# Patient Record
Sex: MALE | Race: WHITE | NOT HISPANIC OR LATINO | URBAN - METROPOLITAN AREA
[De-identification: names, ages, dates, MRNs, and addresses within clinical notes are randomized per-mention and may not be internally consistent; named-entity substitution may affect disease eponyms.]

---

## 2016-10-05 RX ORDER — LEVOTHYROXINE SODIUM 125 MCG
1 TABLET ORAL
Qty: 0 | Refills: 0 | COMMUNITY
Start: 2016-10-05

## 2016-10-05 RX ORDER — SPIRONOLACTONE 25 MG/1
1 TABLET, FILM COATED ORAL
Qty: 30 | Refills: 0 | COMMUNITY
Start: 2016-10-05 | End: 2016-11-04

## 2017-06-22 ENCOUNTER — OUTPATIENT (OUTPATIENT)
Dept: OUTPATIENT SERVICES | Facility: HOSPITAL | Age: 82
LOS: 1 days | End: 2017-06-22
Payer: MEDICARE

## 2017-06-22 VITALS
HEIGHT: 70 IN | DIASTOLIC BLOOD PRESSURE: 65 MMHG | OXYGEN SATURATION: 98 % | WEIGHT: 177.03 LBS | HEART RATE: 76 BPM | RESPIRATION RATE: 16 BRPM | TEMPERATURE: 98 F | SYSTOLIC BLOOD PRESSURE: 102 MMHG

## 2017-06-22 DIAGNOSIS — Z98.89 OTHER SPECIFIED POSTPROCEDURAL STATES: Chronic | ICD-10-CM

## 2017-06-22 DIAGNOSIS — Z95.0 PRESENCE OF CARDIAC PACEMAKER: Chronic | ICD-10-CM

## 2017-06-22 DIAGNOSIS — G56.02 CARPAL TUNNEL SYNDROME, LEFT UPPER LIMB: ICD-10-CM

## 2017-06-22 DIAGNOSIS — I48.0 PAROXYSMAL ATRIAL FIBRILLATION: ICD-10-CM

## 2017-06-22 DIAGNOSIS — Z98.890 OTHER SPECIFIED POSTPROCEDURAL STATES: Chronic | ICD-10-CM

## 2017-06-22 DIAGNOSIS — G56.00 CARPAL TUNNEL SYNDROME, UNSPECIFIED UPPER LIMB: ICD-10-CM

## 2017-06-22 DIAGNOSIS — K56.69 OTHER INTESTINAL OBSTRUCTION: Chronic | ICD-10-CM

## 2017-06-22 DIAGNOSIS — K66.0 PERITONEAL ADHESIONS (POSTPROCEDURAL) (POSTINFECTION): Chronic | ICD-10-CM

## 2017-06-22 DIAGNOSIS — Z01.818 ENCOUNTER FOR OTHER PREPROCEDURAL EXAMINATION: ICD-10-CM

## 2017-06-22 DIAGNOSIS — Z95.1 PRESENCE OF AORTOCORONARY BYPASS GRAFT: Chronic | ICD-10-CM

## 2017-06-22 DIAGNOSIS — Z90.49 ACQUIRED ABSENCE OF OTHER SPECIFIED PARTS OF DIGESTIVE TRACT: Chronic | ICD-10-CM

## 2017-06-22 DIAGNOSIS — I44.2 ATRIOVENTRICULAR BLOCK, COMPLETE: ICD-10-CM

## 2017-06-22 DIAGNOSIS — Z98.49 CATARACT EXTRACTION STATUS, UNSPECIFIED EYE: Chronic | ICD-10-CM

## 2017-06-22 DIAGNOSIS — Z85.828 PERSONAL HISTORY OF OTHER MALIGNANT NEOPLASM OF SKIN: Chronic | ICD-10-CM

## 2017-06-22 PROCEDURE — G0463: CPT

## 2017-06-22 RX ORDER — SODIUM CHLORIDE 9 MG/ML
3 INJECTION INTRAMUSCULAR; INTRAVENOUS; SUBCUTANEOUS EVERY 8 HOURS
Qty: 0 | Refills: 0 | Status: DISCONTINUED | OUTPATIENT
Start: 2017-06-28 | End: 2017-07-13

## 2017-06-22 NOTE — H&P PST ADULT - HISTORY OF PRESENT ILLNESS
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  Patient reports that one week ago he began noticing that he wasn't feeling quite his usual self.  Patient's wide had just passed away at the beginning of this month however.  Patient noticed that he began to feel slightly more short of breath, and as he is a physician himself he bumped up his dose of lasix and noticed some improvement.  However, he was then attending a tennis match with his grandchildren when he noticed that he became very lightheaded though this seemed to resolve.  However, his shortness of breath had not improved much and he went to see his cardiologist yesterday and had a cxr done which had shown pulmonary edema.  Because of this he was recommended to increase his lasix use and was planned for an echocardiogram on monday.  However, patient felt very fatigued yesterday-very weak which is not his usual active self to the extent where he felt he was too weak to walk around.  Because of this the patient came to the [facility] ED.  Patient reports that his pulse ox dropped as low as 82% on RA by the time he came to the ED and he was feeling very dyspneic and having "a lot of air hunger".  Patient denies any fevers/chills, denies chest pain, denies palpitations.  He has noticed a mild cough he denies any sputum production.  Of note the patient does mention that while his wife was recently dying she was in the hospital for a prolonged period of time, so up until 3 weeks ago he had been regularly visiting her in the hospital and bringing her back and forth for a long period of time.  His daughter also reports a recent URI type illness.    In the [facility] ED patient received lasix 40mg iv x 1, zosyn 3.375g iv x 1, tylenol 975mg po x 1 and vancomycin 1g iv x 1 and was briefly on BiPAP for respiratory failure. 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 Pt had surgical consult- carpal tunnel syndrome- for left carpal tunnel release and flexor tenosynovectomy on 06/28/2017

## 2017-06-22 NOTE — H&P PST ADULT - PSH
Adhesion of intestine  Relese of abdominal adhesions- 2002  Artificial cardiac pacemaker  2008  H/O hernia repair  bilateral IHR x 2  History of appendectomy    History of carpal tunnel release, unspecified laterality  right wrist- 2002  History of colon resection  1995  History of skin cancer    Hx of cataract surgery, unspecified laterality    S/P CABG x 3  2002  SBO (small bowel obstruction)  03/2017 Adhesion of intestine  Relese of abdominal adhesions- 2002  Artificial cardiac pacemaker  2008  H/O hernia repair  bilateral IHR x 2  H/O shoulder surgery  Right shoulder repair- 2001  History of appendectomy    History of carpal tunnel release, unspecified laterality  right wrist- 2002  History of colon resection  1995  History of skin cancer    Hx of cataract surgery, unspecified laterality    S/P CABG x 3  2002  SBO (small bowel obstruction)  03/2017

## 2017-06-22 NOTE — H&P PST ADULT - PMH
Abscess  diverticular  Acute on chronic congestive heart failure, unspecified congestive heart failure type  2016  Basal cell carcinoma    Benign prostatic hyperplasia, presence of lower urinary tract symptoms unspecified, unspecified morphology    Carpal tunnel syndrome of left wrist    Chronic gout with tophus, unspecified cause, unspecified site    Complete heart block  s/p PPM insertion-2008  Battery change- 2015  Last interrogation-05/2017  Coronary artery disease involving native heart without angina pectoris, unspecified vessel or lesion type    Hypothyroidism, unspecified type    Melanoma in situ of face excluding eyelid, nose, lip, and ear    Paroxysmal atrial fibrillation  on Coumadin Abscess  diverticular  Acute on chronic congestive heart failure, unspecified congestive heart failure type  2016  Basal cell carcinoma    Benign prostatic hyperplasia, presence of lower urinary tract symptoms unspecified, unspecified morphology    BOOP (bronchiolitis obliterans with organizing pneumonia)  09/2016 after colonoscopy  Carpal tunnel syndrome of left wrist    Chronic gout with tophus, unspecified cause, unspecified site    Complete heart block  s/p PPM insertion-2008  Battery change- 2015  Last interrogation-05/2017  Coronary artery disease involving native heart without angina pectoris, unspecified vessel or lesion type    Hypothyroidism, unspecified type    Melanoma in situ of face excluding eyelid, nose, lip, and ear    Paroxysmal atrial fibrillation  on Coumadin

## 2017-06-22 NOTE — H&P PST ADULT - NSANTHOSAYNRD_GEN_A_CORE
No. MALCOM screening performed.  STOP BANG Legend: 0-2 = LOW Risk; 3-4 = INTERMEDIATE Risk; 5-8 = HIGH Risk

## 2017-06-22 NOTE — H&P PST ADULT - ATTENDING COMMENTS
The patient is admitted today for a left CTR and flexor tenosynovectomy.  I have reviewed the procedure in detail again today with the patient.  The numerous risks, benefits, alternatives, possible complications and expectations are reviewed.  All questions have been thoroughly answered and the patient has verbalized understanding of all of the above and gives consent to proceed

## 2017-06-22 NOTE — H&P PST ADULT - PROBLEM SELECTOR PLAN 2
Last dose of Coumadin on 06/23/2017- INR 2.4 on 06/17/2017  will bridge to Lovenox 80 mg BID on 06/26/2017- Last dose on 06/27/2017 pm

## 2017-06-26 RX ORDER — ENOXAPARIN SODIUM 100 MG/ML
80 INJECTION SUBCUTANEOUS
Qty: 0 | Refills: 0 | COMMUNITY
Start: 2017-06-26

## 2017-06-28 ENCOUNTER — TRANSCRIPTION ENCOUNTER (OUTPATIENT)
Age: 82
End: 2017-06-28

## 2017-06-28 ENCOUNTER — RESULT REVIEW (OUTPATIENT)
Age: 82
End: 2017-06-28

## 2017-06-28 ENCOUNTER — OUTPATIENT (OUTPATIENT)
Dept: OUTPATIENT SERVICES | Facility: HOSPITAL | Age: 82
LOS: 1 days | End: 2017-06-28
Payer: MEDICARE

## 2017-06-28 VITALS
RESPIRATION RATE: 16 BRPM | SYSTOLIC BLOOD PRESSURE: 106 MMHG | HEIGHT: 70 IN | HEART RATE: 81 BPM | WEIGHT: 177.03 LBS | TEMPERATURE: 98 F | OXYGEN SATURATION: 99 % | DIASTOLIC BLOOD PRESSURE: 70 MMHG

## 2017-06-28 VITALS
HEART RATE: 81 BPM | RESPIRATION RATE: 16 BRPM | OXYGEN SATURATION: 99 % | DIASTOLIC BLOOD PRESSURE: 66 MMHG | SYSTOLIC BLOOD PRESSURE: 109 MMHG

## 2017-06-28 DIAGNOSIS — Z98.89 OTHER SPECIFIED POSTPROCEDURAL STATES: Chronic | ICD-10-CM

## 2017-06-28 DIAGNOSIS — Z90.49 ACQUIRED ABSENCE OF OTHER SPECIFIED PARTS OF DIGESTIVE TRACT: Chronic | ICD-10-CM

## 2017-06-28 DIAGNOSIS — K66.0 PERITONEAL ADHESIONS (POSTPROCEDURAL) (POSTINFECTION): Chronic | ICD-10-CM

## 2017-06-28 DIAGNOSIS — G56.00 CARPAL TUNNEL SYNDROME, UNSPECIFIED UPPER LIMB: ICD-10-CM

## 2017-06-28 DIAGNOSIS — Z98.890 OTHER SPECIFIED POSTPROCEDURAL STATES: Chronic | ICD-10-CM

## 2017-06-28 DIAGNOSIS — Z95.1 PRESENCE OF AORTOCORONARY BYPASS GRAFT: Chronic | ICD-10-CM

## 2017-06-28 DIAGNOSIS — Z85.828 PERSONAL HISTORY OF OTHER MALIGNANT NEOPLASM OF SKIN: Chronic | ICD-10-CM

## 2017-06-28 DIAGNOSIS — Z95.0 PRESENCE OF CARDIAC PACEMAKER: Chronic | ICD-10-CM

## 2017-06-28 DIAGNOSIS — Z98.49 CATARACT EXTRACTION STATUS, UNSPECIFIED EYE: Chronic | ICD-10-CM

## 2017-06-28 DIAGNOSIS — K56.69 OTHER INTESTINAL OBSTRUCTION: Chronic | ICD-10-CM

## 2017-06-28 PROCEDURE — 26145 TENDON EXCISION PALM/FINGER: CPT | Mod: LT

## 2017-06-28 PROCEDURE — 88304 TISSUE EXAM BY PATHOLOGIST: CPT

## 2017-06-28 PROCEDURE — 64721 CARPAL TUNNEL SURGERY: CPT | Mod: LT

## 2017-06-28 PROCEDURE — 88304 TISSUE EXAM BY PATHOLOGIST: CPT | Mod: 26

## 2017-06-28 RX ORDER — ACETAMINOPHEN 500 MG
975 TABLET ORAL ONCE
Qty: 0 | Refills: 0 | Status: DISCONTINUED | OUTPATIENT
Start: 2017-06-28 | End: 2017-07-13

## 2017-06-28 RX ORDER — SODIUM CHLORIDE 9 MG/ML
1000 INJECTION INTRAMUSCULAR; INTRAVENOUS; SUBCUTANEOUS
Qty: 0 | Refills: 0 | Status: DISCONTINUED | OUTPATIENT
Start: 2017-06-28 | End: 2017-07-13

## 2017-06-28 RX ORDER — ONDANSETRON 8 MG/1
4 TABLET, FILM COATED ORAL ONCE
Qty: 0 | Refills: 0 | Status: DISCONTINUED | OUTPATIENT
Start: 2017-06-28 | End: 2017-07-13

## 2017-06-28 NOTE — PRE-ANESTHESIA EVALUATION ADULT - NSANTHPMHFT_GEN_ALL_CORE
2002- cabg, 2016- re-do pacemaker 2002- CABG, 2016- re-do pacemaker, last time battery checked was May 2017, was inserthe for CHB  ASHD, s/P CABG, EF 30-35%  SBO 3/17 resolved

## 2017-06-28 NOTE — ASU PATIENT PROFILE, ADULT - PMH
Abscess  diverticular  Acute on chronic congestive heart failure, unspecified congestive heart failure type  2016  Basal cell carcinoma    Benign prostatic hyperplasia, presence of lower urinary tract symptoms unspecified, unspecified morphology    BOOP (bronchiolitis obliterans with organizing pneumonia)  09/2016 after colonoscopy  Carpal tunnel syndrome of left wrist    Chronic gout with tophus, unspecified cause, unspecified site    Complete heart block  s/p PPM insertion-2008  Battery change- 2015  Last interrogation-05/2017  Coronary artery disease involving native heart without angina pectoris, unspecified vessel or lesion type    Hypothyroidism, unspecified type    Melanoma in situ of face excluding eyelid, nose, lip, and ear    Paroxysmal atrial fibrillation  on Coumadin

## 2017-06-28 NOTE — ASU PATIENT PROFILE, ADULT - PSH
Adhesion of intestine  Relese of abdominal adhesions- 2002  Artificial cardiac pacemaker  2008  H/O hernia repair  bilateral IHR x 2  H/O shoulder surgery  Right shoulder repair- 2001  History of appendectomy    History of carpal tunnel release, unspecified laterality  right wrist- 2002  History of colon resection  1995  History of skin cancer    Hx of cataract surgery, unspecified laterality    S/P CABG x 3  2002  SBO (small bowel obstruction)  03/2017

## 2017-11-02 ENCOUNTER — APPOINTMENT (OUTPATIENT)
Dept: SURGERY | Facility: CLINIC | Age: 82
End: 2017-11-02
Payer: MEDICARE

## 2017-11-02 VITALS
WEIGHT: 174 LBS | OXYGEN SATURATION: 98 % | HEIGHT: 70 IN | DIASTOLIC BLOOD PRESSURE: 81 MMHG | RESPIRATION RATE: 16 BRPM | BODY MASS INDEX: 24.91 KG/M2 | SYSTOLIC BLOOD PRESSURE: 116 MMHG | TEMPERATURE: 98 F | HEART RATE: 86 BPM

## 2017-11-02 DIAGNOSIS — Z63.4 DISAPPEARANCE AND DEATH OF FAMILY MEMBER: ICD-10-CM

## 2017-11-02 DIAGNOSIS — I50.9 HEART FAILURE, UNSPECIFIED: ICD-10-CM

## 2017-11-02 DIAGNOSIS — I25.10 ATHEROSCLEROTIC HEART DISEASE OF NATIVE CORONARY ARTERY W/OUT ANGINA PECTORIS: ICD-10-CM

## 2017-11-02 DIAGNOSIS — Z87.09 PERSONAL HISTORY OF OTHER DISEASES OF THE RESPIRATORY SYSTEM: ICD-10-CM

## 2017-11-02 DIAGNOSIS — E78.00 PURE HYPERCHOLESTEROLEMIA, UNSPECIFIED: ICD-10-CM

## 2017-11-02 DIAGNOSIS — N40.0 BENIGN PROSTATIC HYPERPLASIA WITHOUT LOWER URINARY TRACT SYMPMS: ICD-10-CM

## 2017-11-02 DIAGNOSIS — Z87.01 PERSONAL HISTORY OF PNEUMONIA (RECURRENT): ICD-10-CM

## 2017-11-02 DIAGNOSIS — K57.80 DIVERTICULITIS OF INTESTINE, PART UNSPECIFIED, WITH PERFORATION AND ABSCESS W/OUT BLEEDING: ICD-10-CM

## 2017-11-02 DIAGNOSIS — Z87.19 PERSONAL HISTORY OF OTHER DISEASES OF THE DIGESTIVE SYSTEM: ICD-10-CM

## 2017-11-02 DIAGNOSIS — E03.9 HYPOTHYROIDISM, UNSPECIFIED: ICD-10-CM

## 2017-11-02 PROCEDURE — 99205 OFFICE O/P NEW HI 60 MIN: CPT

## 2017-11-02 RX ORDER — METHYLCELLULOSE 2 G/10.2G
POWDER, FOR SOLUTION ORAL
Refills: 0 | Status: ACTIVE | COMMUNITY

## 2017-11-02 RX ORDER — SPIRONOLACTONE 25 MG/1
25 TABLET ORAL
Refills: 0 | Status: ACTIVE | COMMUNITY

## 2017-11-02 RX ORDER — WARFARIN SODIUM 5 MG/1
5 TABLET ORAL
Refills: 0 | Status: ACTIVE | COMMUNITY

## 2017-11-02 RX ORDER — LEVOTHYROXINE SODIUM 112 UG/1
112 TABLET ORAL
Refills: 0 | Status: ACTIVE | COMMUNITY

## 2017-11-02 RX ORDER — SOTALOL HYDROCHLORIDE TABLES AF 80 MG/1
80 TABLET ORAL
Refills: 0 | Status: ACTIVE | COMMUNITY

## 2017-11-02 RX ORDER — ALLOPURINOL 100 MG/1
100 TABLET ORAL
Refills: 0 | Status: ACTIVE | COMMUNITY

## 2017-11-02 RX ORDER — DOCUSATE SODIUM 100 MG/1
CAPSULE ORAL
Refills: 0 | Status: ACTIVE | COMMUNITY

## 2017-11-02 RX ORDER — POTASSIUM CHLORIDE 750 MG/1
10 CAPSULE, EXTENDED RELEASE ORAL
Refills: 0 | Status: ACTIVE | COMMUNITY

## 2017-11-02 RX ORDER — SILODOSIN 8 MG/1
8 CAPSULE ORAL
Refills: 0 | Status: ACTIVE | COMMUNITY

## 2017-11-02 RX ORDER — FUROSEMIDE 40 MG/1
40 TABLET ORAL
Refills: 0 | Status: ACTIVE | COMMUNITY

## 2017-11-02 SDOH — SOCIAL STABILITY - SOCIAL INSECURITY: DISSAPEARANCE AND DEATH OF FAMILY MEMBER: Z63.4

## 2017-11-03 ENCOUNTER — OTHER (OUTPATIENT)
Age: 82
End: 2017-11-03

## 2017-11-13 ENCOUNTER — OUTPATIENT (OUTPATIENT)
Dept: OUTPATIENT SERVICES | Facility: HOSPITAL | Age: 82
LOS: 1 days | End: 2017-11-13

## 2017-11-13 VITALS
RESPIRATION RATE: 18 BRPM | SYSTOLIC BLOOD PRESSURE: 101 MMHG | DIASTOLIC BLOOD PRESSURE: 68 MMHG | HEIGHT: 70 IN | HEART RATE: 79 BPM | WEIGHT: 175.05 LBS | TEMPERATURE: 98 F

## 2017-11-13 DIAGNOSIS — K40.90 UNILATERAL INGUINAL HERNIA, WITHOUT OBSTRUCTION OR GANGRENE, NOT SPECIFIED AS RECURRENT: ICD-10-CM

## 2017-11-13 DIAGNOSIS — Z85.828 PERSONAL HISTORY OF OTHER MALIGNANT NEOPLASM OF SKIN: Chronic | ICD-10-CM

## 2017-11-13 DIAGNOSIS — Z98.890 OTHER SPECIFIED POSTPROCEDURAL STATES: Chronic | ICD-10-CM

## 2017-11-13 DIAGNOSIS — Z01.818 ENCOUNTER FOR OTHER PREPROCEDURAL EXAMINATION: ICD-10-CM

## 2017-11-13 DIAGNOSIS — K40.91 UNILATERAL INGUINAL HERNIA, WITHOUT OBSTRUCTION OR GANGRENE, RECURRENT: ICD-10-CM

## 2017-11-13 DIAGNOSIS — Z90.49 ACQUIRED ABSENCE OF OTHER SPECIFIED PARTS OF DIGESTIVE TRACT: Chronic | ICD-10-CM

## 2017-11-13 DIAGNOSIS — Z95.0 PRESENCE OF CARDIAC PACEMAKER: ICD-10-CM

## 2017-11-13 DIAGNOSIS — Z95.1 PRESENCE OF AORTOCORONARY BYPASS GRAFT: Chronic | ICD-10-CM

## 2017-11-13 DIAGNOSIS — Z98.89 OTHER SPECIFIED POSTPROCEDURAL STATES: Chronic | ICD-10-CM

## 2017-11-13 DIAGNOSIS — K56.69 OTHER INTESTINAL OBSTRUCTION: Chronic | ICD-10-CM

## 2017-11-13 DIAGNOSIS — Z95.0 PRESENCE OF CARDIAC PACEMAKER: Chronic | ICD-10-CM

## 2017-11-13 DIAGNOSIS — K66.0 PERITONEAL ADHESIONS (POSTPROCEDURAL) (POSTINFECTION): Chronic | ICD-10-CM

## 2017-11-13 DIAGNOSIS — Z98.49 CATARACT EXTRACTION STATUS, UNSPECIFIED EYE: Chronic | ICD-10-CM

## 2017-11-13 DIAGNOSIS — I48.0 PAROXYSMAL ATRIAL FIBRILLATION: ICD-10-CM

## 2017-11-13 LAB
ANION GAP SERPL CALC-SCNC: 15 MMOL/L — SIGNIFICANT CHANGE UP (ref 5–17)
BUN SERPL-MCNC: 31 MG/DL — HIGH (ref 7–23)
CALCIUM SERPL-MCNC: 9.4 MG/DL — SIGNIFICANT CHANGE UP (ref 8.4–10.5)
CHLORIDE SERPL-SCNC: 100 MMOL/L — SIGNIFICANT CHANGE UP (ref 96–108)
CO2 SERPL-SCNC: 25 MMOL/L — SIGNIFICANT CHANGE UP (ref 22–31)
CREAT SERPL-MCNC: 1.32 MG/DL — HIGH (ref 0.5–1.3)
GLUCOSE SERPL-MCNC: 60 MG/DL — LOW (ref 70–99)
HCT VFR BLD CALC: 40.2 % — SIGNIFICANT CHANGE UP (ref 39–50)
HGB BLD-MCNC: 13 G/DL — SIGNIFICANT CHANGE UP (ref 13–17)
MCHC RBC-ENTMCNC: 30.7 PG — SIGNIFICANT CHANGE UP (ref 27–34)
MCHC RBC-ENTMCNC: 32.3 GM/DL — SIGNIFICANT CHANGE UP (ref 32–36)
MCV RBC AUTO: 94.8 FL — SIGNIFICANT CHANGE UP (ref 80–100)
PLATELET # BLD AUTO: 156 K/UL — SIGNIFICANT CHANGE UP (ref 150–400)
POTASSIUM SERPL-MCNC: 4.6 MMOL/L — SIGNIFICANT CHANGE UP (ref 3.5–5.3)
POTASSIUM SERPL-SCNC: 4.6 MMOL/L — SIGNIFICANT CHANGE UP (ref 3.5–5.3)
RBC # BLD: 4.24 M/UL — SIGNIFICANT CHANGE UP (ref 4.2–5.8)
RBC # FLD: 15.2 % — HIGH (ref 10.3–14.5)
SODIUM SERPL-SCNC: 140 MMOL/L — SIGNIFICANT CHANGE UP (ref 135–145)
WBC # BLD: 6.77 K/UL — SIGNIFICANT CHANGE UP (ref 3.8–10.5)
WBC # FLD AUTO: 6.77 K/UL — SIGNIFICANT CHANGE UP (ref 3.8–10.5)

## 2017-11-13 RX ORDER — SILODOSIN 4 MG/1
1 CAPSULE ORAL
Qty: 0 | Refills: 0 | COMMUNITY

## 2017-11-13 NOTE — H&P PST ADULT - HISTORY OF PRESENT ILLNESS
82 y/o M PMH CAD, S/P CABG X 3, paroxysmal A fib, complete heart block, S/P PPM 2015, TIA 82 y/o M PMH CAD, S/P CABG X 3  , paroxysmal A fib, complete heart block, S/P PPM 2015, left ocular thrombus 2012, chronic CHF, BOOP developed about 3-4 days after colonoscopy one year ago, was given Propofol for sedation, the patient feels he may have been having an exacerbation of CHF prior to colonoscopy, c/o left inguinal hernia.  Presents today for recurrent left inguinal hernia repair.

## 2017-11-13 NOTE — H&P PST ADULT - PROBLEM SELECTOR PLAN 2
Holding coumadin since 11/11, bridging with Lovenox 80 mg SQ BID, holding dose 24 hours before surgery

## 2017-11-14 ENCOUNTER — EMERGENCY (EMERGENCY)
Facility: HOSPITAL | Age: 82
LOS: 1 days | Discharge: HOME | End: 2017-11-14
Attending: EMERGENCY MEDICINE | Admitting: INTERNAL MEDICINE
Payer: MEDICARE

## 2017-11-14 VITALS
RESPIRATION RATE: 16 BRPM | DIASTOLIC BLOOD PRESSURE: 75 MMHG | HEART RATE: 90 BPM | OXYGEN SATURATION: 98 % | HEIGHT: 70 IN | SYSTOLIC BLOOD PRESSURE: 116 MMHG | WEIGHT: 175.05 LBS | TEMPERATURE: 98 F

## 2017-11-14 DIAGNOSIS — Z98.49 CATARACT EXTRACTION STATUS, UNSPECIFIED EYE: Chronic | ICD-10-CM

## 2017-11-14 DIAGNOSIS — Z98.890 OTHER SPECIFIED POSTPROCEDURAL STATES: Chronic | ICD-10-CM

## 2017-11-14 DIAGNOSIS — K56.69 OTHER INTESTINAL OBSTRUCTION: Chronic | ICD-10-CM

## 2017-11-14 DIAGNOSIS — H54.61 UNQUALIFIED VISUAL LOSS, RIGHT EYE, NORMAL VISION LEFT EYE: ICD-10-CM

## 2017-11-14 DIAGNOSIS — K66.0 PERITONEAL ADHESIONS (POSTPROCEDURAL) (POSTINFECTION): Chronic | ICD-10-CM

## 2017-11-14 DIAGNOSIS — Z95.0 PRESENCE OF CARDIAC PACEMAKER: Chronic | ICD-10-CM

## 2017-11-14 DIAGNOSIS — Z90.49 ACQUIRED ABSENCE OF OTHER SPECIFIED PARTS OF DIGESTIVE TRACT: Chronic | ICD-10-CM

## 2017-11-14 DIAGNOSIS — Z95.1 PRESENCE OF AORTOCORONARY BYPASS GRAFT: Chronic | ICD-10-CM

## 2017-11-14 DIAGNOSIS — Z85.828 PERSONAL HISTORY OF OTHER MALIGNANT NEOPLASM OF SKIN: Chronic | ICD-10-CM

## 2017-11-14 DIAGNOSIS — Z98.89 OTHER SPECIFIED POSTPROCEDURAL STATES: Chronic | ICD-10-CM

## 2017-11-14 LAB
ALBUMIN SERPL ELPH-MCNC: 4.6 G/DL — SIGNIFICANT CHANGE UP (ref 3.3–5)
ALP SERPL-CCNC: 45 U/L — SIGNIFICANT CHANGE UP (ref 40–120)
ALT FLD-CCNC: 21 U/L RC — SIGNIFICANT CHANGE UP (ref 10–45)
ANION GAP SERPL CALC-SCNC: 13 MMOL/L — SIGNIFICANT CHANGE UP (ref 5–17)
APTT BLD: 70.6 SEC — HIGH (ref 27.5–37.4)
AST SERPL-CCNC: 23 U/L — SIGNIFICANT CHANGE UP (ref 10–40)
BASOPHILS # BLD AUTO: 0 K/UL — SIGNIFICANT CHANGE UP (ref 0–0.2)
BASOPHILS NFR BLD AUTO: 0.5 % — SIGNIFICANT CHANGE UP (ref 0–2)
BILIRUB SERPL-MCNC: 0.3 MG/DL — SIGNIFICANT CHANGE UP (ref 0.2–1.2)
BUN SERPL-MCNC: 26 MG/DL — HIGH (ref 7–23)
CALCIUM SERPL-MCNC: 8.8 MG/DL — SIGNIFICANT CHANGE UP (ref 8.4–10.5)
CHLORIDE SERPL-SCNC: 102 MMOL/L — SIGNIFICANT CHANGE UP (ref 96–108)
CO2 SERPL-SCNC: 25 MMOL/L — SIGNIFICANT CHANGE UP (ref 22–31)
CREAT SERPL-MCNC: 1.41 MG/DL — HIGH (ref 0.5–1.3)
EOSINOPHIL # BLD AUTO: 0.4 K/UL — SIGNIFICANT CHANGE UP (ref 0–0.5)
EOSINOPHIL NFR BLD AUTO: 6 % — SIGNIFICANT CHANGE UP (ref 0–6)
GLUCOSE SERPL-MCNC: 118 MG/DL — HIGH (ref 70–99)
HCT VFR BLD CALC: 40.6 % — SIGNIFICANT CHANGE UP (ref 39–50)
HGB BLD-MCNC: 13.8 G/DL — SIGNIFICANT CHANGE UP (ref 13–17)
INR BLD: 1.28 RATIO — HIGH (ref 0.88–1.16)
LYMPHOCYTES # BLD AUTO: 1.1 K/UL — SIGNIFICANT CHANGE UP (ref 1–3.3)
LYMPHOCYTES # BLD AUTO: 17.3 % — SIGNIFICANT CHANGE UP (ref 13–44)
MCHC RBC-ENTMCNC: 33.3 PG — SIGNIFICANT CHANGE UP (ref 27–34)
MCHC RBC-ENTMCNC: 34 GM/DL — SIGNIFICANT CHANGE UP (ref 32–36)
MCV RBC AUTO: 97.7 FL — SIGNIFICANT CHANGE UP (ref 80–100)
MONOCYTES # BLD AUTO: 0.5 K/UL — SIGNIFICANT CHANGE UP (ref 0–0.9)
MONOCYTES NFR BLD AUTO: 8 % — SIGNIFICANT CHANGE UP (ref 2–14)
NEUTROPHILS # BLD AUTO: 4.3 K/UL — SIGNIFICANT CHANGE UP (ref 1.8–7.4)
NEUTROPHILS NFR BLD AUTO: 68.3 % — SIGNIFICANT CHANGE UP (ref 43–77)
PLATELET # BLD AUTO: 139 K/UL — LOW (ref 150–400)
POTASSIUM SERPL-MCNC: 4.6 MMOL/L — SIGNIFICANT CHANGE UP (ref 3.5–5.3)
POTASSIUM SERPL-SCNC: 4.6 MMOL/L — SIGNIFICANT CHANGE UP (ref 3.5–5.3)
PROT SERPL-MCNC: 7.3 G/DL — SIGNIFICANT CHANGE UP (ref 6–8.3)
PROTHROM AB SERPL-ACNC: 14 SEC — HIGH (ref 9.8–12.7)
RBC # BLD: 4.15 M/UL — LOW (ref 4.2–5.8)
RBC # FLD: 13.5 % — SIGNIFICANT CHANGE UP (ref 10.3–14.5)
SODIUM SERPL-SCNC: 140 MMOL/L — SIGNIFICANT CHANGE UP (ref 135–145)
WBC # BLD: 6.4 K/UL — SIGNIFICANT CHANGE UP (ref 3.8–10.5)
WBC # FLD AUTO: 6.4 K/UL — SIGNIFICANT CHANGE UP (ref 3.8–10.5)

## 2017-11-14 PROCEDURE — 70450 CT HEAD/BRAIN W/O DYE: CPT | Mod: 26

## 2017-11-14 PROCEDURE — 71010: CPT | Mod: 26

## 2017-11-14 RX ORDER — SOTALOL HCL 120 MG
80 TABLET ORAL ONCE
Qty: 0 | Refills: 0 | Status: COMPLETED | OUTPATIENT
Start: 2017-11-14 | End: 2017-11-14

## 2017-11-14 RX ORDER — CARVEDILOL PHOSPHATE 80 MG/1
3.12 CAPSULE, EXTENDED RELEASE ORAL ONCE
Qty: 0 | Refills: 0 | Status: COMPLETED | OUTPATIENT
Start: 2017-11-14 | End: 2017-11-14

## 2017-11-14 RX ORDER — WARFARIN SODIUM 2.5 MG/1
8 TABLET ORAL ONCE
Qty: 0 | Refills: 0 | Status: COMPLETED | OUTPATIENT
Start: 2017-11-14 | End: 2017-11-14

## 2017-11-14 RX ORDER — SPIRONOLACTONE 25 MG/1
25 TABLET, FILM COATED ORAL ONCE
Qty: 0 | Refills: 0 | Status: COMPLETED | OUTPATIENT
Start: 2017-11-14 | End: 2017-11-14

## 2017-11-14 RX ORDER — CARVEDILOL PHOSPHATE 80 MG/1
6.25 CAPSULE, EXTENDED RELEASE ORAL ONCE
Qty: 0 | Refills: 0 | Status: COMPLETED | OUTPATIENT
Start: 2017-11-14 | End: 2017-11-14

## 2017-11-14 RX ORDER — ALPRAZOLAM 0.25 MG
0.5 TABLET ORAL ONCE
Qty: 0 | Refills: 0 | Status: DISCONTINUED | OUTPATIENT
Start: 2017-11-14 | End: 2017-11-14

## 2017-11-14 RX ORDER — ENOXAPARIN SODIUM 100 MG/ML
80 INJECTION SUBCUTANEOUS ONCE
Qty: 0 | Refills: 0 | Status: COMPLETED | OUTPATIENT
Start: 2017-11-14 | End: 2017-11-14

## 2017-11-14 RX ADMIN — WARFARIN SODIUM 8 MILLIGRAM(S): 2.5 TABLET ORAL at 23:20

## 2017-11-14 RX ADMIN — ENOXAPARIN SODIUM 80 MILLIGRAM(S): 100 INJECTION SUBCUTANEOUS at 21:30

## 2017-11-14 RX ADMIN — Medication 0.5 MILLIGRAM(S): at 23:20

## 2017-11-14 RX ADMIN — CARVEDILOL PHOSPHATE 3.12 MILLIGRAM(S): 80 CAPSULE, EXTENDED RELEASE ORAL at 21:45

## 2017-11-14 RX ADMIN — Medication 80 MILLIGRAM(S): at 23:20

## 2017-11-14 RX ADMIN — CARVEDILOL PHOSPHATE 6.25 MILLIGRAM(S): 80 CAPSULE, EXTENDED RELEASE ORAL at 21:45

## 2017-11-14 NOTE — ED ADULT NURSE NOTE - CHPI ED SYMPTOMS NEG
no change in level of consciousness/no weakness/no fever/no vomiting/no blurred vision/no loss of consciousness/no nausea/no dizziness/no confusion/no numbness

## 2017-11-14 NOTE — ED PROVIDER NOTE - OBJECTIVE STATEMENT
84 yo male with paroxysmal afib with pacemaker off coumadin for fifth night on lovenox till this morning presenting with 2/3 field of vision loss in the right eye.  intermittent, lasted about 5 minutes with complete resolution.  was scheduled to get a hernia surgery.  no headache no eye pain.  has happened in the right eye in the past when he went off ac with spontaneously resolved. was on 5 mg of coumadin at night. last inr 2 days ago which was 1.5.  on lovenox 80 mg twice day    pcp- steven goldberg - cardiologist   oph- elizabeth solares 84 yo male with paroxysmal afib with pacemaker off coumadin for fifth night on lovenox till this morning presenting with 2/3 field of vision loss in the right eye.  intermittent, lasted about 5 minutes with complete resolution.  was scheduled to get a hernia surgery.  no headache no eye pain.  has happened in the right eye in the past when he went off ac with spontaneously resolved. was on 5 mg of coumadin at night. last inr 2 days ago which was 1.5.  on lovenox 80 mg twice day  last stress test was two years ago, last echo was last year.  last carotid doppler was many years ago.   pcp- steven goldberg - cardiologist   oph- elizabeth solares

## 2017-11-14 NOTE — ED PROVIDER NOTE - NS ED ROS FT
Constitutional: no fever  Eyes: no conjunctivitis  Ears: no ear pain   Nose: no nasal congestion, Mouth/Throat: no throat pain, Neck: no stiffness  Cardiovascular: no chest pain  Chest: no cough  Gastrointestinal: no abdominal pain, no vomiting and diarrhea  MSK: no joint pain  : no dysuria  Skin: no rash  Neuro: no LOC

## 2017-11-14 NOTE — ED PROVIDER NOTE - MEDICAL DECISION MAKING DETAILS
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

## 2017-11-14 NOTE — CONSULT NOTE ADULT - ATTENDING COMMENTS
VASCULAR NEUROLOGY ATTENDING  The patient is seen and examined the history and imaging are reviewed. I agree with the resident note unless otherwise noted. Suspect cardioembolism in the setting of PAF on reverse lovenox bridge for hernia. At this point would continue Lovenox to coumadin bridge. No objection to discharge home with INR close follow-up.

## 2017-11-14 NOTE — ED ADULT NURSE NOTE - OBJECTIVE STATEMENT
82 y/o male PMH a-fib presents to ED c/o right 82 y/o male PMH a-fib presents to ED c/o right eye vision change about an hour before arrival to ED. 82 y/o male PMH a-fib presents to ED c/o right eye vision change about an hour before arrival to ED. Pt says he was in the car when he "all of a sudden felt a black curtain pull over 2/3 of my visual field." Pt was to have hernia surgery tomorrow was 9AM, was taken off his coumadin x5 days. Pt says he had similar symptoms when he came off his anticoagulants briefly in 2012. pt says entire episode lasted around 15 seconds and have since resolved. Denies chest pain, SOB, n/v/d, weakness, blurry vision. Pt lungs clear b/l. Skin warm, dry, intact. Gross motor and neuro intact. No neuro deficits noted. Pt safety and comfort provided. Family at bedside.

## 2017-11-14 NOTE — CONSULT NOTE ADULT - ASSESSMENT
Pt is an 84 yo M with a PMH of Hypothyroidism and pA-Fib (on Coumadin) who came to the ED after an episode of blindness in the bottom 2/3 visual field of his right eye lasting 5 minutes. Exam is unremarkable, CT Head is unremarkable. Pt likely had an episode of [place] Fugax vs less likely decreased blood pressure in the setting of carotid or retinal artery stenosis.    Recommendations:  - CTA Head and Neck  --- Please do with 1mg IV Ativan for sedation (pt claustrophobic)  - Pt refusing MRI Head due to claustrophobia, will consider doing repeat CT Head in 2 days  - C/W Coumadin and Lovenox bridge with daily INR  - Lipid Panel and HbA1c level

## 2017-11-14 NOTE — ED PROVIDER NOTE - PROGRESS NOTE DETAILS
KIRSTY Norman MD : pt endorsed to me by Dr Barber pending neuro eval - seen by neuro who are recommending cta. pt is refusing MRI due to claustrophobia. will admit for further eval.

## 2017-11-14 NOTE — ED PROVIDER NOTE - PHYSICAL EXAMINATION
gen: well appearing  Mentation: AAO x 3  psych: mood appropriate  ENT: airway patent  Eyes: conjunctivae clear bilaterally  Cardio: RRR, no m/r/g  Resp: normal BS b/l  GI: s/nt/nd   Neuro: AAO x 3, sensation and motor function intact, CN 2-12 intact  Skin: No evidence of rash  MSK: normal movement of all extremities gen: well appearing  Mentation: AAO x 3  psych: mood appropriate  ENT: airway patent  Eyes: conjunctivae clear bilaterally  Cardio: RRR, no m/r/g  Resp: normal BS b/l  GI: s/nt/nd   Neuro: AAO x 3, sensation and motor function intact, CN 2-12 intact, eomi, negative finger nose testing   Skin: No evidence of rash  MSK: normal movement of all extremities

## 2017-11-14 NOTE — CONSULT NOTE ADULT - SUBJECTIVE AND OBJECTIVE BOX
HPI:  Pt is an 82 yo M with a PMH of Hypothyroidism and pA-Fib (on Coumadin) who came to the ED after an episode of blindness in the bottom 2/3 visual field of his right eye lasting 5 minutes. Pt endorses that he was driving, when suddenly he couldn't see the dashboard of his car from his right eye. Event lasted 1-2 minutes, faded away, then returned lasting another 2-3 minutes, then faded away again. This has happened to the pt 5 years ago when he was off coumadin for b/l cataract surgery, this time pt was off coumadin for the past 5 days for Hernia repair planned for tomorrow. Otherwise pt denies any HA, dizziness, numbness/tingling, confusion, CP, SOB, cough, nausea/vomiting, abdominal pain, changes in BMs/urination.      MEDICATIONS  (STANDING):  spironolactone 25 milliGRAM(s) Oral once    MEDICATIONS  (PRN):    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 after colonoscopy  Carpal tunnel syndrome of left wrist  Melanoma in situ of face excluding eyelid, nose, lip, and ear  Basal cell carcinoma  Abscess: diverticular  Acute on chronic congestive heart failure, unspecified congestive heart failure type: 2016  Benign prostatic hyperplasia, presence of lower urinary tract symptoms unspecified, unspecified morphology  Chronic gout with tophus, unspecified cause, unspecified site  Hypothyroidism, unspecified type  Paroxysmal atrial fibrillation: on Coumadin  Complete heart block: s/p PPM insertion-2008  Battery change- 2015  Last interrogation-05/2017  Coronary artery disease involving native heart without angina pectoris, unspecified vessel or lesion type  H/O shoulder surgery: Right shoulder repair- 2001  Artificial cardiac pacemaker: 2008  History of colon resection: 1995  S/P CABG x 3: 2002  H/O hernia repair: bilateral IHR x 2  History of appendectomy  History of carpal tunnel release, unspecified laterality: right wrist- 2002  History of skin cancer  Hx of cataract surgery, unspecified laterality  Adhesion of intestine: Relese of abdominal adhesions- 2002  SBO (small bowel obstruction): 03/2017    FAMILY HISTORY:  Family history of coronary artery disease    Allergies    cephalosporins (Fever; Rash)    Intolerances        SHx - No smoking, No ETOH, No drug abuse      Review of Systems:  See HPI.    Vital Signs Last 24 Hrs  T(C): 36.7 (14 Nov 2017 20:58), Max: 36.7 (14 Nov 2017 20:58)  T(F): 98 (14 Nov 2017 20:58), Max: 98 (14 Nov 2017 20:58)  HR: 74 (14 Nov 2017 20:58) (74 - 90)  BP: 130/79 (14 Nov 2017 20:58) (116/75 - 130/79)  BP(mean): --  RR: 16 (14 Nov 2017 20:58) (16 - 16)  SpO2: 98% (14 Nov 2017 20:58) (98% - 98%)    General Exam:   General appearance: No acute distress    Neurological Exam:  Mental Status: Orientated to self, date and place. Attention intact. No dysarthria. Speech fluent.  Cranial Nerves: PERRL, EOMI, VFF, no nystagmus, limited fundoscopy unremarkable. CN V1-3 intact to light touch b/l. No facial asymmetry. Hearing intact to finger rub bilaterally. Tongue, uvula and palate midline. Sternocleidomastoid and Trapezius intact bilaterally.    Motor:   Tone: normal.                  Strength:     [] Upper extremity                      Delt       Bicep    Tricep                                                  R         5/5        5/5        5/5       5/5                                               L          5/5        5/5        5/5       5/5  [] Lower extremity                       HF          KE          KF        DF         PF                                               R        5/5        5/5        5/5       5/5       5/5                                               L         5/5        5/5       5/5       5/5        5/5  Pronator drift: none b/l                 Dysmetria: None to finger-nose-finger b/l  Tremor: No resting, postural or action tremor.  No myoclonus.    Sensation: intact to light touch throughout    Deep Tendon Reflexes:              Biceps       BR        Patellar        Ankle         Babinski                                         R       1+            1+            2+           0               downgoing                                         L        1+            1+            2+           0               downgoing      11-14    140  |  102  |  26<H>  ----------------------------<  118<H>  4.6   |  25  |  1.41<H>    Ca    8.8      14 Nov 2017 22:01    TPro  7.3  /  Alb  4.6  /  TBili  0.3  /  DBili  x   /  AST  23  /  ALT  21  /  AlkPhos  45  11-14                            13.8   6.4   )-----------( 139      ( 14 Nov 2017 22:01 )             40.6       Radiology:  < from: CT Head No Cont (11.14.17 @ 21:48) >  No acute territorial infarct, hemorrhage, mass or mass effect.

## 2017-11-15 ENCOUNTER — TRANSCRIPTION ENCOUNTER (OUTPATIENT)
Age: 82
End: 2017-11-15

## 2017-11-15 ENCOUNTER — APPOINTMENT (OUTPATIENT)
Dept: SURGERY | Facility: HOSPITAL | Age: 82
End: 2017-11-15

## 2017-11-15 VITALS
RESPIRATION RATE: 18 BRPM | HEART RATE: 75 BPM | SYSTOLIC BLOOD PRESSURE: 105 MMHG | DIASTOLIC BLOOD PRESSURE: 71 MMHG | OXYGEN SATURATION: 98 % | TEMPERATURE: 97 F

## 2017-11-15 DIAGNOSIS — G45.3 AMAUROSIS FUGAX: ICD-10-CM

## 2017-11-15 DIAGNOSIS — R79.1 ABNORMAL COAGULATION PROFILE: ICD-10-CM

## 2017-11-15 DIAGNOSIS — I48.0 PAROXYSMAL ATRIAL FIBRILLATION: ICD-10-CM

## 2017-11-15 DIAGNOSIS — N18.3 CHRONIC KIDNEY DISEASE, STAGE 3 (MODERATE): ICD-10-CM

## 2017-11-15 DIAGNOSIS — Z29.9 ENCOUNTER FOR PROPHYLACTIC MEASURES, UNSPECIFIED: ICD-10-CM

## 2017-11-15 DIAGNOSIS — H54.61 UNQUALIFIED VISUAL LOSS, RIGHT EYE, NORMAL VISION LEFT EYE: ICD-10-CM

## 2017-11-15 DIAGNOSIS — I50.22 CHRONIC SYSTOLIC (CONGESTIVE) HEART FAILURE: ICD-10-CM

## 2017-11-15 LAB
ANION GAP SERPL CALC-SCNC: 14 MMOL/L — SIGNIFICANT CHANGE UP (ref 5–17)
APTT BLD: 62.3 SEC — HIGH (ref 27.5–37.4)
BASOPHILS # BLD AUTO: 0.02 K/UL — SIGNIFICANT CHANGE UP (ref 0–0.2)
BASOPHILS NFR BLD AUTO: 0.4 % — SIGNIFICANT CHANGE UP (ref 0–2)
BUN SERPL-MCNC: 24 MG/DL — HIGH (ref 7–23)
CALCIUM SERPL-MCNC: 8.5 MG/DL — SIGNIFICANT CHANGE UP (ref 8.4–10.5)
CHLORIDE SERPL-SCNC: 103 MMOL/L — SIGNIFICANT CHANGE UP (ref 96–108)
CHOLEST SERPL-MCNC: 165 MG/DL — SIGNIFICANT CHANGE UP (ref 10–199)
CO2 SERPL-SCNC: 21 MMOL/L — LOW (ref 22–31)
CREAT SERPL-MCNC: 1.17 MG/DL — SIGNIFICANT CHANGE UP (ref 0.5–1.3)
CRP SERPL-MCNC: <0.2 MG/DL — SIGNIFICANT CHANGE UP (ref 0–0.4)
EOSINOPHIL # BLD AUTO: 0.3 K/UL — SIGNIFICANT CHANGE UP (ref 0–0.5)
EOSINOPHIL NFR BLD AUTO: 5.7 % — SIGNIFICANT CHANGE UP (ref 0–6)
ERYTHROCYTE [SEDIMENTATION RATE] IN BLOOD: 17 MM/HR — SIGNIFICANT CHANGE UP (ref 0–20)
GLUCOSE SERPL-MCNC: 94 MG/DL — SIGNIFICANT CHANGE UP (ref 70–99)
HBA1C BLD-MCNC: 5.8 % — HIGH (ref 4–5.6)
HCT VFR BLD CALC: 38.4 % — LOW (ref 39–50)
HDLC SERPL-MCNC: 38 MG/DL — LOW (ref 40–125)
HGB BLD-MCNC: 12.5 G/DL — LOW (ref 13–17)
IMM GRANULOCYTES NFR BLD AUTO: 0.8 % — SIGNIFICANT CHANGE UP (ref 0–1.5)
INR BLD: 1.28 RATIO — HIGH (ref 0.88–1.16)
LIPID PNL WITH DIRECT LDL SERPL: 98 MG/DL — SIGNIFICANT CHANGE UP
LYMPHOCYTES # BLD AUTO: 0.98 K/UL — LOW (ref 1–3.3)
LYMPHOCYTES # BLD AUTO: 18.5 % — SIGNIFICANT CHANGE UP (ref 13–44)
MAGNESIUM SERPL-MCNC: 2.3 MG/DL — SIGNIFICANT CHANGE UP (ref 1.6–2.6)
MCHC RBC-ENTMCNC: 31.2 PG — SIGNIFICANT CHANGE UP (ref 27–34)
MCHC RBC-ENTMCNC: 32.6 GM/DL — SIGNIFICANT CHANGE UP (ref 32–36)
MCV RBC AUTO: 95.8 FL — SIGNIFICANT CHANGE UP (ref 80–100)
MONOCYTES # BLD AUTO: 0.48 K/UL — SIGNIFICANT CHANGE UP (ref 0–0.9)
MONOCYTES NFR BLD AUTO: 9.1 % — SIGNIFICANT CHANGE UP (ref 2–14)
NEUTROPHILS # BLD AUTO: 3.47 K/UL — SIGNIFICANT CHANGE UP (ref 1.8–7.4)
NEUTROPHILS NFR BLD AUTO: 65.5 % — SIGNIFICANT CHANGE UP (ref 43–77)
PHOSPHATE SERPL-MCNC: 3 MG/DL — SIGNIFICANT CHANGE UP (ref 2.5–4.5)
PLATELET # BLD AUTO: 116 K/UL — LOW (ref 150–400)
POTASSIUM SERPL-MCNC: 4.3 MMOL/L — SIGNIFICANT CHANGE UP (ref 3.5–5.3)
POTASSIUM SERPL-SCNC: 4.3 MMOL/L — SIGNIFICANT CHANGE UP (ref 3.5–5.3)
PROTHROM AB SERPL-ACNC: 14.5 SEC — HIGH (ref 10–13.1)
RBC # BLD: 4.01 M/UL — LOW (ref 4.2–5.8)
RBC # FLD: 15.2 % — HIGH (ref 10.3–14.5)
SODIUM SERPL-SCNC: 138 MMOL/L — SIGNIFICANT CHANGE UP (ref 135–145)
TOTAL CHOLESTEROL/HDL RATIO MEASUREMENT: 4.3 RATIO — SIGNIFICANT CHANGE UP (ref 3.4–9.6)
TRIGL SERPL-MCNC: 145 MG/DL — SIGNIFICANT CHANGE UP (ref 10–149)
WBC # BLD: 5.29 K/UL — SIGNIFICANT CHANGE UP (ref 3.8–10.5)
WBC # FLD AUTO: 5.29 K/UL — SIGNIFICANT CHANGE UP (ref 3.8–10.5)

## 2017-11-15 PROCEDURE — 96372 THER/PROPH/DIAG INJ SC/IM: CPT

## 2017-11-15 PROCEDURE — 85610 PROTHROMBIN TIME: CPT

## 2017-11-15 PROCEDURE — 85027 COMPLETE CBC AUTOMATED: CPT

## 2017-11-15 PROCEDURE — 83735 ASSAY OF MAGNESIUM: CPT

## 2017-11-15 PROCEDURE — 99285 EMERGENCY DEPT VISIT HI MDM: CPT | Mod: 25

## 2017-11-15 PROCEDURE — 93005 ELECTROCARDIOGRAM TRACING: CPT

## 2017-11-15 PROCEDURE — 86140 C-REACTIVE PROTEIN: CPT

## 2017-11-15 PROCEDURE — 70496 CT ANGIOGRAPHY HEAD: CPT

## 2017-11-15 PROCEDURE — 83036 HEMOGLOBIN GLYCOSYLATED A1C: CPT

## 2017-11-15 PROCEDURE — 85730 THROMBOPLASTIN TIME PARTIAL: CPT

## 2017-11-15 PROCEDURE — 70498 CT ANGIOGRAPHY NECK: CPT

## 2017-11-15 PROCEDURE — 85652 RBC SED RATE AUTOMATED: CPT

## 2017-11-15 PROCEDURE — 70498 CT ANGIOGRAPHY NECK: CPT | Mod: 26

## 2017-11-15 PROCEDURE — 80048 BASIC METABOLIC PNL TOTAL CA: CPT

## 2017-11-15 PROCEDURE — 71045 X-RAY EXAM CHEST 1 VIEW: CPT

## 2017-11-15 PROCEDURE — 70496 CT ANGIOGRAPHY HEAD: CPT | Mod: 26

## 2017-11-15 PROCEDURE — 70450 CT HEAD/BRAIN W/O DYE: CPT

## 2017-11-15 PROCEDURE — 84100 ASSAY OF PHOSPHORUS: CPT

## 2017-11-15 PROCEDURE — 80061 LIPID PANEL: CPT

## 2017-11-15 PROCEDURE — 80053 COMPREHEN METABOLIC PANEL: CPT

## 2017-11-15 PROCEDURE — G0463: CPT

## 2017-11-15 RX ORDER — DOCUSATE SODIUM 100 MG
100 CAPSULE ORAL
Qty: 0 | Refills: 0 | Status: DISCONTINUED | OUTPATIENT
Start: 2017-11-15 | End: 2017-11-15

## 2017-11-15 RX ORDER — CARVEDILOL PHOSPHATE 80 MG/1
3 CAPSULE, EXTENDED RELEASE ORAL
Qty: 0 | Refills: 0 | COMMUNITY
Start: 2017-11-15

## 2017-11-15 RX ORDER — CARVEDILOL PHOSPHATE 80 MG/1
1 CAPSULE, EXTENDED RELEASE ORAL
Qty: 0 | Refills: 0 | COMMUNITY

## 2017-11-15 RX ORDER — ENOXAPARIN SODIUM 100 MG/ML
80 INJECTION SUBCUTANEOUS
Qty: 0 | Refills: 0 | Status: DISCONTINUED | OUTPATIENT
Start: 2017-11-15 | End: 2017-11-15

## 2017-11-15 RX ORDER — PANTOPRAZOLE SODIUM 20 MG/1
40 TABLET, DELAYED RELEASE ORAL
Qty: 0 | Refills: 0 | Status: DISCONTINUED | OUTPATIENT
Start: 2017-11-15 | End: 2017-11-15

## 2017-11-15 RX ORDER — EZETIMIBE 10 MG/1
5 TABLET ORAL DAILY
Qty: 0 | Refills: 0 | Status: DISCONTINUED | OUTPATIENT
Start: 2017-11-15 | End: 2017-11-15

## 2017-11-15 RX ORDER — SOTALOL HCL 120 MG
80 TABLET ORAL
Qty: 0 | Refills: 0 | Status: DISCONTINUED | OUTPATIENT
Start: 2017-11-15 | End: 2017-11-15

## 2017-11-15 RX ORDER — SILODOSIN 4 MG/1
8 CAPSULE ORAL AT BEDTIME
Qty: 0 | Refills: 0 | Status: DISCONTINUED | OUTPATIENT
Start: 2017-11-15 | End: 2017-11-15

## 2017-11-15 RX ORDER — ATORVASTATIN CALCIUM 80 MG/1
10 TABLET, FILM COATED ORAL AT BEDTIME
Qty: 0 | Refills: 0 | Status: DISCONTINUED | OUTPATIENT
Start: 2017-11-15 | End: 2017-11-15

## 2017-11-15 RX ORDER — ALLOPURINOL 300 MG
100 TABLET ORAL DAILY
Qty: 0 | Refills: 0 | Status: DISCONTINUED | OUTPATIENT
Start: 2017-11-15 | End: 2017-11-15

## 2017-11-15 RX ORDER — FUROSEMIDE 40 MG
40 TABLET ORAL DAILY
Qty: 0 | Refills: 0 | Status: DISCONTINUED | OUTPATIENT
Start: 2017-11-15 | End: 2017-11-15

## 2017-11-15 RX ORDER — LACTOBACILLUS ACIDOPHILUS 100MM CELL
1 CAPSULE ORAL DAILY
Qty: 0 | Refills: 0 | Status: DISCONTINUED | OUTPATIENT
Start: 2017-11-15 | End: 2017-11-15

## 2017-11-15 RX ORDER — POTASSIUM CHLORIDE 20 MEQ
0 PACKET (EA) ORAL
Qty: 0 | Refills: 0 | COMMUNITY

## 2017-11-15 RX ORDER — SODIUM CHLORIDE 9 MG/ML
500 INJECTION INTRAMUSCULAR; INTRAVENOUS; SUBCUTANEOUS ONCE
Qty: 0 | Refills: 0 | Status: COMPLETED | OUTPATIENT
Start: 2017-11-15 | End: 2017-11-15

## 2017-11-15 RX ORDER — ENOXAPARIN SODIUM 100 MG/ML
80 INJECTION SUBCUTANEOUS
Qty: 6 | Refills: 0 | OUTPATIENT
Start: 2017-11-15 | End: 2017-11-18

## 2017-11-15 RX ORDER — ACETAMINOPHEN 500 MG
650 TABLET ORAL ONCE
Qty: 0 | Refills: 0 | Status: COMPLETED | OUTPATIENT
Start: 2017-11-15 | End: 2017-11-15

## 2017-11-15 RX ORDER — LEVOTHYROXINE SODIUM 125 MCG
112 TABLET ORAL DAILY
Qty: 0 | Refills: 0 | Status: DISCONTINUED | OUTPATIENT
Start: 2017-11-15 | End: 2017-11-15

## 2017-11-15 RX ORDER — CARVEDILOL PHOSPHATE 80 MG/1
9.38 CAPSULE, EXTENDED RELEASE ORAL EVERY 12 HOURS
Qty: 0 | Refills: 0 | Status: DISCONTINUED | OUTPATIENT
Start: 2017-11-15 | End: 2017-11-15

## 2017-11-15 RX ORDER — ALPRAZOLAM 0.25 MG
0.5 TABLET ORAL AT BEDTIME
Qty: 0 | Refills: 0 | Status: DISCONTINUED | OUTPATIENT
Start: 2017-11-15 | End: 2017-11-15

## 2017-11-15 RX ORDER — SILODOSIN 4 MG/1
1 CAPSULE ORAL
Qty: 0 | Refills: 0 | COMMUNITY

## 2017-11-15 RX ADMIN — CARVEDILOL PHOSPHATE 9.38 MILLIGRAM(S): 80 CAPSULE, EXTENDED RELEASE ORAL at 05:20

## 2017-11-15 RX ADMIN — SILODOSIN 8 MILLIGRAM(S): 4 CAPSULE ORAL at 02:16

## 2017-11-15 RX ADMIN — SODIUM CHLORIDE 500 MILLILITER(S): 9 INJECTION INTRAMUSCULAR; INTRAVENOUS; SUBCUTANEOUS at 00:29

## 2017-11-15 RX ADMIN — ENOXAPARIN SODIUM 80 MILLIGRAM(S): 100 INJECTION SUBCUTANEOUS at 09:20

## 2017-11-15 RX ADMIN — Medication 650 MILLIGRAM(S): at 02:46

## 2017-11-15 RX ADMIN — Medication 650 MILLIGRAM(S): at 02:16

## 2017-11-15 RX ADMIN — Medication 100 MILLIGRAM(S): at 05:20

## 2017-11-15 RX ADMIN — Medication 112 MICROGRAM(S): at 05:20

## 2017-11-15 RX ADMIN — Medication 80 MILLIGRAM(S): at 05:20

## 2017-11-15 RX ADMIN — PANTOPRAZOLE SODIUM 40 MILLIGRAM(S): 20 TABLET, DELAYED RELEASE ORAL at 05:20

## 2017-11-15 NOTE — PROVIDER CONTACT NOTE (OTHER) - SITUATION
Pt BMP is not back, pt is getting D/C on lasix. Unsure is to take med as pt kidney functions is elevated

## 2017-11-15 NOTE — DISCHARGE NOTE ADULT - PLAN OF CARE
F/u with your ophthalmologist and neurologist Weigh yourself daily.  If you gain 3lbs in 3 days, or 5lbs in a week call your Health Care Provider.  Do not eat or drink foods containing more than 2000mg of salt (sodium) in your diet every day.  Call your Health Care Provider if you have any swelling or increased swelling in your feet, ankles, and/or stomach.  Take all of your medication as directed.  If you become dizzy call your Health Care Provider. Avoid taking (NSAIDs) - (ex: Ibuprofen, Advil, Celebrex, Naprosyn)  Avoid taking any nephrotoxic agents (can harm kidneys) - Intravenous contrast for diagnostic testing, combination cold medications.  Have all medications adjusted for your renal function by your Health Care Provider.  Blood pressure control is important.  Take all medication as prescribed. Continue Lovenox and coumadin and follow up with your cardiologist on Friday for further dosing of your medication Atrial fibrillation is the most common heart rhythm problem & has the risk of stroke & heart attack  It helps if you control your blood pressure, not drink more than 1-2 alcohol drinks per day, cut down on caffeine, getting treatment for over active thyroid gland, & getting exercise  Call your doctor if you feel your heart racing or beating unusually, chest tightness or pain, lightheaded, faint, shortness of breath especially with exercise  It is important to take your heart medication as prescribed  You may be on anticoagulation which is very important to take as directed - you may need blood work to monitor drug levels

## 2017-11-15 NOTE — H&P ADULT - PROBLEM SELECTOR PLAN 2
Patient given Lovenox 80 mg and Coumadin 8 mg in AM  Continue with Lovenox bridge to coumadin dosing  Monitor PT/INR daily  Primary day team to dose coumadin daily.

## 2017-11-15 NOTE — DISCHARGE NOTE ADULT - SECONDARY DIAGNOSIS.
Acute on chronic congestive heart failure, unspecified congestive heart failure type CKD (chronic kidney disease) stage 3, GFR 30-59 ml/min Paroxysmal atrial fibrillation

## 2017-11-15 NOTE — H&P ADULT - NSHPSOCIALHISTORY_GEN_ALL_CORE
Retired gastroenterologist  Former smoker (pipe) quit 34 years ago  EtOH use: 1 glass of wine/daily  No illicit drug use

## 2017-11-15 NOTE — H&P ADULT - HISTORY OF PRESENT ILLNESS
84 yo man with PMH of CAD s/p CABG, paroxsymal Afib on A/C, systolic HF (reported EF 45% ~1 year ago per patient), hypothyroidism, BPH, presenting with acute vision loss of the right eye. Patient states that he was driving in his car when he experienced blindness of the bottom 2/3 visual field of the right eye. Patient states that the blindness lasted ~1-2 minutes and slowly resolved and returned for another 2-3 minutes and self-resolved again. Patient denies associated headaches or right eye pain.  Denies the left eye ever being affected. Denies prior visual disturbances, discharge in the past few days. Denies acute onset weakness, numbness, gait imbalance. Patient endorsed a similar episode ~5 years ago: at that time he was off coumadin for a few days for cataract surgery. Patient had planned elective hernia repair on 11/15 and has been off coumadin for 5 days in anticipation of surgery. Since checking his INR 2 days ago: 1.5 and started taking Lovenox 80 mg BID. Patient also states when to opthalmology appointment ~4 days ago and was told everything appeared normal.

## 2017-11-15 NOTE — H&P ADULT - NSHPREVIEWOFSYSTEMS_GEN_ALL_CORE
REVIEW OF SYSTEMS:  CONSTITUTIONAL: No fever, chills, or sweats  EYES: see HPI.  ENMT:  No difficulty hearing, tinnitus, vertigo; No sinus or throat pain  NECK: No pain or stiffness  RESPIRATORY: No cough. No shortness of breath  CARDIOVASCULAR: No chest pain, palpitations, dizziness, or leg swelling  GASTROINTESTINAL: No abdominal pain. No nausea, vomiting, No diarrhea or constipation. No melena or hematochezia.  GENITOURINARY: No dysuria, or increased frequency  NEUROLOGICAL: No headaches, memory loss, loss of strength, numbness, or tremors  SKIN: No itching, burning, rashes, or lesions   MUSCULOSKELETAL: No joint pain or swelling; No muscle, back, or extremity pain  PSYCHIATRIC: No depression, anxiety. History of sleeping difficulty at night  HEME/LYMPH: No easy bruising, or bleeding gums  ALLERGY AND IMMUNOLOGIC: Reactions to medications. States may have propofol allergy as he developed BOOP after colonscopy.

## 2017-11-15 NOTE — CONSULT NOTE ADULT - SUBJECTIVE AND OBJECTIVE BOX
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 He was driving when he had acute painless loss of vision in the lower 2/3 of his R eye which resolved completely after a few minutes. No headaches. He was off coumadin and on Lovenox bridge for planned hernia repair surgery.  He has been off lovenox for approximately 12 hours and the surgery was planned for today. He is currently asymptomatic.      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 after colonoscopy  Carpal tunnel syndrome of left wrist  Melanoma in situ of face excluding eyelid, nose, lip, and ear  Basal cell carcinoma  Abscess: diverticular  Acute on chronic congestive heart failure, unspecified congestive heart failure type: 2016  Benign prostatic hyperplasia, presence of lower urinary tract symptoms unspecified, unspecified morphology  Chronic gout with tophus, unspecified cause, unspecified site  Hypothyroidism, unspecified type  Paroxysmal atrial fibrillation: on Coumadin  Complete heart block: s/p PPM insertion-2008  Battery change- 2015  Last interrogation-05/2017  Coronary artery disease involving native heart without angina pectoris, unspecified vessel or lesion type  H/O shoulder surgery: Right shoulder repair- 2001  Artificial cardiac pacemaker: 2008  History of colon resection: 1995  S/P CABG x 3: 2002  H/O hernia repair: bilateral IHR x 2  History of appendectomy  History of carpal tunnel release, unspecified laterality: right wrist- 2002  History of skin cancer  Hx of cataract surgery, unspecified laterality  Adhesion of intestine: Relese of abdominal adhesions- 2002  SBO (small bowel obstruction): 03/2017      MEDICATIONS  (STANDING):  allopurinol 100 milliGRAM(s) Oral daily  atorvastatin 10 milliGRAM(s) Oral at bedtime  carvedilol 9.375 milliGRAM(s) Oral every 12 hours  docusate sodium 100 milliGRAM(s) Oral two times a day  enoxaparin Injectable 80 milliGRAM(s) SubCutaneous two times a day  ezetimibe 5 milliGRAM(s) Oral daily  furosemide    Tablet 40 milliGRAM(s) Oral daily  lactobacillus acidophilus 1 Tablet(s) Oral daily  levothyroxine 112 MICROGram(s) Oral daily  pantoprazole    Tablet 40 milliGRAM(s) Oral before breakfast  silodosin 8 milliGRAM(s) Oral at bedtime  sotalol 80 milliGRAM(s) Oral two times a day    MEDICATIONS  (PRN):  ALPRAZolam 0.5 milliGRAM(s) Oral at bedtime PRN Inomnia      Allergies    cephalosporins (Fever; Rash)    Intolerances        Social Histroy: Tobacco- , ETOH-, Illicit Drugs-    T(C): 36.3 (11-15-17 @ 05:16), Max: 36.7 (11-14-17 @ 20:58)  HR: 75 (11-15-17 @ 05:16) (74 - 90)  BP: 108/70 (11-15-17 @ 05:16) (105/68 - 130/79)  RR: 18 (11-15-17 @ 05:16) (16 - 18)  SpO2: 95% (11-15-17 @ 05:16) (95% - 98%)  I&O's Summary    14 Nov 2017 07:01  -  15 Nov 2017 07:00  --------------------------------------------------------  IN: 360 mL / OUT: 0 mL / NET: 360 mL        Review of Systems:  Constitutional: [ ] Fever [ ] Chills [ ] Fatigue [ ] Weight change   HEENT: [ ] Blurred vision [ ] Eye Pain [ ] Headache [ ] Runny nose [ ] Sore Throat   Respiratory: [ ] Cough [ ] Wheezing [ ] Shortness of breath  Cardiovascular: [ ] Chest Pain [ ] Palpitations [ ] SMITH [ ] PND [ ] Orthopnea  Gastrointestinal: [ ] Abdominal Pain [ ] Diarrhea [ ] Constipation [ ] Hemorrhoids [ ] Nausea [ ] Vomiting  Genitourinary: [ ] Nocturia [ ] Dysuria [ ] Incontinence  Extremities: [ ] Swelling [ ] Joint Pain  Neurologic: [ ] Focal deficit [ ] Paresthesias [ ] Syncope [x] vision loss  Lymphatic: [ ] Swelling [ ] Lymphadenopathy   Skin: [ ] Rash [ ] Ecchymoses [ ] Wounds [ ] Lesions  Psychiatry: [ ] Depression [ ] Suicidal/Homicidal Ideation [ ] Anxiety [ ] Sleep Disturbances  [ x] 10 point review of systems is otherwise negative except as mentioned above            [ ]Unable to obtain    PHYSICAL EXAM:  GENERAL: Alert, NAD  NECK: Supple, No JVD, No carotid bruit.  CHEST/LUNG: Clear to auscultation bilaterally; No wheezes, rales, or rhonchi  HEART: S1 S2 normal, RRR,  No murmurs, rubs, or gallops  ABDOMEN: Soft, Nontender, Nondistended; Bowel sounds present  EXTREMITIES:  No LE edema.      LABS:                        12.5   5.29  )-----------( 116      ( 15 Nov 2017 08:53 )             38.4     11-14    140  |  102  |  26<H>  ----------------------------<  118<H>  4.6   |  25  |  1.41<H>    Ca    8.8      14 Nov 2017 22:01    TPro  7.3  /  Alb  4.6  /  TBili  0.3  /  DBili  x   /  AST  23  /  ALT  21  /  AlkPhos  45  11-14    PT/INR - ( 15 Nov 2017 08:53 )   PT: 14.5 sec;   INR: 1.28 ratio         PTT - ( 15 Nov 2017 08:53 )  PTT:62.3 sec      MEDICATIONS  (STANDING):  allopurinol 100 milliGRAM(s) Oral daily  atorvastatin 10 milliGRAM(s) Oral at bedtime  carvedilol 9.375 milliGRAM(s) Oral every 12 hours  docusate sodium 100 milliGRAM(s) Oral two times a day  enoxaparin Injectable 80 milliGRAM(s) SubCutaneous two times a day  ezetimibe 5 milliGRAM(s) Oral daily  furosemide    Tablet 40 milliGRAM(s) Oral daily  lactobacillus acidophilus 1 Tablet(s) Oral daily  levothyroxine 112 MICROGram(s) Oral daily  pantoprazole    Tablet 40 milliGRAM(s) Oral before breakfast  silodosin 8 milliGRAM(s) Oral at bedtime  sotalol 80 milliGRAM(s) Oral two times a day    MEDICATIONS  (PRN):  ALPRAZolam 0.5 milliGRAM(s) Oral at bedtime PRN Inomnia      RADIOLOGY & ADDITIONAL TESTS:    Cardiology testing:  EKG: Vpaced rhythm

## 2017-11-15 NOTE — H&P ADULT - PROBLEM SELECTOR PLAN 5
On coumadin Patient appears euvolemic.  Continue with Coreg  Patient on Lasix and Spironolactone.   Spironolactone given by ED team earlier in the evening.  Monitor BMP in setting of CKD and recent contrast use. Primary day team to determine if patient allowed to continue diuresis in AM.  Monitor I/O and daily weights

## 2017-11-15 NOTE — DISCHARGE NOTE ADULT - CARE PROVIDER_API CALL
Goldberg, Steven M (MD), Cardiovascular Disease; Internal Medicine  [address] [place] [address]  Phone: [phone]  Fax: [phone]

## 2017-11-15 NOTE — H&P ADULT - NSHPPHYSICALEXAM_GEN_ALL_CORE
Vital Signs Last 24 Hrs  T(C): 36.7 (14 Nov 2017 20:58), Max: 36.7 (14 Nov 2017 20:58)  T(F): 98 (14 Nov 2017 20:58), Max: 98 (14 Nov 2017 20:58)  HR: 74 (14 Nov 2017 20:58) (74 - 90)  BP: 130/79 (14 Nov 2017 20:58) (116/75 - 130/79)  BP(mean): --  RR: 16 (14 Nov 2017 20:58) (16 - 16)  SpO2: 98% (14 Nov 2017 20:58) (98% - 98%) Vital Signs Last 24 Hrs  T(C): 36.7 (14 Nov 2017 20:58), Max: 36.7 (14 Nov 2017 20:58)  T(F): 98 (14 Nov 2017 20:58), Max: 98 (14 Nov 2017 20:58)  HR: 74 (14 Nov 2017 20:58) (74 - 90)  BP: 130/79 (14 Nov 2017 20:58) (116/75 - 130/79)  BP(mean): --  RR: 16 (14 Nov 2017 20:58) (16 - 16)  SpO2: 98% (14 Nov 2017 20:58) (98% - 98%)      PHYSICAL EXAM:  GENERAL: NAD, well-groomed, well-developed  HEAD:  Atraumatic, Normocephalic  EYES: EOMI, no nystagmus, PERRLA, conjunctiva and sclera clear  ENMT:  Moist mucous membranes, Good dentition, No lesions  NECK: Supple, No JVD, Normal thyroid  NERVOUS SYSTEM:  Alert & Oriented X3, Good concentration. Fluid speech; Motor Strength 5/5 B/L upper and lower extremities; No pronator drift; No appreciable dysmetria on FTN testing  CHEST/LUNG: Clear to percussion bilaterally; No rales, rhonchi, wheezing, or rubs  HEART: Regular rate and rhythm; No murmurs, rubs, or gallops  ABDOMEN: Soft, Nontender, Nondistended; Bowel sounds present  EXTREMITIES:  2+ Peripheral Pulses, No clubbing, cyanosis, or edema  LYMPH: No lymphadenopathy noted  SKIN: No rashes or lesions

## 2017-11-15 NOTE — PROVIDER CONTACT NOTE (OTHER) - ACTION/TREATMENT ORDERED:
RENITA Donahue notified, as per cardiologist pt should continue to take lasix until follow up appointment on friday

## 2017-11-15 NOTE — H&P ADULT - PROBLEM SELECTOR PLAN 4
Patient appears euvolemic.  Continue with Coreg  Patient on Lasix and Spironolactone.   Spironolactone given by ED team earlier in the evening.  Monitor BMP in setting of CKD and recent contrast use. Primary day team to determine if patient allowed to continue diuresis in AM.  Monitor I/O and daily weights Patient currently rate controlled.  Continue with home dose of Sotalol

## 2017-11-15 NOTE — H&P ADULT - ATTENDING COMMENTS
Dr. Justice Canela/[facility] team accepted patient's case from the ED and requested in house hospitalist team to complete admission. Patient previously unknown to me and I was assigned to patient's case. [facility] team to assume care ~8AM. Case discussed with overnight NP, Misti [phone].

## 2017-11-15 NOTE — DISCHARGE NOTE ADULT - HOSPITAL COURSE
84 yo man with PMH of CAD s/p CABG, paroxsymal Afib on A/C, systolic HF (reported EF 45% ~1 year ago per patient), hypothyroidism, BPH.  Admitted with acute vision loss of the right eye. Patient had planned elective hernia repair on 11/15 and has been off coumadin for 5 days in anticipation of surgery.  Now surgery cancelled place back on anticoagulation with coumadin and Lovenox bridging per cardiologist. Pt is cleared by PMD, cardiology, and neurology for discharge and to follow up with cardiologist this Friday for further management of AC and F/U with his ophthalmologist and neurologist. 82 yo man with PMH of CAD s/p CABG, paroxsymal Afib on A/C, systolic HF (reported EF 45% ~1 year ago per patient), hypothyroidism, BPH.  Admitted with acute vision loss of the right eye. Patient had planned elective hernia repair on 11/15 and has been off coumadin for 5 days in anticipation of surgery.  Now surgery cancelled place back on anticoagulation with coumadin and Lovenox bridging per cardiologist. Pt is cleared by PMD, cardiology, and neurology for discharge and to follow up with cardiologist this Friday for further management of AC and F/U with his ophthalmologist and neurologist. 	        Attending DC note:  82 yo man with PMH of CAD s/p CABG, paroxsymal Afib on A/C, systolic HF (reported EF 45% ~1 year ago per patient), hypothyroidism, BPH, CKD 3, presenting with acute transient vision loss of the right eye. patient has been holding coumadin and on lovenox for planned hernia surgery. patient evaluated by neuro and felt to have amaurosis fugax. no HA, no tenderness, nontoxic. CT brain no acute pathology. CTA head and neck showed bl calcified atherosclerosis <50%. patient to cont bridging with lovenox and to resume coumadin. outpt fu with Dr. Goldberg.

## 2017-11-15 NOTE — H&P ADULT - PROBLEM SELECTOR PLAN 3
Patient currently rate controlled.  Continue with home dose of Sotalol Patient states baseline Cr 1.3. Currently 1.41  Primary day team to monitor Cr in light of recent contrast exposure  Primary day team to re-eval if lasix and sprinolactone appropriated to continue in AM and consider renal consult if worsening Cr

## 2017-11-15 NOTE — DISCHARGE NOTE ADULT - MEDICATION SUMMARY - MEDICATIONS TO CHANGE
I will SWITCH the dose or number of times a day I take the medications listed below when I get home from the hospital:    potassium chloride 10 mEq oral capsule, extended release  -- orally 2 times a day

## 2017-11-15 NOTE — H&P ADULT - NSHPLABSRESULTS_GEN_ALL_CORE
Labs personally reviewed and noted in detail below: notable subthearpeutic INR and Cr 1.41    Personally reviewed CXR: no appreciable focal consolidations    Personally reviewed EKG tracing.    Reviewed CT Head and CTA head and neck: Detailed report below.

## 2017-11-15 NOTE — H&P ADULT - PROBLEM SELECTOR PLAN 1
Patient with transient loss of vision now resolved.   Primary day team to consider opthalmology consult in AM  Per neurology differential: Amaurosis Fugax vs less likely decreased blood pressure in the setting of carotid or retinal artery stenosis.  CT Head without acute findings  CTA Head and neck: "Bilateral  calcified  atherosclerosis  at  the  carotid  bifurcations resulting  in  mild  (less  than  50%  by  NASCET  criteria)  luminal  stenosis  of  the proximal  internal  carotid  arteries."  Check HgbA1c, Lipid Panel, ESR, CRP  Per neuro continue bridge to coumadin for therapeutic INR Patient with transient loss of vision now resolved.   Primary day team to consider opthalmology consult in AM  Per neurology differential: Amaurosis Fugax vs less likely decreased blood pressure in the setting of carotid or retinal artery stenosis.  CT Head without acute findings  CTA Head and neck: "Bilateral  calcified  atherosclerosis  at  the  carotid  bifurcations resulting  in  mild  (less  than  50%  by  NASCET  criteria)  luminal  stenosis  of  the proximal  internal  carotid  arteries."  Check HgbA1c, Lipid Panel, ESR, CRP  Per neuro continue bridge to coumadin for therapeutic INR and possible repeat CT head in 2 days as patient can't tolerate MRI Patient with transient loss of vision now resolved.   Primary day team to consider opthalmology consult in AM  Per neurology differential: Amaurosis Fugax vs less likely decreased blood pressure in the setting of carotid or retinal artery stenosis.  CT Head without acute findings  CTA Head and neck: "Bilateral  calcified  atherosclerosis  at  the  carotid  bifurcations resulting  in  mild  (less  than  50%  by  NASCET  criteria)  luminal  stenosis  of  the proximal  internal  carotid  arteries."  Check HgbA1c, Lipid Panel, ESR, CRP  Consider TTE   Per neuro continue bridge to coumadin for therapeutic INR and possible repeat CT head in 2 days as patient can't tolerate MRI

## 2017-11-15 NOTE — DISCHARGE NOTE ADULT - CARE PLAN
Principal Discharge DX:	Amaurosis fugax of right eye  Goal:	F/u with your ophthalmologist and neurologist  Instructions for follow-up, activity and diet:	F/u with your ophthalmologist and neurologist  Secondary Diagnosis:	Acute on chronic congestive heart failure, unspecified congestive heart failure type  Instructions for follow-up, activity and diet:	Weigh yourself daily.  If you gain 3lbs in 3 days, or 5lbs in a week call your Health Care Provider.  Do not eat or drink foods containing more than 2000mg of salt (sodium) in your diet every day.  Call your Health Care Provider if you have any swelling or increased swelling in your feet, ankles, and/or stomach.  Take all of your medication as directed.  If you become dizzy call your Health Care Provider.  Secondary Diagnosis:	CKD (chronic kidney disease) stage 3, GFR 30-59 ml/min  Instructions for follow-up, activity and diet:	Avoid taking (NSAIDs) - (ex: Ibuprofen, Advil, Celebrex, Naprosyn)  Avoid taking any nephrotoxic agents (can harm kidneys) - Intravenous contrast for diagnostic testing, combination cold medications.  Have all medications adjusted for your renal function by your Health Care Provider.  Blood pressure control is important.  Take all medication as prescribed.  Secondary Diagnosis:	Paroxysmal atrial fibrillation  Goal:	Continue Lovenox and coumadin and follow up with your cardiologist on Friday for further dosing of your medication  Instructions for follow-up, activity and diet:	Atrial fibrillation is the most common heart rhythm problem & has the risk of stroke & heart attack  It helps if you control your blood pressure, not drink more than 1-2 alcohol drinks per day, cut down on caffeine, getting treatment for over active thyroid gland, & getting exercise  Call your doctor if you feel your heart racing or beating unusually, chest tightness or pain, lightheaded, faint, shortness of breath especially with exercise  It is important to take your heart medication as prescribed  You may be on anticoagulation which is very important to take as directed - you may need blood work to monitor drug levels

## 2017-11-15 NOTE — CONSULT NOTE ADULT - ASSESSMENT
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

## 2017-11-15 NOTE — DISCHARGE NOTE ADULT - PATIENT PORTAL LINK FT
“You can access the [facility] Patient Portal, offered by [facility], by registering with the following website: [email or web address]”

## 2017-11-15 NOTE — DISCHARGE NOTE ADULT - ADDITIONAL INSTRUCTIONS
Continue Lovenox and coumadin and follow up with your cardiologist on Friday for further dosing of your medication  F/U with your surgeon to reschedule your hernia surgery

## 2017-11-15 NOTE — CONSULT NOTE ADULT - PROBLEM SELECTOR RECOMMENDATION 2
-  - will need need to continue uninterrupted anticoagulation.  - cont therapeutic dose lovenox to coumadin bridge.  - cont other home cardiac meds.  - plan d/w pt. Questions answered.    Hector Luna M.D., [facility]  [phone]        A total of 80 minutes of face-to-face time was spent with the patient during this encounter -over half of that time was spent in counseling and coordination of care.

## 2017-11-15 NOTE — H&P ADULT - ASSESSMENT
82 yo man with PMH of CAD s/p CABG, paroxsymal Afib on A/C, systolic HF (reported EF 45% ~1 year ago per patient), hypothyroidism, BPH, presenting with acute vision loss of the right eye.

## 2017-11-15 NOTE — DISCHARGE NOTE ADULT - MEDICATION SUMMARY - MEDICATIONS TO TAKE
I will START or STAY ON the medications listed below when I get home from the hospital:    Rapaflo 8 mg oral capsule  -- 1 cap(s) by mouth once a day  -- Indication: For Cad    sotalol 80 mg oral tablet  -- 1 tab(s) by mouth 2 times a day  -- Indication: For Cad    Coumadin 5 mg oral tablet  -- 1 tab(s) by mouth once a day typicall  5 days of week. On 2 days of the week may take an extra 3 mg  -- Indication: For Paroxysmal atrial fibrillation    Lovenox 80 mg/0.8 mL injectable solution  -- 80 milligram(s) injectable 2 times a day  -- Indication: For Paroxysmal atrial fibrillation    allopurinol 100 mg oral tablet  -- 1 tab(s) by mouth once a day  -- Indication: For Pain    Lipitor 10 mg oral tablet  -- 1 tab(s) by mouth once a day (at bedtime)  -- Indication: For hld    Zetia  -- 5  mg by mouth   -- Indication: For hld    Xanax 0.5 mg oral tablet  -- orally once a day (at bedtime), As Needed  -- Indication: For Anxiety    carvedilol 3.125 mg oral tablet  -- 3 tab(s) by mouth every 12 hours  -- Indication: For htn    Lasix 40 mg oral tablet  -- 1 tab(s) by mouth once a day  -- Indication: For Chronic systolic heart failure    Citrucel 500 mg oral tablet  --  by mouth 2 times a day  -- Indication: For gi    docusate sodium 100 mg oral capsule  -- 1 cap(s) by mouth 2 times a day  -- Indication: For gi    potassium chloride 10 mEq oral capsule, extended release  -- orally once a day  -- Indication: For Supplement    lactobacillus acidophilus oral tablet  -- 1 tab(s) by mouth once a day  -- Indication: For Supplement    Protonix 40 mg oral delayed release tablet  -- 1 tab(s) by mouth once a day  -- Indication: For gi    levothyroxine 112 mcg (0.112 mg) oral tablet  -- 1 tab(s) by mouth once a day  -- Indication: For hypothyroid

## 2017-11-20 RX ORDER — POTASSIUM CHLORIDE 20 MEQ
0 PACKET (EA) ORAL
Qty: 0 | Refills: 0 | COMMUNITY

## 2017-11-21 DIAGNOSIS — H54.61 UNQUALIFIED VISUAL LOSS, RIGHT EYE, NORMAL VISION LEFT EYE: ICD-10-CM

## 2017-11-21 DIAGNOSIS — Z79.899 OTHER LONG TERM (CURRENT) DRUG THERAPY: ICD-10-CM

## 2017-11-21 DIAGNOSIS — Z79.01 LONG TERM (CURRENT) USE OF ANTICOAGULANTS: ICD-10-CM

## 2017-11-21 DIAGNOSIS — I48.91 UNSPECIFIED ATRIAL FIBRILLATION: ICD-10-CM

## 2017-11-21 DIAGNOSIS — Z88.8 ALLERGY STATUS TO OTHER DRUGS, MEDICAMENTS AND BIOLOGICAL SUBSTANCES STATUS: ICD-10-CM

## 2017-11-21 DIAGNOSIS — E03.9 HYPOTHYROIDISM, UNSPECIFIED: ICD-10-CM

## 2017-11-21 DIAGNOSIS — I50.9 HEART FAILURE, UNSPECIFIED: ICD-10-CM

## 2017-11-21 DIAGNOSIS — Z79.51 LONG TERM (CURRENT) USE OF INHALED STEROIDS: ICD-10-CM

## 2018-08-15 ENCOUNTER — TRANSCRIPTION ENCOUNTER (OUTPATIENT)
Age: 83
End: 2018-08-15

## 2018-08-15 ENCOUNTER — INPATIENT (INPATIENT)
Facility: HOSPITAL | Age: 83
LOS: 4 days | Discharge: NURSING, REHAB OR LONG-TERM CARE | DRG: 481 | End: 2018-08-20
Attending: ORTHOPAEDIC SURGERY | Admitting: ORTHOPAEDIC SURGERY
Payer: MEDICARE

## 2018-08-15 VITALS
DIASTOLIC BLOOD PRESSURE: 72 MMHG | RESPIRATION RATE: 16 BRPM | TEMPERATURE: 97 F | HEART RATE: 75 BPM | OXYGEN SATURATION: 98 % | SYSTOLIC BLOOD PRESSURE: 125 MMHG

## 2018-08-15 DIAGNOSIS — Z98.890 OTHER SPECIFIED POSTPROCEDURAL STATES: Chronic | ICD-10-CM

## 2018-08-15 DIAGNOSIS — Z90.49 ACQUIRED ABSENCE OF OTHER SPECIFIED PARTS OF DIGESTIVE TRACT: Chronic | ICD-10-CM

## 2018-08-15 DIAGNOSIS — Z98.89 OTHER SPECIFIED POSTPROCEDURAL STATES: Chronic | ICD-10-CM

## 2018-08-15 DIAGNOSIS — Z85.828 PERSONAL HISTORY OF OTHER MALIGNANT NEOPLASM OF SKIN: Chronic | ICD-10-CM

## 2018-08-15 DIAGNOSIS — Z95.1 PRESENCE OF AORTOCORONARY BYPASS GRAFT: Chronic | ICD-10-CM

## 2018-08-15 DIAGNOSIS — Z95.0 PRESENCE OF CARDIAC PACEMAKER: Chronic | ICD-10-CM

## 2018-08-15 DIAGNOSIS — K66.0 PERITONEAL ADHESIONS (POSTPROCEDURAL) (POSTINFECTION): Chronic | ICD-10-CM

## 2018-08-15 DIAGNOSIS — Z98.49 CATARACT EXTRACTION STATUS, UNSPECIFIED EYE: Chronic | ICD-10-CM

## 2018-08-15 DIAGNOSIS — K56.69 OTHER INTESTINAL OBSTRUCTION: Chronic | ICD-10-CM

## 2018-08-15 PROBLEM — G56.02 CARPAL TUNNEL SYNDROME, LEFT UPPER LIMB: Chronic | Status: ACTIVE | Noted: 2017-06-22

## 2018-08-15 PROBLEM — J84.89 OTHER SPECIFIED INTERSTITIAL PULMONARY DISEASES: Chronic | Status: ACTIVE | Noted: 2017-06-27

## 2018-08-15 LAB
ALBUMIN SERPL ELPH-MCNC: 4.3 G/DL — SIGNIFICANT CHANGE UP (ref 3.3–5)
ALP SERPL-CCNC: 49 U/L — SIGNIFICANT CHANGE UP (ref 40–120)
ALT FLD-CCNC: 17 U/L — SIGNIFICANT CHANGE UP (ref 10–45)
ANION GAP SERPL CALC-SCNC: 11 MMOL/L — SIGNIFICANT CHANGE UP (ref 5–17)
APTT BLD: 58.4 SEC — HIGH (ref 27.5–37.4)
AST SERPL-CCNC: 23 U/L — SIGNIFICANT CHANGE UP (ref 10–40)
BASOPHILS # BLD AUTO: 0 K/UL — SIGNIFICANT CHANGE UP (ref 0–0.2)
BASOPHILS NFR BLD AUTO: 0.1 % — SIGNIFICANT CHANGE UP (ref 0–2)
BILIRUB SERPL-MCNC: 0.7 MG/DL — SIGNIFICANT CHANGE UP (ref 0.2–1.2)
BLD GP AB SCN SERPL QL: NEGATIVE — SIGNIFICANT CHANGE UP
BUN SERPL-MCNC: 39 MG/DL — HIGH (ref 7–23)
CALCIUM SERPL-MCNC: 9.2 MG/DL — SIGNIFICANT CHANGE UP (ref 8.4–10.5)
CHLORIDE SERPL-SCNC: 101 MMOL/L — SIGNIFICANT CHANGE UP (ref 96–108)
CO2 SERPL-SCNC: 24 MMOL/L — SIGNIFICANT CHANGE UP (ref 22–31)
CREAT SERPL-MCNC: 1.46 MG/DL — HIGH (ref 0.5–1.3)
EOSINOPHIL # BLD AUTO: 0.5 K/UL — SIGNIFICANT CHANGE UP (ref 0–0.5)
EOSINOPHIL NFR BLD AUTO: 5.7 % — SIGNIFICANT CHANGE UP (ref 0–6)
GLUCOSE SERPL-MCNC: 109 MG/DL — HIGH (ref 70–99)
HCT VFR BLD CALC: 38 % — LOW (ref 39–50)
HGB BLD-MCNC: 12.8 G/DL — LOW (ref 13–17)
INR BLD: 2.31 RATIO — HIGH (ref 0.88–1.16)
LYMPHOCYTES # BLD AUTO: 1.1 K/UL — SIGNIFICANT CHANGE UP (ref 1–3.3)
LYMPHOCYTES # BLD AUTO: 14.2 % — SIGNIFICANT CHANGE UP (ref 13–44)
MCHC RBC-ENTMCNC: 31.6 PG — SIGNIFICANT CHANGE UP (ref 27–34)
MCHC RBC-ENTMCNC: 33.6 GM/DL — SIGNIFICANT CHANGE UP (ref 32–36)
MCV RBC AUTO: 94 FL — SIGNIFICANT CHANGE UP (ref 80–100)
MONOCYTES # BLD AUTO: 0.6 K/UL — SIGNIFICANT CHANGE UP (ref 0–0.9)
MONOCYTES NFR BLD AUTO: 7.7 % — SIGNIFICANT CHANGE UP (ref 2–14)
NEUTROPHILS # BLD AUTO: 5.8 K/UL — SIGNIFICANT CHANGE UP (ref 1.8–7.4)
NEUTROPHILS NFR BLD AUTO: 72.3 % — SIGNIFICANT CHANGE UP (ref 43–77)
PLATELET # BLD AUTO: 168 K/UL — SIGNIFICANT CHANGE UP (ref 150–400)
POTASSIUM SERPL-MCNC: 4.9 MMOL/L — SIGNIFICANT CHANGE UP (ref 3.5–5.3)
POTASSIUM SERPL-SCNC: 4.9 MMOL/L — SIGNIFICANT CHANGE UP (ref 3.5–5.3)
PROT SERPL-MCNC: 7.4 G/DL — SIGNIFICANT CHANGE UP (ref 6–8.3)
PROTHROM AB SERPL-ACNC: 25.4 SEC — HIGH (ref 9.8–12.7)
RBC # BLD: 4.04 M/UL — LOW (ref 4.2–5.8)
RBC # FLD: 14.9 % — HIGH (ref 10.3–14.5)
RH IG SCN BLD-IMP: POSITIVE — SIGNIFICANT CHANGE UP
RH IG SCN BLD-IMP: POSITIVE — SIGNIFICANT CHANGE UP
SODIUM SERPL-SCNC: 136 MMOL/L — SIGNIFICANT CHANGE UP (ref 135–145)
WBC # BLD: 8 K/UL — SIGNIFICANT CHANGE UP (ref 3.8–10.5)
WBC # FLD AUTO: 8 K/UL — SIGNIFICANT CHANGE UP (ref 3.8–10.5)

## 2018-08-15 PROCEDURE — 99285 EMERGENCY DEPT VISIT HI MDM: CPT | Mod: GC

## 2018-08-15 PROCEDURE — 72192 CT PELVIS W/O DYE: CPT | Mod: 26

## 2018-08-15 PROCEDURE — 76377 3D RENDER W/INTRP POSTPROCES: CPT | Mod: 26

## 2018-08-15 PROCEDURE — 72131 CT LUMBAR SPINE W/O DYE: CPT | Mod: 26

## 2018-08-15 PROCEDURE — 73522 X-RAY EXAM HIPS BI 3-4 VIEWS: CPT | Mod: 26

## 2018-08-15 RX ORDER — ALPRAZOLAM 0.25 MG
0.5 TABLET ORAL ONCE
Qty: 0 | Refills: 0 | Status: DISCONTINUED | OUTPATIENT
Start: 2018-08-15 | End: 2018-08-15

## 2018-08-15 RX ORDER — CARVEDILOL PHOSPHATE 80 MG/1
6.25 CAPSULE, EXTENDED RELEASE ORAL ONCE
Qty: 0 | Refills: 0 | Status: COMPLETED | OUTPATIENT
Start: 2018-08-15 | End: 2018-08-15

## 2018-08-15 RX ORDER — SPIRONOLACTONE 25 MG/1
25 TABLET, FILM COATED ORAL ONCE
Qty: 0 | Refills: 0 | Status: COMPLETED | OUTPATIENT
Start: 2018-08-15 | End: 2018-08-15

## 2018-08-15 RX ORDER — SODIUM CHLORIDE 9 MG/ML
1000 INJECTION INTRAMUSCULAR; INTRAVENOUS; SUBCUTANEOUS ONCE
Qty: 0 | Refills: 0 | Status: COMPLETED | OUTPATIENT
Start: 2018-08-15 | End: 2018-08-15

## 2018-08-15 RX ORDER — TETANUS TOXOID, REDUCED DIPHTHERIA TOXOID AND ACELLULAR PERTUSSIS VACCINE, ADSORBED 5; 2.5; 8; 8; 2.5 [IU]/.5ML; [IU]/.5ML; UG/.5ML; UG/.5ML; UG/.5ML
0.5 SUSPENSION INTRAMUSCULAR ONCE
Qty: 0 | Refills: 0 | Status: COMPLETED | OUTPATIENT
Start: 2018-08-15 | End: 2018-08-15

## 2018-08-15 RX ORDER — CARVEDILOL PHOSPHATE 80 MG/1
3.12 CAPSULE, EXTENDED RELEASE ORAL ONCE
Qty: 0 | Refills: 0 | Status: COMPLETED | OUTPATIENT
Start: 2018-08-15 | End: 2018-08-15

## 2018-08-15 RX ORDER — SOTALOL HCL 120 MG
80 TABLET ORAL ONCE
Qty: 0 | Refills: 0 | Status: COMPLETED | OUTPATIENT
Start: 2018-08-15 | End: 2018-08-15

## 2018-08-15 RX ORDER — FENTANYL CITRATE 50 UG/ML
50 INJECTION INTRAVENOUS ONCE
Qty: 0 | Refills: 0 | Status: DISCONTINUED | OUTPATIENT
Start: 2018-08-15 | End: 2018-08-15

## 2018-08-15 RX ORDER — ACETAMINOPHEN 500 MG
975 TABLET ORAL ONCE
Qty: 0 | Refills: 0 | Status: COMPLETED | OUTPATIENT
Start: 2018-08-15 | End: 2018-08-15

## 2018-08-15 RX ORDER — MORPHINE SULFATE 50 MG/1
4 CAPSULE, EXTENDED RELEASE ORAL ONCE
Qty: 0 | Refills: 0 | Status: DISCONTINUED | OUTPATIENT
Start: 2018-08-15 | End: 2018-08-15

## 2018-08-15 RX ADMIN — Medication 0.5 MILLIGRAM(S): at 23:19

## 2018-08-15 RX ADMIN — TETANUS TOXOID, REDUCED DIPHTHERIA TOXOID AND ACELLULAR PERTUSSIS VACCINE, ADSORBED 0.5 MILLILITER(S): 5; 2.5; 8; 8; 2.5 SUSPENSION INTRAMUSCULAR at 19:28

## 2018-08-15 RX ADMIN — CARVEDILOL PHOSPHATE 6.25 MILLIGRAM(S): 80 CAPSULE, EXTENDED RELEASE ORAL at 21:48

## 2018-08-15 RX ADMIN — FENTANYL CITRATE 50 MICROGRAM(S): 50 INJECTION INTRAVENOUS at 19:28

## 2018-08-15 RX ADMIN — SPIRONOLACTONE 25 MILLIGRAM(S): 25 TABLET, FILM COATED ORAL at 21:48

## 2018-08-15 RX ADMIN — Medication 975 MILLIGRAM(S): at 22:25

## 2018-08-15 RX ADMIN — CARVEDILOL PHOSPHATE 3.12 MILLIGRAM(S): 80 CAPSULE, EXTENDED RELEASE ORAL at 21:48

## 2018-08-15 RX ADMIN — Medication 80 MILLIGRAM(S): at 21:48

## 2018-08-15 RX ADMIN — SODIUM CHLORIDE 1000 MILLILITER(S): 9 INJECTION INTRAMUSCULAR; INTRAVENOUS; SUBCUTANEOUS at 23:48

## 2018-08-15 NOTE — ED ADULT NURSE NOTE - OBJECTIVE STATEMENT
Pt presents to Pt presents to ED following mechanical fall at home, pt AXOX3 with friend at bedside, reports getting out of shower at home and slipping on floor, landing on R hip. Pt denies LOC or head strike. Pt states no other area of the body was struck. Pt R leg is shortened and externally rotated, palpable pulses to lower extremities, skin color normal for age and race. Pt has abrasion to lower right shin. No obvious bruising or other deformity noted. Pt denies chest pain, no shortness of breath, breathing unlabored and symmetrical, abdomen soft and nontender, no nausea vomiting or diarrhea reported.

## 2018-08-15 NOTE — ED PROVIDER NOTE - PHYSICAL EXAMINATION
right leg is externally rotated and shortened compared to left one, tenderness on palpation of the right hip, no expanding hematoma could be appreciated, (+) minor abrasion to the distal right shin

## 2018-08-15 NOTE — ED PROVIDER NOTE - ATTENDING CONTRIBUTION TO CARE
Dr. Mendoza : I have personally seen and examined this patient at the bedside. I have fully participated in the care of this patient. I have reviewed all pertinent clinical information, including history, physical exam, plan and the Resident's note and agree except as noted.     85yo M w/ pmhx of CHAPO short on Coumadin, [place] s/p CABG and PPM, CHF, pw right hip pain s/p slip and fall in the shower today landing onto his right hip .slipped on wet floor, fell on his right side, couldn't get up, . denies any head injury, LOC,   Denies f/c/n/v/cp/sob/palpitations/cough/abd.pain/d/c/dysuria/hematuria currently or prior to fall. no sick contacts/recent travel.    PE:  head; atraumatic normocephalic  eyes: perrla  Heart: rrr s1s2  lungs: ctab  abd: soft, nt nd + bs no rebound/guarding no cva ttp  le: no swelling no calf ttp ttp to right hip ;leg appears shortened neurovascularly intact  back: no midline cervical/thoracic/lumbar ttp    -->concern for hip fx will fu labs xray ct lumbar as notes lower back pain as well--ortho consult--admit

## 2018-08-15 NOTE — ED ADULT NURSE NOTE - NSIMPLEMENTINTERV_GEN_ALL_ED
Implemented All Fall with Harm Risk Interventions:  [place] to call system. Call bell, personal items and telephone within reach. Instruct patient to call for assistance. Room bathroom lighting operational. Non-slip footwear when patient is off stretcher. Physically safe environment: no spills, clutter or unnecessary equipment. Stretcher in lowest position, wheels locked, appropriate side rails in place. Provide visual cue, wrist band, yellow gown, etc. Monitor gait and stability. Monitor for mental status changes and reorient to person, place, and time. Review medications for side effects contributing to fall risk. Reinforce activity limits and safety measures with patient and family. Provide visual clues: red socks.

## 2018-08-15 NOTE — ED PROVIDER NOTE - DATE/TIME 1
ULTRASOUND BIOPSY THYROID



HISTORY: Thyroid nodule, hyperthyroidism.



DESCRIPTION OF PROCEDURE: Informed consent was obtained. Sterile 

technique was utilized. 1% lidocaine for skin anesthesia. Using 

ultrasound guidance, 2 fine needle aspirations and one Rotex biopsy 

were obtained from a 2.8 cm left thyroid lobe mass. The samples were 

deemed adequate by the pathologist on site. No complications.



IMPRESSION: Successful ultrasound-guided biopsy of the left thyroid 

mass as described.
15-Aug-2018 21:11

## 2018-08-15 NOTE — ED PROVIDER NOTE - OBJECTIVE STATEMENT
85yo M w/ pmhx of CHAPO short on Coumadin, MI s/p CABG and PPM, CHF, BIBEMS s/p slip and fall in the shower today. Pt was getting out of the shower, slip on wet floor, fell on his right side, couldn't get up, suffered an abrasion to the distal right shin, and extreme pain to the right hip. denies any head injury, LOC, nausea, vomiting, chest pain or any other symptoms.

## 2018-08-16 DIAGNOSIS — I48.0 PAROXYSMAL ATRIAL FIBRILLATION: ICD-10-CM

## 2018-08-16 DIAGNOSIS — I50.22 CHRONIC SYSTOLIC (CONGESTIVE) HEART FAILURE: ICD-10-CM

## 2018-08-16 DIAGNOSIS — S72.001A FRACTURE OF UNSPECIFIED PART OF NECK OF RIGHT FEMUR, INITIAL ENCOUNTER FOR CLOSED FRACTURE: ICD-10-CM

## 2018-08-16 DIAGNOSIS — I25.10 ATHEROSCLEROTIC HEART DISEASE OF NATIVE CORONARY ARTERY WITHOUT ANGINA PECTORIS: ICD-10-CM

## 2018-08-16 DIAGNOSIS — Z01.810 ENCOUNTER FOR PREPROCEDURAL CARDIOVASCULAR EXAMINATION: ICD-10-CM

## 2018-08-16 DIAGNOSIS — I44.2 ATRIOVENTRICULAR BLOCK, COMPLETE: ICD-10-CM

## 2018-08-16 DIAGNOSIS — Z29.9 ENCOUNTER FOR PROPHYLACTIC MEASURES, UNSPECIFIED: ICD-10-CM

## 2018-08-16 LAB
APTT BLD: 50.7 SEC — HIGH (ref 27.5–37.4)
HCT VFR BLD CALC: 35 % — LOW (ref 39–50)
HGB BLD-MCNC: 11.7 G/DL — LOW (ref 13–17)
INR BLD: 1.25 RATIO — HIGH (ref 0.88–1.16)
INR BLD: 1.8 RATIO — HIGH (ref 0.88–1.16)
INR BLD: 2.06 RATIO — HIGH (ref 0.88–1.16)
MCHC RBC-ENTMCNC: 31.5 PG — SIGNIFICANT CHANGE UP (ref 27–34)
MCHC RBC-ENTMCNC: 33.5 GM/DL — SIGNIFICANT CHANGE UP (ref 32–36)
MCV RBC AUTO: 94.1 FL — SIGNIFICANT CHANGE UP (ref 80–100)
PLATELET # BLD AUTO: 135 K/UL — LOW (ref 150–400)
PROTHROM AB SERPL-ACNC: 13.7 SEC — HIGH (ref 9.8–12.7)
PROTHROM AB SERPL-ACNC: 19.9 SEC — HIGH (ref 9.8–12.7)
PROTHROM AB SERPL-ACNC: 22.6 SEC — HIGH (ref 9.8–12.7)
RBC # BLD: 3.72 M/UL — LOW (ref 4.2–5.8)
RBC # FLD: 15 % — HIGH (ref 10.3–14.5)
WBC # BLD: 8.1 K/UL — SIGNIFICANT CHANGE UP (ref 3.8–10.5)
WBC # FLD AUTO: 8.1 K/UL — SIGNIFICANT CHANGE UP (ref 3.8–10.5)

## 2018-08-16 PROCEDURE — 73552 X-RAY EXAM OF FEMUR 2/>: CPT | Mod: 26,RT

## 2018-08-16 PROCEDURE — 71045 X-RAY EXAM CHEST 1 VIEW: CPT | Mod: 26

## 2018-08-16 RX ORDER — SOTALOL HCL 120 MG
80 TABLET ORAL
Qty: 0 | Refills: 0 | Status: DISCONTINUED | OUTPATIENT
Start: 2018-08-16 | End: 2018-08-17

## 2018-08-16 RX ORDER — BACITRACIN ZINC 500 UNIT/G
1 OINTMENT IN PACKET (EA) TOPICAL
Qty: 0 | Refills: 0 | Status: DISCONTINUED | OUTPATIENT
Start: 2018-08-16 | End: 2018-08-16

## 2018-08-16 RX ORDER — LANOLIN ALCOHOL/MO/W.PET/CERES
3 CREAM (GRAM) TOPICAL AT BEDTIME
Qty: 0 | Refills: 0 | Status: DISCONTINUED | OUTPATIENT
Start: 2018-08-16 | End: 2018-08-16

## 2018-08-16 RX ORDER — CARVEDILOL PHOSPHATE 80 MG/1
9.38 CAPSULE, EXTENDED RELEASE ORAL EVERY 12 HOURS
Qty: 0 | Refills: 0 | Status: DISCONTINUED | OUTPATIENT
Start: 2018-08-16 | End: 2018-08-16

## 2018-08-16 RX ORDER — FUROSEMIDE 40 MG
20 TABLET ORAL DAILY
Qty: 0 | Refills: 0 | Status: DISCONTINUED | OUTPATIENT
Start: 2018-08-16 | End: 2018-08-20

## 2018-08-16 RX ORDER — DIPHENHYDRAMINE HCL 50 MG
25 CAPSULE ORAL EVERY 4 HOURS
Qty: 0 | Refills: 0 | Status: DISCONTINUED | OUTPATIENT
Start: 2018-08-16 | End: 2018-08-20

## 2018-08-16 RX ORDER — ONDANSETRON 8 MG/1
4 TABLET, FILM COATED ORAL ONCE
Qty: 0 | Refills: 0 | Status: DISCONTINUED | OUTPATIENT
Start: 2018-08-16 | End: 2018-08-16

## 2018-08-16 RX ORDER — LEVOTHYROXINE SODIUM 125 MCG
112 TABLET ORAL DAILY
Qty: 0 | Refills: 0 | Status: DISCONTINUED | OUTPATIENT
Start: 2018-08-16 | End: 2018-08-20

## 2018-08-16 RX ORDER — HEPARIN SODIUM 5000 [USP'U]/ML
INJECTION INTRAVENOUS; SUBCUTANEOUS
Qty: 25000 | Refills: 0 | Status: DISCONTINUED | OUTPATIENT
Start: 2018-08-17 | End: 2018-08-19

## 2018-08-16 RX ORDER — OXYCODONE HYDROCHLORIDE 5 MG/1
5 TABLET ORAL EVERY 4 HOURS
Qty: 0 | Refills: 0 | Status: DISCONTINUED | OUTPATIENT
Start: 2018-08-16 | End: 2018-08-16

## 2018-08-16 RX ORDER — HYDROMORPHONE HYDROCHLORIDE 2 MG/ML
0.5 INJECTION INTRAMUSCULAR; INTRAVENOUS; SUBCUTANEOUS
Qty: 0 | Refills: 0 | Status: DISCONTINUED | OUTPATIENT
Start: 2018-08-16 | End: 2018-08-20

## 2018-08-16 RX ORDER — WARFARIN SODIUM 2.5 MG/1
5 TABLET ORAL ONCE
Qty: 0 | Refills: 0 | Status: COMPLETED | OUTPATIENT
Start: 2018-08-17 | End: 2018-08-17

## 2018-08-16 RX ORDER — PHYTONADIONE (VIT K1) 5 MG
5 TABLET ORAL ONCE
Qty: 0 | Refills: 0 | Status: COMPLETED | OUTPATIENT
Start: 2018-08-16 | End: 2018-08-16

## 2018-08-16 RX ORDER — WARFARIN SODIUM 2.5 MG/1
1 TABLET ORAL
Qty: 0 | Refills: 0 | COMMUNITY

## 2018-08-16 RX ORDER — PANTOPRAZOLE SODIUM 20 MG/1
1 TABLET, DELAYED RELEASE ORAL
Qty: 0 | Refills: 0 | COMMUNITY

## 2018-08-16 RX ORDER — PROTHROMBIN COMPLEX CONCENTRATE (HUMAN) 25.5; 16.5; 24; 22; 22; 26 [IU]/ML; [IU]/ML; [IU]/ML; [IU]/ML; [IU]/ML; [IU]/ML
1500 POWDER, FOR SOLUTION INTRAVENOUS ONCE
Qty: 0 | Refills: 0 | Status: COMPLETED | OUTPATIENT
Start: 2018-08-16 | End: 2018-08-16

## 2018-08-16 RX ORDER — DOCUSATE SODIUM 100 MG
100 CAPSULE ORAL THREE TIMES A DAY
Qty: 0 | Refills: 0 | Status: DISCONTINUED | OUTPATIENT
Start: 2018-08-16 | End: 2018-08-16

## 2018-08-16 RX ORDER — CARVEDILOL PHOSPHATE 80 MG/1
9.38 CAPSULE, EXTENDED RELEASE ORAL EVERY 12 HOURS
Qty: 0 | Refills: 0 | Status: DISCONTINUED | OUTPATIENT
Start: 2018-08-16 | End: 2018-08-17

## 2018-08-16 RX ORDER — SILODOSIN 4 MG/1
8 CAPSULE ORAL AT BEDTIME
Qty: 0 | Refills: 0 | Status: DISCONTINUED | OUTPATIENT
Start: 2018-08-16 | End: 2018-08-20

## 2018-08-16 RX ORDER — OXYCODONE HYDROCHLORIDE 5 MG/1
10 TABLET ORAL EVERY 4 HOURS
Qty: 0 | Refills: 0 | Status: DISCONTINUED | OUTPATIENT
Start: 2018-08-16 | End: 2018-08-20

## 2018-08-16 RX ORDER — HYDROMORPHONE HYDROCHLORIDE 2 MG/ML
0.25 INJECTION INTRAMUSCULAR; INTRAVENOUS; SUBCUTANEOUS
Qty: 0 | Refills: 0 | Status: DISCONTINUED | OUTPATIENT
Start: 2018-08-16 | End: 2018-08-16

## 2018-08-16 RX ORDER — BENZOCAINE AND MENTHOL 5; 1 G/100ML; G/100ML
1 LIQUID ORAL
Qty: 0 | Refills: 0 | Status: DISCONTINUED | OUTPATIENT
Start: 2018-08-16 | End: 2018-08-20

## 2018-08-16 RX ORDER — OXYCODONE HYDROCHLORIDE 5 MG/1
5 TABLET ORAL EVERY 4 HOURS
Qty: 0 | Refills: 0 | Status: DISCONTINUED | OUTPATIENT
Start: 2018-08-16 | End: 2018-08-20

## 2018-08-16 RX ORDER — ACETAMINOPHEN 500 MG
975 TABLET ORAL EVERY 8 HOURS
Qty: 0 | Refills: 0 | Status: DISCONTINUED | OUTPATIENT
Start: 2018-08-16 | End: 2018-08-16

## 2018-08-16 RX ORDER — LEVOTHYROXINE SODIUM 125 MCG
112 TABLET ORAL DAILY
Qty: 0 | Refills: 0 | Status: DISCONTINUED | OUTPATIENT
Start: 2018-08-16 | End: 2018-08-16

## 2018-08-16 RX ORDER — HEPARIN SODIUM 5000 [USP'U]/ML
6500 INJECTION INTRAVENOUS; SUBCUTANEOUS EVERY 6 HOURS
Qty: 0 | Refills: 0 | Status: DISCONTINUED | OUTPATIENT
Start: 2018-08-17 | End: 2018-08-19

## 2018-08-16 RX ORDER — MORPHINE SULFATE 50 MG/1
2 CAPSULE, EXTENDED RELEASE ORAL ONCE
Qty: 0 | Refills: 0 | Status: DISCONTINUED | OUTPATIENT
Start: 2018-08-16 | End: 2018-08-16

## 2018-08-16 RX ORDER — WARFARIN SODIUM 2.5 MG/1
10 TABLET ORAL ONCE
Qty: 0 | Refills: 0 | Status: COMPLETED | OUTPATIENT
Start: 2018-08-16 | End: 2018-08-16

## 2018-08-16 RX ORDER — SENNA PLUS 8.6 MG/1
2 TABLET ORAL AT BEDTIME
Qty: 0 | Refills: 0 | Status: DISCONTINUED | OUTPATIENT
Start: 2018-08-16 | End: 2018-08-16

## 2018-08-16 RX ORDER — ACETAMINOPHEN 500 MG
650 TABLET ORAL EVERY 6 HOURS
Qty: 0 | Refills: 0 | Status: DISCONTINUED | OUTPATIENT
Start: 2018-08-16 | End: 2018-08-20

## 2018-08-16 RX ORDER — MAGNESIUM HYDROXIDE 400 MG/1
30 TABLET, CHEWABLE ORAL DAILY
Qty: 0 | Refills: 0 | Status: DISCONTINUED | OUTPATIENT
Start: 2018-08-16 | End: 2018-08-16

## 2018-08-16 RX ORDER — SOTALOL HCL 120 MG
80 TABLET ORAL
Qty: 0 | Refills: 0 | Status: DISCONTINUED | OUTPATIENT
Start: 2018-08-16 | End: 2018-08-16

## 2018-08-16 RX ORDER — SPIRONOLACTONE 25 MG/1
25 TABLET, FILM COATED ORAL DAILY
Qty: 0 | Refills: 0 | Status: DISCONTINUED | OUTPATIENT
Start: 2018-08-16 | End: 2018-08-20

## 2018-08-16 RX ORDER — METHYLCELLULOSE 500 MG
0 TABLET ORAL
Qty: 0 | Refills: 0 | COMMUNITY

## 2018-08-16 RX ORDER — MAGNESIUM HYDROXIDE 400 MG/1
30 TABLET, CHEWABLE ORAL DAILY
Qty: 0 | Refills: 0 | Status: DISCONTINUED | OUTPATIENT
Start: 2018-08-16 | End: 2018-08-20

## 2018-08-16 RX ORDER — SODIUM CHLORIDE 9 MG/ML
1000 INJECTION INTRAMUSCULAR; INTRAVENOUS; SUBCUTANEOUS
Qty: 0 | Refills: 0 | Status: DISCONTINUED | OUTPATIENT
Start: 2018-08-16 | End: 2018-08-16

## 2018-08-16 RX ORDER — SODIUM CHLORIDE 9 MG/ML
1000 INJECTION INTRAMUSCULAR; INTRAVENOUS; SUBCUTANEOUS
Qty: 0 | Refills: 0 | Status: DISCONTINUED | OUTPATIENT
Start: 2018-08-16 | End: 2018-08-20

## 2018-08-16 RX ORDER — ALPRAZOLAM 0.25 MG
0.5 TABLET ORAL AT BEDTIME
Qty: 0 | Refills: 0 | Status: DISCONTINUED | OUTPATIENT
Start: 2018-08-16 | End: 2018-08-20

## 2018-08-16 RX ORDER — LANOLIN ALCOHOL/MO/W.PET/CERES
3 CREAM (GRAM) TOPICAL AT BEDTIME
Qty: 0 | Refills: 0 | Status: DISCONTINUED | OUTPATIENT
Start: 2018-08-16 | End: 2018-08-20

## 2018-08-16 RX ORDER — DOCUSATE SODIUM 100 MG
100 CAPSULE ORAL THREE TIMES A DAY
Qty: 0 | Refills: 0 | Status: DISCONTINUED | OUTPATIENT
Start: 2018-08-16 | End: 2018-08-20

## 2018-08-16 RX ORDER — ONDANSETRON 8 MG/1
4 TABLET, FILM COATED ORAL EVERY 6 HOURS
Qty: 0 | Refills: 0 | Status: DISCONTINUED | OUTPATIENT
Start: 2018-08-16 | End: 2018-08-20

## 2018-08-16 RX ORDER — HEPARIN SODIUM 5000 [USP'U]/ML
3000 INJECTION INTRAVENOUS; SUBCUTANEOUS EVERY 6 HOURS
Qty: 0 | Refills: 0 | Status: DISCONTINUED | OUTPATIENT
Start: 2018-08-17 | End: 2018-08-19

## 2018-08-16 RX ORDER — OXYCODONE HYDROCHLORIDE 5 MG/1
2.5 TABLET ORAL EVERY 4 HOURS
Qty: 0 | Refills: 0 | Status: DISCONTINUED | OUTPATIENT
Start: 2018-08-16 | End: 2018-08-16

## 2018-08-16 RX ADMIN — SODIUM CHLORIDE 75 MILLILITER(S): 9 INJECTION INTRAMUSCULAR; INTRAVENOUS; SUBCUTANEOUS at 18:34

## 2018-08-16 RX ADMIN — HYDROMORPHONE HYDROCHLORIDE 0.25 MILLIGRAM(S): 2 INJECTION INTRAMUSCULAR; INTRAVENOUS; SUBCUTANEOUS at 18:00

## 2018-08-16 RX ADMIN — Medication 0.5 MILLIGRAM(S): at 22:22

## 2018-08-16 RX ADMIN — Medication 100 MILLIGRAM(S): at 23:07

## 2018-08-16 RX ADMIN — Medication 975 MILLIGRAM(S): at 08:12

## 2018-08-16 RX ADMIN — HYDROMORPHONE HYDROCHLORIDE 0.25 MILLIGRAM(S): 2 INJECTION INTRAMUSCULAR; INTRAVENOUS; SUBCUTANEOUS at 17:50

## 2018-08-16 RX ADMIN — Medication 101 MILLIGRAM(S): at 08:10

## 2018-08-16 RX ADMIN — CARVEDILOL PHOSPHATE 9.38 MILLIGRAM(S): 80 CAPSULE, EXTENDED RELEASE ORAL at 08:37

## 2018-08-16 RX ADMIN — MORPHINE SULFATE 2 MILLIGRAM(S): 50 CAPSULE, EXTENDED RELEASE ORAL at 04:45

## 2018-08-16 RX ADMIN — HYDROMORPHONE HYDROCHLORIDE 0.25 MILLIGRAM(S): 2 INJECTION INTRAMUSCULAR; INTRAVENOUS; SUBCUTANEOUS at 18:20

## 2018-08-16 RX ADMIN — WARFARIN SODIUM 10 MILLIGRAM(S): 2.5 TABLET ORAL at 21:58

## 2018-08-16 RX ADMIN — MORPHINE SULFATE 2 MILLIGRAM(S): 50 CAPSULE, EXTENDED RELEASE ORAL at 05:20

## 2018-08-16 RX ADMIN — OXYCODONE HYDROCHLORIDE 5 MILLIGRAM(S): 5 TABLET ORAL at 13:22

## 2018-08-16 RX ADMIN — Medication 100 MILLIGRAM(S): at 21:58

## 2018-08-16 RX ADMIN — Medication 1 APPLICATION(S): at 08:41

## 2018-08-16 RX ADMIN — Medication 100 MILLIGRAM(S): at 05:19

## 2018-08-16 RX ADMIN — PROTHROMBIN COMPLEX CONCENTRATE (HUMAN) 400 INTERNATIONAL UNIT(S): 25.5; 16.5; 24; 22; 22; 26 POWDER, FOR SOLUTION INTRAVENOUS at 14:24

## 2018-08-16 RX ADMIN — Medication 112 MICROGRAM(S): at 08:37

## 2018-08-16 RX ADMIN — OXYCODONE HYDROCHLORIDE 5 MILLIGRAM(S): 5 TABLET ORAL at 20:46

## 2018-08-16 RX ADMIN — OXYCODONE HYDROCHLORIDE 5 MILLIGRAM(S): 5 TABLET ORAL at 20:16

## 2018-08-16 RX ADMIN — Medication 100 MILLIGRAM(S): at 13:25

## 2018-08-16 RX ADMIN — Medication 80 MILLIGRAM(S): at 13:22

## 2018-08-16 NOTE — H&P ADULT - ASSESSMENT
84M with R IT fx    ·	multimodal pain control  ·	is  ·	NPO, IVF  ·	c/w home meds  ·	fu cards re clearance    Ortho [id number]

## 2018-08-16 NOTE — H&P ADULT - NSHPPHYSICALEXAM_GEN_ALL_CORE
PE  Gen: Laying in bed, alert and oriented, NAD  Resp: Unlabored breathing  RLE: Skin intact, no ecchymosis,   SILT DP/SP/ Luanne/Saph,   +EHL/FHL/TA/Gastroc,    DP+,   soft compartments, no calf ttp,   +log roll.    Secondary:  No TTP over bony landmarks, SILT BL, ROM intact BL, distal pulses palpable.    Imaging:  XR demonstrating R hip fracture

## 2018-08-16 NOTE — H&P ADULT - HISTORY OF PRESENT ILLNESS
84yMale c/o R hip pain s/p mechanical fall. Patient denies head hit or LOC. Patient denies numbness or tingling in the RLE. Patient denies any other injuries.    PMH:  BOOP (bronchiolitis obliterans with organizing pneumonia)  Carpal tunnel syndrome of left wrist  Melanoma in situ of face excluding eyelid, nose, lip, and ear  Basal cell carcinoma  Abscess  Acute on chronic congestive heart failure, unspecified congestive heart failure type  Benign prostatic hyperplasia, presence of lower urinary tract symptoms unspecified, unspecified morphology  Chronic gout with tophus, unspecified cause, unspecified site  Hypothyroidism, unspecified type  Paroxysmal atrial fibrillation  Complete heart block  Coronary artery disease involving native heart without angina pectoris, unspecified vessel or lesion type    PSH:  H/O shoulder surgery  Artificial cardiac pacemaker  History of colon resection  S/P CABG x 3  H/O hernia repair  History of appendectomy  History of carpal tunnel release, unspecified laterality  History of skin cancer  Hx of cataract surgery, unspecified laterality  Adhesion of intestine  SBO (small bowel obstruction)    AH:    Meds: See med rec

## 2018-08-16 NOTE — BRIEF OPERATIVE NOTE - PROCEDURE
<<-----Click on this checkbox to enter Procedure Antegrade intramedullary nailing of femur  08/16/2018  Right  Active  AVJOB

## 2018-08-16 NOTE — H&P ADULT - NSHPLABSRESULTS_GEN_ALL_CORE
T(C): 36.9 (08-16-18 @ 04:33)  HR: 74 (08-16-18 @ 04:33)  BP: 110/74 (08-16-18 @ 04:33)  RR: 18 (08-16-18 @ 04:33)  SpO2: 97% (08-16-18 @ 04:33)  Wt(kg): --                        12.8   8.0   )-----------( 168      ( 15 Aug 2018 19:37 )             38.0     08-15    136  |  101  |  39<H>  ----------------------------<  109<H>  4.9   |  24  |  1.46<H>    Ca    9.2      15 Aug 2018 19:37    TPro  7.4  /  Alb  4.3  /  TBili  0.7  /  DBili  x   /  AST  23  /  ALT  17  /  AlkPhos  49  08-15    PT/INR - ( 15 Aug 2018 19:37 )   PT: 25.4 sec;   INR: 2.31 ratio         PTT - ( 15 Aug 2018 19:37 )  PTT:58.4 sec

## 2018-08-16 NOTE — CONSULT NOTE ADULT - SUBJECTIVE AND OBJECTIVE BOX
[facility] Cardiology Consult  _________________________    CC: Fall R hip pain.    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  He denies any prior adverse reactions to anesthesia. No recent cardiac-related complaints. No chest pain, dyspnea or palpitations. No exertional limitations.     PMH:  BOOP (bronchiolitis obliterans with organizing pneumonia)  Carpal tunnel syndrome of left wrist  Melanoma in situ of face excluding eyelid, nose, lip, and ear  Basal cell carcinoma  Abscess  Acute on chronic congestive heart failure, unspecified congestive heart failure type  Benign prostatic hyperplasia, presence of lower urinary tract symptoms unspecified, unspecified morphology  Chronic gout with tophus, unspecified cause, unspecified site  Hypothyroidism, unspecified type  Paroxysmal atrial fibrillation  Complete heart block  Coronary artery disease involving native heart without angina pectoris, unspecified vessel or lesion type    PSH:  H/O shoulder surgery  Artificial cardiac pacemaker  History of colon resection  S/P CABG x 3  H/O hernia repair  History of appendectomy  History of carpal tunnel release, unspecified laterality  History of skin cancer  Hx of cataract surgery, unspecified laterality  Adhesion of intestine  SBO (small bowel obstruction)    AH:    Meds: See med rec (16 Aug 2018 04:39)      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 after colonoscopy  Carpal tunnel syndrome of left wrist  Melanoma in situ of face excluding eyelid, nose, lip, and ear  Basal cell carcinoma  Abscess: diverticular  Acute on chronic congestive heart failure, unspecified congestive heart failure type: 2016  Benign prostatic hyperplasia, presence of lower urinary tract symptoms unspecified, unspecified morphology  Chronic gout with tophus, unspecified cause, unspecified site  Hypothyroidism, unspecified type  Paroxysmal atrial fibrillation: on Coumadin  Complete heart block: s/p PPM insertion-2008  Battery change- 2015  Last interrogation-05/2017  Coronary artery disease involving native heart without angina pectoris, unspecified vessel or lesion type  H/O shoulder surgery: Right shoulder repair- 2001  Artificial cardiac pacemaker: 2008  History of colon resection: 1995  S/P CABG x 3: 2002  H/O hernia repair: bilateral IHR x 2  History of appendectomy  History of carpal tunnel release, unspecified laterality: right wrist- 2002  History of skin cancer  Hx of cataract surgery, unspecified laterality  Adhesion of intestine: Relese of abdominal adhesions- 2002  SBO (small bowel obstruction): 03/2017      MEDICATIONS  (STANDING):  acetaminophen   Tablet 975 milliGRAM(s) Oral every 8 hours  carvedilol 9.375 milliGRAM(s) Oral every 12 hours  docusate sodium 100 milliGRAM(s) Oral three times a day  levothyroxine 112 MICROGram(s) Oral daily  senna 2 Tablet(s) Oral at bedtime  sodium chloride 0.9%. 1000 milliLiter(s) (125 mL/Hr) IV Continuous <Continuous>  sotalol 80 milliGRAM(s) Oral two times a day    MEDICATIONS  (PRN):  magnesium hydroxide Suspension 30 milliLiter(s) Oral daily PRN Constipation  melatonin 3 milliGRAM(s) Oral at bedtime PRN Insomnia  oxyCODONE    IR 2.5 milliGRAM(s) Oral every 4 hours PRN Moderate Pain (4 - 6)  oxyCODONE    IR 5 milliGRAM(s) Oral every 4 hours PRN Severe Pain (7 - 10)      Allergies    cephalosporins (Fever; Rash)    Intolerances        Social Histroy: Tobacco- , ETOH-, Illicit Drugs-    T(C): 36.9 (08-16-18 @ 04:33), Max: 36.9 (08-16-18 @ 04:33)  HR: 74 (08-16-18 @ 04:33) (74 - 76)  BP: 110/74 (08-16-18 @ 04:33) (93/60 - 125/72)  RR: 18 (08-16-18 @ 04:33) (16 - 18)  SpO2: 97% (08-16-18 @ 04:33) (96% - 99%)  I&O's Summary    15 Aug 2018 07:01  -  16 Aug 2018 07:00  --------------------------------------------------------  IN: 375 mL / OUT: 200 mL / NET: 175 mL        Review of Systems:  Constitutional: [ ] Fever [ ] Chills [ ] Fatigue [ ] Weight change   HEENT: [ ] Blurred vision [ ] Eye Pain [ ] Headache [ ] Runny nose [ ] Sore Throat   Respiratory: [ ] Cough [ ] Wheezing [ ] Shortness of breath  Cardiovascular: [ ] Chest Pain [ ] Palpitations [ ] SMITH [ ] PND [ ] Orthopnea  Gastrointestinal: [ ] Abdominal Pain [ ] Diarrhea [ ] Constipation [ ] Hemorrhoids [ ] Nausea [ ] Vomiting  Genitourinary: [ ] Nocturia [ ] Dysuria [ ] Incontinence  Extremities: [ ] Swelling [x ] Joint Pain  Neurologic: [ ] Focal deficit [ ] Paresthesias [ ] Syncope  Lymphatic: [ ] Swelling [ ] Lymphadenopathy   Skin: [ ] Rash [ ] Ecchymoses [ ] Wounds [ ] Lesions  Psychiatry: [ ] Depression [ ] Suicidal/Homicidal Ideation [ ] Anxiety [ ] Sleep Disturbances  [ x] 10 point review of systems is otherwise negative except as mentioned above            [ ]Unable to obtain    PHYSICAL EXAM:  GENERAL: Alert, NAD  NECK: Supple, No JVD, No carotid bruit.  CHEST/LUNG: Clear to auscultation bilaterally; No wheezes, rales, or rhonchi  HEART: S1 S2 normal, RRR,  2/6 holosystolic murmur at LLSB.  ABDOMEN: Soft, Nontender, Nondistended; Bowel sounds present  EXTREMITIES:  No LE edema.      LABS:                        11.7   8.1   )-----------( 135      ( 16 Aug 2018 05:09 )             35.0     08-15    136  |  101  |  39<H>  ----------------------------<  109<H>  4.9   |  24  |  1.46<H>    Ca    9.2      15 Aug 2018 19:37    TPro  7.4  /  Alb  4.3  /  TBili  0.7  /  DBili  x   /  AST  23  /  ALT  17  /  AlkPhos  49  08-15    PT/INR - ( 16 Aug 2018 05:09 )   PT: 22.6 sec;   INR: 2.06 ratio         PTT - ( 16 Aug 2018 05:09 )  PTT:50.7 sec              MEDICATIONS  (STANDING):  acetaminophen   Tablet 975 milliGRAM(s) Oral every 8 hours  carvedilol 9.375 milliGRAM(s) Oral every 12 hours  docusate sodium 100 milliGRAM(s) Oral three times a day  levothyroxine 112 MICROGram(s) Oral daily  senna 2 Tablet(s) Oral at bedtime  sodium chloride 0.9%. 1000 milliLiter(s) (125 mL/Hr) IV Continuous <Continuous>  sotalol 80 milliGRAM(s) Oral two times a day    MEDICATIONS  (PRN):  magnesium hydroxide Suspension 30 milliLiter(s) Oral daily PRN Constipation  melatonin 3 milliGRAM(s) Oral at bedtime PRN Insomnia  oxyCODONE    IR 2.5 milliGRAM(s) Oral every 4 hours PRN Moderate Pain (4 - 6)  oxyCODONE    IR 5 milliGRAM(s) Oral every 4 hours PRN Severe Pain (7 - 10)      RADIOLOGY & ADDITIONAL TESTS:    Cardiology testing:  EKG: V paced    Echo 7/18/18 (): Normal LV dimensions and wall thickness, Low-normal LV sys function (EF 50%), RVE with normal ssystolic function, Mild mild AI and MR, moderate TR, PASP 42 mmHg. [facility] Cardiology Consult  _________________________    CC: Fall R hip pain.    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  He denies any prior adverse reactions to anesthesia. No recent cardiac-related complaints. No chest pain, dyspnea or palpitations. No exertional limitations.     PMH:  BOOP (bronchiolitis obliterans with organizing pneumonia)  Carpal tunnel syndrome of left wrist  Melanoma in situ of face excluding eyelid, nose, lip, and ear  Basal cell carcinoma  Abscess  Acute on chronic congestive heart failure, unspecified congestive heart failure type  Benign prostatic hyperplasia, presence of lower urinary tract symptoms unspecified, unspecified morphology  Chronic gout with tophus, unspecified cause, unspecified site  Hypothyroidism, unspecified type  Paroxysmal atrial fibrillation  Complete heart block  Coronary artery disease involving native heart without angina pectoris, unspecified vessel or lesion type    PSH:  H/O shoulder surgery  Artificial cardiac pacemaker  History of colon resection  S/P CABG x 3  H/O hernia repair  History of appendectomy  History of carpal tunnel release, unspecified laterality  History of skin cancer  Hx of cataract surgery, unspecified laterality  Adhesion of intestine  SBO (small bowel obstruction)    AH:    Meds: See med rec (16 Aug 2018 04:39)      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 after colonoscopy  Carpal tunnel syndrome of left wrist  Melanoma in situ of face excluding eyelid, nose, lip, and ear  Basal cell carcinoma  Abscess: diverticular  Acute on chronic congestive heart failure, unspecified congestive heart failure type: 2016  Benign prostatic hyperplasia, presence of lower urinary tract symptoms unspecified, unspecified morphology  Chronic gout with tophus, unspecified cause, unspecified site  Hypothyroidism, unspecified type  Paroxysmal atrial fibrillation: on Coumadin  Complete heart block: s/p PPM insertion-2008  Battery change- 2015  Last interrogation-05/2017  Coronary artery disease involving native heart without angina pectoris, unspecified vessel or lesion type  H/O shoulder surgery: Right shoulder repair- 2001  Artificial cardiac pacemaker: 2008  History of colon resection: 1995  S/P CABG x 3: 2002  H/O hernia repair: bilateral IHR x 2  History of appendectomy  History of carpal tunnel release, unspecified laterality: right wrist- 2002  History of skin cancer  Hx of cataract surgery, unspecified laterality  Adhesion of intestine: Relese of abdominal adhesions- 2002  SBO (small bowel obstruction): 03/2017      MEDICATIONS  (STANDING):  acetaminophen   Tablet 975 milliGRAM(s) Oral every 8 hours  carvedilol 9.375 milliGRAM(s) Oral every 12 hours  docusate sodium 100 milliGRAM(s) Oral three times a day  levothyroxine 112 MICROGram(s) Oral daily  senna 2 Tablet(s) Oral at bedtime  sodium chloride 0.9%. 1000 milliLiter(s) (125 mL/Hr) IV Continuous <Continuous>  sotalol 80 milliGRAM(s) Oral two times a day    MEDICATIONS  (PRN):  magnesium hydroxide Suspension 30 milliLiter(s) Oral daily PRN Constipation  melatonin 3 milliGRAM(s) Oral at bedtime PRN Insomnia  oxyCODONE    IR 2.5 milliGRAM(s) Oral every 4 hours PRN Moderate Pain (4 - 6)  oxyCODONE    IR 5 milliGRAM(s) Oral every 4 hours PRN Severe Pain (7 - 10)      Allergies    cephalosporins (Fever; Rash)    Intolerances        Social Histroy: Tobacco- , ETOH-, Illicit Drugs-    T(C): 36.9 (08-16-18 @ 04:33), Max: 36.9 (08-16-18 @ 04:33)  HR: 74 (08-16-18 @ 04:33) (74 - 76)  BP: 110/74 (08-16-18 @ 04:33) (93/60 - 125/72)  RR: 18 (08-16-18 @ 04:33) (16 - 18)  SpO2: 97% (08-16-18 @ 04:33) (96% - 99%)  I&O's Summary    15 Aug 2018 07:01  -  16 Aug 2018 07:00  --------------------------------------------------------  IN: 375 mL / OUT: 200 mL / NET: 175 mL        Review of Systems:  Constitutional: [ ] Fever [ ] Chills [ ] Fatigue [ ] Weight change   HEENT: [ ] Blurred vision [ ] Eye Pain [ ] Headache [ ] Runny nose [ ] Sore Throat   Respiratory: [ ] Cough [ ] Wheezing [ ] Shortness of breath  Cardiovascular: [ ] Chest Pain [ ] Palpitations [ ] SMITH [ ] PND [ ] Orthopnea  Gastrointestinal: [ ] Abdominal Pain [ ] Diarrhea [ ] Constipation [ ] Hemorrhoids [ ] Nausea [ ] Vomiting  Genitourinary: [ ] Nocturia [ ] Dysuria [ ] Incontinence  Extremities: [ ] Swelling [x ] Joint Pain  Neurologic: [ ] Focal deficit [ ] Paresthesias [ ] Syncope  Lymphatic: [ ] Swelling [ ] Lymphadenopathy   Skin: [ ] Rash [ ] Ecchymoses [ ] Wounds [ ] Lesions  Psychiatry: [ ] Depression [ ] Suicidal/Homicidal Ideation [ ] Anxiety [ ] Sleep Disturbances  [ x] 10 point review of systems is otherwise negative except as mentioned above            [ ]Unable to obtain    PHYSICAL EXAM:  GENERAL: Alert, NAD  NECK: Supple, No JVD, No carotid bruit.  CHEST/LUNG: Clear to auscultation bilaterally; No wheezes, rales, or rhonchi  HEART: S1 S2 normal, RRR,  2/6 holosystolic murmur at LLSB.  ABDOMEN: Soft, Nontender, Nondistended; Bowel sounds present  EXTREMITIES:  No LE edema.      LABS:                        11.7   8.1   )-----------( 135      ( 16 Aug 2018 05:09 )             35.0     08-15    136  |  101  |  39<H>  ----------------------------<  109<H>  4.9   |  24  |  1.46<H>    Ca    9.2      15 Aug 2018 19:37    TPro  7.4  /  Alb  4.3  /  TBili  0.7  /  DBili  x   /  AST  23  /  ALT  17  /  AlkPhos  49  08-15    PT/INR - ( 16 Aug 2018 05:09 )   PT: 22.6 sec;   INR: 2.06 ratio         PTT - ( 16 Aug 2018 05:09 )  PTT:50.7 sec              MEDICATIONS  (STANDING):  acetaminophen   Tablet 975 milliGRAM(s) Oral every 8 hours  carvedilol 9.375 milliGRAM(s) Oral every 12 hours  docusate sodium 100 milliGRAM(s) Oral three times a day  levothyroxine 112 MICROGram(s) Oral daily  senna 2 Tablet(s) Oral at bedtime  sodium chloride 0.9%. 1000 milliLiter(s) (125 mL/Hr) IV Continuous <Continuous>  sotalol 80 milliGRAM(s) Oral two times a day    MEDICATIONS  (PRN):  magnesium hydroxide Suspension 30 milliLiter(s) Oral daily PRN Constipation  melatonin 3 milliGRAM(s) Oral at bedtime PRN Insomnia  oxyCODONE    IR 2.5 milliGRAM(s) Oral every 4 hours PRN Moderate Pain (4 - 6)  oxyCODONE    IR 5 milliGRAM(s) Oral every 4 hours PRN Severe Pain (7 - 10)      RADIOLOGY & ADDITIONAL TESTS:    Cardiology testing:  EKG: V paced    Echo 7/18/18 (PH): Normal LV dimensions and wall thickness, Low-normal LV sys function (EF 50%), RVE with normal ssystolic function, Mild mild AI and MR, moderate TR, PASP 42 mmHg.

## 2018-08-16 NOTE — PROCEDURE NOTE - ADDITIONAL PROCEDURE DETAILS
Indication for interrogation: Check battery life preop  Measured data WNL. Pt is PM dependent. Patient declined pacing threshold testing ( which of note is not required for  battery life check)  Since last device check  8/23/18 stored data revealed episodes of  AFlutter with controlled ventricular rates on 6/2/18 lasting 2 hrs 14 mins and 24 seconds; 7/1/18 lasting 32 hrs 11mins and 28 seconds; and 7/27/18 lasting >99 hrs 59 mins and 59 seconds.   Changes made: none    [id number] Indication for interrogation: Check battery life preop  Measured data WNL. Pt is PM dependent. Patient declined pacing threshold testing ( which of note is not required for  battery life check)  Since last device check  8/23/18 stored data revealed episodes of  AFib/Aflutter with controlled ventricular rates on 6/2/18 lasting 2 hrs 14 mins and 24 seconds; 7/1/18 lasting 32 hrs 11mins and 28 seconds; and 7/27/18 lasting >99 hrs 59 mins and 59 seconds.   Changes made: none    [id number]

## 2018-08-16 NOTE — PROGRESS NOTE ADULT - PROBLEM SELECTOR PLAN 6
coumadin on hold for potential OR today  post-op prophylaxis as per ortho coumadin on hold for potential OR today  start heparin gtt post-op bridge to coumadin (hx of recent admission for amaurosis fugax)

## 2018-08-16 NOTE — CONSULT NOTE ADULT - PROBLEM SELECTOR RECOMMENDATION 9
-  - The patient's cardiac issues are stable at this time.  - He does not have any high-risk cardiac features at this time.  - reviewed recent echo report as noted above.  - the patient has good functional capacity.  - Otherwise, no further cardiac work-up needed prior to proceeding to OR.  - continue his beta blocker in the perioperative period.  - given his prior histor of amaurosis fugax in the setting of holding AC for elective surgery recommended bridging with heparin gtt to coumadin in the post-operative period.    Hector Luna M.D., [facility]  [phone] -  - The patient's cardiac issues are stable at this time.  - He does not have any high-risk cardiac features at this time.  - reviewed recent echo report as noted above.  - the patient has good functional capacity.  - called EP NP to interrogate his medtronic PPM for battery life.  - Otherwise, no further cardiac work-up needed prior to proceeding to OR.  - continue his beta blocker in the perioperative period.  - given his prior histor of amaurosis fugax in the setting of holding AC for elective surgery recommended bridging with heparin gtt to coumadin in the post-operative period.    Hector Luna M.D., [facility]  [phone]

## 2018-08-16 NOTE — PROGRESS NOTE ADULT - PROBLEM SELECTOR PLAN 1
appreciate ortho  if INR below 1.5 after Vit K, would be medically cleared for OR today  holding coumadin

## 2018-08-16 NOTE — CHART NOTE - NSCHARTNOTEFT_GEN_A_CORE
Patient resting without complaints.  Denies chest pain, SOB, N/V.    T(C): 36.5 (08-16-18 @ 20:05)  T(F): 97.7 (08-16-18 @ 20:05)  HR: 75 (08-16-18 @ 20:05)  BP: 94/52 (08-16-18 @ 20:05)  RR: 18 (08-16-18 @ 20:05)  SpO2: 94% (08-16-18 @ 20:05)      Exam:  Alert and oriented; No acute distress    Cardio: RRR S1,S2    Pulm: CTA B/L    R lower extremity:  Hip dsg CDI  Calf soft, non-tender  +PF/DF  Sensation grossly intact                            11.7   8.1   )-----------( 135      ( 16 Aug 2018 05:09 )             35.0        136  |  101  |  39<H>  ----------------------------<  109<H>  4.9   |  24  |  1.46<H>      PT/PTT/INR - ( 16 Aug 2018 14:53 )    13.7 sec | pending                  / \           1.25 ratio           A/P: 84y Male s/p R hip IMN; Stable    -Pain control  -DVT ppx; IS  -Am labs  -PT eval-WBAT RLE  -Continue current tx.    Anna Mohr PA-C  Orthopedic Surgery  [facility] [phone]

## 2018-08-17 DIAGNOSIS — I48.2 CHRONIC ATRIAL FIBRILLATION: ICD-10-CM

## 2018-08-17 LAB
ANION GAP SERPL CALC-SCNC: 11 MMOL/L — SIGNIFICANT CHANGE UP (ref 5–17)
APPEARANCE UR: CLEAR — SIGNIFICANT CHANGE UP
APTT BLD: 117.6 SEC — SIGNIFICANT CHANGE UP (ref 27.5–37.4)
APTT BLD: 37.7 SEC — HIGH (ref 27.5–37.4)
APTT BLD: >200 SEC — CRITICAL HIGH (ref 27.5–37.4)
BILIRUB UR-MCNC: NEGATIVE — SIGNIFICANT CHANGE UP
BUN SERPL-MCNC: 20 MG/DL — SIGNIFICANT CHANGE UP (ref 7–23)
CALCIUM SERPL-MCNC: 7.4 MG/DL — LOW (ref 8.4–10.5)
CHLORIDE SERPL-SCNC: 103 MMOL/L — SIGNIFICANT CHANGE UP (ref 96–108)
CO2 SERPL-SCNC: 21 MMOL/L — LOW (ref 22–31)
COLOR SPEC: YELLOW — SIGNIFICANT CHANGE UP
CREAT SERPL-MCNC: 1.07 MG/DL — SIGNIFICANT CHANGE UP (ref 0.5–1.3)
DIFF PNL FLD: NEGATIVE — SIGNIFICANT CHANGE UP
GLUCOSE SERPL-MCNC: 116 MG/DL — HIGH (ref 70–99)
GLUCOSE UR QL: NEGATIVE MG/DL — SIGNIFICANT CHANGE UP
HCT VFR BLD CALC: 36.8 % — LOW (ref 39–50)
HGB BLD-MCNC: 11.7 G/DL — LOW (ref 13–17)
INR BLD: 1.22 RATIO — HIGH (ref 0.88–1.16)
KETONES UR-MCNC: NEGATIVE — SIGNIFICANT CHANGE UP
LEUKOCYTE ESTERASE UR-ACNC: NEGATIVE — SIGNIFICANT CHANGE UP
MCHC RBC-ENTMCNC: 30.6 PG — SIGNIFICANT CHANGE UP (ref 27–34)
MCHC RBC-ENTMCNC: 31.8 GM/DL — LOW (ref 32–36)
MCV RBC AUTO: 96.4 FL — SIGNIFICANT CHANGE UP (ref 80–100)
NITRITE UR-MCNC: NEGATIVE — SIGNIFICANT CHANGE UP
PH UR: 5.5 — SIGNIFICANT CHANGE UP (ref 5–8)
PLATELET # BLD AUTO: 145 K/UL — LOW (ref 150–400)
POTASSIUM SERPL-MCNC: 3.9 MMOL/L — SIGNIFICANT CHANGE UP (ref 3.5–5.3)
POTASSIUM SERPL-SCNC: 3.9 MMOL/L — SIGNIFICANT CHANGE UP (ref 3.5–5.3)
PROT UR-MCNC: NEGATIVE MG/DL — SIGNIFICANT CHANGE UP
PROTHROM AB SERPL-ACNC: 13.8 SEC — HIGH (ref 10–13.1)
RBC # BLD: 3.82 M/UL — LOW (ref 4.2–5.8)
RBC # FLD: 15.1 % — HIGH (ref 10.3–14.5)
SODIUM SERPL-SCNC: 135 MMOL/L — SIGNIFICANT CHANGE UP (ref 135–145)
SP GR SPEC: 1.02 — SIGNIFICANT CHANGE UP (ref 1.01–1.02)
UROBILINOGEN FLD QL: NEGATIVE MG/DL — SIGNIFICANT CHANGE UP
WBC # BLD: 8.5 K/UL — SIGNIFICANT CHANGE UP (ref 3.8–10.5)
WBC # FLD AUTO: 8.5 K/UL — SIGNIFICANT CHANGE UP (ref 3.8–10.5)

## 2018-08-17 PROCEDURE — 99222 1ST HOSP IP/OBS MODERATE 55: CPT

## 2018-08-17 RX ORDER — ACETAMINOPHEN 500 MG
650 TABLET ORAL ONCE
Qty: 0 | Refills: 0 | Status: COMPLETED | OUTPATIENT
Start: 2018-08-17 | End: 2018-08-17

## 2018-08-17 RX ORDER — SOTALOL HCL 120 MG
80 TABLET ORAL
Qty: 0 | Refills: 0 | Status: DISCONTINUED | OUTPATIENT
Start: 2018-08-17 | End: 2018-08-20

## 2018-08-17 RX ORDER — CARVEDILOL PHOSPHATE 80 MG/1
9.38 CAPSULE, EXTENDED RELEASE ORAL EVERY 12 HOURS
Qty: 0 | Refills: 0 | Status: DISCONTINUED | OUTPATIENT
Start: 2018-08-17 | End: 2018-08-20

## 2018-08-17 RX ADMIN — Medication 650 MILLIGRAM(S): at 07:35

## 2018-08-17 RX ADMIN — Medication 650 MILLIGRAM(S): at 07:05

## 2018-08-17 RX ADMIN — SPIRONOLACTONE 25 MILLIGRAM(S): 25 TABLET, FILM COATED ORAL at 06:14

## 2018-08-17 RX ADMIN — Medication 0.5 MILLIGRAM(S): at 21:16

## 2018-08-17 RX ADMIN — Medication 100 MILLIGRAM(S): at 08:23

## 2018-08-17 RX ADMIN — Medication 650 MILLIGRAM(S): at 12:26

## 2018-08-17 RX ADMIN — CARVEDILOL PHOSPHATE 9.38 MILLIGRAM(S): 80 CAPSULE, EXTENDED RELEASE ORAL at 17:17

## 2018-08-17 RX ADMIN — Medication 650 MILLIGRAM(S): at 02:54

## 2018-08-17 RX ADMIN — HEPARIN SODIUM 900 UNIT(S)/HR: 5000 INJECTION INTRAVENOUS; SUBCUTANEOUS at 23:28

## 2018-08-17 RX ADMIN — Medication 100 MILLIGRAM(S): at 06:14

## 2018-08-17 RX ADMIN — SILODOSIN 8 MILLIGRAM(S): 4 CAPSULE ORAL at 21:16

## 2018-08-17 RX ADMIN — HEPARIN SODIUM 1400 UNIT(S)/HR: 5000 INJECTION INTRAVENOUS; SUBCUTANEOUS at 07:06

## 2018-08-17 RX ADMIN — Medication 650 MILLIGRAM(S): at 21:16

## 2018-08-17 RX ADMIN — Medication 80 MILLIGRAM(S): at 17:16

## 2018-08-17 RX ADMIN — Medication 20 MILLIGRAM(S): at 06:14

## 2018-08-17 RX ADMIN — Medication 1 TABLET(S): at 13:14

## 2018-08-17 RX ADMIN — WARFARIN SODIUM 5 MILLIGRAM(S): 2.5 TABLET ORAL at 21:16

## 2018-08-17 RX ADMIN — Medication 650 MILLIGRAM(S): at 22:16

## 2018-08-17 RX ADMIN — Medication 100 MILLIGRAM(S): at 13:14

## 2018-08-17 RX ADMIN — HEPARIN SODIUM 0 UNIT(S)/HR: 5000 INJECTION INTRAVENOUS; SUBCUTANEOUS at 22:17

## 2018-08-17 RX ADMIN — Medication 112 MICROGRAM(S): at 06:14

## 2018-08-17 RX ADMIN — HEPARIN SODIUM 1200 UNIT(S)/HR: 5000 INJECTION INTRAVENOUS; SUBCUTANEOUS at 15:58

## 2018-08-17 RX ADMIN — Medication 100 MILLIGRAM(S): at 21:16

## 2018-08-17 RX ADMIN — Medication 650 MILLIGRAM(S): at 02:24

## 2018-08-17 NOTE — OCCUPATIONAL THERAPY INITIAL EVALUATION ADULT - ADDITIONAL COMMENTS
xray right femur 8/16-Redemonstrated comminuted right intertrochanteric fracture with varus angulation and mild superior displacement of the distal femur fracture fragment. Mild medial displacement of the lesser trochanteric fracture fragment.

## 2018-08-17 NOTE — OCCUPATIONAL THERAPY INITIAL EVALUATION ADULT - PERTINENT HX OF CURRENT PROBLEM, REHAB EVAL
84yMale c/o R hip pain s/p mechanical fall. Patient denies head hit or LOC. Patient denies numbness or tingling in the RLE. Patient denies any other injuries.

## 2018-08-17 NOTE — PROGRESS NOTE ADULT - PROBLEM SELECTOR PLAN 1
-  - cont home cardiac meds - BB, statin, and diuretics.   Anticoagulation as noted below. -  - please continue his home cardiac meds - BB, statin, and diuretics. Can change hold parameter to SBP less than 90mmHg.    Anticoagulation as noted below.

## 2018-08-17 NOTE — PHYSICAL THERAPY INITIAL EVALUATION ADULT - CRITERIA FOR SKILLED THERAPEUTIC INTERVENTIONS
predicted duration of therapy intervention/functional limitations in following categories/impairments found/therapy frequency/anticipated equipment needs at discharge/anticipated discharge recommendation/risk reduction/prevention/rehab potential

## 2018-08-17 NOTE — OCCUPATIONAL THERAPY INITIAL EVALUATION ADULT - DIAGNOSIS, OT EVAL
Patient with decreased ADL status and impairments with functional mobility due to Patient with decreased ADL status and impairments with functional mobility due to decreased ROM, strength, and balance

## 2018-08-17 NOTE — PHYSICAL THERAPY INITIAL EVALUATION ADULT - PERTINENT HX OF CURRENT PROBLEM, REHAB EVAL
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 Pt found to have IT Fx, now s/p R Hip IM nail surgery on date mentioned above

## 2018-08-17 NOTE — PHYSICAL THERAPY INITIAL EVALUATION ADULT - RANGE OF MOTION EXAMINATION, REHAB EVAL
Left LE ROM was WFL (within functional limits)/R hip ROM limited 2/2 pain approx half available ROM/bilateral upper extremity ROM was WFL (within functional limits)

## 2018-08-17 NOTE — PROVIDER CONTACT NOTE (CRITICAL VALUE NOTIFICATION) - ASSESSMENT
Pt is currently on Heparin gtt infusing at 12ml/hr. Pt is has no s/s of bleeding or change in mental status

## 2018-08-17 NOTE — PHYSICAL THERAPY INITIAL EVALUATION ADULT - IMPAIRMENTS FOUND, PT EVAL
aerobic capacity/endurance/joint integrity and mobility/muscle strength/gait, locomotion, and balance/ROM

## 2018-08-17 NOTE — PROVIDER CONTACT NOTE (CRITICAL VALUE NOTIFICATION) - BACKGROUND
Admitted for closed fx of neck of right femur. PMHx of BOOP, paroxysmal afib, complete heart block, CHF, BPH, and hypothyroid.

## 2018-08-17 NOTE — PROGRESS NOTE ADULT - PROBLEM SELECTOR PLAN 2
-  - given his prior history of amaurosis fugax while holding AC for elective surgery I would continue heparin gtt bridge until INR is therapeutic on coumadin.  - cont coreg and sotalol.    our team to follow.    Hector Luna M.D., [facility]  [phone] -  - given his prior history of amaurosis fugax while holding AC for elective surgery I would continue heparin gtt bridge until INR is therapeutic on coumadin.  - cont coreg and sotalol with hold parameter for SBP less than 90 mmHg.    our team to follow.    Hector Luna M.D., [facility]  [phone]

## 2018-08-17 NOTE — PHYSICAL THERAPY INITIAL EVALUATION ADULT - ADDITIONAL COMMENTS
84 year old male who PTA was living in a apartment complex with elevator to living floor. independent in all ALD's and functional mobility no AD for gait.

## 2018-08-17 NOTE — CONSULT NOTE ADULT - ASSESSMENT
A/P:   R femur IT fracture, s/p IMN: doinf well after Sx, PT/OT to improve functional status, on coumadin for anticoagulation, pain management as current   A/fib: Heparin bridge with coumadin, rate controlled, on coreg/sotalol as home dose  Anxiety: was taking xanax qhs for many years, GDR not safe at this time, patient had hard time sleeping for the last 2 nights  hypothyroid: levothyroxin  CHF: afib control, coreg, diuretics  BPH rapaflo  fall precaution discussed.
84 M retired gastroenterologist h/o CAD s/p CABG, ICM (EF 50% on last echo), CHB s/p PPM (last gen change - medtronic), chronic AF on coumadin, hypothyroid, prior amaurosis fugax in the setting of holding coumadin for elective cataract surgery, skin cancers (melanoma in situ, basal cell), diverticulitis, prediabetes, HLD, a/w mechanical fall and R hip pain and found to have R IT fracture.

## 2018-08-17 NOTE — OCCUPATIONAL THERAPY INITIAL EVALUATION ADULT - BALANCE TRAINING, PT EVAL
Pt will increase dynamic standing balance 1/2 grade to increase safety/independence with functional transfers and ADLs within 4 weeks

## 2018-08-17 NOTE — PHYSICAL THERAPY INITIAL EVALUATION ADULT - ACTIVE RANGE OF MOTION EXAMINATION, REHAB EVAL
bilateral upper extremity Active ROM was WFL (within functional limits)/Left LE Active ROM was WFL (within functional limits)/R hip ROM limited 2/2 pain approx half available ROM

## 2018-08-17 NOTE — CONSULT NOTE ADULT - SUBJECTIVE AND OBJECTIVE BOX
HPI:  84yMale c/o R hip pain s/p mechanical fall while walking in the bathroom. Patient denies head hit or LOC. Patient denies numbness or tingling in the RLE. Patient denies any other injuries.    PMH:  BOOP (bronchiolitis obliterans with organizing pneumonia)  Carpal tunnel syndrome of left wrist  Melanoma in situ of face excluding eyelid, nose, lip, and ear  Basal cell carcinoma  Abscess  Acute on chronic congestive heart failure, unspecified congestive heart failure type  Benign prostatic hyperplasia, presence of lower urinary tract symptoms unspecified, unspecified morphology  Chronic gout with tophus, unspecified cause, unspecified site  Hypothyroidism, unspecified type  Paroxysmal atrial fibrillation  Complete heart block  Coronary artery disease involving native heart without angina pectoris, unspecified vessel or lesion type    PSH:  H/O shoulder surgery  Artificial cardiac pacemaker  History of colon resection  S/P CABG x 3  H/O hernia repair  History of appendectomy  History of carpal tunnel release, unspecified laterality  History of skin cancer  Hx of cataract surgery, unspecified laterality  Adhesion of intestine  SBO (small bowel obstruction)    Meds: See med rec (16 Aug 2018 04:39)      HOSPITAL COURSE:  found to have R IT fracture, s/p IMN 8/16/2018, post op day 1. no c/o n/v/dizziness/headache/sob/palpitation    Allergies: cephalosporins (Fever; Rash)      FAMILY HISTORY:  Family history of coronary artery disease      Social History: lives alone,     Functional status (prior to admission):  Independent  [x ] fully     Ambulation status  [ x] independant      Review of Systems:   CONSTITUTIONAL: No fever, weight loss, or fatigue  EYES: No eye pain, visual disturbances, or discharge  ENMT:  No difficulty hearing, tinnitus, vertigo; No sinus or throat pain  NECK: No pain or stiffness  BREASTS: No pain, masses, or nipple discharge  RESPIRATORY: No cough, wheezing, chills or hemoptysis; No shortness of breath  CARDIOVASCULAR: No chest pain, palpitations, dizziness, or leg swelling  GASTROINTESTINAL: No abdominal or epigastric pain. No nausea, vomiting, or hematemesis; No diarrhea or constipation. No melena or hematochezia.  GENITOURINARY: No dysuria, frequency, hematuria, or incontinence  NEUROLOGICAL: No headaches, memory loss, loss of strength, numbness, or tremors  SKIN: No itching, burning, rashes, or lesions   LYMPH NODES: No enlarged glands  ENDOCRINE: No heat or cold intolerance; No hair loss  MUSCULOSKELETAL: see HPI   PSYCHIATRIC:  anxiety  HEME/LYMPH: No easy bruising, or bleeding gums    MEDICATIONS  (STANDING):  ALPRAZolam 0.5 milliGRAM(s) Oral at bedtime  carvedilol 9.375 milliGRAM(s) Oral every 12 hours  docusate sodium 100 milliGRAM(s) Oral three times a day  furosemide    Tablet 20 milliGRAM(s) Oral daily  heparin  Infusion.  Unit(s)/Hr (14 mL/Hr) IV Continuous <Continuous>  levothyroxine 112 MICROGram(s) Oral daily  multivitamin 1 Tablet(s) Oral daily  silodosin 8 milliGRAM(s) Oral at bedtime  sodium chloride 0.9%. 1000 milliLiter(s) (75 mL/Hr) IV Continuous <Continuous>  sotalol 80 milliGRAM(s) Oral two times a day  spironolactone 25 milliGRAM(s) Oral daily  warfarin 5 milliGRAM(s) Oral once    MEDICATIONS  (PRN):  acetaminophen   Tablet 650 milliGRAM(s) Oral every 6 hours PRN For Temp greater than 38 C (100.4 F)  acetaminophen   Tablet. 650 milliGRAM(s) Oral every 6 hours PRN Mild Pain (1 - 3)  aluminum hydroxide/magnesium hydroxide/simethicone Suspension 30 milliLiter(s) Oral four times a day PRN Indigestion  benzocaine 15 mG/menthol 3.6 mG Lozenge 1 Lozenge Oral every 2 hours PRN Mouth Sores  diphenhydrAMINE   Capsule 25 milliGRAM(s) Oral every 4 hours PRN Rash and/or Itching  heparin  Injectable 6500 Unit(s) IV Push every 6 hours PRN For aPTT less than 40  heparin  Injectable 3000 Unit(s) IV Push every 6 hours PRN For aPTT between 40 - 57  HYDROmorphone  Injectable 0.5 milliGRAM(s) IV Push every 3 hours PRN breakthrough pain  magnesium hydroxide Suspension 30 milliLiter(s) Oral daily PRN Constipation  melatonin 3 milliGRAM(s) Oral at bedtime PRN Insomnia  ondansetron Injectable 4 milliGRAM(s) IV Push every 6 hours PRN Nausea and/or Vomiting  oxyCODONE    IR 10 milliGRAM(s) Oral every 4 hours PRN Severe Pain (7 - 10)  oxyCODONE    IR 5 milliGRAM(s) Oral every 4 hours PRN Moderate Pain (4 - 6)      Vital Signs Last 24 Hrs  T(C): 37 (17 Aug 2018 07:57), Max: 37.1 (16 Aug 2018 15:22)  T(F): 98.6 (17 Aug 2018 07:57), Max: 98.8 (16 Aug 2018 15:22)  HR: 75 (17 Aug 2018 07:57) (72 - 78)  BP: 95/60 (17 Aug 2018 07:57) (91/51 - 139/76)  BP(mean): 76 (16 Aug 2018 19:30) (65 - 99)  RR: 18 (17 Aug 2018 07:57) (16 - 18)  SpO2: 92% (17 Aug 2018 07:57) (92% - 99%)  CAPILLARY BLOOD GLUCOSE        I&O's Summary    16 Aug 2018 07:01  -  17 Aug 2018 07:00  --------------------------------------------------------  IN: 1340 mL / OUT: 1000 mL / NET: 340 mL        PHYSICAL EXAM:  GENERAL: NAD, well-developed  HEAD:  Atraumatic, Normocephalic  EYES: EOMI, PERRLA, conjunctiva and sclera clear  NECK: Supple, No JVD  CHEST/LUNG: Clear to auscultation bilaterally; No wheeze  HEART: Regular rate and rhythm; No murmurs, rubs, or gallops  ABDOMEN: Soft, Nontender, Nondistended; Bowel sounds present  EXTREMITIES:  2+ Peripheral Pulses, No clubbing, cyanosis, or edema  NEUROLOGY: AAOx3, non-focal  PSYCH: calm  SKIN: No rashes or lesions    LABS:                        11.7   8.1   )-----------( 135      ( 16 Aug 2018 05:09 )             35.0     08-17    135  |  103  |  20  ----------------------------<  116<H>  3.9   |  21<L>  |  1.07    Ca    7.4<L>      17 Aug 2018 06:21    TPro  7.4  /  Alb  4.3  /  TBili  0.7  /  DBili  x   /  AST  23  /  ALT  17  /  AlkPhos  49  08-15    PT/INR - ( 17 Aug 2018 07:40 )   PT: 13.8 sec;   INR: 1.22 ratio         PTT - ( 17 Aug 2018 06:22 )  PTT:37.7 sec          RADIOLOGY & ADDITIONAL TESTS:    X-Ray: Acute right comminuted intertrochanteric femur fracture with varus   angulation and mild superior displacement of the distal femur. No joint   dislocation. Degenerative changes of the spine. Mild arthrosis of   bilateral hips.  CXR: Small bilateral pleural effusions, unchanged. Patchy opacity in the right   lower lung field, decreased likely improving atelectasis. No pneumothorax.  Old fracture deformity of the right rib.    CT lumbar/pelvic: No acute displaced fracture or traumatic malalignment. Mild   chronic degenerative changes.    EKG Interpretation:PPM rhythm    Imaging Personally Reviewed: hip Xray, CT pelvic    Consultant(s) Notes Reviewed:  cardiology     Care Discussed with Consultants/Other Providers: ortho PA     Care Discussed with Family: daughter at bedside

## 2018-08-18 LAB
ANION GAP SERPL CALC-SCNC: 12 MMOL/L — SIGNIFICANT CHANGE UP (ref 5–17)
APTT BLD: 102 SEC — HIGH (ref 27.5–37.4)
APTT BLD: 58.1 SEC — HIGH (ref 27.5–37.4)
APTT BLD: 60.2 SEC — HIGH (ref 27.5–37.4)
BUN SERPL-MCNC: 19 MG/DL — SIGNIFICANT CHANGE UP (ref 7–23)
CALCIUM SERPL-MCNC: 8.4 MG/DL — SIGNIFICANT CHANGE UP (ref 8.4–10.5)
CHLORIDE SERPL-SCNC: 98 MMOL/L — SIGNIFICANT CHANGE UP (ref 96–108)
CO2 SERPL-SCNC: 22 MMOL/L — SIGNIFICANT CHANGE UP (ref 22–31)
CREAT SERPL-MCNC: 1.22 MG/DL — SIGNIFICANT CHANGE UP (ref 0.5–1.3)
GLUCOSE SERPL-MCNC: 109 MG/DL — HIGH (ref 70–99)
HCT VFR BLD CALC: 33 % — LOW (ref 39–50)
HGB BLD-MCNC: 10.6 G/DL — LOW (ref 13–17)
INR BLD: 1.52 RATIO — HIGH (ref 0.88–1.16)
MCHC RBC-ENTMCNC: 30.5 PG — SIGNIFICANT CHANGE UP (ref 27–34)
MCHC RBC-ENTMCNC: 32.1 GM/DL — SIGNIFICANT CHANGE UP (ref 32–36)
MCV RBC AUTO: 94.8 FL — SIGNIFICANT CHANGE UP (ref 80–100)
PLATELET # BLD AUTO: 128 K/UL — LOW (ref 150–400)
POTASSIUM SERPL-MCNC: 3.9 MMOL/L — SIGNIFICANT CHANGE UP (ref 3.5–5.3)
POTASSIUM SERPL-SCNC: 3.9 MMOL/L — SIGNIFICANT CHANGE UP (ref 3.5–5.3)
PROTHROM AB SERPL-ACNC: 17.3 SEC — HIGH (ref 10–13.1)
RBC # BLD: 3.48 M/UL — LOW (ref 4.2–5.8)
RBC # FLD: 16 % — HIGH (ref 10.3–14.5)
SODIUM SERPL-SCNC: 132 MMOL/L — LOW (ref 135–145)
WBC # BLD: 7.19 K/UL — SIGNIFICANT CHANGE UP (ref 3.8–10.5)
WBC # FLD AUTO: 7.19 K/UL — SIGNIFICANT CHANGE UP (ref 3.8–10.5)

## 2018-08-18 RX ORDER — ACETAMINOPHEN 500 MG
1000 TABLET ORAL ONCE
Qty: 0 | Refills: 0 | Status: COMPLETED | OUTPATIENT
Start: 2018-08-18 | End: 2018-08-18

## 2018-08-18 RX ORDER — SENNA PLUS 8.6 MG/1
2 TABLET ORAL AT BEDTIME
Qty: 0 | Refills: 0 | Status: DISCONTINUED | OUTPATIENT
Start: 2018-08-18 | End: 2018-08-20

## 2018-08-18 RX ORDER — WARFARIN SODIUM 2.5 MG/1
2.5 TABLET ORAL ONCE
Qty: 0 | Refills: 0 | Status: COMPLETED | OUTPATIENT
Start: 2018-08-18 | End: 2018-08-18

## 2018-08-18 RX ORDER — POLYETHYLENE GLYCOL 3350 17 G/17G
17 POWDER, FOR SOLUTION ORAL DAILY
Qty: 0 | Refills: 0 | Status: DISCONTINUED | OUTPATIENT
Start: 2018-08-18 | End: 2018-08-20

## 2018-08-18 RX ADMIN — WARFARIN SODIUM 2.5 MILLIGRAM(S): 2.5 TABLET ORAL at 21:29

## 2018-08-18 RX ADMIN — Medication 20 MILLIGRAM(S): at 05:11

## 2018-08-18 RX ADMIN — CARVEDILOL PHOSPHATE 9.38 MILLIGRAM(S): 80 CAPSULE, EXTENDED RELEASE ORAL at 18:10

## 2018-08-18 RX ADMIN — HEPARIN SODIUM 700 UNIT(S)/HR: 5000 INJECTION INTRAVENOUS; SUBCUTANEOUS at 06:30

## 2018-08-18 RX ADMIN — SPIRONOLACTONE 25 MILLIGRAM(S): 25 TABLET, FILM COATED ORAL at 05:11

## 2018-08-18 RX ADMIN — Medication 80 MILLIGRAM(S): at 18:11

## 2018-08-18 RX ADMIN — Medication 112 MICROGRAM(S): at 05:11

## 2018-08-18 RX ADMIN — Medication 100 MILLIGRAM(S): at 14:15

## 2018-08-18 RX ADMIN — Medication 400 MILLIGRAM(S): at 08:07

## 2018-08-18 RX ADMIN — Medication 650 MILLIGRAM(S): at 14:15

## 2018-08-18 RX ADMIN — Medication 650 MILLIGRAM(S): at 14:45

## 2018-08-18 RX ADMIN — Medication 80 MILLIGRAM(S): at 05:11

## 2018-08-18 RX ADMIN — HEPARIN SODIUM 700 UNIT(S)/HR: 5000 INJECTION INTRAVENOUS; SUBCUTANEOUS at 20:43

## 2018-08-18 RX ADMIN — Medication 100 MILLIGRAM(S): at 21:29

## 2018-08-18 RX ADMIN — Medication 5 MILLIGRAM(S): at 21:29

## 2018-08-18 RX ADMIN — Medication 100 MILLIGRAM(S): at 05:11

## 2018-08-18 RX ADMIN — SILODOSIN 8 MILLIGRAM(S): 4 CAPSULE ORAL at 21:30

## 2018-08-18 RX ADMIN — Medication 1 TABLET(S): at 11:47

## 2018-08-18 RX ADMIN — CARVEDILOL PHOSPHATE 9.38 MILLIGRAM(S): 80 CAPSULE, EXTENDED RELEASE ORAL at 05:11

## 2018-08-18 RX ADMIN — Medication 1000 MILLIGRAM(S): at 08:20

## 2018-08-18 RX ADMIN — Medication 0.5 MILLIGRAM(S): at 21:30

## 2018-08-18 RX ADMIN — HEPARIN SODIUM 700 UNIT(S)/HR: 5000 INJECTION INTRAVENOUS; SUBCUTANEOUS at 14:11

## 2018-08-18 NOTE — PROGRESS NOTE ADULT - PROBLEM SELECTOR PLAN 1
-  - please continue his home cardiac meds - BB, statin, and diuretics.   - Anticoagulation as noted below.

## 2018-08-18 NOTE — PROGRESS NOTE ADULT - PROBLEM SELECTOR PLAN 2
-  - cont coreg and sotalol  - cont heparin bridge to coumadin.    Hector Luna M.D., [facility]  [phone]

## 2018-08-18 NOTE — PROGRESS NOTE ADULT - PROBLEM SELECTOR PLAN 5
euvolemic  OK to hold lasix for today, but will likely resume tomorrow
euvolemic  cont lasix 20 mg daily

## 2018-08-18 NOTE — PROGRESS NOTE ADULT - PROBLEM SELECTOR PROBLEM 3
Coronary artery disease involving native heart without angina pectoris, unspecified vessel or lesion type
Coronary artery disease involving native heart without angina pectoris, unspecified vessel or lesion type

## 2018-08-18 NOTE — PROGRESS NOTE ADULT - PROBLEM SELECTOR PLAN 1
appreciate orthopedic intervention  s/p R hip IMN 8/17  PT/incentive spirometer  pain management as per ortho

## 2018-08-18 NOTE — PROVIDER CONTACT NOTE (OTHER) - ACTION/TREATMENT ORDERED:
MD stated no action necessary and to send labs as soon as possible.
MD made aware; ok to stop q6hrs PTT check. Pt therapeutic at this time, will draw next PTT in AM daily. Will continue to monitor.
MD notified. Verbalized ok to give.

## 2018-08-19 ENCOUNTER — TRANSCRIPTION ENCOUNTER (OUTPATIENT)
Age: 83
End: 2018-08-19

## 2018-08-19 LAB
ANION GAP SERPL CALC-SCNC: 11 MMOL/L — SIGNIFICANT CHANGE UP (ref 5–17)
APTT BLD: 48.6 SEC — HIGH (ref 27.5–37.4)
APTT BLD: 55.3 SEC — HIGH (ref 27.5–37.4)
APTT BLD: >200 SEC — CRITICAL HIGH (ref 27.5–37.4)
BUN SERPL-MCNC: 17 MG/DL — SIGNIFICANT CHANGE UP (ref 7–23)
CALCIUM SERPL-MCNC: 8.5 MG/DL — SIGNIFICANT CHANGE UP (ref 8.4–10.5)
CHLORIDE SERPL-SCNC: 101 MMOL/L — SIGNIFICANT CHANGE UP (ref 96–108)
CO2 SERPL-SCNC: 23 MMOL/L — SIGNIFICANT CHANGE UP (ref 22–31)
CREAT SERPL-MCNC: 1.24 MG/DL — SIGNIFICANT CHANGE UP (ref 0.5–1.3)
GLUCOSE SERPL-MCNC: 103 MG/DL — HIGH (ref 70–99)
HCT VFR BLD CALC: 30.8 % — LOW (ref 39–50)
HGB BLD-MCNC: 10.1 G/DL — LOW (ref 13–17)
INR BLD: 1.85 RATIO — HIGH (ref 0.88–1.16)
MCHC RBC-ENTMCNC: 30.9 PG — SIGNIFICANT CHANGE UP (ref 27–34)
MCHC RBC-ENTMCNC: 32.8 GM/DL — SIGNIFICANT CHANGE UP (ref 32–36)
MCV RBC AUTO: 94.2 FL — SIGNIFICANT CHANGE UP (ref 80–100)
PLATELET # BLD AUTO: 134 K/UL — LOW (ref 150–400)
POTASSIUM SERPL-MCNC: 3.9 MMOL/L — SIGNIFICANT CHANGE UP (ref 3.5–5.3)
POTASSIUM SERPL-SCNC: 3.9 MMOL/L — SIGNIFICANT CHANGE UP (ref 3.5–5.3)
PROTHROM AB SERPL-ACNC: 21.2 SEC — HIGH (ref 10–13.1)
RBC # BLD: 3.27 M/UL — LOW (ref 4.2–5.8)
RBC # FLD: 15.8 % — HIGH (ref 10.3–14.5)
SODIUM SERPL-SCNC: 135 MMOL/L — SIGNIFICANT CHANGE UP (ref 135–145)
WBC # BLD: 6.24 K/UL — SIGNIFICANT CHANGE UP (ref 3.8–10.5)
WBC # FLD AUTO: 6.24 K/UL — SIGNIFICANT CHANGE UP (ref 3.8–10.5)

## 2018-08-19 RX ORDER — WARFARIN SODIUM 2.5 MG/1
2.5 TABLET ORAL ONCE
Qty: 0 | Refills: 0 | Status: COMPLETED | OUTPATIENT
Start: 2018-08-19 | End: 2018-08-19

## 2018-08-19 RX ORDER — POLYETHYLENE GLYCOL 3350 17 G/17G
17 POWDER, FOR SOLUTION ORAL
Qty: 0 | Refills: 0 | COMMUNITY
Start: 2018-08-19

## 2018-08-19 RX ORDER — OXYCODONE HYDROCHLORIDE 5 MG/1
1 TABLET ORAL
Qty: 0 | Refills: 0 | COMMUNITY
Start: 2018-08-19

## 2018-08-19 RX ORDER — SILODOSIN 4 MG/1
1 CAPSULE ORAL
Qty: 0 | Refills: 0 | COMMUNITY
Start: 2018-08-19

## 2018-08-19 RX ORDER — ACETAMINOPHEN 500 MG
2 TABLET ORAL
Qty: 0 | Refills: 0 | COMMUNITY
Start: 2018-08-19

## 2018-08-19 RX ORDER — DOCUSATE SODIUM 100 MG
1 CAPSULE ORAL
Qty: 0 | Refills: 0 | COMMUNITY
Start: 2018-08-19

## 2018-08-19 RX ADMIN — Medication 650 MILLIGRAM(S): at 17:40

## 2018-08-19 RX ADMIN — HEPARIN SODIUM 3000 UNIT(S): 5000 INJECTION INTRAVENOUS; SUBCUTANEOUS at 15:59

## 2018-08-19 RX ADMIN — Medication 20 MILLIGRAM(S): at 06:16

## 2018-08-19 RX ADMIN — CARVEDILOL PHOSPHATE 9.38 MILLIGRAM(S): 80 CAPSULE, EXTENDED RELEASE ORAL at 06:16

## 2018-08-19 RX ADMIN — Medication 0.5 MILLIGRAM(S): at 21:36

## 2018-08-19 RX ADMIN — Medication 112 MICROGRAM(S): at 06:16

## 2018-08-19 RX ADMIN — HEPARIN SODIUM 900 UNIT(S)/HR: 5000 INJECTION INTRAVENOUS; SUBCUTANEOUS at 09:19

## 2018-08-19 RX ADMIN — HEPARIN SODIUM 1100 UNIT(S)/HR: 5000 INJECTION INTRAVENOUS; SUBCUTANEOUS at 15:46

## 2018-08-19 RX ADMIN — Medication 80 MILLIGRAM(S): at 17:41

## 2018-08-19 RX ADMIN — Medication 650 MILLIGRAM(S): at 06:46

## 2018-08-19 RX ADMIN — CARVEDILOL PHOSPHATE 9.38 MILLIGRAM(S): 80 CAPSULE, EXTENDED RELEASE ORAL at 17:41

## 2018-08-19 RX ADMIN — Medication 80 MILLIGRAM(S): at 06:16

## 2018-08-19 RX ADMIN — Medication 100 MILLIGRAM(S): at 06:16

## 2018-08-19 RX ADMIN — Medication 1 TABLET(S): at 13:25

## 2018-08-19 RX ADMIN — Medication 650 MILLIGRAM(S): at 06:16

## 2018-08-19 RX ADMIN — SPIRONOLACTONE 25 MILLIGRAM(S): 25 TABLET, FILM COATED ORAL at 21:36

## 2018-08-19 RX ADMIN — Medication 650 MILLIGRAM(S): at 13:55

## 2018-08-19 RX ADMIN — WARFARIN SODIUM 2.5 MILLIGRAM(S): 2.5 TABLET ORAL at 21:36

## 2018-08-19 RX ADMIN — Medication 100 MILLIGRAM(S): at 13:25

## 2018-08-19 RX ADMIN — SILODOSIN 8 MILLIGRAM(S): 4 CAPSULE ORAL at 21:36

## 2018-08-19 RX ADMIN — Medication 650 MILLIGRAM(S): at 13:25

## 2018-08-19 RX ADMIN — Medication 100 MILLIGRAM(S): at 21:37

## 2018-08-19 NOTE — DISCHARGE NOTE ADULT - PATIENT PORTAL LINK FT
You can access the [facility] Portal, offered by [facility], by registering with the following website: [email or web address]

## 2018-08-19 NOTE — DISCHARGE NOTE ADULT - CARE PLAN
Principal Discharge DX:	Closed fracture of right hip, initial encounter  Goal:	Pain relief, improvement with activities of daily living  Assessment and plan of treatment:	Follow up with Dr. Lange after rehab, call for appointment. Out of bed ambulate weight bearing as tolerated. Physical therapy to assist with exercises and help increase endurance. D/C Staples about post op day 14, apply steri strips. Principal Discharge DX:	Closed fracture of right hip, initial encounter  Goal:	Pain relief, improvement with activities of daily living  Assessment and plan of treatment:	Follow up with Dr. Lange after rehab, call for appointment. Out of bed ambulate weight bearing as tolerated. Physical therapy to assist with exercises and help increase endurance. D/C Staples about post op day 14, apply steri strips (8/30/18).  Patient on coumadin for a-fib which will also cover for DVT prophylaxis.

## 2018-08-19 NOTE — DISCHARGE NOTE ADULT - NS AS ACTIVITY OBS
No Heavy lifting/straining/Walking-Outdoors allowed/keep wounds clean and dry/Walking-Indoors allowed/Do not make important decisions/Stairs allowed/Do not drive or operate machinery keep wounds clean and dry, sponge bathe until staples are removed/No Heavy lifting/straining/Walking-Indoors allowed/Do not make important decisions/Do not drive or operate machinery/Walking-Outdoors allowed/Stairs allowed

## 2018-08-19 NOTE — PROGRESS NOTE ADULT - PROBLEM SELECTOR PLAN 1
-  - Continue his home cardiac meds - BB, statin, and diuretics.   - Anticoagulation as noted below.

## 2018-08-19 NOTE — PROGRESS NOTE ADULT - PROBLEM SELECTOR PLAN 1
PT/OT-WBAT  IS  DVT PPx  Pain Control  Continue Current Tx.  Dose coumadin this PM pending INR  Dispo planning    Jono Velarde PA-C  Team Pager: #[phone]

## 2018-08-19 NOTE — PROGRESS NOTE ADULT - PROBLEM SELECTOR PLAN 2
-  - cont coreg and sotalol  - cont heparin bridge to coumadin.  Can DC heparin when INR is above 2.0  - DC planning for rehab.    Hector Luna M.D., [facility]  [phone]

## 2018-08-19 NOTE — PROVIDER CONTACT NOTE (CRITICAL VALUE NOTIFICATION) - ACTION/TREATMENT ORDERED:
MD made aware. Heparin gtt D/C'ed. As per MD, INR close to therapeutic range & will draw in AM. Will continue to monitor.
MD notified. RN to follow Heparin nomogram; stop gtt for 60 mins and continue at 9ml/hr. Will continue to monitor.

## 2018-08-19 NOTE — DISCHARGE NOTE ADULT - HOSPITAL COURSE
History of Present Illness: 	  84yMale c/o R hip pain s/p mechanical fall. Patient denies head hit or LOC. Patient denies numbness or tingling in the RLE. Patient denies any other injuries.    PMH:  BOOP (bronchiolitis obliterans with organizing pneumonia)  Carpal tunnel syndrome of left wrist  Melanoma in situ of face excluding eyelid, nose, lip, and ear  Basal cell carcinoma  Abscess  Acute on chronic congestive heart failure, unspecified congestive heart failure type  Benign prostatic hyperplasia, presence of lower urinary tract symptoms unspecified, unspecified morphology  Chronic gout with tophus, unspecified cause, unspecified site  Hypothyroidism, unspecified type  Paroxysmal atrial fibrillation  Complete heart block  Coronary artery disease involving native heart without angina pectoris, unspecified vessel or lesion type    PSH:  H/O shoulder surgery  Artificial cardiac pacemaker  History of colon resection  S/P CABG x 3  H/O hernia repair  History of appendectomy  History of carpal tunnel release, unspecified laterality  History of skin cancer  Hx of cataract surgery, unspecified laterality  Adhesion of intestine  SBO (small bowel obstruction)    AH:    Meds: See med rec           Review of Systems:  Other Review of Systems: All other review of systems negative, except as noted in HPI	      Allergies and Intolerances:        Allergies:  	cephalosporins: Drug Category, Fever, Rash    Home Medications:   * Patient Currently Takes Medications as of 15-Nov-2017 11:37 documented in Structured Notes  · 	carvedilol 3.125 mg oral tablet: 3 tab(s) orally every 12 hours  · 	Lovenox 80 mg/0.8 mL injectable solution: 80 milligram(s) injectable 2 times a day  · 	docusate sodium 100 mg oral capsule: 1 cap(s) orally 2 times a day  · 	Lasix 40 mg oral tablet: 1 tab(s) orally once a day  · 	Citrucel 500 mg oral tablet:  orally 2 times a day  · 	Zetia: 5  mg orally   · 	Protonix 40 mg oral delayed release tablet: 1 tab(s) orally once a day  · 	sotalol 80 mg oral tablet: 1 tab(s) orally 2 times a day  · 	Xanax 0.5 mg oral tablet: orally once a day (at bedtime), As Needed  · 	levothyroxine 112 mcg (0.112 mg) oral tablet: 1 tab(s) orally once a day  · 	Rapaflo 8 mg oral capsule: 1 cap(s) orally once a day  · 	Coumadin 5 mg oral tablet: 1 tab(s) orally once a day typicall  5 days of week. On 2 days of the week may take an extra 3 mg  · 	allopurinol 100 mg oral tablet: 1 tab(s) orally once a day  · 	lactobacillus acidophilus oral tablet: 1 tab(s) orally once a day  · 	Lipitor 10 mg oral tablet: 1 tab(s) orally once a day (at bedtime)  · 	potassium chloride 10 mEq oral capsule, extended release: orally once a day  8/16/18- Patient presents to the hospital after a fall with right hip pain, x-ray reveals IT fracture. Patient was admitted and cleared for surgery. Patient was taken to the OR for fixation of fracture with intramedullary nail- tolerated procedure without complications.  Patient was evaluated by Physical therapy whom recommended rehab for discharge plan.   Patient stable for discharge when bed becomes available. History of Present Illness: 	  84yMale c/o R hip pain s/p mechanical fall. Patient denies head hit or LOC. Patient denies numbness or tingling in the RLE. Patient denies any other injuries.    PMH:  BOOP (bronchiolitis obliterans with organizing pneumonia)  Carpal tunnel syndrome of left wrist  Melanoma in situ of face excluding eyelid, nose, lip, and ear  Basal cell carcinoma  Abscess  Acute on chronic congestive heart failure, unspecified congestive heart failure type  Benign prostatic hyperplasia, presence of lower urinary tract symptoms unspecified, unspecified morphology  Chronic gout with tophus, unspecified cause, unspecified site  Hypothyroidism, unspecified type  Paroxysmal atrial fibrillation  Complete heart block  Coronary artery disease involving native heart without angina pectoris, unspecified vessel or lesion type    PSH:  H/O shoulder surgery  Artificial cardiac pacemaker  History of colon resection  S/P CABG x 3  H/O hernia repair  History of appendectomy  History of carpal tunnel release, unspecified laterality  History of skin cancer  Hx of cataract surgery, unspecified laterality  Adhesion of intestine  SBO (small bowel obstruction)    AH:    Meds: See med rec           Review of Systems:  Other Review of Systems: All other review of systems negative, except as noted in HPI	      Allergies and Intolerances:        Allergies:  	cephalosporins: Drug Category, Fever, Rash    Home Medications:   * Patient Currently Takes Medications as of 15-Nov-2017 11:37 documented in Structured Notes  · 	carvedilol 3.125 mg oral tablet: 3 tab(s) orally every 12 hours  · 	Lovenox 80 mg/0.8 mL injectable solution: 80 milligram(s) injectable 2 times a day  · 	docusate sodium 100 mg oral capsule: 1 cap(s) orally 2 times a day  · 	Lasix 40 mg oral tablet: 1 tab(s) orally once a day  · 	Citrucel 500 mg oral tablet:  orally 2 times a day  · 	Zetia: 5  mg orally   · 	Protonix 40 mg oral delayed release tablet: 1 tab(s) orally once a day  · 	sotalol 80 mg oral tablet: 1 tab(s) orally 2 times a day  · 	Xanax 0.5 mg oral tablet: orally once a day (at bedtime), As Needed  · 	levothyroxine 112 mcg (0.112 mg) oral tablet: 1 tab(s) orally once a day  · 	Rapaflo 8 mg oral capsule: 1 cap(s) orally once a day  · 	Coumadin 5 mg oral tablet: 1 tab(s) orally once a day typicall  5 days of week. On 2 days of the week may take an extra 3 mg  · 	allopurinol 100 mg oral tablet: 1 tab(s) orally once a day  · 	lactobacillus acidophilus oral tablet: 1 tab(s) orally once a day  · 	Lipitor 10 mg oral tablet: 1 tab(s) orally once a day (at bedtime)  · 	potassium chloride 10 mEq oral capsule, extended release: orally once a day  8/16/18- Patient presents to the hospital after a fall with right hip pain, x-ray reveals IT fracture. Patient was admitted and cleared for surgery. Patient was taken to the OR for fixation of fracture with intramedullary nail- tolerated procedure without complications.  Patient was evaluated by Physical therapy whom recommended rehab for discharge plan.   Will d/c when bed available  Patient stable for discharge when bed becomes available.

## 2018-08-19 NOTE — DISCHARGE NOTE ADULT - PLAN OF CARE
Pain relief, improvement with activities of daily living Follow up with Dr. Lange after rehab, call for appointment. Out of bed ambulate weight bearing as tolerated. Physical therapy to assist with exercises and help increase endurance. D/C Staples about post op day 14, apply steri strips. Follow up with Dr. Lange after rehab, call for appointment. Out of bed ambulate weight bearing as tolerated. Physical therapy to assist with exercises and help increase endurance. D/C Staples about post op day 14, apply steri strips (8/30/18).  Patient on coumadin for a-fib which will also cover for DVT prophylaxis.

## 2018-08-19 NOTE — DISCHARGE NOTE ADULT - ADDITIONAL INSTRUCTIONS
Recommend follow up with medical MD, within next 4 weeks. Recommend follow up with medical MD, within next 4 weeks.    Follow up with Dr. Lange after rehab, call for appointment.

## 2018-08-19 NOTE — DISCHARGE NOTE ADULT - MEDICATION SUMMARY - MEDICATIONS TO TAKE
I will START or STAY ON the medications listed below when I get home from the hospital:    spironolactone 25 mg oral tablet  -- 1 tab(s) by mouth 2 times a day  -- Indication: For Chronic systolic congestive heart failure    acetaminophen 325 mg oral tablet  -- 2 tab(s) by mouth every 6 hours, As needed, For Temp greater than 38 C (100.4 F)  -- Indication: For temps    acetaminophen 325 mg oral tablet  -- 2 tab(s) by mouth every 6 hours, As needed, Mild Pain (1 - 3)  -- Indication: For Pain mgt    oxyCODONE 10 mg oral tablet  -- 1 tab(s) by mouth every 4 hours, As needed, Severe Pain (7 - 10)  -- Indication: For Pain mgt    oxyCODONE 5 mg oral tablet  -- 1 tab(s) by mouth every 4 hours, As needed, Moderate Pain (4 - 6)  -- Indication: For Pain mgt    Rapaflo 8 mg oral capsule  -- 1 cap(s) by mouth once a day  -- Indication: For BPH    sotalol 80 mg oral tablet  -- 1 tab(s) by mouth 2 times a day  -- Indication: For Chronic atrial fibrillation    Coumadin 7.5 mg oral tablet  -- 1 tab(s) by mouth once a day (home dose)  while in the hospital patient was receiving 2.5 mg coumadin.  Please adjust dosing to keep INR 2.5- 3.00  -- Indication: For Paroxysmal atrial fibrillation    Lipitor 10 mg oral tablet  -- 1 tab(s) by mouth once a day (at bedtime)  -- Indication: For HLD    Zetia  -- 5  mg by mouth   -- Indication: For HLD    Xanax 0.5 mg oral tablet  -- orally once a day (at bedtime), As Needed  -- Indication: For anxiety    carvedilol 3.125 mg oral tablet  -- 3 tab(s) by mouth every 12 hours  -- Indication: For Chronic systolic congestive heart failure    Lasix 40 mg oral tablet  -- 1 tab(s) by mouth once a day  -- Indication: For Chronic systolic congestive heart failure    docusate sodium 100 mg oral capsule  -- 1 cap(s) by mouth 3 times a day  -- Indication: For laxative    polyethylene glycol 3350 oral powder for reconstitution  -- 17 gram(s) by mouth once a day, As Needed  -- Indication: For laxative    senna oral tablet  -- 2 tab(s) by mouth once a day (at bedtime)  -- Indication: For laxative    potassium chloride 10 mEq oral capsule, extended release  -- orally once a day  -- Indication: For supplement    melatonin 3 mg oral tablet  -- 1 tab(s) by mouth once a day (at bedtime), As needed, Insomnia  -- Indication: For insomnia    lactobacillus acidophilus oral tablet  -- 1 tab(s) by mouth once a day  -- Indication: For GI    levothyroxine 112 mcg (0.112 mg) oral tablet  -- 1 tab(s) by mouth once a day  -- Indication: For hypothyroid    Multiple Vitamins oral tablet  -- 1 tab(s) by mouth once a day  -- Indication: For supplement

## 2018-08-20 VITALS
RESPIRATION RATE: 18 BRPM | SYSTOLIC BLOOD PRESSURE: 102 MMHG | TEMPERATURE: 98 F | HEART RATE: 75 BPM | OXYGEN SATURATION: 99 % | DIASTOLIC BLOOD PRESSURE: 61 MMHG

## 2018-08-20 LAB
HCT VFR BLD CALC: 28.6 % — LOW (ref 39–50)
HGB BLD-MCNC: 9 G/DL — LOW (ref 13–17)
INR BLD: 2.06 RATIO — HIGH (ref 0.88–1.16)
MCHC RBC-ENTMCNC: 29.7 PG — SIGNIFICANT CHANGE UP (ref 27–34)
MCHC RBC-ENTMCNC: 31.5 GM/DL — LOW (ref 32–36)
MCV RBC AUTO: 94.4 FL — SIGNIFICANT CHANGE UP (ref 80–100)
PLATELET # BLD AUTO: 142 K/UL — LOW (ref 150–400)
PROTHROM AB SERPL-ACNC: 23.6 SEC — HIGH (ref 10–13.1)
RBC # BLD: 3.03 M/UL — LOW (ref 4.2–5.8)
RBC # FLD: 16 % — HIGH (ref 10.3–14.5)
WBC # BLD: 5.77 K/UL — SIGNIFICANT CHANGE UP (ref 3.8–10.5)
WBC # FLD AUTO: 5.77 K/UL — SIGNIFICANT CHANGE UP (ref 3.8–10.5)

## 2018-08-20 PROCEDURE — 85027 COMPLETE CBC AUTOMATED: CPT

## 2018-08-20 PROCEDURE — 76000 FLUOROSCOPY <1 HR PHYS/QHP: CPT

## 2018-08-20 PROCEDURE — 86850 RBC ANTIBODY SCREEN: CPT

## 2018-08-20 PROCEDURE — 97530 THERAPEUTIC ACTIVITIES: CPT

## 2018-08-20 PROCEDURE — 90715 TDAP VACCINE 7 YRS/> IM: CPT

## 2018-08-20 PROCEDURE — 90471 IMMUNIZATION ADMIN: CPT

## 2018-08-20 PROCEDURE — 99285 EMERGENCY DEPT VISIT HI MDM: CPT | Mod: 25

## 2018-08-20 PROCEDURE — 72192 CT PELVIS W/O DYE: CPT

## 2018-08-20 PROCEDURE — 86901 BLOOD TYPING SEROLOGIC RH(D): CPT

## 2018-08-20 PROCEDURE — 71045 X-RAY EXAM CHEST 1 VIEW: CPT

## 2018-08-20 PROCEDURE — 97110 THERAPEUTIC EXERCISES: CPT

## 2018-08-20 PROCEDURE — C1769: CPT

## 2018-08-20 PROCEDURE — 97165 OT EVAL LOW COMPLEX 30 MIN: CPT

## 2018-08-20 PROCEDURE — 72131 CT LUMBAR SPINE W/O DYE: CPT

## 2018-08-20 PROCEDURE — 73522 X-RAY EXAM HIPS BI 3-4 VIEWS: CPT

## 2018-08-20 PROCEDURE — 97116 GAIT TRAINING THERAPY: CPT

## 2018-08-20 PROCEDURE — 80053 COMPREHEN METABOLIC PANEL: CPT

## 2018-08-20 PROCEDURE — 76377 3D RENDER W/INTRP POSTPROCES: CPT

## 2018-08-20 PROCEDURE — 73552 X-RAY EXAM OF FEMUR 2/>: CPT

## 2018-08-20 PROCEDURE — 80048 BASIC METABOLIC PNL TOTAL CA: CPT

## 2018-08-20 PROCEDURE — 81003 URINALYSIS AUTO W/O SCOPE: CPT

## 2018-08-20 PROCEDURE — 85610 PROTHROMBIN TIME: CPT

## 2018-08-20 PROCEDURE — C1889: CPT

## 2018-08-20 PROCEDURE — C1713: CPT

## 2018-08-20 PROCEDURE — 85730 THROMBOPLASTIN TIME PARTIAL: CPT

## 2018-08-20 PROCEDURE — 97162 PT EVAL MOD COMPLEX 30 MIN: CPT

## 2018-08-20 PROCEDURE — 86900 BLOOD TYPING SEROLOGIC ABO: CPT

## 2018-08-20 PROCEDURE — 96374 THER/PROPH/DIAG INJ IV PUSH: CPT

## 2018-08-20 RX ORDER — LANOLIN ALCOHOL/MO/W.PET/CERES
1 CREAM (GRAM) TOPICAL
Qty: 0 | Refills: 0 | COMMUNITY
Start: 2018-08-20

## 2018-08-20 RX ORDER — OXYCODONE HYDROCHLORIDE 5 MG/1
1 TABLET ORAL
Qty: 0 | Refills: 0 | COMMUNITY
Start: 2018-08-20

## 2018-08-20 RX ORDER — WARFARIN SODIUM 2.5 MG/1
1 TABLET ORAL
Qty: 0 | Refills: 0 | COMMUNITY

## 2018-08-20 RX ORDER — SENNA PLUS 8.6 MG/1
2 TABLET ORAL
Qty: 0 | Refills: 0 | COMMUNITY
Start: 2018-08-20

## 2018-08-20 RX ORDER — ACETAMINOPHEN 500 MG
2 TABLET ORAL
Qty: 0 | Refills: 0 | COMMUNITY
Start: 2018-08-20

## 2018-08-20 RX ORDER — WARFARIN SODIUM 2.5 MG/1
2.5 TABLET ORAL ONCE
Qty: 0 | Refills: 0 | Status: DISCONTINUED | OUTPATIENT
Start: 2018-08-20 | End: 2018-08-20

## 2018-08-20 RX ORDER — FUROSEMIDE 40 MG
1 TABLET ORAL
Qty: 0 | Refills: 0 | COMMUNITY

## 2018-08-20 RX ADMIN — Medication 650 MILLIGRAM(S): at 06:01

## 2018-08-20 RX ADMIN — Medication 80 MILLIGRAM(S): at 05:31

## 2018-08-20 RX ADMIN — Medication 1 TABLET(S): at 12:03

## 2018-08-20 RX ADMIN — Medication 20 MILLIGRAM(S): at 05:31

## 2018-08-20 RX ADMIN — Medication 650 MILLIGRAM(S): at 05:31

## 2018-08-20 RX ADMIN — Medication 100 MILLIGRAM(S): at 05:30

## 2018-08-20 RX ADMIN — Medication 650 MILLIGRAM(S): at 12:02

## 2018-08-20 RX ADMIN — Medication 112 MICROGRAM(S): at 05:31

## 2018-08-20 RX ADMIN — CARVEDILOL PHOSPHATE 9.38 MILLIGRAM(S): 80 CAPSULE, EXTENDED RELEASE ORAL at 05:30

## 2018-08-20 NOTE — PROGRESS NOTE ADULT - PROBLEM SELECTOR PROBLEM 1
Closed fracture of right hip, initial encounter
Coronary artery disease involving native heart without angina pectoris, unspecified vessel or lesion type
Closed fracture of right hip, initial encounter

## 2018-08-20 NOTE — PROGRESS NOTE ADULT - PROBLEM SELECTOR PROBLEM 2
Paroxysmal atrial fibrillation
Chronic atrial fibrillation
Paroxysmal atrial fibrillation

## 2018-08-20 NOTE — PROGRESS NOTE ADULT - PROVIDER SPECIALTY LIST ADULT
Cardiology
Internal Medicine
Orthopedics
Internal Medicine

## 2018-08-20 NOTE — PROGRESS NOTE ADULT - PROBLEM SELECTOR PLAN 2
-  - cont coreg and sotalol  - INR therapeutic. Can DC heparin gtt. Cont coumadin dosing.  - No objection from cardiac perspective to DC to rehab.    Hector Luna M.D., [facility]  [phone]

## 2018-08-20 NOTE — PROGRESS NOTE ADULT - ASSESSMENT
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
84 M retired gastroenterologist h/o CAD s/p CABG, ICM (EF 50% on last echo), CHB s/p PPM (last gen change - medtronic), chronic AF on coumadin, hypothyroid, prior amaurosis fugax in the setting of holding coumadin for elective cataract surgery, skin cancers (melanoma in situ, basal cell), diverticulitis, prediabetes, HLD, a/w mechanical fall and R hip pain and found to have R IT fracture.
A/p: 84yMale s/p R hip IMN POD 4.  VSS. NAD.    -Dose Coumadin 2.5mg, f/u INR (last INR 1.85)  -WBAT  -D/c heparin when INR >2  -Pain control  -Dispo: Rehab
A/p: 84yMale s/p R hip IMN.  VSS. NAD.
Impression: Stable       Plan:   Continue present treatment                 Out of bed, ambulate, weight bearing as tolerated                  Physical therapy follow up                  Continue to monitor                      Remy Carlos PA-C  Orthopaedic Surgery  Team pager [phone]  [phone]
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

## 2018-08-20 NOTE — PROGRESS NOTE ADULT - SUBJECTIVE AND OBJECTIVE BOX
[facility] Cardiology Progress Note  _______________________________    Pt. seen and examined. POD #1 s/p R hip IMN. No cardiac-related complaints.       T(C): 37.1 (08-17-18 @ 04:50), Max: 37.1 (08-16-18 @ 15:22)  HR: 75 (08-17-18 @ 04:50) (72 - 78)  BP: 103/60 (08-17-18 @ 04:50) (91/51 - 139/76)  RR: 18 (08-17-18 @ 04:50) (16 - 18)  SpO2: 94% (08-17-18 @ 04:50) (94% - 99%)  I&O's Summary    16 Aug 2018 07:01  -  17 Aug 2018 07:00  --------------------------------------------------------  IN: 1340 mL / OUT: 1000 mL / NET: 340 mL        PHYSICAL EXAM:  GENERAL: Alert, NAD  NECK: Supple, No JVD, No carotid bruit.  CHEST/LUNG: Clear to auscultation bilaterally; No wheezes, rales, or rhonchi  HEART: S1 S2 normal, RRR,  2/6 holosystolic murmur at LLSB.  ABDOMEN: Soft, Nontender, Nondistended; Bowel sounds present  EXTREMITIES:  No LE edema.      LABS:                        11.7   8.1   )-----------( 135      ( 16 Aug 2018 05:09 )             35.0     08-17    135  |  103  |  20  ----------------------------<  116<H>  3.9   |  21<L>  |  1.07    Ca    7.4<L>      17 Aug 2018 06:21    TPro  7.4  /  Alb  4.3  /  TBili  0.7  /  DBili  x   /  AST  23  /  ALT  17  /  AlkPhos  49  08-15    PT/INR - ( 17 Aug 2018 07:40 )   PT: 13.8 sec;   INR: 1.22 ratio         PTT - ( 17 Aug 2018 06:22 )  PTT:37.7 sec              MEDICATIONS  (STANDING):  ALPRAZolam 0.5 milliGRAM(s) Oral at bedtime  carvedilol 9.375 milliGRAM(s) Oral every 12 hours  clindamycin IVPB 900 milliGRAM(s) IV Intermittent every 8 hours  docusate sodium 100 milliGRAM(s) Oral three times a day  furosemide    Tablet 20 milliGRAM(s) Oral daily  heparin  Infusion.  Unit(s)/Hr (14 mL/Hr) IV Continuous <Continuous>  levothyroxine 112 MICROGram(s) Oral daily  multivitamin 1 Tablet(s) Oral daily  silodosin 8 milliGRAM(s) Oral at bedtime  sodium chloride 0.9%. 1000 milliLiter(s) (75 mL/Hr) IV Continuous <Continuous>  sotalol 80 milliGRAM(s) Oral two times a day  spironolactone 25 milliGRAM(s) Oral daily  warfarin 5 milliGRAM(s) Oral once    MEDICATIONS  (PRN):  acetaminophen   Tablet 650 milliGRAM(s) Oral every 6 hours PRN For Temp greater than 38 C (100.4 F)  acetaminophen   Tablet. 650 milliGRAM(s) Oral every 6 hours PRN Mild Pain (1 - 3)  aluminum hydroxide/magnesium hydroxide/simethicone Suspension 30 milliLiter(s) Oral four times a day PRN Indigestion  benzocaine 15 mG/menthol 3.6 mG Lozenge 1 Lozenge Oral every 2 hours PRN Mouth Sores  diphenhydrAMINE   Capsule 25 milliGRAM(s) Oral every 4 hours PRN Rash and/or Itching  heparin  Injectable 6500 Unit(s) IV Push every 6 hours PRN For aPTT less than 40  heparin  Injectable 3000 Unit(s) IV Push every 6 hours PRN For aPTT between 40 - 57  HYDROmorphone  Injectable 0.5 milliGRAM(s) IV Push every 3 hours PRN breakthrough pain  magnesium hydroxide Suspension 30 milliLiter(s) Oral daily PRN Constipation  melatonin 3 milliGRAM(s) Oral at bedtime PRN Insomnia  ondansetron Injectable 4 milliGRAM(s) IV Push every 6 hours PRN Nausea and/or Vomiting  oxyCODONE    IR 10 milliGRAM(s) Oral every 4 hours PRN Severe Pain (7 - 10)  oxyCODONE    IR 5 milliGRAM(s) Oral every 4 hours PRN Moderate Pain (4 - 6)      RADIOLOGY & ADDITIONAL TESTS:
[facility] Cardiology Progress Note  _______________________________    Pt. seen and examined. No new cardiac-related complaints.      T(C): 36.8 (18 @ 07:59), Max: 37 (18 @ 18:00)  HR: 74 (18 @ 07:59) (71 - 76)  BP: 100/65 (18 @ 07:59) (91/64 - 120/55)  RR: 18 (18 @ 07:59) (18 - 18)  SpO2: 94% (18 @ 07:59) (93% - 97%)  I&O's Summary    17 Aug 2018 07:01  -  18 Aug 2018 07:00  --------------------------------------------------------  IN: 988 mL / OUT: 1000 mL / NET: -12 mL        PHYSICAL EXAM:  GENERAL: Alert, NAD  NECK: Supple, No JVD, No carotid bruit.  CHEST/LUNG: Clear to auscultation bilaterally; No wheezes, rales, or rhonchi  HEART: S1 S2 normal, RRR,  2/6 holosystolic murmur at LLSB.  ABDOMEN: Soft, Nontender, Nondistended; Bowel sounds present  EXTREMITIES:  No LE edema.    LABS:                        10.6   7.19  )-----------( 128      ( 18 Aug 2018 08:28 )             33.0         132<L>  |  98  |  19  ----------------------------<  109<H>  3.9   |  22  |  1.22    Ca    8.4      18 Aug 2018 06:15      PT/INR - ( 18 Aug 2018 08:28 )   PT: 17.3 sec;   INR: 1.52 ratio         PTT - ( 18 Aug 2018 06:15 )  PTT:102.0 sec          Urinalysis Basic - ( 17 Aug 2018 21:19 )    Color: Yellow / Appearance: Clear / S.018 / pH: x  Gluc: x / Ketone: Negative  / Bili: Negative / Urobili: Negative mg/dL   Blood: x / Protein: Negative mg/dL / Nitrite: Negative   Leuk Esterase: Negative / RBC: x / WBC x   Sq Epi: x / Non Sq Epi: x / Bacteria: x        MEDICATIONS  (STANDING):  acetaminophen  IVPB. 1000 milliGRAM(s) IV Intermittent once  acetaminophen  IVPB. 1000 milliGRAM(s) IV Intermittent once  ALPRAZolam 0.5 milliGRAM(s) Oral at bedtime  carvedilol 9.375 milliGRAM(s) Oral every 12 hours  docusate sodium 100 milliGRAM(s) Oral three times a day  furosemide    Tablet 20 milliGRAM(s) Oral daily  heparin  Infusion.  Unit(s)/Hr (14 mL/Hr) IV Continuous <Continuous>  levothyroxine 112 MICROGram(s) Oral daily  multivitamin 1 Tablet(s) Oral daily  silodosin 8 milliGRAM(s) Oral at bedtime  sodium chloride 0.9%. 1000 milliLiter(s) (75 mL/Hr) IV Continuous <Continuous>  sotalol 80 milliGRAM(s) Oral two times a day  spironolactone 25 milliGRAM(s) Oral daily  warfarin 2.5 milliGRAM(s) Oral once    MEDICATIONS  (PRN):  acetaminophen   Tablet 650 milliGRAM(s) Oral every 6 hours PRN For Temp greater than 38 C (100.4 F)  acetaminophen   Tablet. 650 milliGRAM(s) Oral every 6 hours PRN Mild Pain (1 - 3)  aluminum hydroxide/magnesium hydroxide/simethicone Suspension 30 milliLiter(s) Oral four times a day PRN Indigestion  benzocaine 15 mG/menthol 3.6 mG Lozenge 1 Lozenge Oral every 2 hours PRN Mouth Sores  bisacodyl Suppository 10 milliGRAM(s) Rectal daily PRN If no bowel movement  diphenhydrAMINE   Capsule 25 milliGRAM(s) Oral every 4 hours PRN Rash and/or Itching  heparin  Injectable 6500 Unit(s) IV Push every 6 hours PRN For aPTT less than 40  heparin  Injectable 3000 Unit(s) IV Push every 6 hours PRN For aPTT between 40 - 57  HYDROmorphone  Injectable 0.5 milliGRAM(s) IV Push every 3 hours PRN breakthrough pain  magnesium hydroxide Suspension 30 milliLiter(s) Oral daily PRN Constipation  melatonin 3 milliGRAM(s) Oral at bedtime PRN Insomnia  ondansetron Injectable 4 milliGRAM(s) IV Push every 6 hours PRN Nausea and/or Vomiting  oxyCODONE    IR 10 milliGRAM(s) Oral every 4 hours PRN Severe Pain (7 - 10)  oxyCODONE    IR 5 milliGRAM(s) Oral every 4 hours PRN Moderate Pain (4 - 6)      RADIOLOGY & ADDITIONAL TESTS:
NO new pulmonary or cardiac symptoms  Still has     GENERAL: NAD, AAOx3  HEAD:  Atraumatic, Normocephalic  EYES: EOMI, PERRLA, conjunctiva and sclera clear  NECK: Supple, No JVD, No LAD  CHEST/LUNG: Clear to auscultation bilaterally; No wheeze  HEART: Irregular rate and rhythm; No murmurs, rubs, or gallops  ABDOMEN: Soft, Nontender, Nondistended; Bowel sounds present  EXTREMITIES:  2+ Peripheral Pulses, No clubbing, cyanosis, or edema  SKIN: minor superficial scrap wound on right distal shin  NEURO: nonfocal CN/motor/sensory/reflexes
ORTHO  Patient is a 84y old  Male who presents with a chief complaint of   Pt. resting without complaint    VS-  T(C): 36.8 (08-18-18 @ 04:38), Max: 37 (08-17-18 @ 07:57)  HR: 76 (08-18-18 @ 04:38) (71 - 76)  BP: 99/60 (08-18-18 @ 04:38) (91/64 - 120/55)  RR: 18 (08-18-18 @ 04:38) (18 - 18)  SpO2: 93% (08-18-18 @ 04:38) (92% - 97%)  Wt(kg): --    M.S. A&O  Extremity- Right LE- dressings intact  Neuro-              Motor- (+) ankle DF/PF              Sensation- grossly intact to light touch              Calves- soft, nontender- PAS                               11.7   8.5   )-----------( 145      ( 17 Aug 2018 15:22 )             36.8     08-18    132<L>  |  98  |  19  ----------------------------<  109<H>  3.9   |  22  |  1.22    Ca    8.4      18 Aug 2018 06:15
[facility] Cardiology Progress Note  _______________________________    Pt. seen and examined. No new cardiac-related complaints. He is concerned about his R leg swelling.       T(C): 36.6 (18 @ 08:34), Max: 36.9 (18 @ 05:11)  HR: 75 (18 @ 09:23) (74 - 78)  BP: 104/65 (18 @ 09:23) (88/53 - 113/72)  RR: 18 (18 @ 09:23) (18 - 18)  SpO2: 96% (18 @ 09:23) (94% - 97%)  I&O's Summary    18 Aug 2018 07:  -  19 Aug 2018 07:00  --------------------------------------------------------  IN: 1240 mL / OUT: 1500 mL / NET: -260 mL    19 Aug 2018 07:  -  19 Aug 2018 11:56  --------------------------------------------------------  IN: 480 mL / OUT: 250 mL / NET: 230 mL        PHYSICAL EXAM:  GENERAL: Alert, NAD  NECK: Supple, No JVD, No carotid bruit.  CHEST/LUNG: Clear to auscultation bilaterally; No wheezes, rales, or rhonchi  HEART: S1 S2 normal, RRR,  2/6 holosystolic murmur at LLSB.  ABDOMEN: Soft, Nontender, Nondistended; Bowel sounds present  EXTREMITIES: RLE 1+ edema, no LLE edema.    LABS:                        10.1   6.24  )-----------( 134      ( 19 Aug 2018 07:58 )             30.8         135  |  101  |  17  ----------------------------<  103<H>  3.9   |  23  |  1.24    Ca    8.5      19 Aug 2018 06:43      PT/INR - ( 19 Aug 2018 07:58 )   PT: 21.2 sec;   INR: 1.85 ratio         PTT - ( 19 Aug 2018 07:58 )  PTT:48.6 sec          Urinalysis Basic - ( 17 Aug 2018 21:19 )    Color: Yellow / Appearance: Clear / S.018 / pH: x  Gluc: x / Ketone: Negative  / Bili: Negative / Urobili: Negative mg/dL   Blood: x / Protein: Negative mg/dL / Nitrite: Negative   Leuk Esterase: Negative / RBC: x / WBC x   Sq Epi: x / Non Sq Epi: x / Bacteria: x        MEDICATIONS  (STANDING):  ALPRAZolam 0.5 milliGRAM(s) Oral at bedtime  bisacodyl 5 milliGRAM(s) Oral at bedtime  carvedilol 9.375 milliGRAM(s) Oral every 12 hours  docusate sodium 100 milliGRAM(s) Oral three times a day  furosemide    Tablet 20 milliGRAM(s) Oral daily  heparin  Infusion.  Unit(s)/Hr (14 mL/Hr) IV Continuous <Continuous>  levothyroxine 112 MICROGram(s) Oral daily  multivitamin 1 Tablet(s) Oral daily  polyethylene glycol 3350 17 Gram(s) Oral daily  senna 2 Tablet(s) Oral at bedtime  silodosin 8 milliGRAM(s) Oral at bedtime  sodium chloride 0.9%. 1000 milliLiter(s) (75 mL/Hr) IV Continuous <Continuous>  sotalol 80 milliGRAM(s) Oral two times a day  spironolactone 25 milliGRAM(s) Oral daily  warfarin 2.5 milliGRAM(s) Oral once    MEDICATIONS  (PRN):  acetaminophen   Tablet 650 milliGRAM(s) Oral every 6 hours PRN For Temp greater than 38 C (100.4 F)  acetaminophen   Tablet. 650 milliGRAM(s) Oral every 6 hours PRN Mild Pain (1 - 3)  aluminum hydroxide/magnesium hydroxide/simethicone Suspension 30 milliLiter(s) Oral four times a day PRN Indigestion  benzocaine 15 mG/menthol 3.6 mG Lozenge 1 Lozenge Oral every 2 hours PRN Mouth Sores  bisacodyl Suppository 10 milliGRAM(s) Rectal daily PRN If no bowel movement  diphenhydrAMINE   Capsule 25 milliGRAM(s) Oral every 4 hours PRN Rash and/or Itching  heparin  Injectable 6500 Unit(s) IV Push every 6 hours PRN For aPTT less than 40  heparin  Injectable 3000 Unit(s) IV Push every 6 hours PRN For aPTT between 40 - 57  HYDROmorphone  Injectable 0.5 milliGRAM(s) IV Push every 3 hours PRN breakthrough pain  magnesium hydroxide Suspension 30 milliLiter(s) Oral daily PRN Constipation  melatonin 3 milliGRAM(s) Oral at bedtime PRN Insomnia  ondansetron Injectable 4 milliGRAM(s) IV Push every 6 hours PRN Nausea and/or Vomiting  oxyCODONE    IR 10 milliGRAM(s) Oral every 4 hours PRN Severe Pain (7 - 10)  oxyCODONE    IR 5 milliGRAM(s) Oral every 4 hours PRN Moderate Pain (4 - 6)      RADIOLOGY & ADDITIONAL TESTS:
Patient resting without complaints.  No chest pain, SOB, N/V. Pain well controlled. Patient would like to go to [facility] for rehab so he is closer to his grandkids.    Vitals 24hrs  Vital Signs Last 24 Hrs  T(C): 36.8 (20 Aug 2018 05:02), Max: 36.8 (20 Aug 2018 05:02)  T(F): 98.3 (20 Aug 2018 05:02), Max: 98.3 (20 Aug 2018 05:02)  HR: 75 (20 Aug 2018 05:02) (75 - 78)  BP: 98/62 (20 Aug 2018 05:02) (96/60 - 104/68)  BP(mean): --  RR: 18 (20 Aug 2018 05:02) (16 - 18)  SpO2: 96% (20 Aug 2018 05:02) (93% - 97%)      08-18-18 @ 07:01  -  08-19-18 @ 07:00  --------------------------------------------------------  IN: 1240 mL / OUT: 1500 mL / NET: -260 mL    08-19-18 @ 07:01  -  08-20-18 @ 06:22  --------------------------------------------------------  IN: 1964 mL / OUT: 1550 mL / NET: 414 mL      Lab Results 24hrs:                        10.1   6.24  )-----------( 134      ( 19 Aug 2018 07:58 )             30.8       08-19    135  |  101  |  17  ----------------------------<  103<H>  3.9   |  23  |  1.24    Ca    8.5      19 Aug 2018 06:43      Exam:  Alert and Oriented, No Acute Distress  Proximal dressing changed, no drainage, distal site intact with staples. Tibial abrasians, dressings placed (no drainage)  Calves Soft, Non-tender bilaterally  +PF/DF/EHL/FHL  SILT  +DP Pulse
Patient seen and examined. Pain controlled. No acute events overnight.     Allergies    cephalosporins (Fever; Rash)    Intolerances                            11.7   8.1   )-----------( 135      ( 16 Aug 2018 05:09 )             35.0     15 Aug 2018 19:37    136    |  101    |  39     ----------------------------<  109    4.9     |  24     |  1.46     Ca    9.2        15 Aug 2018 19:37    TPro  7.4    /  Alb  4.3    /  TBili  0.7    /  DBili  x      /  AST  23     /  ALT  17     /  AlkPhos  49     15 Aug 2018 19:37    PT/INR - ( 16 Aug 2018 14:53 )   PT: 13.7 sec;   INR: 1.25 ratio         PTT - ( 16 Aug 2018 05:09 )  PTT:50.7 sec  Vital Signs Last 24 Hrs  T(C): 37.1 (08-17-18 @ 04:50), Max: 37.1 (08-16-18 @ 15:22)  T(F): 98.8 (08-17-18 @ 04:50), Max: 98.8 (08-16-18 @ 15:22)  HR: 75 (08-17-18 @ 04:50) (72 - 78)  BP: 103/60 (08-17-18 @ 04:50) (91/51 - 139/76)  BP(mean): 76 (08-16-18 @ 19:30) (65 - 99)  RR: 18 (08-17-18 @ 04:50) (16 - 18)  SpO2: 94% (08-17-18 @ 04:50) (94% - 99%)    Physical Exam  Gen: NAD  RLE:   Dressing c/d/i  +ehl/fhl/ta/gs function  L2-S1 silt  Dp/pt pulse intact  No calf ttp  Compartments soft    A/P:  84y Male sp R IMN  Pain control  DVT ppx, hep ggt bridge to coumadin  PT/WBAT/OOB  FU labs  Medical management appreciated  Incentive spirometry  Dispo planning
Post op Day [3 ]    Patient resting without complaints.  No chest pain, SOB, N/V.    T(C): 36.9 (08-19-18 @ 05:11), Max: 36.9 (08-19-18 @ 05:11)  HR: 75 (08-19-18 @ 05:11) (74 - 78)  BP: 113/72 (08-19-18 @ 05:11) (88/53 - 113/72)  RR: 18 (08-19-18 @ 05:11) (18 - 18)  SpO2: 94% (08-19-18 @ 05:11) (94% - 97%)  Wt(kg): --    Exam:  Alert and Oriented, No Acute Distress  Proximal dressing changed, mild serosang drainage, distal site intact with staples  Calves Soft, Non-tender bilaterally  +PF/DF/EHL/FHL  SILT  +DP Pulse                        10.6   7.19  )-----------( 128      ( 18 Aug 2018 08:28 )             33.0    08-19    135  |  101  |  17  ----------------------------<  103<H>  3.9   |  23  |  1.24    Ca    8.5      19 Aug 2018 06:43
[facility] Cardiology Progress Note  _______________________________    Pt. seen and examined. No new cardiac-related complaints.      T(C): 36.8 (08-20-18 @ 05:02), Max: 36.8 (08-20-18 @ 05:02)  HR: 75 (08-20-18 @ 05:02) (75 - 78)  BP: 98/62 (08-20-18 @ 05:02) (96/60 - 104/68)  RR: 18 (08-20-18 @ 05:02) (16 - 18)  SpO2: 96% (08-20-18 @ 05:02) (93% - 97%)  I&O's Summary    19 Aug 2018 07:01  -  20 Aug 2018 07:00  --------------------------------------------------------  IN: 1964 mL / OUT: 1550 mL / NET: 414 mL        PHYSICAL EXAM:  GENERAL: Alert, NAD  NECK: Supple, No JVD, No carotid bruit.  CHEST/LUNG: Clear to auscultation bilaterally; No wheezes, rales, or rhonchi  HEART: S1 S2 normal, RRR,  2/6 holosystolic murmur at LLSB.  ABDOMEN: Soft, Nontender, Nondistended; Bowel sounds present  EXTREMITIES: RLE 1+ edema, no LLE edema.      LABS:                        9.0    5.77  )-----------( 142      ( 20 Aug 2018 06:38 )             28.6     08-19    135  |  101  |  17  ----------------------------<  103<H>  3.9   |  23  |  1.24    Ca    8.5      19 Aug 2018 06:43      PT/INR - ( 20 Aug 2018 07:14 )   PT: 23.6 sec;   INR: 2.06 ratio         PTT - ( 19 Aug 2018 21:34 )  PTT:>200.0 sec              MEDICATIONS  (STANDING):  ALPRAZolam 0.5 milliGRAM(s) Oral at bedtime  bisacodyl 5 milliGRAM(s) Oral at bedtime  carvedilol 9.375 milliGRAM(s) Oral every 12 hours  docusate sodium 100 milliGRAM(s) Oral three times a day  furosemide    Tablet 20 milliGRAM(s) Oral daily  levothyroxine 112 MICROGram(s) Oral daily  multivitamin 1 Tablet(s) Oral daily  polyethylene glycol 3350 17 Gram(s) Oral daily  senna 2 Tablet(s) Oral at bedtime  silodosin 8 milliGRAM(s) Oral at bedtime  sodium chloride 0.9%. 1000 milliLiter(s) (75 mL/Hr) IV Continuous <Continuous>  sotalol 80 milliGRAM(s) Oral two times a day  spironolactone 25 milliGRAM(s) Oral daily    MEDICATIONS  (PRN):  acetaminophen   Tablet 650 milliGRAM(s) Oral every 6 hours PRN For Temp greater than 38 C (100.4 F)  acetaminophen   Tablet. 650 milliGRAM(s) Oral every 6 hours PRN Mild Pain (1 - 3)  aluminum hydroxide/magnesium hydroxide/simethicone Suspension 30 milliLiter(s) Oral four times a day PRN Indigestion  benzocaine 15 mG/menthol 3.6 mG Lozenge 1 Lozenge Oral every 2 hours PRN Mouth Sores  bisacodyl Suppository 10 milliGRAM(s) Rectal daily PRN If no bowel movement  diphenhydrAMINE   Capsule 25 milliGRAM(s) Oral every 4 hours PRN Rash and/or Itching  HYDROmorphone  Injectable 0.5 milliGRAM(s) IV Push every 3 hours PRN breakthrough pain  magnesium hydroxide Suspension 30 milliLiter(s) Oral daily PRN Constipation  melatonin 3 milliGRAM(s) Oral at bedtime PRN Insomnia  ondansetron Injectable 4 milliGRAM(s) IV Push every 6 hours PRN Nausea and/or Vomiting  oxyCODONE    IR 10 milliGRAM(s) Oral every 4 hours PRN Severe Pain (7 - 10)  oxyCODONE    IR 5 milliGRAM(s) Oral every 4 hours PRN Moderate Pain (4 - 6)      RADIOLOGY & ADDITIONAL TESTS:
Patient is a 84y old  Male who presents with a chief complaint of     SUBJECTIVE / OVERNIGHT EVENTS:    Denies any CP or SOB    Vital Signs Last 24 Hrs  T(C): 36.9 (16 Aug 2018 04:33), Max: 36.9 (16 Aug 2018 04:33)  T(F): 98.4 (16 Aug 2018 04:33), Max: 98.4 (16 Aug 2018 04:33)  HR: 74 (16 Aug 2018 04:33) (74 - 76)  BP: 110/74 (16 Aug 2018 04:33) (93/60 - 125/72)  BP(mean): --  RR: 18 (16 Aug 2018 04:33) (16 - 18)  SpO2: 97% (16 Aug 2018 04:33) (96% - 99%)  I&O's Summary    15 Aug 2018 07:01  -  16 Aug 2018 07:00  --------------------------------------------------------  IN: 375 mL / OUT: 200 mL / NET: 175 mL    16 Aug 2018 07:01  -  16 Aug 2018 08:26  --------------------------------------------------------  IN: 0 mL / OUT: 250 mL / NET: -250 mL        GENERAL: NAD, AAOx3  HEAD:  Atraumatic, Normocephalic  EYES: EOMI, PERRLA, conjunctiva and sclera clear  NECK: Supple, No JVD, No LAD  CHEST/LUNG: Clear to auscultation bilaterally; No wheeze  HEART: Irregular rate and rhythm; No murmurs, rubs, or gallops  ABDOMEN: Soft, Nontender, Nondistended; Bowel sounds present  EXTREMITIES:  2+ Peripheral Pulses, No clubbing, cyanosis, or edema  SKIN: minor superficial scrap wound on right distal shin  NEURO: nonfocal CN/motor/sensory/reflexes    LABS:                        11.7   8.1   )-----------( 135      ( 16 Aug 2018 05:09 )             35.0     08-15    136  |  101  |  39<H>  ----------------------------<  109<H>  4.9   |  24  |  1.46<H>    Ca    9.2      15 Aug 2018 19:37    TPro  7.4  /  Alb  4.3  /  TBili  0.7  /  DBili  x   /  AST  23  /  ALT  17  /  AlkPhos  49  08-15    PT/INR - ( 16 Aug 2018 05:09 )   PT: 22.6 sec;   INR: 2.06 ratio         PTT - ( 16 Aug 2018 05:09 )  PTT:50.7 sec  CAPILLARY BLOOD GLUCOSE                RADIOLOGY & ADDITIONAL TESTS:    Imaging Personally Reviewed:  [x] YES  [ ] NO    Consultant(s) Notes Reviewed:  [x] YES  [ ] NO      MEDICATIONS  (STANDING):  acetaminophen   Tablet 975 milliGRAM(s) Oral every 8 hours  BACItracin   Ointment 1 Application(s) Topical two times a day  carvedilol 9.375 milliGRAM(s) Oral every 12 hours  docusate sodium 100 milliGRAM(s) Oral three times a day  levothyroxine 112 MICROGram(s) Oral daily  senna 2 Tablet(s) Oral at bedtime  sodium chloride 0.9%. 1000 milliLiter(s) (125 mL/Hr) IV Continuous <Continuous>  sotalol 80 milliGRAM(s) Oral two times a day    MEDICATIONS  (PRN):  magnesium hydroxide Suspension 30 milliLiter(s) Oral daily PRN Constipation  melatonin 3 milliGRAM(s) Oral at bedtime PRN Insomnia  oxyCODONE    IR 2.5 milliGRAM(s) Oral every 4 hours PRN Moderate Pain (4 - 6)  oxyCODONE    IR 5 milliGRAM(s) Oral every 4 hours PRN Severe Pain (7 - 10)      Care Discussed with Consultants/Other Providers [x] YES  [ ] NO    HEALTH ISSUES - PROBLEM Dx:

## 2018-09-13 ENCOUNTER — INPATIENT (INPATIENT)
Facility: HOSPITAL | Age: 83
LOS: 10 days | Discharge: HOME | DRG: 351 | End: 2018-09-24
Attending: COLON & RECTAL SURGERY | Admitting: COLON & RECTAL SURGERY
Payer: MEDICARE

## 2018-09-13 VITALS
HEART RATE: 88 BPM | DIASTOLIC BLOOD PRESSURE: 74 MMHG | SYSTOLIC BLOOD PRESSURE: 110 MMHG | OXYGEN SATURATION: 98 % | RESPIRATION RATE: 18 BRPM

## 2018-09-13 DIAGNOSIS — Z85.828 PERSONAL HISTORY OF OTHER MALIGNANT NEOPLASM OF SKIN: Chronic | ICD-10-CM

## 2018-09-13 DIAGNOSIS — Z95.1 PRESENCE OF AORTOCORONARY BYPASS GRAFT: Chronic | ICD-10-CM

## 2018-09-13 DIAGNOSIS — Z98.890 OTHER SPECIFIED POSTPROCEDURAL STATES: Chronic | ICD-10-CM

## 2018-09-13 DIAGNOSIS — Z90.49 ACQUIRED ABSENCE OF OTHER SPECIFIED PARTS OF DIGESTIVE TRACT: Chronic | ICD-10-CM

## 2018-09-13 DIAGNOSIS — Z98.49 CATARACT EXTRACTION STATUS, UNSPECIFIED EYE: Chronic | ICD-10-CM

## 2018-09-13 DIAGNOSIS — Z98.89 OTHER SPECIFIED POSTPROCEDURAL STATES: Chronic | ICD-10-CM

## 2018-09-13 DIAGNOSIS — K56.609 UNSPECIFIED INTESTINAL OBSTRUCTION, UNSPECIFIED AS TO PARTIAL VERSUS COMPLETE OBSTRUCTION: ICD-10-CM

## 2018-09-13 DIAGNOSIS — K66.0 PERITONEAL ADHESIONS (POSTPROCEDURAL) (POSTINFECTION): Chronic | ICD-10-CM

## 2018-09-13 DIAGNOSIS — K56.69 OTHER INTESTINAL OBSTRUCTION: Chronic | ICD-10-CM

## 2018-09-13 DIAGNOSIS — Z95.0 PRESENCE OF CARDIAC PACEMAKER: Chronic | ICD-10-CM

## 2018-09-13 LAB
ALBUMIN SERPL ELPH-MCNC: 4.2 G/DL — SIGNIFICANT CHANGE UP (ref 3.3–5)
ALP SERPL-CCNC: 104 U/L — SIGNIFICANT CHANGE UP (ref 40–120)
ALT FLD-CCNC: 17 U/L — SIGNIFICANT CHANGE UP (ref 10–45)
ANION GAP SERPL CALC-SCNC: 14 MMOL/L — SIGNIFICANT CHANGE UP (ref 5–17)
APTT BLD: 54.2 SEC — HIGH (ref 27.5–37.4)
AST SERPL-CCNC: 24 U/L — SIGNIFICANT CHANGE UP (ref 10–40)
BASOPHILS # BLD AUTO: 0 K/UL — SIGNIFICANT CHANGE UP (ref 0–0.2)
BASOPHILS NFR BLD AUTO: 0.4 % — SIGNIFICANT CHANGE UP (ref 0–2)
BILIRUB SERPL-MCNC: 0.7 MG/DL — SIGNIFICANT CHANGE UP (ref 0.2–1.2)
BUN SERPL-MCNC: 25 MG/DL — HIGH (ref 7–23)
CALCIUM SERPL-MCNC: 10.1 MG/DL — SIGNIFICANT CHANGE UP (ref 8.4–10.5)
CHLORIDE SERPL-SCNC: 90 MMOL/L — LOW (ref 96–108)
CO2 SERPL-SCNC: 23 MMOL/L — SIGNIFICANT CHANGE UP (ref 22–31)
CREAT SERPL-MCNC: 1.39 MG/DL — HIGH (ref 0.5–1.3)
EOSINOPHIL # BLD AUTO: 0.3 K/UL — SIGNIFICANT CHANGE UP (ref 0–0.5)
EOSINOPHIL NFR BLD AUTO: 3.7 % — SIGNIFICANT CHANGE UP (ref 0–6)
GLUCOSE SERPL-MCNC: 110 MG/DL — HIGH (ref 70–99)
HCT VFR BLD CALC: 39.5 % — SIGNIFICANT CHANGE UP (ref 39–50)
HGB BLD-MCNC: 13.7 G/DL — SIGNIFICANT CHANGE UP (ref 13–17)
INR BLD: 2.75 RATIO — HIGH (ref 0.88–1.16)
LACTATE BLDV-MCNC: 1.7 MMOL/L — SIGNIFICANT CHANGE UP (ref 0.7–2)
LIDOCAIN IGE QN: 26 U/L — SIGNIFICANT CHANGE UP (ref 7–60)
LYMPHOCYTES # BLD AUTO: 0.8 K/UL — LOW (ref 1–3.3)
LYMPHOCYTES # BLD AUTO: 9.9 % — LOW (ref 13–44)
MCHC RBC-ENTMCNC: 32.3 PG — SIGNIFICANT CHANGE UP (ref 27–34)
MCHC RBC-ENTMCNC: 34.6 GM/DL — SIGNIFICANT CHANGE UP (ref 32–36)
MCV RBC AUTO: 93.4 FL — SIGNIFICANT CHANGE UP (ref 80–100)
MONOCYTES # BLD AUTO: 0.4 K/UL — SIGNIFICANT CHANGE UP (ref 0–0.9)
MONOCYTES NFR BLD AUTO: 4.8 % — SIGNIFICANT CHANGE UP (ref 2–14)
NEUTROPHILS # BLD AUTO: 6.6 K/UL — SIGNIFICANT CHANGE UP (ref 1.8–7.4)
NEUTROPHILS NFR BLD AUTO: 81.2 % — HIGH (ref 43–77)
PLATELET # BLD AUTO: 202 K/UL — SIGNIFICANT CHANGE UP (ref 150–400)
POTASSIUM SERPL-MCNC: 5.3 MMOL/L — SIGNIFICANT CHANGE UP (ref 3.5–5.3)
POTASSIUM SERPL-SCNC: 5.3 MMOL/L — SIGNIFICANT CHANGE UP (ref 3.5–5.3)
PROT SERPL-MCNC: 7.3 G/DL — SIGNIFICANT CHANGE UP (ref 6–8.3)
PROTHROM AB SERPL-ACNC: 30.3 SEC — HIGH (ref 9.8–12.7)
RBC # BLD: 4.23 M/UL — SIGNIFICANT CHANGE UP (ref 4.2–5.8)
RBC # FLD: 14.7 % — HIGH (ref 10.3–14.5)
SODIUM SERPL-SCNC: 127 MMOL/L — LOW (ref 135–145)
WBC # BLD: 8.1 K/UL — SIGNIFICANT CHANGE UP (ref 3.8–10.5)
WBC # FLD AUTO: 8.1 K/UL — SIGNIFICANT CHANGE UP (ref 3.8–10.5)

## 2018-09-13 PROCEDURE — 99285 EMERGENCY DEPT VISIT HI MDM: CPT | Mod: GC

## 2018-09-13 PROCEDURE — 71045 X-RAY EXAM CHEST 1 VIEW: CPT | Mod: 26

## 2018-09-13 PROCEDURE — 74176 CT ABD & PELVIS W/O CONTRAST: CPT | Mod: 26

## 2018-09-13 RX ORDER — SODIUM CHLORIDE 9 MG/ML
500 INJECTION, SOLUTION INTRAVENOUS ONCE
Qty: 0 | Refills: 0 | Status: DISCONTINUED | OUTPATIENT
Start: 2018-09-13 | End: 2018-09-14

## 2018-09-13 RX ORDER — LEVOTHYROXINE SODIUM 125 MCG
1 TABLET ORAL
Qty: 0 | Refills: 0 | COMMUNITY

## 2018-09-13 RX ORDER — MORPHINE SULFATE 50 MG/1
2 CAPSULE, EXTENDED RELEASE ORAL ONCE
Qty: 0 | Refills: 0 | Status: DISCONTINUED | OUTPATIENT
Start: 2018-09-13 | End: 2018-09-13

## 2018-09-13 RX ORDER — METOCLOPRAMIDE HCL 10 MG
10 TABLET ORAL ONCE
Qty: 0 | Refills: 0 | Status: COMPLETED | OUTPATIENT
Start: 2018-09-13 | End: 2018-09-13

## 2018-09-13 RX ORDER — LIDOCAINE 4 G/100G
10 CREAM TOPICAL ONCE
Qty: 0 | Refills: 0 | Status: COMPLETED | OUTPATIENT
Start: 2018-09-13 | End: 2018-09-13

## 2018-09-13 RX ORDER — METOPROLOL TARTRATE 50 MG
5 TABLET ORAL EVERY 6 HOURS
Qty: 0 | Refills: 0 | Status: DISCONTINUED | OUTPATIENT
Start: 2018-09-13 | End: 2018-09-14

## 2018-09-13 RX ORDER — SPIRONOLACTONE 25 MG/1
1 TABLET, FILM COATED ORAL
Qty: 0 | Refills: 0 | COMMUNITY

## 2018-09-13 RX ORDER — SODIUM CHLORIDE 9 MG/ML
1000 INJECTION INTRAMUSCULAR; INTRAVENOUS; SUBCUTANEOUS ONCE
Qty: 0 | Refills: 0 | Status: COMPLETED | OUTPATIENT
Start: 2018-09-13 | End: 2018-09-13

## 2018-09-13 RX ORDER — ALPRAZOLAM 0.25 MG
0 TABLET ORAL
Qty: 0 | Refills: 0 | COMMUNITY

## 2018-09-13 RX ORDER — LEVOTHYROXINE SODIUM 125 MCG
56 TABLET ORAL AT BEDTIME
Qty: 0 | Refills: 0 | Status: DISCONTINUED | OUTPATIENT
Start: 2018-09-13 | End: 2018-09-16

## 2018-09-13 RX ORDER — SODIUM CHLORIDE 9 MG/ML
1000 INJECTION, SOLUTION INTRAVENOUS
Qty: 0 | Refills: 0 | Status: DISCONTINUED | OUTPATIENT
Start: 2018-09-13 | End: 2018-09-14

## 2018-09-13 RX ORDER — POTASSIUM CHLORIDE 20 MEQ
0 PACKET (EA) ORAL
Qty: 0 | Refills: 0 | COMMUNITY

## 2018-09-13 RX ORDER — ONDANSETRON 8 MG/1
4 TABLET, FILM COATED ORAL ONCE
Qty: 0 | Refills: 0 | Status: COMPLETED | OUTPATIENT
Start: 2018-09-13 | End: 2018-09-13

## 2018-09-13 RX ORDER — SILODOSIN 4 MG/1
1 CAPSULE ORAL
Qty: 0 | Refills: 0 | COMMUNITY

## 2018-09-13 RX ORDER — EZETIMIBE 10 MG/1
5 TABLET ORAL
Qty: 0 | Refills: 0 | COMMUNITY

## 2018-09-13 RX ORDER — SODIUM CHLORIDE 9 MG/ML
1000 INJECTION, SOLUTION INTRAVENOUS ONCE
Qty: 0 | Refills: 0 | Status: COMPLETED | OUTPATIENT
Start: 2018-09-13 | End: 2018-09-13

## 2018-09-13 RX ORDER — ONDANSETRON 8 MG/1
4 TABLET, FILM COATED ORAL EVERY 4 HOURS
Qty: 0 | Refills: 0 | Status: DISCONTINUED | OUTPATIENT
Start: 2018-09-13 | End: 2018-09-16

## 2018-09-13 RX ORDER — MORPHINE SULFATE 50 MG/1
4 CAPSULE, EXTENDED RELEASE ORAL ONCE
Qty: 0 | Refills: 0 | Status: DISCONTINUED | OUTPATIENT
Start: 2018-09-13 | End: 2018-09-13

## 2018-09-13 RX ADMIN — Medication 10 MILLIGRAM(S): at 23:30

## 2018-09-13 RX ADMIN — MORPHINE SULFATE 2 MILLIGRAM(S): 50 CAPSULE, EXTENDED RELEASE ORAL at 20:15

## 2018-09-13 RX ADMIN — MORPHINE SULFATE 2 MILLIGRAM(S): 50 CAPSULE, EXTENDED RELEASE ORAL at 20:06

## 2018-09-13 RX ADMIN — ONDANSETRON 4 MILLIGRAM(S): 8 TABLET, FILM COATED ORAL at 18:14

## 2018-09-13 RX ADMIN — SODIUM CHLORIDE 1000 MILLILITER(S): 9 INJECTION, SOLUTION INTRAVENOUS at 18:14

## 2018-09-13 RX ADMIN — MORPHINE SULFATE 4 MILLIGRAM(S): 50 CAPSULE, EXTENDED RELEASE ORAL at 19:58

## 2018-09-13 RX ADMIN — LIDOCAINE 10 MILLILITER(S): 4 CREAM TOPICAL at 21:29

## 2018-09-13 RX ADMIN — ONDANSETRON 4 MILLIGRAM(S): 8 TABLET, FILM COATED ORAL at 20:03

## 2018-09-13 RX ADMIN — MORPHINE SULFATE 4 MILLIGRAM(S): 50 CAPSULE, EXTENDED RELEASE ORAL at 18:14

## 2018-09-13 RX ADMIN — SODIUM CHLORIDE 1000 MILLILITER(S): 9 INJECTION INTRAMUSCULAR; INTRAVENOUS; SUBCUTANEOUS at 20:04

## 2018-09-13 NOTE — ED PROVIDER NOTE - NS ED ROS FT
CONSTITUTIONAL: No fevers, no chills  Eyes: no visual changes  Ears: no ear drainage, no ear pain  Nose: no nasal congestion  Mouth/Throat: no sore throat  Cardiovascular: No Chest pain  Respiratory: No SOB  Gastrointestinal: see HPI   Genitourinary: no dysuria, no hematuria  SKIN: no rashes.  NEURO: no headache

## 2018-09-13 NOTE — ED PROVIDER NOTE - MEDICAL DECISION MAKING DETAILS
abd pain diffuse, epigastric tenderness will get labs, CT for SBO, pt has CKD Cr 1.6 this morning will get CT non-contrast, labs. Dr. Snell (Attending Physician)  abd pain diffuse, epigastric tenderness will get labs, CT for SBO, pt has CKD Cr 1.6 this morning will get CT non-contrast, labs.

## 2018-09-13 NOTE — ED ADULT NURSE NOTE - OBJECTIVE STATEMENT
pt is an 84 yr M presents with abd pain and nausea. pt has hx of SBO, this feels like his past SBOs. pt reports constipation that he took a laxative for, then pt started having diarrhea which he took imodium for which resulted in abd cramping. denies fever/chills/dysuria.

## 2018-09-13 NOTE — ED PROVIDER NOTE - ATTENDING CONTRIBUTION TO CARE
Dr. Snell (Attending Physician)  I performed a history and physical exam of the patient and discussed their management with the resident. I reviewed the resident's note and agree with the documented findings and plan of care. My medical decision making and observations are found above.

## 2018-09-13 NOTE — ED SUB INTERN NOTE - OBJECTIVE STATEMENT FT
45y female presents with L eye vision change onset 4 days ago. Pt notes gradual onset pain and blurry vision that has progressively worsened.

## 2018-09-13 NOTE — ED ADULT NURSE NOTE - NSIMPLEMENTINTERV_GEN_ALL_ED
Implemented All Fall with Harm Risk Interventions:  [place] to call system. Call bell, personal items and telephone within reach. Instruct patient to call for assistance. Room bathroom lighting operational. Non-slip footwear when patient is off stretcher. Physically safe environment: no spills, clutter or unnecessary equipment. Stretcher in lowest position, wheels locked, appropriate side rails in place. Provide visual cue, wrist band, yellow gown, etc. Monitor gait and stability. Monitor for mental status changes and reorient to person, place, and time. Review medications for side effects contributing to fall risk. Reinforce activity limits and safety measures with patient and family. Provide visual clues: red socks.

## 2018-09-13 NOTE — ED PROVIDER NOTE - OBJECTIVE STATEMENT
84 YOM pmh SBO in past, multiple abdominal surgeries, recently at rehab facility s/p hip fx. P/w diffuse abdominal pain and nausea pt states similar in nature to his previous SBO. Pt states pain radiates to his back. No chest pain, no fever, no chills, no current diarrhea. Pt had diarrhea yesterday nbnb and took 2 imodium today and started having abdominal cramping this morning. Pt denies headache, denies chest pain. Pt currently nauseated but not vomiting.

## 2018-09-14 DIAGNOSIS — I25.5 ISCHEMIC CARDIOMYOPATHY: ICD-10-CM

## 2018-09-14 DIAGNOSIS — K56.609 UNSPECIFIED INTESTINAL OBSTRUCTION, UNSPECIFIED AS TO PARTIAL VERSUS COMPLETE OBSTRUCTION: ICD-10-CM

## 2018-09-14 DIAGNOSIS — I48.2 CHRONIC ATRIAL FIBRILLATION: ICD-10-CM

## 2018-09-14 LAB
ANION GAP SERPL CALC-SCNC: 11 MMOL/L — SIGNIFICANT CHANGE UP (ref 5–17)
APTT BLD: 47.3 SEC — HIGH (ref 27.5–37.4)
BASOPHILS # BLD AUTO: 0 K/UL — SIGNIFICANT CHANGE UP (ref 0–0.2)
BASOPHILS NFR BLD AUTO: 0.3 % — SIGNIFICANT CHANGE UP (ref 0–2)
BLD GP AB SCN SERPL QL: NEGATIVE — SIGNIFICANT CHANGE UP
BUN SERPL-MCNC: 22 MG/DL — SIGNIFICANT CHANGE UP (ref 7–23)
CALCIUM SERPL-MCNC: 9.3 MG/DL — SIGNIFICANT CHANGE UP (ref 8.4–10.5)
CHLORIDE SERPL-SCNC: 92 MMOL/L — LOW (ref 96–108)
CO2 SERPL-SCNC: 24 MMOL/L — SIGNIFICANT CHANGE UP (ref 22–31)
CREAT SERPL-MCNC: 1.4 MG/DL — HIGH (ref 0.5–1.3)
EOSINOPHIL # BLD AUTO: 0.1 K/UL — SIGNIFICANT CHANGE UP (ref 0–0.5)
EOSINOPHIL NFR BLD AUTO: 4.7 % — SIGNIFICANT CHANGE UP (ref 0–6)
GLUCOSE SERPL-MCNC: 108 MG/DL — HIGH (ref 70–99)
HCT VFR BLD CALC: 35.4 % — LOW (ref 39–50)
HCT VFR BLD CALC: 35.6 % — LOW (ref 39–50)
HGB BLD-MCNC: 11.4 G/DL — LOW (ref 13–17)
HGB BLD-MCNC: 12 G/DL — LOW (ref 13–17)
INR BLD: 2.92 RATIO — HIGH (ref 0.88–1.16)
LYMPHOCYTES # BLD AUTO: 0.6 K/UL — LOW (ref 1–3.3)
LYMPHOCYTES # BLD AUTO: 21.1 % — SIGNIFICANT CHANGE UP (ref 13–44)
MAGNESIUM SERPL-MCNC: 1.8 MG/DL — SIGNIFICANT CHANGE UP (ref 1.6–2.6)
MCHC RBC-ENTMCNC: 29.9 PG — SIGNIFICANT CHANGE UP (ref 27–34)
MCHC RBC-ENTMCNC: 31.8 PG — SIGNIFICANT CHANGE UP (ref 27–34)
MCHC RBC-ENTMCNC: 32.2 GM/DL — SIGNIFICANT CHANGE UP (ref 32–36)
MCHC RBC-ENTMCNC: 33.8 GM/DL — SIGNIFICANT CHANGE UP (ref 32–36)
MCV RBC AUTO: 92.9 FL — SIGNIFICANT CHANGE UP (ref 80–100)
MCV RBC AUTO: 94.1 FL — SIGNIFICANT CHANGE UP (ref 80–100)
MONOCYTES # BLD AUTO: 0.5 K/UL — SIGNIFICANT CHANGE UP (ref 0–0.9)
MONOCYTES NFR BLD AUTO: 16.1 % — HIGH (ref 2–14)
NEUTROPHILS # BLD AUTO: 1.7 K/UL — LOW (ref 1.8–7.4)
NEUTROPHILS NFR BLD AUTO: 57.8 % — SIGNIFICANT CHANGE UP (ref 43–77)
PHOSPHATE SERPL-MCNC: 3.4 MG/DL — SIGNIFICANT CHANGE UP (ref 2.5–4.5)
PLATELET # BLD AUTO: 153 K/UL — SIGNIFICANT CHANGE UP (ref 150–400)
PLATELET # BLD AUTO: 166 K/UL — SIGNIFICANT CHANGE UP (ref 150–400)
POTASSIUM SERPL-MCNC: 4.6 MMOL/L — SIGNIFICANT CHANGE UP (ref 3.5–5.3)
POTASSIUM SERPL-SCNC: 4.6 MMOL/L — SIGNIFICANT CHANGE UP (ref 3.5–5.3)
PROTHROM AB SERPL-ACNC: 33.7 SEC — HIGH (ref 10–13.1)
RBC # BLD: 3.78 M/UL — LOW (ref 4.2–5.8)
RBC # BLD: 3.81 M/UL — LOW (ref 4.2–5.8)
RBC # FLD: 14.7 % — HIGH (ref 10.3–14.5)
RBC # FLD: 15.8 % — HIGH (ref 10.3–14.5)
RH IG SCN BLD-IMP: POSITIVE — SIGNIFICANT CHANGE UP
SODIUM SERPL-SCNC: 127 MMOL/L — LOW (ref 135–145)
WBC # BLD: 1.97 K/UL — LOW (ref 3.8–10.5)
WBC # BLD: 2.9 K/UL — LOW (ref 3.8–10.5)
WBC # FLD AUTO: 1.97 K/UL — LOW (ref 3.8–10.5)
WBC # FLD AUTO: 2.9 K/UL — LOW (ref 3.8–10.5)

## 2018-09-14 RX ORDER — SODIUM CHLORIDE 9 MG/ML
1000 INJECTION INTRAMUSCULAR; INTRAVENOUS; SUBCUTANEOUS
Qty: 0 | Refills: 0 | Status: DISCONTINUED | OUTPATIENT
Start: 2018-09-14 | End: 2018-09-16

## 2018-09-14 RX ORDER — METOPROLOL TARTRATE 50 MG
2.5 TABLET ORAL EVERY 6 HOURS
Qty: 0 | Refills: 0 | Status: DISCONTINUED | OUTPATIENT
Start: 2018-09-14 | End: 2018-09-16

## 2018-09-14 RX ORDER — MAGNESIUM SULFATE 500 MG/ML
2 VIAL (ML) INJECTION ONCE
Qty: 0 | Refills: 0 | Status: COMPLETED | OUTPATIENT
Start: 2018-09-14 | End: 2018-09-14

## 2018-09-14 RX ORDER — PANTOPRAZOLE SODIUM 20 MG/1
40 TABLET, DELAYED RELEASE ORAL DAILY
Qty: 0 | Refills: 0 | Status: DISCONTINUED | OUTPATIENT
Start: 2018-09-14 | End: 2018-09-16

## 2018-09-14 RX ORDER — SODIUM CHLORIDE 9 MG/ML
250 INJECTION, SOLUTION INTRAVENOUS ONCE
Qty: 0 | Refills: 0 | Status: COMPLETED | OUTPATIENT
Start: 2018-09-14 | End: 2018-09-14

## 2018-09-14 RX ADMIN — MORPHINE SULFATE 2 MILLIGRAM(S): 50 CAPSULE, EXTENDED RELEASE ORAL at 00:13

## 2018-09-14 RX ADMIN — SODIUM CHLORIDE 750 MILLILITER(S): 9 INJECTION, SOLUTION INTRAVENOUS at 10:34

## 2018-09-14 RX ADMIN — Medication 0.25 MILLIGRAM(S): at 20:46

## 2018-09-14 RX ADMIN — Medication 0.25 MILLIGRAM(S): at 01:34

## 2018-09-14 RX ADMIN — SODIUM CHLORIDE 100 MILLILITER(S): 9 INJECTION, SOLUTION INTRAVENOUS at 01:34

## 2018-09-14 RX ADMIN — SODIUM CHLORIDE 100 MILLILITER(S): 9 INJECTION INTRAMUSCULAR; INTRAVENOUS; SUBCUTANEOUS at 09:08

## 2018-09-14 RX ADMIN — PANTOPRAZOLE SODIUM 40 MILLIGRAM(S): 20 TABLET, DELAYED RELEASE ORAL at 13:03

## 2018-09-14 RX ADMIN — Medication 56 MICROGRAM(S): at 21:11

## 2018-09-14 RX ADMIN — Medication 50 GRAM(S): at 09:08

## 2018-09-14 NOTE — PROGRESS NOTE ADULT - SUBJECTIVE AND OBJECTIVE BOX
[facility] GREEN SURGERY DAILY PROGRESS NOTE      SUBJECTIVE:    The patient was seen and examined at bedside this morning.  -  minimal nausea with NGT in place  -  no pain currently    OBJECTIVE:    Vital Signs Last 24 Hrs  T(C): 36.8 (14 Sep 2018 00:55), Max: 36.8 (13 Sep 2018 18:09)  T(F): 98.3 (14 Sep 2018 00:55), Max: 98.3 (14 Sep 2018 00:55)  HR: 75 (14 Sep 2018 00:55) (75 - 88)  BP: 100/63 (14 Sep 2018 00:55) (83/55 - 110/74)  BP(mean): --  RR: 18 (14 Sep 2018 00:55) (17 - 21)  SpO2: 92% (14 Sep 2018 00:55) (92% - 98%)    I&O's Detail    13 Sep 2018 07:01  -  14 Sep 2018 05:05  --------------------------------------------------------  IN:  Total IN: 0 mL    OUT:    Nasoenteral Tube: 200 mL  Total OUT: 200 mL    Total NET: -200 mL          LABS:                        13.7   8.1   )-----------( 202      ( 13 Sep 2018 18:37 )             39.5     09-13    127<L>  |  90<L>  |  25<H>  ----------------------------<  110<H>  5.3   |  23  |  1.39<H>    Ca    10.1      13 Sep 2018 18:37    TPro  7.3  /  Alb  4.2  /  TBili  0.7  /  DBili  x   /  AST  24  /  ALT  17  /  AlkPhos  104  09-13    PT/INR - ( 13 Sep 2018 18:37 )   PT: 30.3 sec;   INR: 2.75 ratio         PTT - ( 13 Sep 2018 18:37 )  PTT:54.2 sec    LIVER FUNCTIONS - ( 13 Sep 2018 18:37 )  Alb: 4.2 g/dL / Pro: 7.3 g/dL / ALK PHOS: 104 U/L / ALT: 17 U/L / AST: 24 U/L / GGT: x             EXAM:  Gen:       alert, in NAD  Lungs:       unlabored breathing  CV:       regular rate, rhythm  Abd:       nondistended, nontender, soft  Ext:        no edema

## 2018-09-14 NOTE — H&P ADULT - ATTENDING COMMENTS
Patient seen/examined.  Agree w above note and plan and have discussed plan w house staff.  Feels better w NG.  Abdomen soft, non-tender.  LIH reducible.  NG decompression, fluids.  No plan for OR at this point.  Will stop coumadin and start heparin drip if no resolution next few days    Nnamdi Briones MD

## 2018-09-14 NOTE — PROGRESS NOTE ADULT - SUBJECTIVE AND OBJECTIVE BOX
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 after colonoscopy  Carpal tunnel syndrome of left wrist  Melanoma in situ of face excluding eyelid, nose, lip, and ear  Basal cell carcinoma  Abscess: diverticular  Acute on chronic congestive heart failure, unspecified congestive heart failure type: 2016  Benign prostatic hyperplasia, presence of lower urinary tract symptoms unspecified, unspecified morphology  Chronic gout with tophus, unspecified cause, unspecified site  Hypothyroidism, unspecified type  Paroxysmal atrial fibrillation: on Coumadin  Complete heart block: s/p PPM insertion-2008  Battery change- 2015  Last interrogation-05/2017  Coronary artery disease involving native heart without angina pectoris, unspecified vessel or lesion type  H/O shoulder surgery: Right shoulder repair- 2001  Artificial cardiac pacemaker: 2008  History of colon resection: 1995  S/P CABG x 3: 2002  H/O hernia repair: bilateral IHR x 2  History of appendectomy  History of carpal tunnel release, unspecified laterality: right wrist- 2002  History of skin cancer  Hx of cataract surgery, unspecified laterality  Adhesion of intestine: Relese of abdominal adhesions- 2002  SBO (small bowel obstruction): 03/2017      MEDICATIONS  (STANDING):  levothyroxine Injectable 56 MICROGram(s) IV Push at bedtime  metoprolol tartrate Injectable 5 milliGRAM(s) IV Push every 6 hours  pantoprazole  Injectable 40 milliGRAM(s) IV Push daily  sodium chloride 0.9%. 1000 milliLiter(s) (100 mL/Hr) IV Continuous <Continuous>    MEDICATIONS  (PRN):  ondansetron Injectable 4 milliGRAM(s) IV Push every 4 hours PRN Nausea and/or Vomiting      Allergies    cephalosporins (Fever; Rash)    Intolerances        Social Histroy: Tobacco- , ETOH-, Illicit Drugs-    T(C): 36.8 (09-14-18 @ 05:14), Max: 36.8 (09-13-18 @ 18:09)  HR: 76 (09-14-18 @ 05:14) (75 - 88)  BP: 97/61 (09-14-18 @ 05:14) (83/55 - 110/74)  RR: 18 (09-14-18 @ 05:14) (17 - 21)  SpO2: 95% (09-14-18 @ 05:14) (92% - 98%)  I&O's Summary    13 Sep 2018 07:01  -  14 Sep 2018 07:00  --------------------------------------------------------  IN: 500 mL / OUT: 1050 mL / NET: -550 mL        Review of Systems:  Constitutional: [ ] Fever [ ] Chills [ ] Fatigue [ ] Weight change   HEENT: [ ] Blurred vision [ ] Eye Pain [ ] Headache [ ] Runny nose [ ] Sore Throat   Respiratory: [ ] Cough [ ] Wheezing [ ] Shortness of breath  Cardiovascular: [ ] Chest Pain [ ] Palpitations [ ] SMITH [ ] PND [ ] Orthopnea  Gastrointestinal: [ ] Abdominal Pain [ ] Diarrhea [ ] Constipation [ ] Hemorrhoids [ ] Nausea [ ] Vomiting  Genitourinary: [ ] Nocturia [ ] Dysuria [ ] Incontinence  Extremities: [ ] Swelling [ ] Joint Pain  Neurologic: [ ] Focal deficit [ ] Paresthesias [ ] Syncope  Lymphatic: [ ] Swelling [ ] Lymphadenopathy   Skin: [ ] Rash [ ] Ecchymoses [ ] Wounds [ ] Lesions  Psychiatry: [ ] Depression [ ] Suicidal/Homicidal Ideation [ ] Anxiety [ ] Sleep Disturbances  [ ] 10 point review of systems is otherwise negative except as mentioned above            [ ]Unable to obtain    PHYSICAL EXAM:  GENERAL: Alert, NAD  NECK: Supple, No JVD, No carotid bruit.  CHEST/LUNG: Clear to auscultation bilaterally; No wheezes, rales, or rhonchi  HEART: S1 S2 normal, RRR,  No murmurs, rubs, or gallops  ABDOMEN: Soft, Nontender, Nondistended; Bowel sounds present  EXTREMITIES:  No LE edema.      LABS:                        13.7   8.1   )-----------( 202      ( 13 Sep 2018 18:37 )             39.5     09-14    127<L>  |  92<L>  |  22  ----------------------------<  108<H>  4.6   |  24  |  1.40<H>    Ca    9.3      14 Sep 2018 07:16  Phos  3.4     09-14  Mg     1.8     09-14    TPro  7.3  /  Alb  4.2  /  TBili  0.7  /  DBili  x   /  AST  24  /  ALT  17  /  AlkPhos  104  09-13    PT/INR - ( 13 Sep 2018 18:37 )   PT: 30.3 sec;   INR: 2.75 ratio         PTT - ( 13 Sep 2018 18:37 )  PTT:54.2 sec              MEDICATIONS  (STANDING):  levothyroxine Injectable 56 MICROGram(s) IV Push at bedtime  metoprolol tartrate Injectable 5 milliGRAM(s) IV Push every 6 hours  pantoprazole  Injectable 40 milliGRAM(s) IV Push daily  sodium chloride 0.9%. 1000 milliLiter(s) (100 mL/Hr) IV Continuous <Continuous>    MEDICATIONS  (PRN):  ondansetron Injectable 4 milliGRAM(s) IV Push every 4 hours PRN Nausea and/or Vomiting      RADIOLOGY & ADDITIONAL TESTS:    Telemetry:    < from: CT Abdomen and Pelvis No Cont (09.13.18 @ 19:02) >  EXAM:  CT ABDOMEN AND PELVIS                            PROCEDURE DATE:  09/13/2018            INTERPRETATION:  CLINICAL INFORMATION: Abdominal pain    COMPARISON: Pelvic CT dated 8/15/2018.    PROCEDURE:   CT of the Abdomen and Pelvis was performed without intravenous contrast.   Intravenous contrast: None.  Oral contrast: None.  Sagittal and coronal reformats were performed.    FINDINGS:    LOWER CHEST: Within normal limits.    LIVER: Within normal limits.  BILE DUCTS: Normal caliber.  GALLBLADDER: Within normal limits.  SPLEEN: Within normal limits.  PANCREAS: Within normal limits.  ADRENALS: Within normal limits.  KIDNEYS/URETERS: Left renal cyst. No hydronephrosis.    BLADDER: Within normal limits.  REPRODUCTIVE ORGANS: The prostate isenlarged.    BOWEL: Dilated loops of small bowel with transition point just distal to   left bowel containing inguinal hernia, consistent with small bowel   obstruction. Small hiatal hernia. Appendix is normal.  PERITONEUM: No ascites.  VESSELS:  Atherosclerotic disease.  RETROPERITONEUM: No lymphadenopathy.    ABDOMINAL WALL: Small fat-containing umbilical hernia.  BONES: Multilevel degenerative disease. Right femur fixating hardware is   partially visualized.    IMPRESSION: Small bowel obstruction with transition points just distal to   left bowel containing inguinal hernia.                    ANTONI NORRIS M.D., RADIOLOGY RESIDENT  This document has been electronically signed.  VINOD YOU M.D., ATTENDING RADIOLOGIST  This document hasbeen electronically signed. 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 after colonoscopy  Carpal tunnel syndrome of left wrist  Melanoma in situ of face excluding eyelid, nose, lip, and ear  Basal cell carcinoma  Abscess: diverticular  Acute on chronic congestive heart failure, unspecified congestive heart failure type: 2016  Benign prostatic hyperplasia, presence of lower urinary tract symptoms unspecified, unspecified morphology  Chronic gout with tophus, unspecified cause, unspecified site  Hypothyroidism, unspecified type  Paroxysmal atrial fibrillation: on Coumadin  Complete heart block: s/p PPM insertion-2008  Battery change- 2015  Last interrogation-05/2017  Coronary artery disease involving native heart without angina pectoris, unspecified vessel or lesion type  H/O shoulder surgery: Right shoulder repair- 2001  Artificial cardiac pacemaker: 2008  History of colon resection: 1995  S/P CABG x 3: 2002  H/O hernia repair: bilateral IHR x 2  History of appendectomy  History of carpal tunnel release, unspecified laterality: right wrist- 2002  History of skin cancer  Hx of cataract surgery, unspecified laterality  Adhesion of intestine: Relese of abdominal adhesions- 2002  SBO (small bowel obstruction): 03/2017      MEDICATIONS  (STANDING):  levothyroxine Injectable 56 MICROGram(s) IV Push at bedtime  metoprolol tartrate Injectable 5 milliGRAM(s) IV Push every 6 hours  pantoprazole  Injectable 40 milliGRAM(s) IV Push daily  sodium chloride 0.9%. 1000 milliLiter(s) (100 mL/Hr) IV Continuous <Continuous>    MEDICATIONS  (PRN):  ondansetron Injectable 4 milliGRAM(s) IV Push every 4 hours PRN Nausea and/or Vomiting      Allergies    cephalosporins (Fever; Rash)    Intolerances        Social Histroy: Tobacco- , ETOH-, Illicit Drugs-    T(C): 36.8 (09-14-18 @ 05:14), Max: 36.8 (09-13-18 @ 18:09)  HR: 76 (09-14-18 @ 05:14) (75 - 88)  BP: 97/61 (09-14-18 @ 05:14) (83/55 - 110/74)  RR: 18 (09-14-18 @ 05:14) (17 - 21)  SpO2: 95% (09-14-18 @ 05:14) (92% - 98%)  I&O's Summary    13 Sep 2018 07:01  -  14 Sep 2018 07:00  --------------------------------------------------------  IN: 500 mL / OUT: 1050 mL / NET: -550 mL        Review of Systems:  Constitutional: [ ] Fever [ ] Chills [ ] Fatigue [ ] Weight change   HEENT: [ ] Blurred vision [ ] Eye Pain [ ] Headache [ ] Runny nose [ ] Sore Throat   Respiratory: [ ] Cough [ ] Wheezing [ ] Shortness of breath  Cardiovascular: [ ] Chest Pain [ ] Palpitations [ ] SMITH [ ] PND [ ] Orthopnea  Gastrointestinal: [ x] Abdominal Pain [ ] Diarrhea [ ] Constipation [ ] Hemorrhoids [x ] Nausea [x] Vomiting  Genitourinary: [ ] Nocturia [ ] Dysuria [ ] Incontinence  Extremities: [ ] Swelling [ ] Joint Pain  Neurologic: [ ] Focal deficit [ ] Paresthesias [ ] Syncope  Lymphatic: [ ] Swelling [ ] Lymphadenopathy   Skin: [ ] Rash [ ] Ecchymoses [ ] Wounds [ ] Lesions  Psychiatry: [ ] Depression [ ] Suicidal/Homicidal Ideation [ ] Anxiety [ ] Sleep Disturbances  [x ] 10 point review of systems is otherwise negative except as mentioned above            [ ]Unable to obtain    PHYSICAL EXAM:  GENERAL: Alert, NAD. NG tube in nose.  NECK: Supple, No JVD, No carotid bruit.  CHEST/LUNG: Clear to auscultation bilaterally; No wheezes, rales, or rhonchi  HEART: S1 S2 normal, RRR,  No murmurs, rubs, or gallops  ABDOMEN: Soft, Nontender, Nondistended; Bowel sounds present  EXTREMITIES:  No LE edema.      LABS:                        13.7   8.1   )-----------( 202      ( 13 Sep 2018 18:37 )             39.5     09-14    127<L>  |  92<L>  |  22  ----------------------------<  108<H>  4.6   |  24  |  1.40<H>    Ca    9.3      14 Sep 2018 07:16  Phos  3.4     09-14  Mg     1.8     09-14    TPro  7.3  /  Alb  4.2  /  TBili  0.7  /  DBili  x   /  AST  24  /  ALT  17  /  AlkPhos  104  09-13    PT/INR - ( 13 Sep 2018 18:37 )   PT: 30.3 sec;   INR: 2.75 ratio         PTT - ( 13 Sep 2018 18:37 )  PTT:54.2 sec              MEDICATIONS  (STANDING):  levothyroxine Injectable 56 MICROGram(s) IV Push at bedtime  metoprolol tartrate Injectable 5 milliGRAM(s) IV Push every 6 hours  pantoprazole  Injectable 40 milliGRAM(s) IV Push daily  sodium chloride 0.9%. 1000 milliLiter(s) (100 mL/Hr) IV Continuous <Continuous>    MEDICATIONS  (PRN):  ondansetron Injectable 4 milliGRAM(s) IV Push every 4 hours PRN Nausea and/or Vomiting      RADIOLOGY & ADDITIONAL TESTS:    Telemetry:    < from: CT Abdomen and Pelvis No Cont (09.13.18 @ 19:02) >  EXAM:  CT ABDOMEN AND PELVIS                            PROCEDURE DATE:  09/13/2018            INTERPRETATION:  CLINICAL INFORMATION: Abdominal pain    COMPARISON: Pelvic CT dated 8/15/2018.    PROCEDURE:   CT of the Abdomen and Pelvis was performed without intravenous contrast.   Intravenous contrast: None.  Oral contrast: None.  Sagittal and coronal reformats were performed.    FINDINGS:    LOWER CHEST: Within normal limits.    LIVER: Within normal limits.  BILE DUCTS: Normal caliber.  GALLBLADDER: Within normal limits.  SPLEEN: Within normal limits.  PANCREAS: Within normal limits.  ADRENALS: Within normal limits.  KIDNEYS/URETERS: Left renal cyst. No hydronephrosis.    BLADDER: Within normal limits.  REPRODUCTIVE ORGANS: The prostate isenlarged.    BOWEL: Dilated loops of small bowel with transition point just distal to   left bowel containing inguinal hernia, consistent with small bowel   obstruction. Small hiatal hernia. Appendix is normal.  PERITONEUM: No ascites.  VESSELS:  Atherosclerotic disease.  RETROPERITONEUM: No lymphadenopathy.    ABDOMINAL WALL: Small fat-containing umbilical hernia.  BONES: Multilevel degenerative disease. Right femur fixating hardware is   partially visualized.    IMPRESSION: Small bowel obstruction with transition points just distal to   left bowel containing inguinal hernia.                    ANTONI NORRIS M.D., RADIOLOGY RESIDENT  This document has been electronically signed.  VINOD YOU M.D., ATTENDING RADIOLOGIST  This document hasbeen electronically signed. Sep 13 2018  7:32PM        < end of copied text >

## 2018-09-14 NOTE — H&P ADULT - ASSESSMENT
Assessment:  84M with significant cardiac history, and hx of diverticulitis s/p colectomy, multiple SBOs now presenting with another SBO.    Plan:  - Admit to [facility] surgery under Dr. Welch  - NPO / IVF / NGT  - Serial abdominal exams  - Will reach out to the patient's cardiologist (Dr. Steven Goldberg) regarding optimization for possible surgical intervention  - Home meds  - OOB as tolerated    Discussed with Dr. Yuri Maldonado, PGY-2  Green Surgery [phone]

## 2018-09-14 NOTE — H&P ADULT - NSHPLABSRESULTS_GEN_ALL_CORE
CBC (09-13 @ 18:37)                              13.7                           8.1     )----------------(  202        81.2<H>% Neutrophils, 9.9<L>% Lymphocytes, ANC: 6.6                                 39.5      BMP (09-13 @ 18:37)             127<L>  |  90<L>   |  25<H> 		Ca++ --      Ca 10.1               ---------------------------------( 110<H>		Mg --                 5.3     |  23      |  1.39<H>			Ph --      BMP (09-13 @ 09:28)             126<L>  |  90<L>   |  27<H> 		Ca++ --      Ca 9.8                ---------------------------------( 107<H>		Mg --                 4.8     |  25      |  1.59<H>			Ph --        LFTs (09-13 @ 18:37)      TPro 7.3 / Alb 4.2 / TBili 0.7 / DBili -- / AST 24 / ALT 17 / AlkPhos 104    Coags (09-13 @ 18:37)  aPTT 54.2<H> / INR 2.75<H> / PT 30.3<H>  Coags (09-13 @ 08:10)  aPTT -- / INR 2.42<H> / PT 27.9<H>        VBG (09-13 @ 18:37)     -- / -- / -- / -- / -- / --%     Lactate: 1.7      < from: CT Abdomen and Pelvis No Cont (09.13.18 @ 19:02) >    FINDINGS:    LOWER CHEST: Within normal limits.    LIVER: Within normal limits.  BILE DUCTS: Normal caliber.  GALLBLADDER: Within normal limits.  SPLEEN: Within normal limits.  PANCREAS: Within normal limits.  ADRENALS: Within normal limits.  KIDNEYS/URETERS: Left renal cyst. No hydronephrosis.    BLADDER: Within normal limits.  REPRODUCTIVE ORGANS: The prostate isenlarged.    BOWEL: Dilated loops of small bowel with transition point just distal to   left bowel containing inguinal hernia, consistent with small bowel   obstruction. Small hiatal hernia. Appendix is normal.  PERITONEUM: No ascites.  VESSELS:  Atherosclerotic disease.  RETROPERITONEUM: No lymphadenopathy.    ABDOMINAL WALL: Small fat-containing umbilical hernia.  BONES: Multilevel degenerative disease. Right femur fixating hardware is   partially visualized.    IMPRESSION: Small bowel obstruction with transition points just distal to   left bowel containing inguinal hernia.    < end of copied text >

## 2018-09-14 NOTE — H&P ADULT - NSHPPHYSICALEXAM_GEN_ALL_CORE
Gen: AAOx3, NAD, mentating normally  Neuro: Cranial nerves II-XII grossly intact  HEENT: Atraumatic, normocephalic, NG tube in place to wall suction  CV: RRR, normal S1/S2, no audible m/r/g  Pulm: Breathing comfortably on RA. Equal chest rise b/l. Lung fields CTAB  Abd: Well-healed vertical midline incision. Soft, distended, mostly tender in the epigastrium. No palpable loops of bowel in L inguinal canal. No rebound or guarding.  Back/flank: No CVA tenderness  Extremities: WWP. Moving all 4 extremities spontaneously. Strength 5/5. Sensation intact  Skin: No rashes or suspicious lesions

## 2018-09-14 NOTE — H&P ADULT - HISTORY OF PRESENT ILLNESS
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 The patient describes the pain as diffuse, similar in nature to previous SBOs, non-radiating, and severe at times. He reports that 2 days ago he had some diarrhea, for which he took 2 imodium, and since then has not had much GI function. Of note, the patient had b/l inguinal hernia repairs in the '80s, and has a known recurrence of the left hernia. He had seen Dr. Eddie Laura in the office to schedule a L inguinal hernia repair, however in the course of preparing for surgery the patient had held his coumadin, and subsequently had an episode of amaurosis fugax, so the surgery was cancelled. He denies HA, CP, SOB, dysuria.

## 2018-09-15 LAB
ANION GAP SERPL CALC-SCNC: 13 MMOL/L — SIGNIFICANT CHANGE UP (ref 5–17)
APTT BLD: 45.1 SEC — HIGH (ref 27.5–37.4)
BUN SERPL-MCNC: 19 MG/DL — SIGNIFICANT CHANGE UP (ref 7–23)
CALCIUM SERPL-MCNC: 8.5 MG/DL — SIGNIFICANT CHANGE UP (ref 8.4–10.5)
CHLORIDE SERPL-SCNC: 96 MMOL/L — SIGNIFICANT CHANGE UP (ref 96–108)
CO2 SERPL-SCNC: 23 MMOL/L — SIGNIFICANT CHANGE UP (ref 22–31)
CREAT SERPL-MCNC: 1.2 MG/DL — SIGNIFICANT CHANGE UP (ref 0.5–1.3)
GLUCOSE SERPL-MCNC: 78 MG/DL — SIGNIFICANT CHANGE UP (ref 70–99)
HCT VFR BLD CALC: 33 % — LOW (ref 39–50)
HGB BLD-MCNC: 10.6 G/DL — LOW (ref 13–17)
INR BLD: 2.9 RATIO — HIGH (ref 0.88–1.16)
MAGNESIUM SERPL-MCNC: 2.2 MG/DL — SIGNIFICANT CHANGE UP (ref 1.6–2.6)
MCHC RBC-ENTMCNC: 30.5 PG — SIGNIFICANT CHANGE UP (ref 27–34)
MCHC RBC-ENTMCNC: 32.1 GM/DL — SIGNIFICANT CHANGE UP (ref 32–36)
MCV RBC AUTO: 94.8 FL — SIGNIFICANT CHANGE UP (ref 80–100)
PHOSPHATE SERPL-MCNC: 2.6 MG/DL — SIGNIFICANT CHANGE UP (ref 2.5–4.5)
PLATELET # BLD AUTO: 152 K/UL — SIGNIFICANT CHANGE UP (ref 150–400)
POTASSIUM SERPL-MCNC: 4.3 MMOL/L — SIGNIFICANT CHANGE UP (ref 3.5–5.3)
POTASSIUM SERPL-SCNC: 4.3 MMOL/L — SIGNIFICANT CHANGE UP (ref 3.5–5.3)
PROTHROM AB SERPL-ACNC: 33.5 SEC — HIGH (ref 10–13.1)
RBC # BLD: 3.48 M/UL — LOW (ref 4.2–5.8)
RBC # FLD: 15.8 % — HIGH (ref 10.3–14.5)
SODIUM SERPL-SCNC: 132 MMOL/L — LOW (ref 135–145)
WBC # BLD: 3.78 K/UL — LOW (ref 3.8–10.5)
WBC # FLD AUTO: 3.78 K/UL — LOW (ref 3.8–10.5)

## 2018-09-15 RX ORDER — SODIUM CHLORIDE 9 MG/ML
500 INJECTION INTRAMUSCULAR; INTRAVENOUS; SUBCUTANEOUS ONCE
Qty: 0 | Refills: 0 | Status: COMPLETED | OUTPATIENT
Start: 2018-09-15 | End: 2018-09-15

## 2018-09-15 RX ADMIN — PANTOPRAZOLE SODIUM 40 MILLIGRAM(S): 20 TABLET, DELAYED RELEASE ORAL at 10:30

## 2018-09-15 RX ADMIN — Medication 62.5 MILLIMOLE(S): at 14:16

## 2018-09-15 RX ADMIN — Medication 62.5 MILLIMOLE(S): at 10:29

## 2018-09-15 RX ADMIN — Medication 2.5 MILLIGRAM(S): at 17:46

## 2018-09-15 RX ADMIN — Medication 56 MICROGRAM(S): at 21:50

## 2018-09-15 RX ADMIN — SODIUM CHLORIDE 1000 MILLILITER(S): 9 INJECTION INTRAMUSCULAR; INTRAVENOUS; SUBCUTANEOUS at 19:58

## 2018-09-15 RX ADMIN — Medication 0.5 MILLIGRAM(S): at 21:50

## 2018-09-15 NOTE — PROGRESS NOTE ADULT - SUBJECTIVE AND OBJECTIVE BOX
[facility] Cardiology Progress Note  _______________________________    Pt. seen and examined. No new cardiac-related complaints.      T(C): 37 (09-15-18 @ 09:51), Max: 37 (09-15-18 @ 09:51)  HR: 74 (09-15-18 @ 09:51) (74 - 77)  BP: 98/58 (09-15-18 @ 09:51) (90/55 - 109/64)  RR: 18 (09-15-18 @ 09:51) (18 - 18)  SpO2: 93% (09-15-18 @ 09:51) (93% - 96%)  I&O's Summary    14 Sep 2018 07:01  -  15 Sep 2018 07:00  --------------------------------------------------------  IN: 1250 mL / OUT: 1325 mL / NET: -75 mL    15 Sep 2018 07:01  -  15 Sep 2018 13:02  --------------------------------------------------------  IN: 250 mL / OUT: 100 mL / NET: 150 mL        PHYSICAL EXAM:  GENERAL: Alert, NAD. NG tube in nose.  NECK: Supple, No JVD, No carotid bruit.  CHEST/LUNG: Clear to auscultation bilaterally; No wheezes, rales, or rhonchi  HEART: S1 S2 normal, RRR,  No murmurs, rubs, or gallops  ABDOMEN: Soft, Nontender, Nondistended; Bowel sounds present  EXTREMITIES:  No LE edema.    LABS:                        10.6   3.78  )-----------( 152      ( 15 Sep 2018 07:04 )             33.0     09-15    132<L>  |  96  |  19  ----------------------------<  78  4.3   |  23  |  1.20    Ca    8.5      15 Sep 2018 06:28  Phos  2.6     09-15  Mg     2.2     09-15    TPro  7.3  /  Alb  4.2  /  TBili  0.7  /  DBili  x   /  AST  24  /  ALT  17  /  AlkPhos  104  09-13    PT/INR - ( 15 Sep 2018 07:04 )   PT: 33.5 sec;   INR: 2.90 ratio         PTT - ( 15 Sep 2018 07:04 )  PTT:45.1 sec              MEDICATIONS  (STANDING):  levothyroxine Injectable 56 MICROGram(s) IV Push at bedtime  metoprolol tartrate Injectable 2.5 milliGRAM(s) IV Push every 6 hours  pantoprazole  Injectable 40 milliGRAM(s) IV Push daily  sodium chloride 0.9%. 1000 milliLiter(s) (100 mL/Hr) IV Continuous <Continuous>  sodium phosphate IVPB 15 milliMole(s) IV Intermittent once    MEDICATIONS  (PRN):  LORazepam   Injectable 0.5 milliGRAM(s) IV Push every 4 hours PRN Insomnia  ondansetron Injectable 4 milliGRAM(s) IV Push every 4 hours PRN Nausea and/or Vomiting      RADIOLOGY & ADDITIONAL TESTS: [facility] Cardiology Progress Note  _______________________________    Pt. seen and examined. No new cardiac-related complaints.      T(C): 37 (09-15-18 @ 09:51), Max: 37 (09-15-18 @ 09:51)  HR: 74 (09-15-18 @ 09:51) (74 - 77)  BP: 98/58 (09-15-18 @ 09:51) (90/55 - 109/64)  RR: 18 (09-15-18 @ 09:51) (18 - 18)  SpO2: 93% (09-15-18 @ 09:51) (93% - 96%)  I&O's Summary    14 Sep 2018 07:01  -  15 Sep 2018 07:00  --------------------------------------------------------  IN: 1250 mL / OUT: 1325 mL / NET: -75 mL    15 Sep 2018 07:01  -  15 Sep 2018 13:02  --------------------------------------------------------  IN: 250 mL / OUT: 100 mL / NET: 150 mL        PHYSICAL EXAM:  GENERAL: Alert, NAD. NG tube in nose.  NECK: Supple, No JVD, No carotid bruit.  CHEST/LUNG: Clear to auscultation bilaterally; No wheezes, rales, or rhonchi  HEART: S1 S2 normal, RRR,  No murmurs, rubs, or gallops  ABDOMEN: Soft, slightly distended, nontender.  EXTREMITIES:  No LE edema.    LABS:                        10.6   3.78  )-----------( 152      ( 15 Sep 2018 07:04 )             33.0     09-15    132<L>  |  96  |  19  ----------------------------<  78  4.3   |  23  |  1.20    Ca    8.5      15 Sep 2018 06:28  Phos  2.6     09-15  Mg     2.2     09-15    TPro  7.3  /  Alb  4.2  /  TBili  0.7  /  DBili  x   /  AST  24  /  ALT  17  /  AlkPhos  104  09-13    PT/INR - ( 15 Sep 2018 07:04 )   PT: 33.5 sec;   INR: 2.90 ratio         PTT - ( 15 Sep 2018 07:04 )  PTT:45.1 sec              MEDICATIONS  (STANDING):  levothyroxine Injectable 56 MICROGram(s) IV Push at bedtime  metoprolol tartrate Injectable 2.5 milliGRAM(s) IV Push every 6 hours  pantoprazole  Injectable 40 milliGRAM(s) IV Push daily  sodium chloride 0.9%. 1000 milliLiter(s) (100 mL/Hr) IV Continuous <Continuous>  sodium phosphate IVPB 15 milliMole(s) IV Intermittent once    MEDICATIONS  (PRN):  LORazepam   Injectable 0.5 milliGRAM(s) IV Push every 4 hours PRN Insomnia  ondansetron Injectable 4 milliGRAM(s) IV Push every 4 hours PRN Nausea and/or Vomiting      RADIOLOGY & ADDITIONAL TESTS:

## 2018-09-15 NOTE — PROGRESS NOTE ADULT - PROBLEM SELECTOR PLAN 1
-  - NGT currently to suction.  - conservative management for now per surgical team. -  - NGT currently to suction.  - conservative management for now per surgical team.  - No absolute cardiac contraindication to hernia surgery.

## 2018-09-15 NOTE — PROVIDER CONTACT NOTE (OTHER) - ACTION/TREATMENT ORDERED:
Bladder scan performed, MD Shook stated no action necessary at this time and to pass off on in handoff report. Stated he would monitor overnight. Will continue to monitor.

## 2018-09-15 NOTE — PROGRESS NOTE ADULT - SUBJECTIVE AND OBJECTIVE BOX
SURGERY DAILY PROGRESS NOTE:       SUBJECTIVE/ROS: Patient examined at bedside. No acute events overnight. NGT w/ bilious output. No GI fxn.          MEDICATIONS  (STANDING):  levothyroxine Injectable 56 MICROGram(s) IV Push at bedtime  metoprolol tartrate Injectable 2.5 milliGRAM(s) IV Push every 6 hours  pantoprazole  Injectable 40 milliGRAM(s) IV Push daily  sodium chloride 0.9%. 1000 milliLiter(s) (100 mL/Hr) IV Continuous <Continuous>    MEDICATIONS  (PRN):  ondansetron Injectable 4 milliGRAM(s) IV Push every 4 hours PRN Nausea and/or Vomiting      OBJECTIVE:    Vital Signs Last 24 Hrs  T(C): 36.7 (15 Sep 2018 04:55), Max: 36.9 (14 Sep 2018 21:29)  T(F): 98 (15 Sep 2018 04:55), Max: 98.5 (14 Sep 2018 21:29)  HR: 75 (15 Sep 2018 04:55) (74 - 77)  BP: 100/60 (15 Sep 2018 04:55) (88/55 - 109/64)  BP(mean): --  RR: 18 (15 Sep 2018 04:55) (18 - 18)  SpO2: 95% (15 Sep 2018 04:55) (93% - 96%)        I&O's Detail    13 Sep 2018 07:01  -  14 Sep 2018 07:00  --------------------------------------------------------  IN:    lactated ringers.: 300 mL    sodium chloride 0.9%.: 200 mL  Total IN: 500 mL    OUT:    Nasoenteral Tube: 850 mL    Voided: 200 mL  Total OUT: 1050 mL    Total NET: -550 mL      14 Sep 2018 07:01  -  15 Sep 2018 06:40  --------------------------------------------------------  IN:    IV PiggyBack: 50 mL    sodium chloride 0.9%.: 1200 mL  Total IN: 1250 mL    OUT:    Nasoenteral Tube: 550 mL    Voided: 775 mL  Total OUT: 1325 mL    Total NET: -75 mL          Daily     Daily     LABS:                        12.0   2.9   )-----------( 153      ( 14 Sep 2018 12:24 )             35.6     09-14    127<L>  |  92<L>  |  22  ----------------------------<  108<H>  4.6   |  24  |  1.40<H>    Ca    9.3      14 Sep 2018 07:16  Phos  3.4     09-14  Mg     1.8     09-14    TPro  7.3  /  Alb  4.2  /  TBili  0.7  /  DBili  x   /  AST  24  /  ALT  17  /  AlkPhos  104  09-13    PT/INR - ( 14 Sep 2018 09:41 )   PT: 33.7 sec;   INR: 2.92 ratio         PTT - ( 14 Sep 2018 09:41 )  PTT:47.3 sec              PHYSICAL EXAM:  GEN: NAD  Pulm: unlabored breathing on RA  CV: radial pulse 2+, cap refil <2sec  Abd: soft, nontender, distended

## 2018-09-16 LAB
ANION GAP SERPL CALC-SCNC: 12 MMOL/L — SIGNIFICANT CHANGE UP (ref 5–17)
APTT BLD: 43.8 SEC — HIGH (ref 27.5–37.4)
BUN SERPL-MCNC: 15 MG/DL — SIGNIFICANT CHANGE UP (ref 7–23)
CALCIUM SERPL-MCNC: 8.4 MG/DL — SIGNIFICANT CHANGE UP (ref 8.4–10.5)
CHLORIDE SERPL-SCNC: 98 MMOL/L — SIGNIFICANT CHANGE UP (ref 96–108)
CO2 SERPL-SCNC: 22 MMOL/L — SIGNIFICANT CHANGE UP (ref 22–31)
CREAT SERPL-MCNC: 1.19 MG/DL — SIGNIFICANT CHANGE UP (ref 0.5–1.3)
GLUCOSE SERPL-MCNC: 71 MG/DL — SIGNIFICANT CHANGE UP (ref 70–99)
HCT VFR BLD CALC: 30.7 % — LOW (ref 39–50)
HGB BLD-MCNC: 10 G/DL — LOW (ref 13–17)
INR BLD: 2.92 RATIO — HIGH (ref 0.88–1.16)
MAGNESIUM SERPL-MCNC: 2 MG/DL — SIGNIFICANT CHANGE UP (ref 1.6–2.6)
MCHC RBC-ENTMCNC: 31.2 PG — SIGNIFICANT CHANGE UP (ref 27–34)
MCHC RBC-ENTMCNC: 32.6 GM/DL — SIGNIFICANT CHANGE UP (ref 32–36)
MCV RBC AUTO: 95.6 FL — SIGNIFICANT CHANGE UP (ref 80–100)
PHOSPHATE SERPL-MCNC: 2.9 MG/DL — SIGNIFICANT CHANGE UP (ref 2.5–4.5)
PLATELET # BLD AUTO: 135 K/UL — LOW (ref 150–400)
POTASSIUM SERPL-MCNC: 3.6 MMOL/L — SIGNIFICANT CHANGE UP (ref 3.5–5.3)
POTASSIUM SERPL-SCNC: 3.6 MMOL/L — SIGNIFICANT CHANGE UP (ref 3.5–5.3)
PROTHROM AB SERPL-ACNC: 33.7 SEC — HIGH (ref 10–13.1)
RBC # BLD: 3.21 M/UL — LOW (ref 4.2–5.8)
RBC # FLD: 15.8 % — HIGH (ref 10.3–14.5)
SODIUM SERPL-SCNC: 132 MMOL/L — LOW (ref 135–145)
WBC # BLD: 2.68 K/UL — LOW (ref 3.8–10.5)
WBC # FLD AUTO: 2.68 K/UL — LOW (ref 3.8–10.5)

## 2018-09-16 PROCEDURE — 74176 CT ABD & PELVIS W/O CONTRAST: CPT | Mod: 26

## 2018-09-16 PROCEDURE — 71045 X-RAY EXAM CHEST 1 VIEW: CPT | Mod: 26

## 2018-09-16 RX ORDER — METOCLOPRAMIDE HCL 10 MG
10 TABLET ORAL EVERY 6 HOURS
Qty: 0 | Refills: 0 | Status: DISCONTINUED | OUTPATIENT
Start: 2018-09-16 | End: 2018-09-19

## 2018-09-16 RX ORDER — ONDANSETRON 8 MG/1
4 TABLET, FILM COATED ORAL EVERY 6 HOURS
Qty: 0 | Refills: 0 | Status: DISCONTINUED | OUTPATIENT
Start: 2018-09-16 | End: 2018-09-19

## 2018-09-16 RX ORDER — FUROSEMIDE 40 MG
40 TABLET ORAL DAILY
Qty: 0 | Refills: 0 | Status: DISCONTINUED | OUTPATIENT
Start: 2018-09-16 | End: 2018-09-17

## 2018-09-16 RX ORDER — SODIUM CHLORIDE 9 MG/ML
1000 INJECTION INTRAMUSCULAR; INTRAVENOUS; SUBCUTANEOUS
Qty: 0 | Refills: 0 | Status: DISCONTINUED | OUTPATIENT
Start: 2018-09-16 | End: 2018-09-16

## 2018-09-16 RX ORDER — DEXTROSE MONOHYDRATE, SODIUM CHLORIDE, AND POTASSIUM CHLORIDE 50; .745; 4.5 G/1000ML; G/1000ML; G/1000ML
1000 INJECTION, SOLUTION INTRAVENOUS
Qty: 0 | Refills: 0 | Status: DISCONTINUED | OUTPATIENT
Start: 2018-09-16 | End: 2018-09-19

## 2018-09-16 RX ORDER — ALPRAZOLAM 0.25 MG
0.5 TABLET ORAL AT BEDTIME
Qty: 0 | Refills: 0 | Status: DISCONTINUED | OUTPATIENT
Start: 2018-09-16 | End: 2018-09-16

## 2018-09-16 RX ORDER — SOTALOL HCL 120 MG
80 TABLET ORAL
Qty: 0 | Refills: 0 | Status: DISCONTINUED | OUTPATIENT
Start: 2018-09-16 | End: 2018-09-17

## 2018-09-16 RX ORDER — PANTOPRAZOLE SODIUM 20 MG/1
40 TABLET, DELAYED RELEASE ORAL
Qty: 0 | Refills: 0 | Status: DISCONTINUED | OUTPATIENT
Start: 2018-09-16 | End: 2018-09-17

## 2018-09-16 RX ORDER — SILODOSIN 4 MG/1
8 CAPSULE ORAL DAILY
Qty: 0 | Refills: 0 | Status: DISCONTINUED | OUTPATIENT
Start: 2018-09-16 | End: 2018-09-17

## 2018-09-16 RX ORDER — PANTOPRAZOLE SODIUM 20 MG/1
40 TABLET, DELAYED RELEASE ORAL ONCE
Qty: 0 | Refills: 0 | Status: COMPLETED | OUTPATIENT
Start: 2018-09-16 | End: 2018-09-16

## 2018-09-16 RX ORDER — CARVEDILOL PHOSPHATE 80 MG/1
3.12 CAPSULE, EXTENDED RELEASE ORAL EVERY 12 HOURS
Qty: 0 | Refills: 0 | Status: DISCONTINUED | OUTPATIENT
Start: 2018-09-16 | End: 2018-09-17

## 2018-09-16 RX ORDER — LEVOTHYROXINE SODIUM 125 MCG
112 TABLET ORAL DAILY
Qty: 0 | Refills: 0 | Status: DISCONTINUED | OUTPATIENT
Start: 2018-09-16 | End: 2018-09-17

## 2018-09-16 RX ORDER — SPIRONOLACTONE 25 MG/1
25 TABLET, FILM COATED ORAL
Qty: 0 | Refills: 0 | Status: DISCONTINUED | OUTPATIENT
Start: 2018-09-16 | End: 2018-09-16

## 2018-09-16 RX ADMIN — Medication 112 MICROGRAM(S): at 13:28

## 2018-09-16 RX ADMIN — DEXTROSE MONOHYDRATE, SODIUM CHLORIDE, AND POTASSIUM CHLORIDE 75 MILLILITER(S): 50; .745; 4.5 INJECTION, SOLUTION INTRAVENOUS at 15:53

## 2018-09-16 RX ADMIN — SILODOSIN 8 MILLIGRAM(S): 4 CAPSULE ORAL at 15:54

## 2018-09-16 RX ADMIN — SODIUM CHLORIDE 100 MILLILITER(S): 9 INJECTION INTRAMUSCULAR; INTRAVENOUS; SUBCUTANEOUS at 05:51

## 2018-09-16 RX ADMIN — CARVEDILOL PHOSPHATE 3.12 MILLIGRAM(S): 80 CAPSULE, EXTENDED RELEASE ORAL at 17:28

## 2018-09-16 RX ADMIN — PANTOPRAZOLE SODIUM 40 MILLIGRAM(S): 20 TABLET, DELAYED RELEASE ORAL at 20:07

## 2018-09-16 RX ADMIN — Medication 80 MILLIGRAM(S): at 17:28

## 2018-09-16 RX ADMIN — PANTOPRAZOLE SODIUM 40 MILLIGRAM(S): 20 TABLET, DELAYED RELEASE ORAL at 13:28

## 2018-09-16 RX ADMIN — ONDANSETRON 4 MILLIGRAM(S): 8 TABLET, FILM COATED ORAL at 15:53

## 2018-09-16 RX ADMIN — Medication 0.5 MILLIGRAM(S): at 21:38

## 2018-09-16 RX ADMIN — Medication 0.5 MILLIGRAM(S): at 01:53

## 2018-09-16 RX ADMIN — Medication 10 MILLIGRAM(S): at 20:07

## 2018-09-16 RX ADMIN — Medication 2.5 MILLIGRAM(S): at 05:46

## 2018-09-16 RX ADMIN — Medication 40 MILLIGRAM(S): at 13:28

## 2018-09-16 NOTE — PROGRESS NOTE ADULT - SUBJECTIVE AND OBJECTIVE BOX
SURGERY DAILY PROGRESS NOTE:       SUBJECTIVE/ROS: Patient examined at bedside. No acute events overnight. NGT trial w/ residual <10ml. Pt decided to keep NGT. Denies N/V. -BM/+Flatus         MEDICATIONS  (STANDING):  levothyroxine Injectable 56 MICROGram(s) IV Push at bedtime  metoprolol tartrate Injectable 2.5 milliGRAM(s) IV Push every 6 hours  pantoprazole  Injectable 40 milliGRAM(s) IV Push daily  sodium chloride 0.9%. 1000 milliLiter(s) (100 mL/Hr) IV Continuous <Continuous>    MEDICATIONS  (PRN):  LORazepam   Injectable 0.5 milliGRAM(s) IV Push every 4 hours PRN Insomnia  ondansetron Injectable 4 milliGRAM(s) IV Push every 4 hours PRN Nausea and/or Vomiting      OBJECTIVE:    Vital Signs Last 24 Hrs  T(C): 36.5 (16 Sep 2018 05:12), Max: 37 (15 Sep 2018 09:51)  T(F): 97.7 (16 Sep 2018 05:12), Max: 98.6 (15 Sep 2018 09:51)  HR: 74 (16 Sep 2018 05:12) (70 - 75)  BP: 101/63 (16 Sep 2018 05:12) (92/54 - 111/70)  BP(mean): --  RR: 18 (16 Sep 2018 05:12) (18 - 18)  SpO2: 94% (16 Sep 2018 05:12) (93% - 97%)        I&O's Detail    14 Sep 2018 07:01  -  15 Sep 2018 07:00  --------------------------------------------------------  IN:    IV PiggyBack: 50 mL    sodium chloride 0.9%.: 1200 mL  Total IN: 1250 mL    OUT:    Nasoenteral Tube: 550 mL    Voided: 775 mL  Total OUT: 1325 mL    Total NET: -75 mL      15 Sep 2018 07:01  -  16 Sep 2018 06:32  --------------------------------------------------------  IN:    IV PiggyBack: 500 mL    sodium chloride 0.9%.: 1200 mL  Total IN: 1700 mL    OUT:    Voided: 675 mL  Total OUT: 675 mL    Total NET: 1025 mL          Daily Height in cm: 177.8 (15 Sep 2018 10:33)    Daily     LABS:                        10.6   3.78  )-----------( 152      ( 15 Sep 2018 07:04 )             33.0     09-15    132<L>  |  96  |  19  ----------------------------<  78  4.3   |  23  |  1.20    Ca    8.5      15 Sep 2018 06:28  Phos  2.6     09-15  Mg     2.2     09-15      PT/INR - ( 15 Sep 2018 07:04 )   PT: 33.5 sec;   INR: 2.90 ratio         PTT - ( 15 Sep 2018 07:04 )  PTT:45.1 sec              PHYSICAL EXAM:  GEN: NAD  Pulm: unlabored breathing on RA  CV: radial pulse 2+, cap refil <2sec  Abd: soft, nontender, nondistended

## 2018-09-16 NOTE — PROGRESS NOTE ADULT - SUBJECTIVE AND OBJECTIVE BOX
The patient remains distended - I re reduced the LIH - patient remains distended with minimal GI function - I discussed with the patient and family the issues - It looks to me on the original CT that the obstruction was at the LIH but as there has been minimal improvement will repeat the CT scan with po contrast

## 2018-09-16 NOTE — PROGRESS NOTE ADULT - PROBLEM SELECTOR PLAN 1
-  - NGT removed.  - passing gas.  - No absolute cardiac contraindication to planned hernia surgery.

## 2018-09-16 NOTE — PROGRESS NOTE ADULT - SUBJECTIVE AND OBJECTIVE BOX
[facility] Cardiology Progress Note  _______________________________    Pt. seen and examined. Events noted. Pt c/o SMITH today when walking to the bathroom. Currently no dyspnea or other cardiac related symptoms at rest    T(C): 36.8 (09-16-18 @ 11:28), Max: 37.2 (09-16-18 @ 09:40)  HR: 76 (09-16-18 @ 11:38) (70 - 76)  BP: 127/69 (09-16-18 @ 11:38) (92/54 - 127/69)  RR: 18 (09-16-18 @ 11:38) (18 - 18)  SpO2: 96% (09-16-18 @ 11:38) (94% - 97%)  I&O's Summary    15 Sep 2018 07:01  -  16 Sep 2018 07:00  --------------------------------------------------------  IN: 1700 mL / OUT: 950 mL / NET: 750 mL        PHYSICAL EXAM:  GENERAL: Alert, NAD.  NECK: Supple, No JVD, no carotid bruit.  CHEST/LUNG: bibasilar crackles.  HEART: S1 S2 normal, RRR; No murmurs, rubs, or gallops  ABDOMEN: distended. Nontender.  EXTREMITIES:  No LE edema.      LABS:                        10.0   2.68  )-----------( 135      ( 16 Sep 2018 09:15 )             30.7     09-16    132<L>  |  98  |  15  ----------------------------<  71  3.6   |  22  |  1.19    Ca    8.4      16 Sep 2018 07:29  Phos  2.9     09-16  Mg     2.0     09-16      PT/INR - ( 16 Sep 2018 09:12 )   PT: 33.7 sec;   INR: 2.92 ratio         PTT - ( 16 Sep 2018 09:12 )  PTT:43.8 sec              MEDICATIONS  (STANDING):  ALPRAZolam 0.5 milliGRAM(s) Oral at bedtime  levothyroxine 112 MICROGram(s) Oral daily  metoprolol tartrate Injectable 2.5 milliGRAM(s) IV Push every 6 hours  pantoprazole    Tablet 40 milliGRAM(s) Oral before breakfast    MEDICATIONS  (PRN):      RADIOLOGY & ADDITIONAL TESTS:

## 2018-09-16 NOTE — PHYSICAL THERAPY INITIAL EVALUATION ADULT - ADDITIONAL COMMENTS
lives w/ lives alone in apt w/ elevator, did not use assistive device prior to admission for previous hip fx , was at [facility] prior to this admission, glasses all the time, hearing good, R handed

## 2018-09-16 NOTE — PHYSICAL THERAPY INITIAL EVALUATION ADULT - PERTINENT HX OF CURRENT PROBLEM, REHAB EVAL
84M with significant cardiac history, and hx of diverticulitis s/p colectomy, multiple SBOs now presenting with another SBO.

## 2018-09-17 LAB
ANION GAP SERPL CALC-SCNC: 12 MMOL/L — SIGNIFICANT CHANGE UP (ref 5–17)
APTT BLD: 42.6 SEC — HIGH (ref 27.5–37.4)
APTT BLD: 45.3 SEC — HIGH (ref 27.5–37.4)
APTT BLD: 46.2 SEC — HIGH (ref 27.5–37.4)
BUN SERPL-MCNC: 13 MG/DL — SIGNIFICANT CHANGE UP (ref 7–23)
CALCIUM SERPL-MCNC: 8.5 MG/DL — SIGNIFICANT CHANGE UP (ref 8.4–10.5)
CHLORIDE SERPL-SCNC: 95 MMOL/L — LOW (ref 96–108)
CO2 SERPL-SCNC: 23 MMOL/L — SIGNIFICANT CHANGE UP (ref 22–31)
CREAT SERPL-MCNC: 1.26 MG/DL — SIGNIFICANT CHANGE UP (ref 0.5–1.3)
GLUCOSE SERPL-MCNC: 99 MG/DL — SIGNIFICANT CHANGE UP (ref 70–99)
HCT VFR BLD CALC: 29.7 % — LOW (ref 39–50)
HGB BLD-MCNC: 9.6 G/DL — LOW (ref 13–17)
INR BLD: 2.24 RATIO — HIGH (ref 0.88–1.16)
INR BLD: 3.22 RATIO — HIGH (ref 0.88–1.16)
INR BLD: 3.27 RATIO — HIGH (ref 0.88–1.16)
MAGNESIUM SERPL-MCNC: 1.8 MG/DL — SIGNIFICANT CHANGE UP (ref 1.6–2.6)
MCHC RBC-ENTMCNC: 30.2 PG — SIGNIFICANT CHANGE UP (ref 27–34)
MCHC RBC-ENTMCNC: 32.3 GM/DL — SIGNIFICANT CHANGE UP (ref 32–36)
MCV RBC AUTO: 93.4 FL — SIGNIFICANT CHANGE UP (ref 80–100)
PHOSPHATE SERPL-MCNC: 2.5 MG/DL — SIGNIFICANT CHANGE UP (ref 2.5–4.5)
PLATELET # BLD AUTO: 149 K/UL — LOW (ref 150–400)
POTASSIUM SERPL-MCNC: 3.6 MMOL/L — SIGNIFICANT CHANGE UP (ref 3.5–5.3)
POTASSIUM SERPL-SCNC: 3.6 MMOL/L — SIGNIFICANT CHANGE UP (ref 3.5–5.3)
PROTHROM AB SERPL-ACNC: 24.6 SEC — HIGH (ref 9.8–12.7)
PROTHROM AB SERPL-ACNC: 36.2 SEC — HIGH (ref 9.8–12.7)
PROTHROM AB SERPL-ACNC: 37.3 SEC — HIGH (ref 10–13.1)
RBC # BLD: 3.18 M/UL — LOW (ref 4.2–5.8)
RBC # FLD: 15.7 % — HIGH (ref 10.3–14.5)
SODIUM SERPL-SCNC: 130 MMOL/L — LOW (ref 135–145)
WBC # BLD: 2.67 K/UL — LOW (ref 3.8–10.5)
WBC # FLD AUTO: 2.67 K/UL — LOW (ref 3.8–10.5)

## 2018-09-17 PROCEDURE — 93880 EXTRACRANIAL BILAT STUDY: CPT | Mod: 26

## 2018-09-17 PROCEDURE — 99233 SBSQ HOSP IP/OBS HIGH 50: CPT | Mod: GC

## 2018-09-17 RX ORDER — PANTOPRAZOLE SODIUM 20 MG/1
40 TABLET, DELAYED RELEASE ORAL DAILY
Qty: 0 | Refills: 0 | Status: DISCONTINUED | OUTPATIENT
Start: 2018-09-17 | End: 2018-09-19

## 2018-09-17 RX ORDER — HYDROCORTISONE 1 %
1 OINTMENT (GRAM) TOPICAL ONCE
Qty: 0 | Refills: 0 | Status: COMPLETED | OUTPATIENT
Start: 2018-09-17 | End: 2018-09-17

## 2018-09-17 RX ORDER — LEVOTHYROXINE SODIUM 125 MCG
56 TABLET ORAL AT BEDTIME
Qty: 0 | Refills: 0 | Status: DISCONTINUED | OUTPATIENT
Start: 2018-09-17 | End: 2018-09-19

## 2018-09-17 RX ORDER — FUROSEMIDE 40 MG
40 TABLET ORAL DAILY
Qty: 0 | Refills: 0 | Status: DISCONTINUED | OUTPATIENT
Start: 2018-09-17 | End: 2018-09-18

## 2018-09-17 RX ORDER — METOPROLOL TARTRATE 50 MG
2.5 TABLET ORAL EVERY 6 HOURS
Qty: 0 | Refills: 0 | Status: DISCONTINUED | OUTPATIENT
Start: 2018-09-17 | End: 2018-09-19

## 2018-09-17 RX ORDER — PHYTONADIONE (VIT K1) 5 MG
2.5 TABLET ORAL ONCE
Qty: 0 | Refills: 0 | Status: COMPLETED | OUTPATIENT
Start: 2018-09-17 | End: 2018-09-17

## 2018-09-17 RX ADMIN — Medication 0.5 MILLIGRAM(S): at 21:36

## 2018-09-17 RX ADMIN — Medication 2.5 MILLIGRAM(S): at 17:35

## 2018-09-17 RX ADMIN — Medication 1 APPLICATION(S): at 21:28

## 2018-09-17 RX ADMIN — CARVEDILOL PHOSPHATE 3.12 MILLIGRAM(S): 80 CAPSULE, EXTENDED RELEASE ORAL at 05:30

## 2018-09-17 RX ADMIN — Medication 56 MICROGRAM(S): at 21:47

## 2018-09-17 RX ADMIN — Medication 100.5 MILLIGRAM(S): at 12:54

## 2018-09-17 RX ADMIN — DEXTROSE MONOHYDRATE, SODIUM CHLORIDE, AND POTASSIUM CHLORIDE 75 MILLILITER(S): 50; .745; 4.5 INJECTION, SOLUTION INTRAVENOUS at 05:31

## 2018-09-17 RX ADMIN — Medication 112 MICROGRAM(S): at 05:31

## 2018-09-17 RX ADMIN — Medication 80 MILLIGRAM(S): at 05:29

## 2018-09-17 RX ADMIN — PANTOPRAZOLE SODIUM 40 MILLIGRAM(S): 20 TABLET, DELAYED RELEASE ORAL at 05:29

## 2018-09-17 RX ADMIN — PANTOPRAZOLE SODIUM 40 MILLIGRAM(S): 20 TABLET, DELAYED RELEASE ORAL at 12:54

## 2018-09-17 RX ADMIN — Medication 40 MILLIGRAM(S): at 05:30

## 2018-09-17 NOTE — PROGRESS NOTE ADULT - SUBJECTIVE AND OBJECTIVE BOX
SURGERY DAILY PROGRESS NOTE:       SUBJECTIVE/ROS: Patient examined at bedside. No acute events overnight. Passing gas however still feels a little nauseated. D/c'ed NGT yesterday. CT repeat done last night.          MEDICATIONS  (STANDING):  carvedilol 3.125 milliGRAM(s) Oral every 12 hours  dextrose 5% + sodium chloride 0.9% with potassium chloride 20 mEq/L 1000 milliLiter(s) (75 mL/Hr) IV Continuous <Continuous>  furosemide    Tablet 40 milliGRAM(s) Oral daily  levothyroxine 112 MICROGram(s) Oral daily  pantoprazole    Tablet 40 milliGRAM(s) Oral before breakfast  silodosin 8 milliGRAM(s) Oral daily  sotalol 80 milliGRAM(s) Oral two times a day    MEDICATIONS  (PRN):  LORazepam   Injectable 0.5 milliGRAM(s) IV Push every 4 hours PRN Anxiety  metoclopramide Injectable 10 milliGRAM(s) IV Push every 6 hours PRN nausea  ondansetron Injectable 4 milliGRAM(s) IV Push every 6 hours PRN Nausea and/or Vomiting      OBJECTIVE:    Vital Signs Last 24 Hrs  T(C): 36.8 (16 Sep 2018 21:28), Max: 37.7 (16 Sep 2018 14:19)  T(F): 98.2 (16 Sep 2018 21:28), Max: 99.8 (16 Sep 2018 14:19)  HR: 74 (16 Sep 2018 21:28) (74 - 76)  BP: 107/64 (16 Sep 2018 21:28) (92/54 - 127/69)  BP(mean): --  RR: 18 (16 Sep 2018 21:28) (18 - 18)  SpO2: 96% (16 Sep 2018 21:28) (92% - 96%)        I&O's Detail    15 Sep 2018 07:01  -  16 Sep 2018 07:00  --------------------------------------------------------  IN:    IV PiggyBack: 500 mL    sodium chloride 0.9%: 1200 mL  Total IN: 1700 mL    OUT:    Nasoenteral Tube: 275 mL    Voided: 675 mL  Total OUT: 950 mL    Total NET: 750 mL      16 Sep 2018 07:01  -  17 Sep 2018 00:31  --------------------------------------------------------  IN:    dextrose 5% + sodium chloride 0.9% with potassium chloride 20 mEq/L: 300 mL    Oral Fluid: 340 mL    sodium chloride 0.9%: 400 mL  Total IN: 1040 mL    OUT:    Voided: 475 mL  Total OUT: 475 mL    Total NET: 565 mL          Daily     Daily     LABS:                        10.0   2.68  )-----------( 135      ( 16 Sep 2018 09:15 )             30.7     09-16    132<L>  |  98  |  15  ----------------------------<  71  3.6   |  22  |  1.19    Ca    8.4      16 Sep 2018 07:29  Phos  2.9     09-16  Mg     2.0     09-16      PT/INR - ( 16 Sep 2018 09:12 )   PT: 33.7 sec;   INR: 2.92 ratio         PTT - ( 16 Sep 2018 09:12 )  PTT:43.8 sec              PHYSICAL EXAM:  GEN: NAD  Pulm: unlabored breathing on RA  CV: radial pulse 2+, cap refil <2sec  Abd: soft, nontender,distedned

## 2018-09-17 NOTE — PROGRESS NOTE ADULT - PROBLEM SELECTOR PLAN 1
-  - resolved  - No absolute cardiac contraindication to planned hernia surgery but first need INR to be lower.

## 2018-09-17 NOTE — CONSULT NOTE ADULT - SUBJECTIVE AND OBJECTIVE BOX
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 The patient describes the pain as diffuse, similar in nature to previous SBOs, non-radiating, and severe at times. He reports that 2 days ago he had some diarrhea, for which he took 2 imodium, and since then has not had much GI function. Of note, the patient had b/l inguinal hernia repairs in the '80s, and has a known recurrence of the left hernia. He had seen Dr. Eddie Laura in the office to schedule a L inguinal hernia repair, however in the course of preparing for surgery the patient had held his coumadin, and subsequently had an episode of amaurosis fugax, so the surgery was cancelled. He denies HA, CP, SOB, dysuria.    Of note, previous episodes of amaurosis fugax occurred when patient was not in the hospital. His pain is currently well controlled and he doesn't have excessive amounts of nausea.     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 after colonoscopy  Carpal tunnel syndrome of left wrist  Melanoma in situ of face excluding eyelid, nose, lip, and ear  Basal cell carcinoma  Abscess: diverticular  Acute on chronic congestive heart failure, unspecified congestive heart failure type: 2016  Benign prostatic hyperplasia, presence of lower urinary tract symptoms unspecified, unspecified morphology  Chronic gout with tophus, unspecified cause, unspecified site  Hypothyroidism, unspecified type  Paroxysmal atrial fibrillation: on Coumadin  Complete heart block: s/p PPM insertion-2008  Battery change- 2015  Last interrogation-05/2017  Coronary artery disease involving native heart without angina pectoris, unspecified vessel or lesion type  H/O shoulder surgery: Right shoulder repair- 2001  Artificial cardiac pacemaker: 2008  History of colon resection: 1995  S/P CABG x 3: 2002  H/O hernia repair: bilateral IHR x 2  History of appendectomy  History of carpal tunnel release, unspecified laterality: right wrist- 2002  History of skin cancer  Hx of cataract surgery, unspecified laterality  Adhesion of intestine: Relese of abdominal adhesions- 2002  SBO (small bowel obstruction): 03/2017      Allergies    cephalosporins (Fever; Rash)    Intolerances        MEDICATIONS  (STANDING):  dextrose 5% + sodium chloride 0.9% with potassium chloride 20 mEq/L 1000 milliLiter(s) (75 mL/Hr) IV Continuous <Continuous>  furosemide    Tablet 40 milliGRAM(s) Oral daily  levothyroxine Injectable 56 MICROGram(s) IV Push at bedtime  metoprolol tartrate Injectable 2.5 milliGRAM(s) IV Push every 6 hours  pantoprazole  Injectable 40 milliGRAM(s) IV Push daily    MEDICATIONS  (PRN):  LORazepam   Injectable 0.5 milliGRAM(s) IV Push every 4 hours PRN Anxiety  metoclopramide Injectable 10 milliGRAM(s) IV Push every 6 hours PRN nausea  ondansetron Injectable 4 milliGRAM(s) IV Push every 6 hours PRN Nausea and/or Vomiting      FAMILY HISTORY:      SOCIAL HISTORY: No EtOH, no tobacco    REVIEW OF SYSTEMS:    CONSTITUTIONAL: No weakness, fevers or chills  EYES/ENT: No visual changes;  No vertigo or throat pain   NECK: No pain or stiffness  RESPIRATORY: No cough, wheezing, hemoptysis; No shortness of breath  CARDIOVASCULAR: No chest pain or palpitations  GASTROINTESTINAL: Abdominal bloating+ Abdominal Pain +  GENITOURINARY: No dysuria, frequency or hematuria  NEUROLOGICAL: No numbness or weakness  SKIN: No itching, burning, rashes, or lesions   All other review of systems is negative unless indicated above.        T(F): 98.2 (09-17-18 @ 12:57), Max: 98.2 (09-16-18 @ 17:22)  HR: 75 (09-17-18 @ 12:57)  BP: 99/68 (09-17-18 @ 12:57)  RR: 16 (09-17-18 @ 12:57)  SpO2: 95% (09-17-18 @ 12:57)  Wt(kg): --    GENERAL: NAD, well-developed  HEAD:  Atraumatic, Normocephalic  EYES: EOMI, PERRLA, conjunctiva and sclera clear  NECK: Supple, No JVD  CHEST/LUNG: Clear to auscultation bilaterally; No wheeze  HEART: Regular rate and rhythm; No murmurs, rubs, or gallops  ABDOMEN: Soft, Nontender, Nondistended; Bowel sounds present  EXTREMITIES:  2+ Peripheral Pulses, No clubbing, cyanosis, or edema  NEUROLOGY: non-focal  SKIN: No rashes or lesions                          9.6    2.67  )-----------( 149      ( 17 Sep 2018 07:55 )             29.7       09-17    130<L>  |  95<L>  |  13  ----------------------------<  99  3.6   |  23  |  1.26    Ca    8.5      17 Sep 2018 06:23  Phos  2.5     09-17  Mg     1.8     09-17        Magnesium, Serum: 1.8 mg/dL (09-17 @ 06:23)  Phosphorus Level, Serum: 2.5 mg/dL (09-17 @ 06:23)      PT/INR - ( 17 Sep 2018 07:52 )   PT: 37.3 sec;   INR: 3.22 ratio         PTT - ( 17 Sep 2018 07:52 )  PTT:46.2 sec

## 2018-09-17 NOTE — PROGRESS NOTE ADULT - SUBJECTIVE AND OBJECTIVE BOX
[facility] Cardiology Progress Note  _______________________________    Pt. seen and examined. No new cardiac-related complaints. Passing gas. Small BM yest.     T(C): 36.8 (09-17-18 @ 04:33), Max: 37.7 (09-16-18 @ 14:19)  HR: 75 (09-17-18 @ 04:33) (74 - 76)  BP: 97/58 (09-17-18 @ 04:33) (97/58 - 127/69)  RR: 18 (09-17-18 @ 04:33) (18 - 18)  SpO2: 94% (09-17-18 @ 04:33) (92% - 96%)  I&O's Summary    16 Sep 2018 07:01  -  17 Sep 2018 07:00  --------------------------------------------------------  IN: 1940 mL / OUT: 1225 mL / NET: 715 mL    17 Sep 2018 07:01  -  17 Sep 2018 10:26  --------------------------------------------------------  IN: 0 mL / OUT: 200 mL / NET: -200 mL        PHYSICAL EXAM:  GENERAL: Alert, NAD.  NECK: Supple, No JVD, no carotid bruit.  CHEST/LUNG: bibasilar crackles - improved.  HEART: S1 S2 normal, RRR; No murmurs, rubs, or gallops  ABDOMEN: distended. Nontender.  EXTREMITIES:  No LE edema.    LABS:                        9.6    2.67  )-----------( 149      ( 17 Sep 2018 07:55 )             29.7     09-17    130<L>  |  95<L>  |  13  ----------------------------<  99  3.6   |  23  |  1.26    Ca    8.5      17 Sep 2018 06:23  Phos  2.5     09-17  Mg     1.8     09-17      PT/INR - ( 17 Sep 2018 07:52 )   PT: 37.3 sec;   INR: 3.22 ratio         PTT - ( 17 Sep 2018 07:52 )  PTT:46.2 sec              MEDICATIONS  (STANDING):  dextrose 5% + sodium chloride 0.9% with potassium chloride 20 mEq/L 1000 milliLiter(s) (75 mL/Hr) IV Continuous <Continuous>  levothyroxine Injectable 56 MICROGram(s) IV Push at bedtime  metoprolol tartrate Injectable 2.5 milliGRAM(s) IV Push every 6 hours  pantoprazole  Injectable 40 milliGRAM(s) IV Push daily    MEDICATIONS  (PRN):  LORazepam   Injectable 0.5 milliGRAM(s) IV Push every 4 hours PRN Anxiety  metoclopramide Injectable 10 milliGRAM(s) IV Push every 6 hours PRN nausea  ondansetron Injectable 4 milliGRAM(s) IV Push every 6 hours PRN Nausea and/or Vomiting      RADIOLOGY & ADDITIONAL TESTS:

## 2018-09-17 NOTE — CONSULT NOTE ADULT - ASSESSMENT
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     #Elevated INR  Would give vitamin K 2.5 mg IV and recheck INR. Repeat as necessary for INR around 2 to be acceptable for surgery.  Patient has had embolic events below INR of 2 however that was in non-hospital situation.   If INR goes below 2 would start heparin infusion as a bridge.   Restart coumadin after surgery.   Please contact with any further questions.

## 2018-09-17 NOTE — CONSULT NOTE ADULT - ATTENDING COMMENTS
This patient has a history of paradoxical atrial fibrillation; he now has a pacemaker and has a paced rhythm. He discusses a bowel obstruction associated with a lower left abdomen (possibly inguinal hernia). Management of anticoagulation including a switch to heparin once surgery is decided. He has received oral vitamin K

## 2018-09-18 ENCOUNTER — TRANSCRIPTION ENCOUNTER (OUTPATIENT)
Age: 83
End: 2018-09-18

## 2018-09-18 LAB
ANION GAP SERPL CALC-SCNC: 10 MMOL/L — SIGNIFICANT CHANGE UP (ref 5–17)
APPEARANCE UR: CLEAR — SIGNIFICANT CHANGE UP
APTT BLD: 116.7 SEC — HIGH (ref 27.5–37.4)
APTT BLD: 185.1 SEC — CRITICAL HIGH (ref 27.5–37.4)
BILIRUB UR-MCNC: NEGATIVE — SIGNIFICANT CHANGE UP
BLD GP AB SCN SERPL QL: NEGATIVE — SIGNIFICANT CHANGE UP
BUN SERPL-MCNC: 9 MG/DL — SIGNIFICANT CHANGE UP (ref 7–23)
CALCIUM SERPL-MCNC: 8.8 MG/DL — SIGNIFICANT CHANGE UP (ref 8.4–10.5)
CHLORIDE SERPL-SCNC: 99 MMOL/L — SIGNIFICANT CHANGE UP (ref 96–108)
CO2 SERPL-SCNC: 24 MMOL/L — SIGNIFICANT CHANGE UP (ref 22–31)
COLOR SPEC: SIGNIFICANT CHANGE UP
CREAT SERPL-MCNC: 1.46 MG/DL — HIGH (ref 0.5–1.3)
DIFF PNL FLD: NEGATIVE — SIGNIFICANT CHANGE UP
GLUCOSE SERPL-MCNC: 133 MG/DL — HIGH (ref 70–99)
GLUCOSE UR QL: NEGATIVE — SIGNIFICANT CHANGE UP
HCT VFR BLD CALC: 36.1 % — LOW (ref 39–50)
HGB BLD-MCNC: 12 G/DL — LOW (ref 13–17)
INR BLD: 1.33 RATIO — HIGH (ref 0.88–1.16)
INR BLD: 1.66 RATIO — HIGH (ref 0.88–1.16)
KETONES UR-MCNC: NEGATIVE — SIGNIFICANT CHANGE UP
LEUKOCYTE ESTERASE UR-ACNC: NEGATIVE — SIGNIFICANT CHANGE UP
MAGNESIUM SERPL-MCNC: 1.7 MG/DL — SIGNIFICANT CHANGE UP (ref 1.6–2.6)
MCHC RBC-ENTMCNC: 31.3 PG — SIGNIFICANT CHANGE UP (ref 27–34)
MCHC RBC-ENTMCNC: 33.3 GM/DL — SIGNIFICANT CHANGE UP (ref 32–36)
MCV RBC AUTO: 94.1 FL — SIGNIFICANT CHANGE UP (ref 80–100)
NITRITE UR-MCNC: NEGATIVE — SIGNIFICANT CHANGE UP
PH UR: 6 — SIGNIFICANT CHANGE UP (ref 5–8)
PHOSPHATE SERPL-MCNC: 2.3 MG/DL — LOW (ref 2.5–4.5)
PLATELET # BLD AUTO: 192 K/UL — SIGNIFICANT CHANGE UP (ref 150–400)
POTASSIUM SERPL-MCNC: 3.9 MMOL/L — SIGNIFICANT CHANGE UP (ref 3.5–5.3)
POTASSIUM SERPL-SCNC: 3.9 MMOL/L — SIGNIFICANT CHANGE UP (ref 3.5–5.3)
PROT UR-MCNC: NEGATIVE — SIGNIFICANT CHANGE UP
PROTHROM AB SERPL-ACNC: 14.6 SEC — HIGH (ref 9.8–12.7)
PROTHROM AB SERPL-ACNC: 18.1 SEC — HIGH (ref 9.8–12.7)
RBC # BLD: 3.84 M/UL — LOW (ref 4.2–5.8)
RBC # FLD: 13.9 % — SIGNIFICANT CHANGE UP (ref 10.3–14.5)
RH IG SCN BLD-IMP: POSITIVE — SIGNIFICANT CHANGE UP
SODIUM SERPL-SCNC: 133 MMOL/L — LOW (ref 135–145)
SP GR SPEC: 1.01 — SIGNIFICANT CHANGE UP
UROBILINOGEN FLD QL: NEGATIVE — SIGNIFICANT CHANGE UP
WBC # BLD: 4.7 K/UL — SIGNIFICANT CHANGE UP (ref 3.8–10.5)
WBC # FLD AUTO: 4.7 K/UL — SIGNIFICANT CHANGE UP (ref 3.8–10.5)

## 2018-09-18 RX ORDER — HEPARIN SODIUM 5000 [USP'U]/ML
1400 INJECTION INTRAVENOUS; SUBCUTANEOUS
Qty: 25000 | Refills: 0 | Status: DISCONTINUED | OUTPATIENT
Start: 2018-09-18 | End: 2018-09-18

## 2018-09-18 RX ORDER — HEPARIN SODIUM 5000 [USP'U]/ML
1400 INJECTION INTRAVENOUS; SUBCUTANEOUS
Qty: 25000 | Refills: 0 | Status: DISCONTINUED | OUTPATIENT
Start: 2018-09-18 | End: 2018-09-19

## 2018-09-18 RX ADMIN — PANTOPRAZOLE SODIUM 40 MILLIGRAM(S): 20 TABLET, DELAYED RELEASE ORAL at 13:31

## 2018-09-18 RX ADMIN — Medication 56 MICROGRAM(S): at 22:04

## 2018-09-18 RX ADMIN — Medication 40 MILLIGRAM(S): at 06:25

## 2018-09-18 RX ADMIN — Medication 10 MILLIGRAM(S): at 13:31

## 2018-09-18 RX ADMIN — Medication 0.5 MILLIGRAM(S): at 22:04

## 2018-09-18 RX ADMIN — Medication 2.5 MILLIGRAM(S): at 13:31

## 2018-09-18 RX ADMIN — HEPARIN SODIUM 14 UNIT(S)/HR: 5000 INJECTION INTRAVENOUS; SUBCUTANEOUS at 04:17

## 2018-09-18 RX ADMIN — Medication 62.5 MILLIMOLE(S): at 15:24

## 2018-09-18 RX ADMIN — Medication 0.5 MILLIGRAM(S): at 02:14

## 2018-09-18 RX ADMIN — HEPARIN SODIUM 11 UNIT(S)/HR: 5000 INJECTION INTRAVENOUS; SUBCUTANEOUS at 13:29

## 2018-09-18 RX ADMIN — HEPARIN SODIUM 9 UNIT(S)/HR: 5000 INJECTION INTRAVENOUS; SUBCUTANEOUS at 21:32

## 2018-09-18 RX ADMIN — DEXTROSE MONOHYDRATE, SODIUM CHLORIDE, AND POTASSIUM CHLORIDE 75 MILLILITER(S): 50; .745; 4.5 INJECTION, SOLUTION INTRAVENOUS at 02:27

## 2018-09-18 RX ADMIN — Medication 2.5 MILLIGRAM(S): at 17:45

## 2018-09-18 RX ADMIN — DEXTROSE MONOHYDRATE, SODIUM CHLORIDE, AND POTASSIUM CHLORIDE 75 MILLILITER(S): 50; .745; 4.5 INJECTION, SOLUTION INTRAVENOUS at 15:23

## 2018-09-18 RX ADMIN — Medication 2.5 MILLIGRAM(S): at 06:25

## 2018-09-18 NOTE — PROGRESS NOTE ADULT - SUBJECTIVE AND OBJECTIVE BOX
Green Surgery Pre-Op Note    Pre-Op Diagnosis:  SBO from hernia repair   Procedure: Hernia repair    Lab Findings:    CBC Full  -  ( 18 Sep 2018 11:50 )  WBC Count : 4.7 K/uL  Hemoglobin : 12.0 g/dL  Hematocrit : 36.1 %  Platelet Count - Automated : 192 K/uL  Mean Cell Volume : 94.1 fl  Mean Cell Hemoglobin : 31.3 pg  Mean Cell Hemoglobin Concentration : 33.3 gm/dL  Auto Neutrophil # : x  Auto Lymphocyte # : x  Auto Monocyte # : x  Auto Eosinophil # : x  Auto Basophil # : x  Auto Neutrophil % : x  Auto Lymphocyte % : x  Auto Monocyte % : x  Auto Eosinophil % : x  Auto Basophil % : x   09-18    133<L>  |  99  |  9   ----------------------------<  133<H>  3.9   |  24  |  1.46<H>    Ca    8.8      18 Sep 2018 11:40  Phos  2.3     09-18  Mg     1.7     09-18   PT/INR - ( 18 Sep 2018 11:10 )   PT: 14.6 sec;   INR: 1.33 ratio      PTT - ( 18 Sep 2018 11:40 )  PTT:185.1 sec          CAPILLARY BLOOD GLUCOSE    Magnesium, Serum: 1.7 mg/dL [1.6 - 2.6] (09-18-18 @ 11:40)                  09-18    133<L>  |  99  |  9   ----------------------------<  133<H>  3.9   |  24  |  1.46<H>    Ca    8.8      18 Sep 2018 11:40  Phos  2.3     09-18  Mg     1.7     09-18    Type and Screen: PENDING ORDERED resident will follow up     Vital Signs Last 24 Hrs  T(C): 36.6 (18 Sep 2018 09:44), Max: 36.9 (18 Sep 2018 05:58)  T(F): 97.8 (18 Sep 2018 09:44), Max: 98.4 (18 Sep 2018 05:58)  HR: 75 (18 Sep 2018 12:44) (75 - 80)  BP: 110/71 (18 Sep 2018 12:44) (108/68 - 125/82)  RR: 18 (18 Sep 2018 12:44) (18 - 18)  SpO2: 96% (18 Sep 2018 12:44) (95% - 97%) I&O's Detail      17 Sep 2018 07:01  -  18 Sep 2018 07:00  --------------------------------------------------------  IN:    dextrose 5% + sodium chloride 0.9% with potassium chloride 20 mEq/L: 1800 mL    heparin Infusion: 42 mL    Oral Fluid: 320 mL  Total IN: 2162 mL    OUT:    Voided: 1200 mL  Total OUT: 1200 mL  Total NET: 962 mL    18 Sep 2018 07:01  -  18 Sep 2018 14:43  --------------------------------------------------------  IN:    Oral Fluid: 840 mL  Total IN: 840 mL  OUT:    Voided: 100 mL  Total OUT: 100 mL  Total NET: 740 mL    EkG: QRS axis to [] ° and NSR at a rate of [] BPM. There was no atrial enlargement. There was no ventricular hypertrophy. There were no ST-T changes and all intervals were normal.    CXR: Impression: < from: Xray Chest 1 View- PORTABLE-Urgent (09.16.18 @ 13:09) >IMPRESSION: Small left pleural effusion.No bilateral focal infiltrates.< end of copied text >    Urinalaysis:  PENDING ORDERED STAT, Over night resident will follow up   Orders: NPO pMN Allergies, cephalosporins (Fever; Rash)    Consent: Pending will be done by overnight resident     Mary Lee PA-C  #[id number]

## 2018-09-18 NOTE — PROGRESS NOTE ADULT - SUBJECTIVE AND OBJECTIVE BOX
SURGERY DAILY PROGRESS NOTE:       SUBJECTIVE/ROS: Patient examined at bedside. No acute events overnight. INR 1.6 was started on Hep drip.          MEDICATIONS  (STANDING):  dextrose 5% + sodium chloride 0.9% with potassium chloride 20 mEq/L 1000 milliLiter(s) (75 mL/Hr) IV Continuous <Continuous>  furosemide    Tablet 40 milliGRAM(s) Oral daily  heparin  Infusion 1400 Unit(s)/Hr (14 mL/Hr) IV Continuous <Continuous>  levothyroxine Injectable 56 MICROGram(s) IV Push at bedtime  metoprolol tartrate Injectable 2.5 milliGRAM(s) IV Push every 6 hours  pantoprazole  Injectable 40 milliGRAM(s) IV Push daily    MEDICATIONS  (PRN):  LORazepam   Injectable 0.5 milliGRAM(s) IV Push every 4 hours PRN Anxiety  metoclopramide Injectable 10 milliGRAM(s) IV Push every 6 hours PRN nausea  ondansetron Injectable 4 milliGRAM(s) IV Push every 6 hours PRN Nausea and/or Vomiting      OBJECTIVE:    Vital Signs Last 24 Hrs  T(C): 36.5 (18 Sep 2018 00:31), Max: 36.8 (17 Sep 2018 12:57)  T(F): 97.7 (18 Sep 2018 00:31), Max: 98.3 (17 Sep 2018 16:53)  HR: 76 (18 Sep 2018 00:31) (74 - 78)  BP: 108/68 (18 Sep 2018 00:31) (99/68 - 122/71)  BP(mean): --  RR: 18 (18 Sep 2018 00:31) (16 - 18)  SpO2: 96% (18 Sep 2018 00:31) (95% - 96%)        I&O's Detail    16 Sep 2018 07:01  -  17 Sep 2018 07:00  --------------------------------------------------------  IN:    dextrose 5% + sodium chloride 0.9% with potassium chloride 20 mEq/L: 1200 mL    Oral Fluid: 340 mL    sodium chloride 0.9%: 400 mL  Total IN: 1940 mL    OUT:    Voided: 1225 mL  Total OUT: 1225 mL    Total NET: 715 mL      17 Sep 2018 07:01  -  18 Sep 2018 05:40  --------------------------------------------------------  IN:    dextrose 5% + sodium chloride 0.9% with potassium chloride 20 mEq/L: 900 mL    Oral Fluid: 320 mL  Total IN: 1220 mL    OUT:    Voided: 1200 mL  Total OUT: 1200 mL    Total NET: 20 mL          Daily     Daily     LABS:                        9.6    2.67  )-----------( 149      ( 17 Sep 2018 07:55 )             29.7     09-17    130<L>  |  95<L>  |  13  ----------------------------<  99  3.6   |  23  |  1.26    Ca    8.5      17 Sep 2018 06:23  Phos  2.5     09-17  Mg     1.8     09-17      PT/INR - ( 17 Sep 2018 23:42 )   PT: 18.1 sec;   INR: 1.66 ratio         PTT - ( 17 Sep 2018 16:45 )  PTT:42.6 sec              PHYSICAL EXAM:  GEN: NAD  Pulm: unlabored breathing on RA  CV: radial pulse 2+, cap refil <2sec  Abd: soft, nontender, nondistended

## 2018-09-18 NOTE — PROGRESS NOTE ADULT - SUBJECTIVE AND OBJECTIVE BOX
[facility] Progress Note  _______________________________    Pt. seen and examined. No new cardiac-related complaints. Passing gas.      T(C): 36.9 (09-18-18 @ 05:58), Max: 36.9 (09-18-18 @ 05:58)  HR: 75 (09-18-18 @ 05:58) (74 - 78)  BP: 125/82 (09-18-18 @ 05:58) (99/68 - 125/82)  RR: 18 (09-18-18 @ 05:58) (16 - 18)  SpO2: 95% (09-18-18 @ 05:58) (95% - 96%)  I&O's Summary    17 Sep 2018 07:01  -  18 Sep 2018 07:00  --------------------------------------------------------  IN: 2162 mL / OUT: 1200 mL / NET: 962 mL        PHYSICAL EXAM:  GENERAL: Alert, NAD.  NECK: Supple, No JVD, no carotid bruit.  CHEST/LUNG: cta b/l  HEART: S1 S2 normal, RRR; No murmurs, rubs, or gallops  ABDOMEN: distended. Nontender.  EXTREMITIES:  No LE edema.    LABS:                        9.6    2.67  )-----------( 149      ( 17 Sep 2018 07:55 )             29.7     09-17    130<L>  |  95<L>  |  13  ----------------------------<  99  3.6   |  23  |  1.26    Ca    8.5      17 Sep 2018 06:23  Phos  2.5     09-17  Mg     1.8     09-17      PT/INR - ( 17 Sep 2018 23:42 )   PT: 18.1 sec;   INR: 1.66 ratio         PTT - ( 17 Sep 2018 16:45 )  PTT:42.6 sec              MEDICATIONS  (STANDING):  dextrose 5% + sodium chloride 0.9% with potassium chloride 20 mEq/L 1000 milliLiter(s) (75 mL/Hr) IV Continuous <Continuous>  furosemide    Tablet 40 milliGRAM(s) Oral daily  heparin  Infusion 1400 Unit(s)/Hr (14 mL/Hr) IV Continuous <Continuous>  levothyroxine Injectable 56 MICROGram(s) IV Push at bedtime  metoprolol tartrate Injectable 2.5 milliGRAM(s) IV Push every 6 hours  pantoprazole  Injectable 40 milliGRAM(s) IV Push daily    MEDICATIONS  (PRN):  LORazepam   Injectable 0.5 milliGRAM(s) IV Push every 4 hours PRN Anxiety  metoclopramide Injectable 10 milliGRAM(s) IV Push every 6 hours PRN nausea  ondansetron Injectable 4 milliGRAM(s) IV Push every 6 hours PRN Nausea and/or Vomiting      RADIOLOGY & ADDITIONAL TESTS:

## 2018-09-18 NOTE — PROGRESS NOTE ADULT - PROBLEM SELECTOR PLAN 1
-  - No absolute cardiac contraindication to planned hernia surgery.  - cont BB in the perioperative period.

## 2018-09-19 ENCOUNTER — APPOINTMENT (OUTPATIENT)
Dept: SURGERY | Facility: HOSPITAL | Age: 83
End: 2018-09-19
Payer: MEDICARE

## 2018-09-19 LAB
ANION GAP SERPL CALC-SCNC: 11 MMOL/L — SIGNIFICANT CHANGE UP (ref 5–17)
APTT BLD: 56.9 SEC — HIGH (ref 27.5–37.4)
APTT BLD: 89.8 SEC — HIGH (ref 27.5–37.4)
BUN SERPL-MCNC: 8 MG/DL — SIGNIFICANT CHANGE UP (ref 7–23)
CALCIUM SERPL-MCNC: 8.4 MG/DL — SIGNIFICANT CHANGE UP (ref 8.4–10.5)
CHLORIDE SERPL-SCNC: 99 MMOL/L — SIGNIFICANT CHANGE UP (ref 96–108)
CO2 SERPL-SCNC: 24 MMOL/L — SIGNIFICANT CHANGE UP (ref 22–31)
CREAT SERPL-MCNC: 1.35 MG/DL — HIGH (ref 0.5–1.3)
GLUCOSE SERPL-MCNC: 110 MG/DL — HIGH (ref 70–99)
HCT VFR BLD CALC: 34.4 % — LOW (ref 39–50)
HGB BLD-MCNC: 11.2 G/DL — LOW (ref 13–17)
INR BLD: 1.25 RATIO — HIGH (ref 0.88–1.16)
MAGNESIUM SERPL-MCNC: 1.5 MG/DL — LOW (ref 1.6–2.6)
MCHC RBC-ENTMCNC: 30.9 PG — SIGNIFICANT CHANGE UP (ref 27–34)
MCHC RBC-ENTMCNC: 32.6 GM/DL — SIGNIFICANT CHANGE UP (ref 32–36)
MCV RBC AUTO: 95 FL — SIGNIFICANT CHANGE UP (ref 80–100)
PHOSPHATE SERPL-MCNC: 2.9 MG/DL — SIGNIFICANT CHANGE UP (ref 2.5–4.5)
PLATELET # BLD AUTO: 164 K/UL — SIGNIFICANT CHANGE UP (ref 150–400)
POTASSIUM SERPL-MCNC: 3.6 MMOL/L — SIGNIFICANT CHANGE UP (ref 3.5–5.3)
POTASSIUM SERPL-SCNC: 3.6 MMOL/L — SIGNIFICANT CHANGE UP (ref 3.5–5.3)
PROTHROM AB SERPL-ACNC: 13.6 SEC — HIGH (ref 9.8–12.7)
RBC # BLD: 3.62 M/UL — LOW (ref 4.2–5.8)
RBC # FLD: 15.4 % — HIGH (ref 10.3–14.5)
SODIUM SERPL-SCNC: 134 MMOL/L — LOW (ref 135–145)
WBC # BLD: 3.59 K/UL — LOW (ref 3.8–10.5)
WBC # FLD AUTO: 3.59 K/UL — LOW (ref 3.8–10.5)

## 2018-09-19 PROCEDURE — 49520 REREPAIR ING HERNIA REDUCE: CPT | Mod: LT

## 2018-09-19 RX ORDER — OXYCODONE AND ACETAMINOPHEN 5; 325 MG/1; MG/1
1 TABLET ORAL EVERY 4 HOURS
Qty: 0 | Refills: 0 | Status: DISCONTINUED | OUTPATIENT
Start: 2018-09-19 | End: 2018-09-19

## 2018-09-19 RX ORDER — METOPROLOL TARTRATE 50 MG
2.5 TABLET ORAL EVERY 6 HOURS
Qty: 0 | Refills: 0 | Status: DISCONTINUED | OUTPATIENT
Start: 2018-09-19 | End: 2018-09-20

## 2018-09-19 RX ORDER — HEPARIN SODIUM 5000 [USP'U]/ML
1100 INJECTION INTRAVENOUS; SUBCUTANEOUS
Qty: 25000 | Refills: 0 | Status: DISCONTINUED | OUTPATIENT
Start: 2018-09-19 | End: 2018-09-21

## 2018-09-19 RX ORDER — ACETAMINOPHEN 500 MG
1000 TABLET ORAL ONCE
Qty: 0 | Refills: 0 | Status: COMPLETED | OUTPATIENT
Start: 2018-09-20 | End: 2018-09-20

## 2018-09-19 RX ORDER — HYDROMORPHONE HYDROCHLORIDE 2 MG/ML
0.25 INJECTION INTRAMUSCULAR; INTRAVENOUS; SUBCUTANEOUS
Qty: 0 | Refills: 0 | Status: DISCONTINUED | OUTPATIENT
Start: 2018-09-19 | End: 2018-09-20

## 2018-09-19 RX ORDER — DEXTROSE MONOHYDRATE, SODIUM CHLORIDE, AND POTASSIUM CHLORIDE 50; .745; 4.5 G/1000ML; G/1000ML; G/1000ML
1000 INJECTION, SOLUTION INTRAVENOUS
Qty: 0 | Refills: 0 | Status: DISCONTINUED | OUTPATIENT
Start: 2018-09-19 | End: 2018-09-19

## 2018-09-19 RX ORDER — FUROSEMIDE 40 MG
20 TABLET ORAL ONCE
Qty: 0 | Refills: 0 | Status: COMPLETED | OUTPATIENT
Start: 2018-09-19 | End: 2018-09-19

## 2018-09-19 RX ORDER — PANTOPRAZOLE SODIUM 20 MG/1
40 TABLET, DELAYED RELEASE ORAL DAILY
Qty: 0 | Refills: 0 | Status: DISCONTINUED | OUTPATIENT
Start: 2018-09-19 | End: 2018-09-20

## 2018-09-19 RX ORDER — ONDANSETRON 8 MG/1
8 TABLET, FILM COATED ORAL ONCE
Qty: 0 | Refills: 0 | Status: COMPLETED | OUTPATIENT
Start: 2018-09-19 | End: 2018-09-19

## 2018-09-19 RX ORDER — CHLORHEXIDINE GLUCONATE 213 G/1000ML
1 SOLUTION TOPICAL ONCE
Qty: 0 | Refills: 0 | Status: COMPLETED | OUTPATIENT
Start: 2018-09-19 | End: 2018-09-19

## 2018-09-19 RX ORDER — LEVOTHYROXINE SODIUM 125 MCG
56 TABLET ORAL AT BEDTIME
Qty: 0 | Refills: 0 | Status: DISCONTINUED | OUTPATIENT
Start: 2018-09-19 | End: 2018-09-20

## 2018-09-19 RX ORDER — LEVOTHYROXINE SODIUM 125 MCG
56 TABLET ORAL AT BEDTIME
Qty: 0 | Refills: 0 | Status: DISCONTINUED | OUTPATIENT
Start: 2018-09-19 | End: 2018-09-19

## 2018-09-19 RX ORDER — FUROSEMIDE 40 MG
20 TABLET ORAL ONCE
Qty: 0 | Refills: 0 | Status: DISCONTINUED | OUTPATIENT
Start: 2018-09-19 | End: 2018-09-19

## 2018-09-19 RX ORDER — ACETAMINOPHEN 500 MG
650 TABLET ORAL EVERY 6 HOURS
Qty: 0 | Refills: 0 | Status: DISCONTINUED | OUTPATIENT
Start: 2018-09-19 | End: 2018-09-19

## 2018-09-19 RX ORDER — ACETAMINOPHEN 500 MG
1000 TABLET ORAL ONCE
Qty: 0 | Refills: 0 | Status: COMPLETED | OUTPATIENT
Start: 2018-09-19 | End: 2018-09-19

## 2018-09-19 RX ORDER — OXYCODONE AND ACETAMINOPHEN 5; 325 MG/1; MG/1
2 TABLET ORAL EVERY 6 HOURS
Qty: 0 | Refills: 0 | Status: DISCONTINUED | OUTPATIENT
Start: 2018-09-19 | End: 2018-09-19

## 2018-09-19 RX ADMIN — HYDROMORPHONE HYDROCHLORIDE 0.25 MILLIGRAM(S): 2 INJECTION INTRAMUSCULAR; INTRAVENOUS; SUBCUTANEOUS at 19:44

## 2018-09-19 RX ADMIN — DEXTROSE MONOHYDRATE, SODIUM CHLORIDE, AND POTASSIUM CHLORIDE 75 MILLILITER(S): 50; .745; 4.5 INJECTION, SOLUTION INTRAVENOUS at 11:51

## 2018-09-19 RX ADMIN — Medication 2.5 MILLIGRAM(S): at 00:13

## 2018-09-19 RX ADMIN — HEPARIN SODIUM 11 UNIT(S)/HR: 5000 INJECTION INTRAVENOUS; SUBCUTANEOUS at 22:03

## 2018-09-19 RX ADMIN — PANTOPRAZOLE SODIUM 40 MILLIGRAM(S): 20 TABLET, DELAYED RELEASE ORAL at 11:51

## 2018-09-19 RX ADMIN — Medication 2.5 MILLIGRAM(S): at 23:30

## 2018-09-19 RX ADMIN — Medication 400 MILLIGRAM(S): at 23:30

## 2018-09-19 RX ADMIN — Medication 2.5 MILLIGRAM(S): at 12:00

## 2018-09-19 RX ADMIN — Medication 2.5 MILLIGRAM(S): at 05:54

## 2018-09-19 RX ADMIN — CHLORHEXIDINE GLUCONATE 1 APPLICATION(S): 213 SOLUTION TOPICAL at 11:30

## 2018-09-19 RX ADMIN — Medication 20 MILLIGRAM(S): at 08:55

## 2018-09-19 RX ADMIN — DEXTROSE MONOHYDRATE, SODIUM CHLORIDE, AND POTASSIUM CHLORIDE 75 MILLILITER(S): 50; .745; 4.5 INJECTION, SOLUTION INTRAVENOUS at 08:55

## 2018-09-19 RX ADMIN — Medication 0.5 MILLIGRAM(S): at 01:50

## 2018-09-19 RX ADMIN — HEPARIN SODIUM 9 UNIT(S)/HR: 5000 INJECTION INTRAVENOUS; SUBCUTANEOUS at 03:40

## 2018-09-19 RX ADMIN — Medication 56 MICROGRAM(S): at 22:58

## 2018-09-19 RX ADMIN — ONDANSETRON 8 MILLIGRAM(S): 8 TABLET, FILM COATED ORAL at 20:03

## 2018-09-19 RX ADMIN — HYDROMORPHONE HYDROCHLORIDE 0.25 MILLIGRAM(S): 2 INJECTION INTRAMUSCULAR; INTRAVENOUS; SUBCUTANEOUS at 18:57

## 2018-09-19 NOTE — CHART NOTE - NSCHARTNOTEFT_GEN_A_CORE
General Surgery Post-Op Note    SUBJECTIVE:  This is a 84y Male POD 0 s/p repair of L recurrent inguinal hernia with bioabsorbable mesh plug. Doing well overall. Denies flatus, BM, N/V. Heparin gtt restarted at 2200 POD#0.     OBJECTIVE:     ** VITAL SIGNS / I&O's **    Vital Signs Last 24 Hrs  T(C): 36.5 (19 Sep 2018 21:30), Max: 36.8 (19 Sep 2018 14:36)  T(F): 97.7 (19 Sep 2018 21:30), Max: 98.3 (19 Sep 2018 14:36)  HR: 76 (19 Sep 2018 21:30) (75 - 80)  BP: 115/69 (19 Sep 2018 21:30) (102/64 - 124/67)  BP(mean): 83 (19 Sep 2018 19:30) (75 - 90)  RR: 18 (19 Sep 2018 21:30) (14 - 18)  SpO2: 95% (19 Sep 2018 21:30) (94% - 100%)      18 Sep 2018 07:01  -  19 Sep 2018 07:00  --------------------------------------------------------  IN:    dextrose 5% + sodium chloride 0.9% with potassium chloride 20 mEq/L: 1200 mL    heparin Infusion: 228 mL    IV PiggyBack: 250 mL    Oral Fluid: 1260 mL  Total IN: 2938 mL    OUT:    Voided: 1700 mL  Total OUT: 1700 mL    Total NET: 1238 mL      19 Sep 2018 07:01  -  19 Sep 2018 22:25  --------------------------------------------------------  IN:  Total IN: 0 mL    OUT:    Voided: 1550 mL  Total OUT: 1550 mL    Total NET: -1550 mL          ** PHYSICAL EXAM **    -- CONSTITUTIONAL: Alert, in NAD  -- PULMONARY: non-labored respirations  -- ABDOMEN: soft, non-distended, appropriately TTP, dressings c/d/i  -- NEURO: A&Ox3    ** LABS **                          11.2   3.59  )-----------( 164      ( 19 Sep 2018 13:21 )             34.4     19 Sep 2018 11:03    134    |  99     |  8      ----------------------------<  110    3.6     |  24     |  1.35     Ca    8.4        19 Sep 2018 11:03  Phos  2.9       19 Sep 2018 11:03  Mg     1.5       19 Sep 2018 11:03      PT/INR - ( 19 Sep 2018 11:03 )   PT: 13.6 sec;   INR: 1.25 ratio         PTT - ( 19 Sep 2018 11:03 )  PTT:56.9 sec  CAPILLARY BLOOD GLUCOSE                Urinalysis Basic - ( 18 Sep 2018 18:29 )    Color: Light Yellow / Appearance: Clear / S.010 / pH: x  Gluc: x / Ketone: Negative  / Bili: Negative / Urobili: Negative   Blood: x / Protein: Negative / Nitrite: Negative   Leuk Esterase: Negative / RBC: x / WBC x   Sq Epi: x / Non Sq Epi: x / Bacteria: x        MEDICATIONS  (STANDING):  acetaminophen  IVPB .. 1000 milliGRAM(s) IV Intermittent once  heparin  Infusion 1100 Unit(s)/Hr (11 mL/Hr) IV Continuous <Continuous>  levothyroxine Injectable 56 MICROGram(s) IV Push at bedtime  metoprolol tartrate Injectable 2.5 milliGRAM(s) IV Push every 6 hours  pantoprazole  Injectable 40 milliGRAM(s) IV Push daily    MEDICATIONS  (PRN):  HYDROmorphone  Injectable 0.25 milliGRAM(s) IV Push every 10 minutes PRN Moderate Pain (4 - 6)  LORazepam   Injectable 0.5 milliGRAM(s) IV Push every 4 hours PRN Anxiety        Assessment:  This is a 84yMale POD 0 s/p repair of L recurrent inguinal hernia with bioabsorbable mesh plug.    Plan:  - Diet: CLDs  - Started Hep gtt at 11/hr at 2200 tonight, monitor PT/INR  - OOB, ambulate as tolerated  - Encourage use of IS  - Monitor dressings  - Monitor GIF  - DVT ppx      General Surgery Green Team [phone]

## 2018-09-19 NOTE — BRIEF OPERATIVE NOTE - PROCEDURE
<<-----Click on this checkbox to enter Procedure Inguinal hernia repair, left  09/19/2018  With mesh  [facility]

## 2018-09-19 NOTE — PROGRESS NOTE ADULT - SUBJECTIVE AND OBJECTIVE BOX
SURGERY DAILY PROGRESS NOTE:       SUBJECTIVE/ROS: Patient examined at bedside. No acute events overnight. NPO for OR today; denies  N/V. -BM/+Flatus         MEDICATIONS  (STANDING):  dextrose 5% + sodium chloride 0.9% with potassium chloride 20 mEq/L 1000 milliLiter(s) (75 mL/Hr) IV Continuous <Continuous>  heparin  Infusion 1400 Unit(s)/Hr (9 mL/Hr) IV Continuous <Continuous>  levothyroxine Injectable 56 MICROGram(s) IV Push at bedtime  metoprolol tartrate Injectable 2.5 milliGRAM(s) IV Push every 6 hours  pantoprazole  Injectable 40 milliGRAM(s) IV Push daily    MEDICATIONS  (PRN):  LORazepam   Injectable 0.5 milliGRAM(s) IV Push every 4 hours PRN Anxiety  metoclopramide Injectable 10 milliGRAM(s) IV Push every 6 hours PRN nausea  ondansetron Injectable 4 milliGRAM(s) IV Push every 6 hours PRN Nausea and/or Vomiting      OBJECTIVE:    Vital Signs Last 24 Hrs  T(C): 36.6 (19 Sep 2018 05:27), Max: 36.9 (18 Sep 2018 05:58)  T(F): 97.8 (19 Sep 2018 05:27), Max: 98.5 (18 Sep 2018 17:37)  HR: 75 (19 Sep 2018 05:27) (75 - 80)  BP: 112/71 (19 Sep 2018 05:27) (102/65 - 125/82)  BP(mean): --  RR: 18 (19 Sep 2018 05:27) (18 - 18)  SpO2: 95% (19 Sep 2018 05:27) (94% - 97%)        I&O's Detail    17 Sep 2018 07:01  -  18 Sep 2018 07:00  --------------------------------------------------------  IN:    dextrose 5% + sodium chloride 0.9% with potassium chloride 20 mEq/L: 1800 mL    heparin Infusion: 42 mL    Oral Fluid: 320 mL  Total IN: 2162 mL    OUT:    Voided: 1200 mL  Total OUT: 1200 mL    Total NET: 962 mL      18 Sep 2018 07:01  -  19 Sep 2018 05:32  --------------------------------------------------------  IN:    dextrose 5% + sodium chloride 0.9% with potassium chloride 20 mEq/L: 1200 mL    heparin Infusion: 125 mL    IV PiggyBack: 250 mL    Oral Fluid: 1260 mL  Total IN: 2835 mL    OUT:    Voided: 1700 mL  Total OUT: 1700 mL    Total NET: 1135 mL          Daily     Daily     LABS:                        12.0   4.7   )-----------( 192      ( 18 Sep 2018 11:50 )             36.1         133<L>  |  99  |  9   ----------------------------<  133<H>  3.9   |  24  |  1.46<H>    Ca    8.8      18 Sep 2018 11:40  Phos  2.3       Mg     1.7           PT/INR - ( 18 Sep 2018 11:10 )   PT: 14.6 sec;   INR: 1.33 ratio         PTT - ( 19 Sep 2018 03:18 )  PTT:89.8 sec  Urinalysis Basic - ( 18 Sep 2018 18:29 )    Color: Light Yellow / Appearance: Clear / S.010 / pH: x  Gluc: x / Ketone: Negative  / Bili: Negative / Urobili: Negative   Blood: x / Protein: Negative / Nitrite: Negative   Leuk Esterase: Negative / RBC: x / WBC x   Sq Epi: x / Non Sq Epi: x / Bacteria: x                PHYSICAL EXAM:  GEN: NAD  Pulm: unlabored breathing on RA  CV: radial pulse 2+, cap refil <2sec  Abd: soft, nontender, nondistended L inguinal reducible hernia

## 2018-09-19 NOTE — PROGRESS NOTE ADULT - SUBJECTIVE AND OBJECTIVE BOX
CARDIOLOGY FOLLOW UP - Dr. Mckeon - coverage for [facility] - Dr. Luna     CC no cp/sob        PHYSICAL EXAM:  T(C): 36.6 (09-19-18 @ 09:29), Max: 36.9 (09-18-18 @ 17:37)  HR: 75 (09-19-18 @ 09:29) (75 - 77)  BP: 111/67 (09-19-18 @ 09:29) (102/65 - 114/73)  RR: 18 (09-19-18 @ 09:29) (18 - 18)  SpO2: 94% (09-19-18 @ 09:29) (94% - 96%)  Wt(kg): --  I&O's Summary    18 Sep 2018 07:01  -  19 Sep 2018 07:00  --------------------------------------------------------  IN: 2938 mL / OUT: 1700 mL / NET: 1238 mL    19 Sep 2018 07:01  -  19 Sep 2018 11:06  --------------------------------------------------------  IN: 0 mL / OUT: 800 mL / NET: -800 mL        Appearance: Normal	  Cardiovascular: Normal S1 S2,RRR, No JVD, No murmurs  Respiratory: Lungs clear to auscultation	  Gastrointestinal:  Soft, Non-tender, + BS	  Extremities: Normal range of motion, No clubbing, cyanosis or edema        MEDICATIONS  (STANDING):  chlorhexidine 4% Liquid 1 Application(s) Topical once  dextrose 5% + sodium chloride 0.9% with potassium chloride 20 mEq/L 1000 milliLiter(s) (75 mL/Hr) IV Continuous <Continuous>  heparin  Infusion 1400 Unit(s)/Hr (9 mL/Hr) IV Continuous <Continuous>  levothyroxine Injectable 56 MICROGram(s) IV Push at bedtime  metoprolol tartrate Injectable 2.5 milliGRAM(s) IV Push every 6 hours  pantoprazole  Injectable 40 milliGRAM(s) IV Push daily      TELEMETRY: 	    ECG:  	  RADIOLOGY:   DIAGNOSTIC TESTING:  [ ] Echocardiogram:  [ ]  Catheterization:  [ ] Stress Test:    OTHER: 	    LABS:	 	                                12.0   4.7   )-----------( 192      ( 18 Sep 2018 11:50 )             36.1     09-18    133<L>  |  99  |  9   ----------------------------<  133<H>  3.9   |  24  |  1.46<H>    Ca    8.8      18 Sep 2018 11:40  Phos  2.3     09-18  Mg     1.7     09-18      PT/INR - ( 18 Sep 2018 11:10 )   PT: 14.6 sec;   INR: 1.33 ratio         PTT - ( 19 Sep 2018 03:18 )  PTT:89.8 sec

## 2018-09-19 NOTE — BRIEF OPERATIVE NOTE - POST-OP DX
Recurrent inguinal hernia of left side with obstruction  09/19/2018    Stacie Lott  Small bowel obstruction  09/19/2018    Stacie Lott

## 2018-09-20 LAB
ANION GAP SERPL CALC-SCNC: 12 MMOL/L — SIGNIFICANT CHANGE UP (ref 5–17)
APTT BLD: 66.4 SEC — HIGH (ref 27.5–37.4)
APTT BLD: 93.3 SEC — HIGH (ref 27.5–37.4)
BUN SERPL-MCNC: 10 MG/DL — SIGNIFICANT CHANGE UP (ref 7–23)
CALCIUM SERPL-MCNC: 8 MG/DL — LOW (ref 8.4–10.5)
CHLORIDE SERPL-SCNC: 96 MMOL/L — SIGNIFICANT CHANGE UP (ref 96–108)
CO2 SERPL-SCNC: 23 MMOL/L — SIGNIFICANT CHANGE UP (ref 22–31)
CREAT SERPL-MCNC: 1.26 MG/DL — SIGNIFICANT CHANGE UP (ref 0.5–1.3)
GLUCOSE SERPL-MCNC: 99 MG/DL — SIGNIFICANT CHANGE UP (ref 70–99)
HCT VFR BLD CALC: 29.3 % — LOW (ref 39–50)
HCT VFR BLD CALC: 30.6 % — LOW (ref 39–50)
HGB BLD-MCNC: 10.2 G/DL — LOW (ref 13–17)
HGB BLD-MCNC: 9.7 G/DL — LOW (ref 13–17)
MAGNESIUM SERPL-MCNC: 1.5 MG/DL — LOW (ref 1.6–2.6)
MCHC RBC-ENTMCNC: 30.9 PG — SIGNIFICANT CHANGE UP (ref 27–34)
MCHC RBC-ENTMCNC: 31.2 PG — SIGNIFICANT CHANGE UP (ref 27–34)
MCHC RBC-ENTMCNC: 33.1 GM/DL — SIGNIFICANT CHANGE UP (ref 32–36)
MCHC RBC-ENTMCNC: 33.2 GM/DL — SIGNIFICANT CHANGE UP (ref 32–36)
MCV RBC AUTO: 93.2 FL — SIGNIFICANT CHANGE UP (ref 80–100)
MCV RBC AUTO: 93.7 FL — SIGNIFICANT CHANGE UP (ref 80–100)
PLATELET # BLD AUTO: 143 K/UL — LOW (ref 150–400)
PLATELET # BLD AUTO: 150 K/UL — SIGNIFICANT CHANGE UP (ref 150–400)
POTASSIUM SERPL-MCNC: 3.1 MMOL/L — LOW (ref 3.5–5.3)
POTASSIUM SERPL-SCNC: 3.1 MMOL/L — LOW (ref 3.5–5.3)
RBC # BLD: 3.14 M/UL — LOW (ref 4.2–5.8)
RBC # BLD: 3.27 M/UL — LOW (ref 4.2–5.8)
RBC # FLD: 13.9 % — SIGNIFICANT CHANGE UP (ref 10.3–14.5)
RBC # FLD: 14 % — SIGNIFICANT CHANGE UP (ref 10.3–14.5)
SODIUM SERPL-SCNC: 131 MMOL/L — LOW (ref 135–145)
WBC # BLD: 4.8 K/UL — SIGNIFICANT CHANGE UP (ref 3.8–10.5)
WBC # BLD: 4.9 K/UL — SIGNIFICANT CHANGE UP (ref 3.8–10.5)
WBC # FLD AUTO: 4.8 K/UL — SIGNIFICANT CHANGE UP (ref 3.8–10.5)
WBC # FLD AUTO: 4.9 K/UL — SIGNIFICANT CHANGE UP (ref 3.8–10.5)

## 2018-09-20 PROCEDURE — 99222 1ST HOSP IP/OBS MODERATE 55: CPT

## 2018-09-20 PROCEDURE — 74176 CT ABD & PELVIS W/O CONTRAST: CPT | Mod: 26

## 2018-09-20 PROCEDURE — 70450 CT HEAD/BRAIN W/O DYE: CPT | Mod: 26

## 2018-09-20 PROCEDURE — 72131 CT LUMBAR SPINE W/O DYE: CPT | Mod: 26

## 2018-09-20 RX ORDER — POTASSIUM CHLORIDE 20 MEQ
20 PACKET (EA) ORAL
Qty: 0 | Refills: 0 | Status: COMPLETED | OUTPATIENT
Start: 2018-09-20 | End: 2018-09-20

## 2018-09-20 RX ORDER — CARVEDILOL PHOSPHATE 80 MG/1
3.12 CAPSULE, EXTENDED RELEASE ORAL EVERY 12 HOURS
Qty: 0 | Refills: 0 | Status: DISCONTINUED | OUTPATIENT
Start: 2018-09-20 | End: 2018-09-21

## 2018-09-20 RX ORDER — ALPRAZOLAM 0.25 MG
0.5 TABLET ORAL AT BEDTIME
Qty: 0 | Refills: 0 | Status: DISCONTINUED | OUTPATIENT
Start: 2018-09-20 | End: 2018-09-24

## 2018-09-20 RX ORDER — POTASSIUM CHLORIDE 20 MEQ
20 PACKET (EA) ORAL ONCE
Qty: 0 | Refills: 0 | Status: COMPLETED | OUTPATIENT
Start: 2018-09-20 | End: 2018-09-20

## 2018-09-20 RX ORDER — WARFARIN SODIUM 2.5 MG/1
7.5 TABLET ORAL DAILY
Qty: 0 | Refills: 0 | Status: DISCONTINUED | OUTPATIENT
Start: 2018-09-20 | End: 2018-09-21

## 2018-09-20 RX ORDER — PANTOPRAZOLE SODIUM 20 MG/1
40 TABLET, DELAYED RELEASE ORAL
Qty: 0 | Refills: 0 | Status: DISCONTINUED | OUTPATIENT
Start: 2018-09-20 | End: 2018-09-24

## 2018-09-20 RX ORDER — SOTALOL HCL 120 MG
80 TABLET ORAL
Qty: 0 | Refills: 0 | Status: DISCONTINUED | OUTPATIENT
Start: 2018-09-20 | End: 2018-09-24

## 2018-09-20 RX ORDER — POTASSIUM CHLORIDE 20 MEQ
10 PACKET (EA) ORAL
Qty: 0 | Refills: 0 | Status: DISCONTINUED | OUTPATIENT
Start: 2018-09-20 | End: 2018-09-20

## 2018-09-20 RX ORDER — MAGNESIUM SULFATE 500 MG/ML
2 VIAL (ML) INJECTION
Qty: 0 | Refills: 0 | Status: COMPLETED | OUTPATIENT
Start: 2018-09-20 | End: 2018-09-20

## 2018-09-20 RX ADMIN — WARFARIN SODIUM 7.5 MILLIGRAM(S): 2.5 TABLET ORAL at 14:29

## 2018-09-20 RX ADMIN — Medication 0.5 MILLIGRAM(S): at 07:04

## 2018-09-20 RX ADMIN — Medication 400 MILLIGRAM(S): at 04:58

## 2018-09-20 RX ADMIN — PANTOPRAZOLE SODIUM 40 MILLIGRAM(S): 20 TABLET, DELAYED RELEASE ORAL at 12:39

## 2018-09-20 RX ADMIN — Medication 400 MILLIGRAM(S): at 17:47

## 2018-09-20 RX ADMIN — Medication 1000 MILLIGRAM(S): at 05:28

## 2018-09-20 RX ADMIN — Medication 2.5 MILLIGRAM(S): at 05:59

## 2018-09-20 RX ADMIN — Medication 0.5 MILLIGRAM(S): at 11:13

## 2018-09-20 RX ADMIN — Medication 2.5 MILLIGRAM(S): at 13:34

## 2018-09-20 RX ADMIN — Medication 0.5 MILLIGRAM(S): at 21:05

## 2018-09-20 RX ADMIN — HEPARIN SODIUM 11 UNIT(S)/HR: 5000 INJECTION INTRAVENOUS; SUBCUTANEOUS at 04:56

## 2018-09-20 RX ADMIN — Medication 20 MILLIEQUIVALENT(S): at 19:59

## 2018-09-20 RX ADMIN — Medication 20 MILLIEQUIVALENT(S): at 14:26

## 2018-09-20 RX ADMIN — Medication 400 MILLIGRAM(S): at 12:38

## 2018-09-20 RX ADMIN — Medication 1000 MILLIGRAM(S): at 00:00

## 2018-09-20 RX ADMIN — Medication 100 MILLIEQUIVALENT(S): at 17:46

## 2018-09-20 RX ADMIN — Medication 50 GRAM(S): at 16:29

## 2018-09-20 RX ADMIN — HEPARIN SODIUM 11 UNIT(S)/HR: 5000 INJECTION INTRAVENOUS; SUBCUTANEOUS at 13:33

## 2018-09-20 RX ADMIN — Medication 1000 MILLIGRAM(S): at 13:01

## 2018-09-20 RX ADMIN — Medication 20 MILLIEQUIVALENT(S): at 21:05

## 2018-09-20 RX ADMIN — Medication 50 GRAM(S): at 13:35

## 2018-09-20 NOTE — PROVIDER CONTACT NOTE (OTHER) - ACTION/TREATMENT ORDERED:
MD Chambers notified and made aware. MD Chambers assessed pt at bedside.  Green team in pts room assessing pt at bedside. Awaiting orders. Will continue to monitor.

## 2018-09-20 NOTE — PROGRESS NOTE ADULT - SUBJECTIVE AND OBJECTIVE BOX
[facility] Progress Note  _______________________________    Pt. seen and examined. No new cardiac-related complaints. Tolerated surgery well. c/o Numbness on inner side of L leg.      T(C): 36.9 (18 @ 04:27), Max: 36.9 (18 @ 04:27)  HR: 75 (18 @ 09:19) (75 - 80)  BP: 111/67 (18 @ 09:19) (102/64 - 124/67)  RR: 18 (18 @ 09:19) (14 - 18)  SpO2: 94% (18 @ 09:19) (94% - 100%)  I&O's Summary    19 Sep 2018 07:  -  20 Sep 2018 07:00  --------------------------------------------------------  IN: 419 mL / OUT: 2250 mL / NET: -1831 mL    20 Sep 2018 07:  -  20 Sep 2018 11:41  --------------------------------------------------------  IN: 33 mL / OUT: 250 mL / NET: -217 mL        PHYSICAL EXAM:  GENERAL: Alert, NAD.  NECK: Supple, No JVD, no carotid bruit.  CHEST/LUNG: cta b/l  HEART: S1 S2 normal, RRR; No murmurs, rubs, or gallops  ABDOMEN: deferred.  EXTREMITIES:  No LE edema.    LABS:                        9.7    4.9   )-----------( 150      ( 20 Sep 2018 09:54 )             29.3         131<L>  |  96  |  10  ----------------------------<  99  3.1<L>   |  23  |  1.26    Ca    8.0<L>      20 Sep 2018 09:37  Phos  2.9     09-  Mg     1.5     -20      PT/INR - ( 19 Sep 2018 11:03 )   PT: 13.6 sec;   INR: 1.25 ratio         PTT - ( 20 Sep 2018 04:20 )  PTT:66.4 sec          Urinalysis Basic - ( 18 Sep 2018 18:29 )    Color: Light Yellow / Appearance: Clear / S.010 / pH: x  Gluc: x / Ketone: Negative  / Bili: Negative / Urobili: Negative   Blood: x / Protein: Negative / Nitrite: Negative   Leuk Esterase: Negative / RBC: x / WBC x   Sq Epi: x / Non Sq Epi: x / Bacteria: x        MEDICATIONS  (STANDING):  acetaminophen  IVPB .. 1000 milliGRAM(s) IV Intermittent once  acetaminophen  IVPB .. 1000 milliGRAM(s) IV Intermittent once  heparin  Infusion 1100 Unit(s)/Hr (11 mL/Hr) IV Continuous <Continuous>  levothyroxine Injectable 56 MICROGram(s) IV Push at bedtime  magnesium sulfate  IVPB 2 Gram(s) IV Intermittent every 1 hour  metoprolol tartrate Injectable 2.5 milliGRAM(s) IV Push every 6 hours  pantoprazole  Injectable 40 milliGRAM(s) IV Push daily  potassium chloride    Tablet ER 20 milliEquivalent(s) Oral once  potassium chloride  10 mEq/100 mL IVPB 10 milliEquivalent(s) IV Intermittent every 1 hour  warfarin 7.5 milliGRAM(s) Oral daily    MEDICATIONS  (PRN):  LORazepam   Injectable 0.5 milliGRAM(s) IV Push every 4 hours PRN Anxiety      RADIOLOGY & ADDITIONAL TESTS:

## 2018-09-20 NOTE — PROVIDER CONTACT NOTE (OTHER) - ASSESSMENT
Pt in bed, upset stating he cant feel his left leg. Pt could not stand w/ assistance and a walker to complete the 1st OOB.

## 2018-09-20 NOTE — CONSULT NOTE ADULT - ASSESSMENT
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 Now s/p left inguinal hernia repair (9/19) and complaining of left medial thigh numbness radiating to the left leg with weakness and difficulty ambulating. Neurologic exam remarkable diminished sensation to light touch and temperature in the Left medial thigh/Left medial leg and left lateral knee and weakness in knee extension 2/5. CT head unremarkable for acute pathology.       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     Recommendations:  [] Frequent Repositioning in bed  [] Consider CT Lumbar spine (MRI preferred but patient has PPM)    Full recommendations to follow    Plan to be discussed with Neurology Attending. 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 Now s/p left inguinal hernia repair (9/19) and complaining of left medial thigh numbness radiating to the left leg with weakness and difficulty ambulating. Neurologic exam remarkable diminished sensation to light touch and temperature in the Left medial thigh/Left medial leg and left lateral knee and weakness in knee extension 2/5. CT head unremarkable for acute pathology.       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     Recommendations:  [] Frequent Repositioning in bed  [] Consider CT Lumbar spine (MRI preferred but patient has PPM)  [] CT abd/pelv to evaluate for hematoma  [] PT/OT    Plan discussed with Neurology Attending. 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 Now s/p left inguinal hernia repair (9/19) and complaining of left medial thigh numbness radiating to the left leg with weakness and difficulty ambulating. Neurologic exam remarkable diminished sensation to light touch and pin prick in the Left medial thigh/Left medial leg  and weakness in knee extension and hip flexion. CT head unremarkable for acute pathology.       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     Recommendations:  [] Frequent Repositioning in bed  [] Consider CT Lumbar spine (MRI preferred but patient has PPM)  [] CT abd/pelv to evaluate for hematoma  [] PT/OT    Plan discussed with Neurology Attending.

## 2018-09-20 NOTE — CONSULT NOTE ADULT - SUBJECTIVE AND OBJECTIVE BOX
CODY [id number] is a 84y old  Male who presents with a chief complaint of Hernia repair (18 Sep 2018 14:42)    HPI:  "84M (former gastroenterologist) pmhx CAD s/p CABG, complete heart block s/p PPM, Afib on coumadin, Hypothyroidism, Gout, CHF (EF 45% 2017), BPH, Diverticulitis s/p partial colectomy, multiple previous SBOs, who now presents to the ED from [facility] with 1-2 days of abdominal pain & nausea. The patient describes the pain as diffuse, similar in nature to previous SBOs, non-radiating, and severe at times. He reports that 2 days ago he had some diarrhea, for which he took 2 imodium, and since then has not had much GI function. Of note, the patient had b/l inguinal hernia repairs in the '80s, and has a known recurrence of the left hernia. He had seen Dr. Eddie Laura in the office to schedule a L inguinal hernia repair, however in the course of preparing for surgery the patient had held his coumadin, and subsequently had an episode of amaurosis fugax, so the surgery was cancelled. He denies HA, CP, SOB, dysuria. (14 Sep 2018 00:34)"  Patient underwent Left inguinal hernia repair on 9/19/2018 and later that night developed Left leg numbness and weakness.     Neurology consulted for LLE numbness/weakness.     Interim history: Patient seen and examined at bedside. Patient's daughters at bedside.   Patient states that since late last night he has been feeling numbness in the inner portion of his thigh which radiates down to his leg. Patient also states he has been unable to lift his leg up off the bed. At baseline patient ambulates with walker and is currently in [facility]. At time of symptom onset, patient denies any changes in speech, changes in vision, headache, incontinence of bladder or bowel, lower back pain.   Currently, patient complains of persistent numbness that has not diminished in severity a/w weakness in his left leg and inability to ambulate as a result.       MEDICATIONS  (STANDING):  acetaminophen  IVPB .. 1000 milliGRAM(s) IV Intermittent once  acetaminophen  IVPB .. 1000 milliGRAM(s) IV Intermittent once  heparin  Infusion 1100 Unit(s)/Hr (11 mL/Hr) IV Continuous <Continuous>  levothyroxine Injectable 56 MICROGram(s) IV Push at bedtime  metoprolol tartrate Injectable 2.5 milliGRAM(s) IV Push every 6 hours  pantoprazole  Injectable 40 milliGRAM(s) IV Push daily    MEDICATIONS  (PRN):  LORazepam   Injectable 0.5 milliGRAM(s) IV Push every 4 hours PRN Anxiety    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 after colonoscopy  Carpal tunnel syndrome of left wrist  Melanoma in situ of face excluding eyelid, nose, lip, and ear  Basal cell carcinoma  Abscess: diverticular  Acute on chronic congestive heart failure, unspecified congestive heart failure type: 2016  Benign prostatic hyperplasia, presence of lower urinary tract symptoms unspecified, unspecified morphology  Chronic gout with tophus, unspecified cause, unspecified site  Hypothyroidism, unspecified type  Paroxysmal atrial fibrillation: on Coumadin  Complete heart block: s/p PPM insertion-2008  Battery change- 2015  Last interrogation-05/2017  Coronary artery disease involving native heart without angina pectoris, unspecified vessel or lesion type  H/O shoulder surgery: Right shoulder repair- 2001  Artificial cardiac pacemaker: 2008  History of colon resection: 1995  S/P CABG x 3: 2002  H/O hernia repair: bilateral IHR x 2  History of appendectomy  History of carpal tunnel release, unspecified laterality: right wrist- 2002  History of skin cancer  Hx of cataract surgery, unspecified laterality  Adhesion of intestine: Relese of abdominal adhesions- 2002  SBO (small bowel obstruction): 03/2017    FAMILY HISTORY:    Allergies    Diprivan (Short breath)    Intolerances        SHx - No smoking, No ETOH, No drug abuse      Review of Systems:  As per HPi      Vital Signs Last 24 Hrs  T(C): 36.9 (20 Sep 2018 04:27), Max: 36.9 (20 Sep 2018 04:27)  T(F): 98.4 (20 Sep 2018 04:27), Max: 98.4 (20 Sep 2018 04:27)  HR: 75 (20 Sep 2018 09:19) (75 - 80)  BP: 111/67 (20 Sep 2018 09:19) (102/64 - 124/67)  BP(mean): 83 (19 Sep 2018 19:30) (75 - 90)  RR: 18 (20 Sep 2018 09:19) (14 - 18)  SpO2: 94% (20 Sep 2018 09:19) (94% - 100%)    General Exam:   General appearance: No acute distress                 Neurological Exam:  Mental Status: Orientated to self, date and place.  Attention intact.  No dysarthria. Speech fluent.  Cranial Nerves:   PERRL, EOMI, VFF, no nystagmus.    CN V1-3 intact to light touch .  No facial asymmetry.  Hearing intact to finger rub bilaterally.  Tongue, uvula and palate midline.  Sternocleidomastoid and Trapezius intact bilaterally.    Motor:   Tone: Normal.                  Strength:     [] Upper extremity                      Delt       Bicep    Tricep                                                  R         5/5 5/5 5/5 5/5                                               L          5/5 5/5 5/5 5/5  [] Lower extremity                       HF          KE          KF        DF         PF                                               R        5/5 5/5 5/5 5/5 5/5                                               L         5/5 2/5 5/5 5/5 5/5  Pronator drift: None                 Dysmetria: None to finger-nose-finger.    Tremor: No resting, postural or action tremor.  No myoclonus.    Sensation: +diminished sensation to light touch and temperature in the Left medial thigh/Left medial leg and left lateral knee.     Deep Tendon Reflexes:     Biceps          Triceps      BR        Patellar        Ankle         Babinski                                         R       1+                   1+           1+            0               0           downgoing                                         L        1+                  1+           1+            0               0           downgoing    Gait: Deferred    Other:    09-20    131<L>  |  96  |  10  ----------------------------<  99  3.1<L>   |  23  |  1.26    Ca    8.0<L>      20 Sep 2018 09:37  Phos  2.9     09-19  Mg     1.5     09-20                              9.7    4.9   )-----------( 150      ( 20 Sep 2018 09:54 )             29.3       Radiology    CT: < from: CT Head No Cont (09.20.18 @ 07:22) >  IMPRESSION: Age  appropriate involutional and ischemic gliotic changes..   No hemorrhage. No significant change since 11/14/2017.    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 The patient describes the pain as diffuse, similar in nature to previous SBOs, non-radiating, and severe at times. He reports that 2 days ago he had some diarrhea, for which he took 2 imodium, and since then has not had much GI function. Of note, the patient had b/l inguinal hernia repairs in the '80s, and has a known recurrence of the left hernia. He had seen Dr. Eddie Laura in the office to schedule a L inguinal hernia repair, however in the course of preparing for surgery the patient had held his coumadin, and subsequently had an episode of amaurosis fugax, so the surgery was cancelled. He denies HA, CP, SOB, dysuria. (14 Sep 2018 00:34)"  Patient underwent Left inguinal hernia repair on 9/19/2018 and later that night developed Left leg numbness and weakness.     Neurology consulted for LLE numbness/weakness.     Interim history: Patient seen and examined at bedside. Patient's daughters at bedside.   Patient states that since late last night he has been feeling numbness in the inner portion of his thigh which radiates down to his leg. Patient also states he has been unable to lift his leg up off the bed. At baseline patient ambulates with walker and is currently in [facility]. At time of symptom onset, patient denies any changes in speech, changes in vision, headache, incontinence of bladder or bowel, lower back pain.   Currently, patient complains of persistent numbness that has not diminished in severity a/w weakness in his left leg and inability to ambulate as a result.       MEDICATIONS  (STANDING):  acetaminophen  IVPB .. 1000 milliGRAM(s) IV Intermittent once  acetaminophen  IVPB .. 1000 milliGRAM(s) IV Intermittent once  heparin  Infusion 1100 Unit(s)/Hr (11 mL/Hr) IV Continuous <Continuous>  levothyroxine Injectable 56 MICROGram(s) IV Push at bedtime  metoprolol tartrate Injectable 2.5 milliGRAM(s) IV Push every 6 hours  pantoprazole  Injectable 40 milliGRAM(s) IV Push daily    MEDICATIONS  (PRN):  LORazepam   Injectable 0.5 milliGRAM(s) IV Push every 4 hours PRN Anxiety    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 after colonoscopy  Carpal tunnel syndrome of left wrist  Melanoma in situ of face excluding eyelid, nose, lip, and ear  Basal cell carcinoma  Abscess: diverticular  Acute on chronic congestive heart failure, unspecified congestive heart failure type: 2016  Benign prostatic hyperplasia, presence of lower urinary tract symptoms unspecified, unspecified morphology  Chronic gout with tophus, unspecified cause, unspecified site  Hypothyroidism, unspecified type  Paroxysmal atrial fibrillation: on Coumadin  Complete heart block: s/p PPM insertion-2008  Battery change- 2015  Last interrogation-05/2017  Coronary artery disease involving native heart without angina pectoris, unspecified vessel or lesion type  H/O shoulder surgery: Right shoulder repair- 2001  Artificial cardiac pacemaker: 2008  History of colon resection: 1995  S/P CABG x 3: 2002  H/O hernia repair: bilateral IHR x 2  History of appendectomy  History of carpal tunnel release, unspecified laterality: right wrist- 2002  History of skin cancer  Hx of cataract surgery, unspecified laterality  Adhesion of intestine: Relese of abdominal adhesions- 2002  SBO (small bowel obstruction): 03/2017    FAMILY HISTORY:    Allergies    Diprivan (Short breath)    Intolerances        SHx - No smoking, No ETOH, No drug abuse      Review of Systems:  As per HPi      Vital Signs Last 24 Hrs  T(C): 36.9 (20 Sep 2018 04:27), Max: 36.9 (20 Sep 2018 04:27)  T(F): 98.4 (20 Sep 2018 04:27), Max: 98.4 (20 Sep 2018 04:27)  HR: 75 (20 Sep 2018 09:19) (75 - 80)  BP: 111/67 (20 Sep 2018 09:19) (102/64 - 124/67)  BP(mean): 83 (19 Sep 2018 19:30) (75 - 90)  RR: 18 (20 Sep 2018 09:19) (14 - 18)  SpO2: 94% (20 Sep 2018 09:19) (94% - 100%)    General Exam:   General appearance: No acute distress                 Neurological Exam:  Mental Status: Orientated to self, date and place.  Attention intact.  No dysarthria. Speech fluent.  Cranial Nerves:   PERRL, EOMI, VFF, no nystagmus.    CN V1-3 intact to light touch .  No facial asymmetry.  Hearing intact to finger rub bilaterally.  Tongue, uvula and palate midline.  Sternocleidomastoid and Trapezius intact bilaterally.    Motor:   Tone: Normal.                  Strength:     [] Upper extremity                      Delt       Bicep    Tricep                                                  R         5/5 5/5 5/5 5/5                                               L          5/5 5/5 5/5 5/5  [] Lower extremity                       HF          KE          KF        DF         PF                                               R        5/5 5/5 5/5 5/5 5/5                                               L        4/5 2/5 5/5 5/5 5/5  Pronator drift: None                 Dysmetria: None to finger-nose-finger.    Tremor: No resting, postural or action tremor.  No myoclonus.    Sensation: +diminished sensation to light touch and temperature in the Left medial thigh/Left medial leg and left lateral knee.     Deep Tendon Reflexes:     Biceps          Triceps      BR        Patellar        Ankle         Babinski                                         R       1+                   1+           1+            0               0           downgoing                                         L        1+                  1+           1+            0               0           downgoing    Gait: Deferred    Other:    09-20    131<L>  |  96  |  10  ----------------------------<  99  3.1<L>   |  23  |  1.26    Ca    8.0<L>      20 Sep 2018 09:37  Phos  2.9     09-19  Mg     1.5     09-20                              9.7    4.9   )-----------( 150      ( 20 Sep 2018 09:54 )             29.3       Radiology    CT: < from: CT Head No Cont (09.20.18 @ 07:22) >  IMPRESSION: Age  appropriate involutional and ischemic gliotic changes..   No hemorrhage. No significant change since 11/14/2017.    < end of copied text >    MRI  EKG:  tele:  TTE:  EEG:

## 2018-09-20 NOTE — PROVIDER CONTACT NOTE (OTHER) - SITUATION
Pt woke up screaming stating he could not feel his upper right thigh that it went dumb. Pt woke up screaming stating he could not feel his upper left thigh that it went dumb.

## 2018-09-20 NOTE — CHART NOTE - NSCHARTNOTEFT_GEN_A_CORE
Patient with complaints of LLE numbness, had been sedated with ativan earlier.  D/w family who had already been told by surgical team that local anesthetic injected by surgeon can last up to 72 hours.  No apparent complications of the general anesthetic he received yesterday.  All questions answered.  Continue management as per surgery.

## 2018-09-20 NOTE — PROGRESS NOTE ADULT - SUBJECTIVE AND OBJECTIVE BOX
SURGERY DAILY PROGRESS NOTE:       SUBJECTIVE/ROS: Patient examined at bedside. No acute events overnight. Went to OR yesterday. Pain well controlled.          MEDICATIONS  (STANDING):  acetaminophen  IVPB .. 1000 milliGRAM(s) IV Intermittent once  acetaminophen  IVPB .. 1000 milliGRAM(s) IV Intermittent once  acetaminophen  IVPB .. 1000 milliGRAM(s) IV Intermittent once  heparin  Infusion 1100 Unit(s)/Hr (11 mL/Hr) IV Continuous <Continuous>  levothyroxine Injectable 56 MICROGram(s) IV Push at bedtime  metoprolol tartrate Injectable 2.5 milliGRAM(s) IV Push every 6 hours  pantoprazole  Injectable 40 milliGRAM(s) IV Push daily    MEDICATIONS  (PRN):  LORazepam   Injectable 0.5 milliGRAM(s) IV Push every 4 hours PRN Anxiety      OBJECTIVE:    Vital Signs Last 24 Hrs  T(C): 36.9 (20 Sep 2018 04:27), Max: 36.9 (20 Sep 2018 04:27)  T(F): 98.4 (20 Sep 2018 04:27), Max: 98.4 (20 Sep 2018 04:27)  HR: 75 (20 Sep 2018 04:27) (75 - 80)  BP: 104/69 (20 Sep 2018 04:27) (102/64 - 124/67)  BP(mean): 83 (19 Sep 2018 19:30) (75 - 90)  RR: 18 (20 Sep 2018 04:27) (14 - 18)  SpO2: 95% (20 Sep 2018 04:27) (94% - 100%)        I&O's Detail    18 Sep 2018 07:01  -  19 Sep 2018 07:00  --------------------------------------------------------  IN:    dextrose 5% + sodium chloride 0.9% with potassium chloride 20 mEq/L: 1200 mL    heparin Infusion: 228 mL    Oral Fluid: 1260 mL    Solution: 250 mL  Total IN: 2938 mL    OUT:    Voided: 1700 mL  Total OUT: 1700 mL    Total NET: 1238 mL      19 Sep 2018 07:01  -  20 Sep 2018 05:05  --------------------------------------------------------  IN:    heparin Infusion: 99 mL    Oral Fluid: 120 mL    Solution: 200 mL  Total IN: 419 mL    OUT:    Voided: 2250 mL  Total OUT: 2250 mL    Total NET: -1831 mL          Daily     Daily Weight in k (19 Sep 2018 11:50)    LABS:                        10.2   4.8   )-----------( 143      ( 20 Sep 2018 04:20 )             30.6     09-19    134<L>  |  99  |  8   ----------------------------<  110<H>  3.6   |  24  |  1.35<H>    Ca    8.4      19 Sep 2018 11:03  Phos  2.9       Mg     1.5           PT/INR - ( 19 Sep 2018 11:03 )   PT: 13.6 sec;   INR: 1.25 ratio         PTT - ( 20 Sep 2018 04:20 )  PTT:66.4 sec  Urinalysis Basic - ( 18 Sep 2018 18:29 )    Color: Light Yellow / Appearance: Clear / S.010 / pH: x  Gluc: x / Ketone: Negative  / Bili: Negative / Urobili: Negative   Blood: x / Protein: Negative / Nitrite: Negative   Leuk Esterase: Negative / RBC: x / WBC x   Sq Epi: x / Non Sq Epi: x / Bacteria: x                PHYSICAL EXAM:  GEN: NAD  Pulm: unlabored breathing on RA  CV: radial pulse 2+, cap refil <2sec  Abd: soft, nontender, nondistedned, incision CDI SURGERY DAILY PROGRESS NOTE:       SUBJECTIVE/ROS: Patient examined at bedside. No acute events overnight. Went to OR yesterday. Pain well controlled.          MEDICATIONS  (STANDING):  acetaminophen  IVPB .. 1000 milliGRAM(s) IV Intermittent once  acetaminophen  IVPB .. 1000 milliGRAM(s) IV Intermittent once  acetaminophen  IVPB .. 1000 milliGRAM(s) IV Intermittent once  heparin  Infusion 1100 Unit(s)/Hr (11 mL/Hr) IV Continuous <Continuous>  levothyroxine Injectable 56 MICROGram(s) IV Push at bedtime  metoprolol tartrate Injectable 2.5 milliGRAM(s) IV Push every 6 hours  pantoprazole  Injectable 40 milliGRAM(s) IV Push daily    MEDICATIONS  (PRN):  LORazepam   Injectable 0.5 milliGRAM(s) IV Push every 4 hours PRN Anxiety      OBJECTIVE:    Vital Signs Last 24 Hrs  T(C): 36.9 (20 Sep 2018 04:27), Max: 36.9 (20 Sep 2018 04:27)  T(F): 98.4 (20 Sep 2018 04:27), Max: 98.4 (20 Sep 2018 04:27)  HR: 75 (20 Sep 2018 04:27) (75 - 80)  BP: 104/69 (20 Sep 2018 04:27) (102/64 - 124/67)  BP(mean): 83 (19 Sep 2018 19:30) (75 - 90)  RR: 18 (20 Sep 2018 04:27) (14 - 18)  SpO2: 95% (20 Sep 2018 04:27) (94% - 100%)        I&O's Detail    18 Sep 2018 07:01  -  19 Sep 2018 07:00  --------------------------------------------------------  IN:    dextrose 5% + sodium chloride 0.9% with potassium chloride 20 mEq/L: 1200 mL    heparin Infusion: 228 mL    Oral Fluid: 1260 mL    Solution: 250 mL  Total IN: 2938 mL    OUT:    Voided: 1700 mL  Total OUT: 1700 mL    Total NET: 1238 mL      19 Sep 2018 07:01  -  20 Sep 2018 05:05  --------------------------------------------------------  IN:    heparin Infusion: 99 mL    Oral Fluid: 120 mL    Solution: 200 mL  Total IN: 419 mL    OUT:    Voided: 2250 mL  Total OUT: 2250 mL    Total NET: -1831 mL          Daily     Daily Weight in k (19 Sep 2018 11:50)    LABS:                        10.2   4.8   )-----------( 143      ( 20 Sep 2018 04:20 )             30.6     09-19    134<L>  |  99  |  8   ----------------------------<  110<H>  3.6   |  24  |  1.35<H>    Ca    8.4      19 Sep 2018 11:03  Phos  2.9       Mg     1.5           PT/INR - ( 19 Sep 2018 11:03 )   PT: 13.6 sec;   INR: 1.25 ratio         PTT - ( 20 Sep 2018 04:20 )  PTT:66.4 sec  Urinalysis Basic - ( 18 Sep 2018 18:29 )    Color: Light Yellow / Appearance: Clear / S.010 / pH: x  Gluc: x / Ketone: Negative  / Bili: Negative / Urobili: Negative   Blood: x / Protein: Negative / Nitrite: Negative   Leuk Esterase: Negative / RBC: x / WBC x   Sq Epi: x / Non Sq Epi: x / Bacteria: x                PHYSICAL EXAM:  GEN: NAD  Pulm: unlabored breathing on RA  CV: radial pulse 2+, cap refil <2sec  Abd: soft, nontender, nondistended incision CDI

## 2018-09-21 LAB
ANION GAP SERPL CALC-SCNC: 9 MMOL/L — SIGNIFICANT CHANGE UP (ref 5–17)
APTT BLD: 172.9 SEC — CRITICAL HIGH (ref 27.5–37.4)
APTT BLD: 55.5 SEC — HIGH (ref 27.5–37.4)
APTT BLD: > 200 SEC (ref 27.5–37.4)
BUN SERPL-MCNC: 10 MG/DL — SIGNIFICANT CHANGE UP (ref 7–23)
CALCIUM SERPL-MCNC: 8.1 MG/DL — LOW (ref 8.4–10.5)
CHLORIDE SERPL-SCNC: 96 MMOL/L — SIGNIFICANT CHANGE UP (ref 96–108)
CO2 SERPL-SCNC: 23 MMOL/L — SIGNIFICANT CHANGE UP (ref 22–31)
CREAT SERPL-MCNC: 1.2 MG/DL — SIGNIFICANT CHANGE UP (ref 0.5–1.3)
GLUCOSE SERPL-MCNC: 143 MG/DL — HIGH (ref 70–99)
HCT VFR BLD CALC: 29.7 % — LOW (ref 39–50)
HCT VFR BLD CALC: 30.6 % — LOW (ref 39–50)
HGB BLD-MCNC: 10.2 G/DL — LOW (ref 13–17)
HGB BLD-MCNC: 9.8 G/DL — LOW (ref 13–17)
INR BLD: 1.56 RATIO — HIGH (ref 0.88–1.16)
MAGNESIUM SERPL-MCNC: 2.2 MG/DL — SIGNIFICANT CHANGE UP (ref 1.6–2.6)
MCHC RBC-ENTMCNC: 30.4 PG — SIGNIFICANT CHANGE UP (ref 27–34)
MCHC RBC-ENTMCNC: 31.4 PG — SIGNIFICANT CHANGE UP (ref 27–34)
MCHC RBC-ENTMCNC: 33 GM/DL — SIGNIFICANT CHANGE UP (ref 32–36)
MCHC RBC-ENTMCNC: 33.5 GM/DL — SIGNIFICANT CHANGE UP (ref 32–36)
MCV RBC AUTO: 92.2 FL — SIGNIFICANT CHANGE UP (ref 80–100)
MCV RBC AUTO: 93.8 FL — SIGNIFICANT CHANGE UP (ref 80–100)
PHOSPHATE SERPL-MCNC: 2.7 MG/DL — SIGNIFICANT CHANGE UP (ref 2.5–4.5)
PLATELET # BLD AUTO: 156 K/UL — SIGNIFICANT CHANGE UP (ref 150–400)
PLATELET # BLD AUTO: 159 K/UL — SIGNIFICANT CHANGE UP (ref 150–400)
POTASSIUM SERPL-MCNC: 3.5 MMOL/L — SIGNIFICANT CHANGE UP (ref 3.5–5.3)
POTASSIUM SERPL-MCNC: 3.7 MMOL/L — SIGNIFICANT CHANGE UP (ref 3.5–5.3)
POTASSIUM SERPL-SCNC: 3.5 MMOL/L — SIGNIFICANT CHANGE UP (ref 3.5–5.3)
POTASSIUM SERPL-SCNC: 3.7 MMOL/L — SIGNIFICANT CHANGE UP (ref 3.5–5.3)
PROTHROM AB SERPL-ACNC: 17.2 SEC — HIGH (ref 9.8–12.7)
RBC # BLD: 3.22 M/UL — LOW (ref 4.2–5.8)
RBC # BLD: 3.26 M/UL — LOW (ref 4.2–5.8)
RBC # FLD: 14.2 % — SIGNIFICANT CHANGE UP (ref 10.3–14.5)
RBC # FLD: 15.5 % — HIGH (ref 10.3–14.5)
SODIUM SERPL-SCNC: 128 MMOL/L — LOW (ref 135–145)
WBC # BLD: 7.21 K/UL — SIGNIFICANT CHANGE UP (ref 3.8–10.5)
WBC # BLD: 7.5 K/UL — SIGNIFICANT CHANGE UP (ref 3.8–10.5)
WBC # FLD AUTO: 7.21 K/UL — SIGNIFICANT CHANGE UP (ref 3.8–10.5)
WBC # FLD AUTO: 7.5 K/UL — SIGNIFICANT CHANGE UP (ref 3.8–10.5)

## 2018-09-21 RX ORDER — ACETAMINOPHEN 500 MG
650 TABLET ORAL EVERY 6 HOURS
Qty: 0 | Refills: 0 | Status: DISCONTINUED | OUTPATIENT
Start: 2018-09-21 | End: 2018-09-24

## 2018-09-21 RX ORDER — SODIUM CHLORIDE 9 MG/ML
1 INJECTION INTRAMUSCULAR; INTRAVENOUS; SUBCUTANEOUS
Qty: 0 | Refills: 0 | Status: DISCONTINUED | OUTPATIENT
Start: 2018-09-21 | End: 2018-09-24

## 2018-09-21 RX ORDER — HEPARIN SODIUM 5000 [USP'U]/ML
900 INJECTION INTRAVENOUS; SUBCUTANEOUS
Qty: 25000 | Refills: 0 | Status: DISCONTINUED | OUTPATIENT
Start: 2018-09-21 | End: 2018-09-21

## 2018-09-21 RX ORDER — WARFARIN SODIUM 2.5 MG/1
5 TABLET ORAL DAILY
Qty: 0 | Refills: 0 | Status: DISCONTINUED | OUTPATIENT
Start: 2018-09-21 | End: 2018-09-24

## 2018-09-21 RX ORDER — POTASSIUM CHLORIDE 20 MEQ
10 PACKET (EA) ORAL DAILY
Qty: 0 | Refills: 0 | Status: DISCONTINUED | OUTPATIENT
Start: 2018-09-22 | End: 2018-09-24

## 2018-09-21 RX ORDER — CARVEDILOL PHOSPHATE 80 MG/1
9.38 CAPSULE, EXTENDED RELEASE ORAL EVERY 12 HOURS
Qty: 0 | Refills: 0 | Status: DISCONTINUED | OUTPATIENT
Start: 2018-09-21 | End: 2018-09-24

## 2018-09-21 RX ORDER — ALPRAZOLAM 0.25 MG
0.5 TABLET ORAL DAILY
Qty: 0 | Refills: 0 | Status: DISCONTINUED | OUTPATIENT
Start: 2018-09-21 | End: 2018-09-24

## 2018-09-21 RX ORDER — HEPARIN SODIUM 5000 [USP'U]/ML
1100 INJECTION INTRAVENOUS; SUBCUTANEOUS
Qty: 25000 | Refills: 0 | Status: DISCONTINUED | OUTPATIENT
Start: 2018-09-21 | End: 2018-09-22

## 2018-09-21 RX ORDER — HEPARIN SODIUM 5000 [USP'U]/ML
3000 INJECTION INTRAVENOUS; SUBCUTANEOUS ONCE
Qty: 0 | Refills: 0 | Status: COMPLETED | OUTPATIENT
Start: 2018-09-21 | End: 2018-09-21

## 2018-09-21 RX ORDER — ACETAMINOPHEN 500 MG
1000 TABLET ORAL ONCE
Qty: 0 | Refills: 0 | Status: COMPLETED | OUTPATIENT
Start: 2018-09-21 | End: 2018-09-21

## 2018-09-21 RX ORDER — FUROSEMIDE 40 MG
20 TABLET ORAL DAILY
Qty: 0 | Refills: 0 | Status: DISCONTINUED | OUTPATIENT
Start: 2018-09-21 | End: 2018-09-24

## 2018-09-21 RX ORDER — SPIRONOLACTONE 25 MG/1
25 TABLET, FILM COATED ORAL AT BEDTIME
Qty: 0 | Refills: 0 | Status: DISCONTINUED | OUTPATIENT
Start: 2018-09-21 | End: 2018-09-24

## 2018-09-21 RX ORDER — POTASSIUM CHLORIDE 20 MEQ
20 PACKET (EA) ORAL ONCE
Qty: 0 | Refills: 0 | Status: COMPLETED | OUTPATIENT
Start: 2018-09-21 | End: 2018-09-21

## 2018-09-21 RX ADMIN — Medication 1000 MILLIGRAM(S): at 02:30

## 2018-09-21 RX ADMIN — Medication 0.25 MILLIGRAM(S): at 02:27

## 2018-09-21 RX ADMIN — Medication 0.5 MILLIGRAM(S): at 22:09

## 2018-09-21 RX ADMIN — SPIRONOLACTONE 25 MILLIGRAM(S): 25 TABLET, FILM COATED ORAL at 22:09

## 2018-09-21 RX ADMIN — SODIUM CHLORIDE 1 GRAM(S): 9 INJECTION INTRAMUSCULAR; INTRAVENOUS; SUBCUTANEOUS at 18:43

## 2018-09-21 RX ADMIN — PANTOPRAZOLE SODIUM 40 MILLIGRAM(S): 20 TABLET, DELAYED RELEASE ORAL at 06:26

## 2018-09-21 RX ADMIN — Medication 400 MILLIGRAM(S): at 02:00

## 2018-09-21 RX ADMIN — CARVEDILOL PHOSPHATE 3.12 MILLIGRAM(S): 80 CAPSULE, EXTENDED RELEASE ORAL at 06:24

## 2018-09-21 RX ADMIN — HEPARIN SODIUM 11 UNIT(S)/HR: 5000 INJECTION INTRAVENOUS; SUBCUTANEOUS at 07:15

## 2018-09-21 RX ADMIN — Medication 80 MILLIGRAM(S): at 18:43

## 2018-09-21 RX ADMIN — Medication 80 MILLIGRAM(S): at 06:23

## 2018-09-21 RX ADMIN — Medication 650 MILLIGRAM(S): at 11:12

## 2018-09-21 RX ADMIN — Medication 20 MILLIEQUIVALENT(S): at 11:13

## 2018-09-21 RX ADMIN — WARFARIN SODIUM 5 MILLIGRAM(S): 2.5 TABLET ORAL at 22:09

## 2018-09-21 RX ADMIN — Medication 650 MILLIGRAM(S): at 11:42

## 2018-09-21 RX ADMIN — HEPARIN SODIUM 9 UNIT(S)/HR: 5000 INJECTION INTRAVENOUS; SUBCUTANEOUS at 08:38

## 2018-09-21 RX ADMIN — Medication 20 MILLIGRAM(S): at 12:37

## 2018-09-21 RX ADMIN — HEPARIN SODIUM 3000 UNIT(S): 5000 INJECTION INTRAVENOUS; SUBCUTANEOUS at 17:13

## 2018-09-21 RX ADMIN — CARVEDILOL PHOSPHATE 9.38 MILLIGRAM(S): 80 CAPSULE, EXTENDED RELEASE ORAL at 18:43

## 2018-09-21 RX ADMIN — HEPARIN SODIUM 11 UNIT(S)/HR: 5000 INJECTION INTRAVENOUS; SUBCUTANEOUS at 17:14

## 2018-09-21 NOTE — PROVIDER CONTACT NOTE (CRITICAL VALUE NOTIFICATION) - ACTION/TREATMENT ORDERED:
heparin drip held for high APTT. bleeding precautions maintained
MD Patino notified and made aware. Awaiting new rate for heparin. Will continue to monitor.
MD aware, hold Heparin for 1 hour, provider will place new order for lower rate. Continue to monitor

## 2018-09-21 NOTE — PROVIDER CONTACT NOTE (OTHER) - ASSESSMENT
Pt resting comfortably in bed, pt A&Ox4, no s/s of active bleeding. Patient's repeat APTT resulted with 55.5.

## 2018-09-21 NOTE — PROGRESS NOTE ADULT - SUBJECTIVE AND OBJECTIVE BOX
I have reviewed the abdominal CT scan done last night - there is no retroperitoneal hematoma or any evidence of psoas muscle hematoma.  I ahve discussed the finding with the patient.  He is slowly regainng ability to extended his left lower leg.

## 2018-09-21 NOTE — CHART NOTE - NSCHARTNOTEFT_GEN_A_CORE
Patient's CT Lumbar Spine and CT abd/pelv reviewed. Patient's left lower extremity numbness and weakness on knee extension consistent with Femoral Nerve infiltration likely from Anesthetic given at time of L Inguinal Hernia repair. Patient's symptoms should continue to improve as the anesthetic wears off.     Recommendations:  [] PT/OT  [] Follow-up with Neurology outpatient    Neurology signing off.

## 2018-09-21 NOTE — PROGRESS NOTE ADULT - SUBJECTIVE AND OBJECTIVE BOX
Pt. seen and examined.  Doing better.  Right leg improved but not baseline.  Complains of some air hunger typically when he doesnt get full dose of lasix.  No chest pain or palpitations.  Surg notes appreciated    Telemetry -  Vital Signs Last 24 Hrs  T(C): 36.7 (21 Sep 2018 09:44), Max: 37 (20 Sep 2018 12:47)  T(F): 98 (21 Sep 2018 09:44), Max: 98.6 (20 Sep 2018 12:47)  HR: 80 (21 Sep 2018 09:44) (75 - 80)  BP: 100/66 (21 Sep 2018 09:44) (100/66 - 120/74)  BP(mean): --  RR: 18 (21 Sep 2018 09:44) (18 - 18)  SpO2: 97% (21 Sep 2018 09:44) (93% - 97%)    I&O's Summary    20 Sep 2018 07:01  -  21 Sep 2018 07:00  --------------------------------------------------------  IN: 1264 mL / OUT: 800 mL / NET: 464 mL    21 Sep 2018 07:01  -  21 Sep 2018 11:02  --------------------------------------------------------  IN: 360 mL / OUT: 300 mL / NET: 60 mL        PHYSICAL EXAM:  GENERAL: Alert, NAD.  NECK: Supple, No JVD, no carotid bruit.  CHEST/LUNG: Grossly clear to auscultation bilaterally; No wheezes, rales, or rhonchi.  HEART: S1 S2 normal, RRR; No murmurs, rubs, or gallops  ABDOMEN: Soft, Nontender, Nondistended; Bowel sounds present  EXTREMITIES:  mild edema      LABS:                        9.8    7.21  )-----------( 159      ( 21 Sep 2018 07:50 )             29.7     09-21    128<L>  |  96  |  10  ----------------------------<  143<H>  3.7   |  23  |  1.20    Ca    8.1<L>      21 Sep 2018 06:15  Phos  2.7     09-21  Mg     2.2     09-21      PT/INR - ( 21 Sep 2018 06:16 )   PT: 17.2 sec;   INR: 1.56 ratio         PTT - ( 21 Sep 2018 06:16 )  PTT:172.9 sec              MEDICATIONS  (STANDING):  ALPRAZolam 0.5 milliGRAM(s) Oral at bedtime  carvedilol 9.375 milliGRAM(s) Oral every 12 hours  furosemide    Tablet 20 milliGRAM(s) Oral daily  heparin  Infusion 900 Unit(s)/Hr (9 mL/Hr) IV Continuous <Continuous>  pantoprazole    Tablet 40 milliGRAM(s) Oral before breakfast  potassium chloride    Tablet ER 20 milliEquivalent(s) Oral once  sotalol 80 milliGRAM(s) Oral two times a day  spironolactone 25 milliGRAM(s) Oral at bedtime  warfarin 7.5 milliGRAM(s) Oral daily    MEDICATIONS  (PRN):  acetaminophen   Tablet .. 650 milliGRAM(s) Oral every 6 hours PRN Moderate Pain (4 - 6)  ALPRAZolam 0.5 milliGRAM(s) Oral daily PRN anxiety      RADIOLOGY & ADDITIONAL TESTS:

## 2018-09-21 NOTE — PROGRESS NOTE ADULT - SUBJECTIVE AND OBJECTIVE BOX
SURGERY DAILY PROGRESS NOTE:       SUBJECTIVE/ROS: Patient examined at bedside. No acute events overnight. Tolerates regular diet w/o N/V. -BM/+Flatus. Continues to have L leg paralysis most likely 2/2 femoral nerve block         MEDICATIONS  (STANDING):  ALPRAZolam 0.5 milliGRAM(s) Oral at bedtime  carvedilol 3.125 milliGRAM(s) Oral every 12 hours  heparin  Infusion 1100 Unit(s)/Hr (11 mL/Hr) IV Continuous <Continuous>  pantoprazole    Tablet 40 milliGRAM(s) Oral before breakfast  sotalol 80 milliGRAM(s) Oral two times a day  warfarin 7.5 milliGRAM(s) Oral daily    MEDICATIONS  (PRN):      OBJECTIVE:    Vital Signs Last 24 Hrs  T(C): 36.6 (21 Sep 2018 05:21), Max: 37 (20 Sep 2018 12:47)  T(F): 97.9 (21 Sep 2018 05:21), Max: 98.6 (20 Sep 2018 12:47)  HR: 75 (21 Sep 2018 05:21) (75 - 78)  BP: 113/73 (21 Sep 2018 05:21) (106/64 - 120/74)  BP(mean): --  RR: 18 (21 Sep 2018 05:21) (18 - 18)  SpO2: 95% (21 Sep 2018 05:21) (93% - 96%)        I&O's Detail    19 Sep 2018 07:01  -  20 Sep 2018 07:00  --------------------------------------------------------  IN:    heparin Infusion: 99 mL    Oral Fluid: 120 mL    Solution: 200 mL  Total IN: 419 mL    OUT:    Voided: 2250 mL  Total OUT: 2250 mL    Total NET: -1831 mL      20 Sep 2018 07:01  -  21 Sep 2018 06:29  --------------------------------------------------------  IN:    heparin Infusion: 264 mL    Oral Fluid: 600 mL    Solution: 200 mL    Solution: 100 mL    Solution: 100 mL  Total IN: 1264 mL    OUT:    Voided: 800 mL  Total OUT: 800 mL    Total NET: 464 mL          Daily     Daily     LABS:                        9.7    4.9   )-----------( 150      ( 20 Sep 2018 09:54 )             29.3     09-21    x   |  x   |  x   ----------------------------<  x   3.5   |  x   |  x     Ca    8.0<L>      20 Sep 2018 09:37  Phos  2.9     09-19  Mg     1.5     09-20      PT/INR - ( 19 Sep 2018 11:03 )   PT: 13.6 sec;   INR: 1.25 ratio         PTT - ( 20 Sep 2018 13:01 )  PTT:93.3 sec              PHYSICAL EXAM:  GEN: NAD  Pulm: unlabored breathing on RA  CV: radial pulse 2+, cap refil <2sec  Abd: soft, nontender, nondistended incision CDI

## 2018-09-22 LAB
ANION GAP SERPL CALC-SCNC: 9 MMOL/L — SIGNIFICANT CHANGE UP (ref 5–17)
APTT BLD: 63 SEC — HIGH (ref 27.5–37.4)
APTT BLD: 76.8 SEC — HIGH (ref 27.5–37.4)
BUN SERPL-MCNC: 10 MG/DL — SIGNIFICANT CHANGE UP (ref 7–23)
CALCIUM SERPL-MCNC: 8.2 MG/DL — LOW (ref 8.4–10.5)
CHLORIDE SERPL-SCNC: 96 MMOL/L — SIGNIFICANT CHANGE UP (ref 96–108)
CO2 SERPL-SCNC: 26 MMOL/L — SIGNIFICANT CHANGE UP (ref 22–31)
CREAT SERPL-MCNC: 1.22 MG/DL — SIGNIFICANT CHANGE UP (ref 0.5–1.3)
GLUCOSE SERPL-MCNC: 113 MG/DL — HIGH (ref 70–99)
HCT VFR BLD CALC: 29.6 % — LOW (ref 39–50)
HCT VFR BLD CALC: 30.5 % — LOW (ref 39–50)
HGB BLD-MCNC: 10.2 G/DL — LOW (ref 13–17)
HGB BLD-MCNC: 9.9 G/DL — LOW (ref 13–17)
INR BLD: 1.95 RATIO — HIGH (ref 0.88–1.16)
MAGNESIUM SERPL-MCNC: 2 MG/DL — SIGNIFICANT CHANGE UP (ref 1.6–2.6)
MCHC RBC-ENTMCNC: 31.3 PG — SIGNIFICANT CHANGE UP (ref 27–34)
MCHC RBC-ENTMCNC: 31.3 PG — SIGNIFICANT CHANGE UP (ref 27–34)
MCHC RBC-ENTMCNC: 33.3 GM/DL — SIGNIFICANT CHANGE UP (ref 32–36)
MCHC RBC-ENTMCNC: 33.4 GM/DL — SIGNIFICANT CHANGE UP (ref 32–36)
MCV RBC AUTO: 93.9 FL — SIGNIFICANT CHANGE UP (ref 80–100)
MCV RBC AUTO: 94 FL — SIGNIFICANT CHANGE UP (ref 80–100)
PHOSPHATE SERPL-MCNC: 2.7 MG/DL — SIGNIFICANT CHANGE UP (ref 2.5–4.5)
PLATELET # BLD AUTO: 159 K/UL — SIGNIFICANT CHANGE UP (ref 150–400)
PLATELET # BLD AUTO: 161 K/UL — SIGNIFICANT CHANGE UP (ref 150–400)
POTASSIUM SERPL-MCNC: 3.8 MMOL/L — SIGNIFICANT CHANGE UP (ref 3.5–5.3)
POTASSIUM SERPL-SCNC: 3.8 MMOL/L — SIGNIFICANT CHANGE UP (ref 3.5–5.3)
PROTHROM AB SERPL-ACNC: 21.5 SEC — HIGH (ref 9.8–12.7)
RBC # BLD: 3.15 M/UL — LOW (ref 4.2–5.8)
RBC # BLD: 3.25 M/UL — LOW (ref 4.2–5.8)
RBC # FLD: 14 % — SIGNIFICANT CHANGE UP (ref 10.3–14.5)
RBC # FLD: 14.3 % — SIGNIFICANT CHANGE UP (ref 10.3–14.5)
SODIUM SERPL-SCNC: 131 MMOL/L — LOW (ref 135–145)
WBC # BLD: 7 K/UL — SIGNIFICANT CHANGE UP (ref 3.8–10.5)
WBC # BLD: 7.1 K/UL — SIGNIFICANT CHANGE UP (ref 3.8–10.5)
WBC # FLD AUTO: 7 K/UL — SIGNIFICANT CHANGE UP (ref 3.8–10.5)
WBC # FLD AUTO: 7.1 K/UL — SIGNIFICANT CHANGE UP (ref 3.8–10.5)

## 2018-09-22 PROCEDURE — 74018 RADEX ABDOMEN 1 VIEW: CPT | Mod: 26

## 2018-09-22 RX ORDER — ONDANSETRON 8 MG/1
4 TABLET, FILM COATED ORAL ONCE
Qty: 0 | Refills: 0 | Status: COMPLETED | OUTPATIENT
Start: 2018-09-22 | End: 2018-09-22

## 2018-09-22 RX ORDER — ONDANSETRON 8 MG/1
4 TABLET, FILM COATED ORAL ONCE
Qty: 0 | Refills: 0 | Status: DISCONTINUED | OUTPATIENT
Start: 2018-09-22 | End: 2018-09-23

## 2018-09-22 RX ORDER — HEPARIN SODIUM 5000 [USP'U]/ML
800 INJECTION INTRAVENOUS; SUBCUTANEOUS
Qty: 25000 | Refills: 0 | Status: DISCONTINUED | OUTPATIENT
Start: 2018-09-22 | End: 2018-09-23

## 2018-09-22 RX ORDER — POTASSIUM PHOSPHATE, MONOBASIC POTASSIUM PHOSPHATE, DIBASIC 236; 224 MG/ML; MG/ML
15 INJECTION, SOLUTION INTRAVENOUS ONCE
Qty: 0 | Refills: 0 | Status: COMPLETED | OUTPATIENT
Start: 2018-09-22 | End: 2018-09-22

## 2018-09-22 RX ADMIN — CARVEDILOL PHOSPHATE 9.38 MILLIGRAM(S): 80 CAPSULE, EXTENDED RELEASE ORAL at 17:59

## 2018-09-22 RX ADMIN — WARFARIN SODIUM 5 MILLIGRAM(S): 2.5 TABLET ORAL at 21:52

## 2018-09-22 RX ADMIN — ONDANSETRON 4 MILLIGRAM(S): 8 TABLET, FILM COATED ORAL at 06:04

## 2018-09-22 RX ADMIN — Medication 650 MILLIGRAM(S): at 19:40

## 2018-09-22 RX ADMIN — HEPARIN SODIUM 8 UNIT(S)/HR: 5000 INJECTION INTRAVENOUS; SUBCUTANEOUS at 00:15

## 2018-09-22 RX ADMIN — Medication 80 MILLIGRAM(S): at 17:59

## 2018-09-22 RX ADMIN — SODIUM CHLORIDE 1 GRAM(S): 9 INJECTION INTRAMUSCULAR; INTRAVENOUS; SUBCUTANEOUS at 05:38

## 2018-09-22 RX ADMIN — Medication 650 MILLIGRAM(S): at 19:12

## 2018-09-22 RX ADMIN — Medication 20 MILLIGRAM(S): at 05:38

## 2018-09-22 RX ADMIN — Medication 80 MILLIGRAM(S): at 05:38

## 2018-09-22 RX ADMIN — ONDANSETRON 4 MILLIGRAM(S): 8 TABLET, FILM COATED ORAL at 01:04

## 2018-09-22 RX ADMIN — CARVEDILOL PHOSPHATE 9.38 MILLIGRAM(S): 80 CAPSULE, EXTENDED RELEASE ORAL at 05:38

## 2018-09-22 RX ADMIN — HEPARIN SODIUM 8 UNIT(S)/HR: 5000 INJECTION INTRAVENOUS; SUBCUTANEOUS at 06:29

## 2018-09-22 RX ADMIN — SPIRONOLACTONE 25 MILLIGRAM(S): 25 TABLET, FILM COATED ORAL at 21:52

## 2018-09-22 RX ADMIN — Medication 0.5 MILLIGRAM(S): at 21:52

## 2018-09-22 RX ADMIN — POTASSIUM PHOSPHATE, MONOBASIC POTASSIUM PHOSPHATE, DIBASIC 62.5 MILLIMOLE(S): 236; 224 INJECTION, SOLUTION INTRAVENOUS at 17:59

## 2018-09-22 RX ADMIN — PANTOPRAZOLE SODIUM 40 MILLIGRAM(S): 20 TABLET, DELAYED RELEASE ORAL at 05:38

## 2018-09-22 RX ADMIN — HEPARIN SODIUM 8 UNIT(S)/HR: 5000 INJECTION INTRAVENOUS; SUBCUTANEOUS at 13:03

## 2018-09-22 RX ADMIN — Medication 10 MILLIEQUIVALENT(S): at 13:07

## 2018-09-22 RX ADMIN — Medication 0.5 MILLIGRAM(S): at 02:32

## 2018-09-22 NOTE — PROGRESS NOTE ADULT - SUBJECTIVE AND OBJECTIVE BOX
SURGERY DAILY PROGRESS NOTE:       SUBJECTIVE/ROS: Patient examined at bedside. No acute events overnight. Tolerates diet w/o N/V. +BM/+Flatus. Slowly regains motion in L leg.          MEDICATIONS  (STANDING):  ALPRAZolam 0.5 milliGRAM(s) Oral at bedtime  carvedilol 9.375 milliGRAM(s) Oral every 12 hours  furosemide    Tablet 20 milliGRAM(s) Oral daily  heparin  Infusion 800 Unit(s)/Hr (8 mL/Hr) IV Continuous <Continuous>  pantoprazole    Tablet 40 milliGRAM(s) Oral before breakfast  potassium chloride    Tablet ER 10 milliEquivalent(s) Oral daily  sodium chloride 1 Gram(s) Oral two times a day  sotalol 80 milliGRAM(s) Oral two times a day  spironolactone 25 milliGRAM(s) Oral at bedtime  warfarin 5 milliGRAM(s) Oral daily    MEDICATIONS  (PRN):  acetaminophen   Tablet .. 650 milliGRAM(s) Oral every 6 hours PRN Moderate Pain (4 - 6)  ALPRAZolam 0.5 milliGRAM(s) Oral daily PRN anxiety      OBJECTIVE:    Vital Signs Last 24 Hrs  T(C): 36.7 (22 Sep 2018 01:15), Max: 37.1 (21 Sep 2018 14:43)  T(F): 98 (22 Sep 2018 01:15), Max: 98.7 (21 Sep 2018 14:43)  HR: 75 (22 Sep 2018 01:15) (75 - 80)  BP: 107/67 (22 Sep 2018 01:15) (100/66 - 111/70)  BP(mean): --  RR: 18 (22 Sep 2018 01:15) (18 - 18)  SpO2: 96% (22 Sep 2018 01:15) (95% - 97%)        I&O's Detail    20 Sep 2018 07:01  -  21 Sep 2018 07:00  --------------------------------------------------------  IN:    heparin Infusion: 264 mL    Oral Fluid: 600 mL    Solution: 200 mL    Solution: 100 mL    Solution: 100 mL  Total IN: 1264 mL    OUT:    Voided: 800 mL  Total OUT: 800 mL    Total NET: 464 mL      21 Sep 2018 07:01  -  22 Sep 2018 05:36  --------------------------------------------------------  IN:    heparin Infusion: 90 mL    heparin Infusion: 66 mL    heparin Infusion: 32 mL    Oral Fluid: 940 mL  Total IN: 1128 mL    OUT:    Voided: 1275 mL  Total OUT: 1275 mL    Total NET: -147 mL          Daily     Daily     LABS:                        10.2   7.5   )-----------( 156      ( 21 Sep 2018 22:29 )             30.6     09-21    128<L>  |  96  |  10  ----------------------------<  143<H>  3.7   |  23  |  1.20    Ca    8.1<L>      21 Sep 2018 06:15  Phos  2.7     09-21  Mg     2.2     09-21      PT/INR - ( 21 Sep 2018 06:16 )   PT: 17.2 sec;   INR: 1.56 ratio         PTT - ( 21 Sep 2018 22:29 )  PTT:> 200 sec              PHYSICAL EXAM:  GEN: NAD  Pulm: unlabored breathing on RA  CV: radial pulse 2+, cap refil <2sec  Abd: soft, nontender, nondistended incision CDI

## 2018-09-22 NOTE — PROGRESS NOTE ADULT - SUBJECTIVE AND OBJECTIVE BOX
CARDIOLOGY FOLLOW UP NOTE - DR. BARAHONA (for [facility])    Subjective:    no chest pain, sob  + fatigue       PHYSICAL EXAM:  T(C): 37 (09-22-18 @ 13:40), Max: 37.1 (09-21-18 @ 14:43)  HR: 74 (09-22-18 @ 13:40) (74 - 76)  BP: 101/63 (09-22-18 @ 13:40) (101/63 - 112/72)  RR: 18 (09-22-18 @ 13:40) (18 - 18)  SpO2: 96% (09-22-18 @ 13:40) (95% - 96%)  Wt(kg): --  I&O's Summary    21 Sep 2018 07:01  -  22 Sep 2018 07:00  --------------------------------------------------------  IN: 1392 mL / OUT: 1575 mL / NET: -183 mL    22 Sep 2018 07:01  -  22 Sep 2018 14:07  --------------------------------------------------------  IN: 240 mL / OUT: 700 mL / NET: -460 mL        Appearance: Normal	  Cardiovascular: Normal S1 S2,RRR, No JVD, No murmurs  Respiratory: Lungs clear to auscultation	  Gastrointestinal:  Soft, Non-tender, + BS	  Extremities: Normal range of motion, No clubbing, cyanosis or edema  y    MEDICATIONS  (STANDING):  ALPRAZolam 0.5 milliGRAM(s) Oral at bedtime  bisacodyl Suppository 10 milliGRAM(s) Rectal once  carvedilol 9.375 milliGRAM(s) Oral every 12 hours  furosemide    Tablet 20 milliGRAM(s) Oral daily  heparin  Infusion 800 Unit(s)/Hr (8 mL/Hr) IV Continuous <Continuous>  ondansetron Injectable 4 milliGRAM(s) IV Push once  pantoprazole    Tablet 40 milliGRAM(s) Oral before breakfast  potassium chloride    Tablet ER 10 milliEquivalent(s) Oral daily  potassium phosphate IVPB 15 milliMole(s) IV Intermittent once  sodium chloride 1 Gram(s) Oral two times a day  sotalol 80 milliGRAM(s) Oral two times a day  spironolactone 25 milliGRAM(s) Oral at bedtime  warfarin 5 milliGRAM(s) Oral daily      TELEMETRY: 	    ECG:  	  RADIOLOGY:   DIAGNOSTIC TESTING:  [ ] Echocardiogram:  [ ] Catheterization:  [ ] Stress Test:    OTHER: 	    LABS:	 	    CARDIAC MARKERS:                                10.2   7.0   )-----------( 159      ( 22 Sep 2018 12:28 )             30.5     09-22    131<L>  |  96  |  10  ----------------------------<  113<H>  3.8   |  26  |  1.22    Ca    8.2<L>      22 Sep 2018 06:01  Phos  2.7     09-22  Mg     2.0     09-22      proBNP:   PT/INR - ( 22 Sep 2018 12:28 )   PT: 21.5 sec;   INR: 1.95 ratio         PTT - ( 22 Sep 2018 12:28 )  PTT:63.0 sec  Lipid Profile:   HgA1c:

## 2018-09-23 LAB
ANION GAP SERPL CALC-SCNC: 11 MMOL/L — SIGNIFICANT CHANGE UP (ref 5–17)
APTT BLD: 48.8 SEC — HIGH (ref 27.5–37.4)
BUN SERPL-MCNC: 10 MG/DL — SIGNIFICANT CHANGE UP (ref 7–23)
CALCIUM SERPL-MCNC: 8.3 MG/DL — LOW (ref 8.4–10.5)
CHLORIDE SERPL-SCNC: 101 MMOL/L — SIGNIFICANT CHANGE UP (ref 96–108)
CO2 SERPL-SCNC: 23 MMOL/L — SIGNIFICANT CHANGE UP (ref 22–31)
CREAT SERPL-MCNC: 1.3 MG/DL — SIGNIFICANT CHANGE UP (ref 0.5–1.3)
GLUCOSE SERPL-MCNC: 92 MG/DL — SIGNIFICANT CHANGE UP (ref 70–99)
HCT VFR BLD CALC: 31.8 % — LOW (ref 39–50)
HGB BLD-MCNC: 10.4 G/DL — LOW (ref 13–17)
INR BLD: 2.2 RATIO — HIGH (ref 0.88–1.16)
MAGNESIUM SERPL-MCNC: 1.9 MG/DL — SIGNIFICANT CHANGE UP (ref 1.6–2.6)
MCHC RBC-ENTMCNC: 31.9 PG — SIGNIFICANT CHANGE UP (ref 27–34)
MCHC RBC-ENTMCNC: 32.7 GM/DL — SIGNIFICANT CHANGE UP (ref 32–36)
MCV RBC AUTO: 97.7 FL — SIGNIFICANT CHANGE UP (ref 80–100)
PHOSPHATE SERPL-MCNC: 3.5 MG/DL — SIGNIFICANT CHANGE UP (ref 2.5–4.5)
PLATELET # BLD AUTO: 164 K/UL — SIGNIFICANT CHANGE UP (ref 150–400)
POTASSIUM SERPL-MCNC: 3.9 MMOL/L — SIGNIFICANT CHANGE UP (ref 3.5–5.3)
POTASSIUM SERPL-SCNC: 3.9 MMOL/L — SIGNIFICANT CHANGE UP (ref 3.5–5.3)
PROTHROM AB SERPL-ACNC: 24.4 SEC — HIGH (ref 9.8–12.7)
RBC # BLD: 3.26 M/UL — LOW (ref 4.2–5.8)
RBC # FLD: 14.4 % — SIGNIFICANT CHANGE UP (ref 10.3–14.5)
SODIUM SERPL-SCNC: 135 MMOL/L — SIGNIFICANT CHANGE UP (ref 135–145)
WBC # BLD: 5.4 K/UL — SIGNIFICANT CHANGE UP (ref 3.8–10.5)
WBC # FLD AUTO: 5.4 K/UL — SIGNIFICANT CHANGE UP (ref 3.8–10.5)

## 2018-09-23 RX ORDER — SILODOSIN 4 MG/1
8 CAPSULE ORAL AT BEDTIME
Qty: 0 | Refills: 0 | Status: DISCONTINUED | OUTPATIENT
Start: 2018-09-23 | End: 2018-09-24

## 2018-09-23 RX ADMIN — Medication 20 MILLIGRAM(S): at 04:43

## 2018-09-23 RX ADMIN — Medication 650 MILLIGRAM(S): at 08:53

## 2018-09-23 RX ADMIN — PANTOPRAZOLE SODIUM 40 MILLIGRAM(S): 20 TABLET, DELAYED RELEASE ORAL at 04:43

## 2018-09-23 RX ADMIN — Medication 80 MILLIGRAM(S): at 04:43

## 2018-09-23 RX ADMIN — Medication 0.5 MILLIGRAM(S): at 21:47

## 2018-09-23 RX ADMIN — CARVEDILOL PHOSPHATE 9.38 MILLIGRAM(S): 80 CAPSULE, EXTENDED RELEASE ORAL at 04:43

## 2018-09-23 RX ADMIN — Medication 80 MILLIGRAM(S): at 17:20

## 2018-09-23 RX ADMIN — Medication 10 MILLIGRAM(S): at 09:34

## 2018-09-23 RX ADMIN — SILODOSIN 8 MILLIGRAM(S): 4 CAPSULE ORAL at 23:22

## 2018-09-23 RX ADMIN — Medication 10 MILLIEQUIVALENT(S): at 13:46

## 2018-09-23 RX ADMIN — SPIRONOLACTONE 25 MILLIGRAM(S): 25 TABLET, FILM COATED ORAL at 21:47

## 2018-09-23 RX ADMIN — CARVEDILOL PHOSPHATE 9.38 MILLIGRAM(S): 80 CAPSULE, EXTENDED RELEASE ORAL at 17:19

## 2018-09-23 RX ADMIN — WARFARIN SODIUM 5 MILLIGRAM(S): 2.5 TABLET ORAL at 21:47

## 2018-09-23 RX ADMIN — Medication 650 MILLIGRAM(S): at 20:13

## 2018-09-23 RX ADMIN — Medication 650 MILLIGRAM(S): at 20:43

## 2018-09-23 NOTE — PROGRESS NOTE ADULT - SUBJECTIVE AND OBJECTIVE BOX
SURGERY DAILY PROGRESS NOTE:       SUBJECTIVE/ROS: Patient examined at bedside. No acute events overnight.  - Tolerates diet w/o N/V.  - Flatus but no bm  - Now able to raise OOB with LLE  - New scrotal, foreskin swelling overnight      OBJECTIVE:    Vital Signs Last 24 Hrs  T(C): 36.4 (23 Sep 2018 00:35), Max: 37 (22 Sep 2018 13:40)  T(F): 97.5 (23 Sep 2018 00:35), Max: 98.6 (22 Sep 2018 13:40)  HR: 75 (23 Sep 2018 00:35) (74 - 75)  BP: 100/66 (23 Sep 2018 00:35) (97/62 - 112/72)  BP(mean): --  RR: 18 (23 Sep 2018 00:35) (18 - 18)  SpO2: 95% (23 Sep 2018 00:35) (94% - 97%)    I&O's Detail    21 Sep 2018 07:01  -  22 Sep 2018 07:00  --------------------------------------------------------  IN:    heparin Infusion: 90 mL    heparin Infusion: 66 mL    heparin Infusion: 56 mL    Oral Fluid: 1180 mL  Total IN: 1392 mL    OUT:    Voided: 1575 mL  Total OUT: 1575 mL    Total NET: -183 mL      22 Sep 2018 07:01  -  23 Sep 2018 04:13  --------------------------------------------------------  IN:    heparin Infusion: 160 mL    Oral Fluid: 640 mL    Solution: 250 mL  Total IN: 1050 mL    OUT:    Voided: 2450 mL  Total OUT: 2450 mL    Total NET: -1400 mL          LABS:                        10.2   7.0   )-----------( 159      ( 22 Sep 2018 12:28 )             30.5     09-22    131<L>  |  96  |  10  ----------------------------<  113<H>  3.8   |  26  |  1.22    Ca    8.2<L>      22 Sep 2018 06:01  Phos  2.7     09-22  Mg     2.0     09-22      PT/INR - ( 22 Sep 2018 12:28 )   PT: 21.5 sec;   INR: 1.95 ratio         PTT - ( 22 Sep 2018 12:28 )  PTT:63.0 sec        EXAM:  GEN: NAD  Pulm: unlabored breathing on RA  CV: radial pulse 2+, cap refil <2sec  Abd: soft, nontender, nondistended incision CDI  : edema to foreskin, scrotum

## 2018-09-23 NOTE — PROGRESS NOTE ADULT - SUBJECTIVE AND OBJECTIVE BOX
CARDIOLOGY FOLLOW UP NOTE - DR. BARAHONA (for [facility])    Subjective:    no chest pain, sob  no abd pain      PHYSICAL EXAM:  T(C): 36.4 (09-23-18 @ 09:20), Max: 37 (09-22-18 @ 13:40)  HR: 72 (09-23-18 @ 09:30) (72 - 75)  BP: 161/768 (09-23-18 @ 09:30) (97/62 - 161/768)  RR: 18 (09-23-18 @ 09:30) (18 - 18)  SpO2: 97% (09-23-18 @ 09:30) (94% - 97%)  Wt(kg): --  I&O's Summary    22 Sep 2018 07:01  -  23 Sep 2018 07:00  --------------------------------------------------------  IN: 1074 mL / OUT: 2450 mL / NET: -1376 mL    23 Sep 2018 07:01  -  23 Sep 2018 11:11  --------------------------------------------------------  IN: 300 mL / OUT: 350 mL / NET: -50 mL        Appearance: Normal	  Cardiovascular: Normal S1 S2,RRR, No JVD, No murmurs  Respiratory: faint wheeze b/l bases  Gastrointestinal:  Soft, Non-tender, + BS	  Extremities: Normal range of motion, min edema b/l    MEDICATIONS  (STANDING):  ALPRAZolam 0.5 milliGRAM(s) Oral at bedtime  carvedilol 9.375 milliGRAM(s) Oral every 12 hours  furosemide    Tablet 20 milliGRAM(s) Oral daily  pantoprazole    Tablet 40 milliGRAM(s) Oral before breakfast  potassium chloride    Tablet ER 10 milliEquivalent(s) Oral daily  sodium chloride 1 Gram(s) Oral two times a day  sotalol 80 milliGRAM(s) Oral two times a day  spironolactone 25 milliGRAM(s) Oral at bedtime  warfarin 5 milliGRAM(s) Oral daily      TELEMETRY: 	    ECG:  	  RADIOLOGY:   DIAGNOSTIC TESTING:  [ ] Echocardiogram:  [ ] Catheterization:  [ ] Stress Test:    OTHER: 	    LABS:	 	    CARDIAC MARKERS:                                10.4   5.4   )-----------( 164      ( 23 Sep 2018 07:03 )             31.8     09-23    135  |  101  |  10  ----------------------------<  92  3.9   |  23  |  1.30    Ca    8.3<L>      23 Sep 2018 07:03  Phos  3.5     09-23  Mg     1.9     09-23      proBNP:   PT/INR - ( 23 Sep 2018 07:03 )   PT: 24.4 sec;   INR: 2.20 ratio         PTT - ( 23 Sep 2018 07:03 )  PTT:48.8 sec  Lipid Profile:   HgA1c:

## 2018-09-24 ENCOUNTER — TRANSCRIPTION ENCOUNTER (OUTPATIENT)
Age: 83
End: 2018-09-24

## 2018-09-24 VITALS
TEMPERATURE: 98 F | SYSTOLIC BLOOD PRESSURE: 108 MMHG | RESPIRATION RATE: 18 BRPM | DIASTOLIC BLOOD PRESSURE: 68 MMHG | OXYGEN SATURATION: 98 % | HEART RATE: 73 BPM

## 2018-09-24 LAB
ANION GAP SERPL CALC-SCNC: 10 MMOL/L — SIGNIFICANT CHANGE UP (ref 5–17)
APTT BLD: 48 SEC — HIGH (ref 27.5–37.4)
BUN SERPL-MCNC: 13 MG/DL — SIGNIFICANT CHANGE UP (ref 7–23)
CALCIUM SERPL-MCNC: 8.8 MG/DL — SIGNIFICANT CHANGE UP (ref 8.4–10.5)
CHLORIDE SERPL-SCNC: 96 MMOL/L — SIGNIFICANT CHANGE UP (ref 96–108)
CO2 SERPL-SCNC: 27 MMOL/L — SIGNIFICANT CHANGE UP (ref 22–31)
CREAT SERPL-MCNC: 1.33 MG/DL — HIGH (ref 0.5–1.3)
GLUCOSE SERPL-MCNC: 103 MG/DL — HIGH (ref 70–99)
HCT VFR BLD CALC: 30.9 % — LOW (ref 39–50)
HGB BLD-MCNC: 10.2 G/DL — LOW (ref 13–17)
INR BLD: 2.69 RATIO — HIGH (ref 0.88–1.16)
MAGNESIUM SERPL-MCNC: 1.8 MG/DL — SIGNIFICANT CHANGE UP (ref 1.6–2.6)
MCHC RBC-ENTMCNC: 31.2 PG — SIGNIFICANT CHANGE UP (ref 27–34)
MCHC RBC-ENTMCNC: 33 GM/DL — SIGNIFICANT CHANGE UP (ref 32–36)
MCV RBC AUTO: 94.5 FL — SIGNIFICANT CHANGE UP (ref 80–100)
PHOSPHATE SERPL-MCNC: 3.4 MG/DL — SIGNIFICANT CHANGE UP (ref 2.5–4.5)
PLATELET # BLD AUTO: 173 K/UL — SIGNIFICANT CHANGE UP (ref 150–400)
POTASSIUM SERPL-MCNC: 4 MMOL/L — SIGNIFICANT CHANGE UP (ref 3.5–5.3)
POTASSIUM SERPL-SCNC: 4 MMOL/L — SIGNIFICANT CHANGE UP (ref 3.5–5.3)
PROTHROM AB SERPL-ACNC: 31 SEC — HIGH (ref 10–13.1)
RBC # BLD: 3.27 M/UL — LOW (ref 4.2–5.8)
RBC # FLD: 15.9 % — HIGH (ref 10.3–14.5)
SODIUM SERPL-SCNC: 133 MMOL/L — LOW (ref 135–145)
WBC # BLD: 6.14 K/UL — SIGNIFICANT CHANGE UP (ref 3.8–10.5)
WBC # FLD AUTO: 6.14 K/UL — SIGNIFICANT CHANGE UP (ref 3.8–10.5)

## 2018-09-24 RX ORDER — SOTALOL HCL 120 MG
1 TABLET ORAL
Qty: 0 | Refills: 0 | COMMUNITY
Start: 2018-09-24

## 2018-09-24 RX ORDER — OXYCODONE HYDROCHLORIDE 5 MG/1
1 TABLET ORAL
Qty: 30 | Refills: 0 | OUTPATIENT
Start: 2018-09-24

## 2018-09-24 RX ORDER — PANTOPRAZOLE SODIUM 20 MG/1
1 TABLET, DELAYED RELEASE ORAL
Qty: 14 | Refills: 0 | OUTPATIENT
Start: 2018-09-24

## 2018-09-24 RX ORDER — WARFARIN SODIUM 2.5 MG/1
1 TABLET ORAL
Qty: 0 | Refills: 0 | COMMUNITY

## 2018-09-24 RX ORDER — PANTOPRAZOLE SODIUM 20 MG/1
1 TABLET, DELAYED RELEASE ORAL
Qty: 0 | Refills: 0 | COMMUNITY
Start: 2018-09-24

## 2018-09-24 RX ORDER — WARFARIN SODIUM 2.5 MG/1
1 TABLET ORAL
Qty: 14 | Refills: 0 | OUTPATIENT
Start: 2018-09-24

## 2018-09-24 RX ORDER — SOTALOL HCL 120 MG
1 TABLET ORAL
Qty: 0 | Refills: 0 | COMMUNITY

## 2018-09-24 RX ORDER — CARVEDILOL PHOSPHATE 80 MG/1
3 CAPSULE, EXTENDED RELEASE ORAL
Qty: 0 | Refills: 0 | COMMUNITY
Start: 2018-09-24

## 2018-09-24 RX ORDER — FUROSEMIDE 40 MG
1 TABLET ORAL
Qty: 0 | Refills: 0 | COMMUNITY
Start: 2018-09-24

## 2018-09-24 RX ORDER — SODIUM CHLORIDE 9 MG/ML
1 INJECTION INTRAMUSCULAR; INTRAVENOUS; SUBCUTANEOUS
Qty: 7 | Refills: 0 | OUTPATIENT
Start: 2018-09-24

## 2018-09-24 RX ORDER — FUROSEMIDE 40 MG
1 TABLET ORAL
Qty: 0 | Refills: 0 | COMMUNITY

## 2018-09-24 RX ADMIN — CARVEDILOL PHOSPHATE 9.38 MILLIGRAM(S): 80 CAPSULE, EXTENDED RELEASE ORAL at 06:07

## 2018-09-24 RX ADMIN — Medication 0.5 MILLIGRAM(S): at 02:05

## 2018-09-24 RX ADMIN — Medication 650 MILLIGRAM(S): at 10:12

## 2018-09-24 RX ADMIN — Medication 80 MILLIGRAM(S): at 06:07

## 2018-09-24 RX ADMIN — PANTOPRAZOLE SODIUM 40 MILLIGRAM(S): 20 TABLET, DELAYED RELEASE ORAL at 06:07

## 2018-09-24 RX ADMIN — Medication 20 MILLIGRAM(S): at 06:07

## 2018-09-24 NOTE — DISCHARGE NOTE ADULT - CARE PROVIDER_API CALL
Eddie Laura), Surgery  [address]  [address]  [place] [address]  Phone: [phone]  Fax: [phone]    Kelly Duque (MD), Neurology  [address]  [address]  [place] [address]  Phone: [phone]  Fax: [phone] Eddie Laura), Surgery  [address]  [address]  [place] [address]  Phone: [phone]  Fax: [phone]    Kelly Duque), Neurology  [address] [place] [address]  [address]  [place] [address]  Phone: [phone]  Fax: [phone]    Goldberg, Steven M (MD), Cardiovascular Disease; Internal Medicine  [address] [place] [address]  Phone: [phone]  Fax: [phone]

## 2018-09-24 NOTE — DISCHARGE NOTE ADULT - MEDICATION SUMMARY - MEDICATIONS TO CHANGE
I will SWITCH the dose or number of times a day I take the medications listed below when I get home from the hospital:    Coumadin 7.5 mg oral tablet  -- 1 tab(s) by mouth once a day (home dose)  while in the hospital patient was receiving 2.5 mg coumadin.  Please adjust dosing to keep INR 2.5- 3.00

## 2018-09-24 NOTE — DISCHARGE NOTE ADULT - [id number]_GEN_ALL_CORE
medication teaching and assessment/rehabilitation services/wound care and assessment/other:/observation and assessment

## 2018-09-24 NOTE — DISCHARGE NOTE ADULT - HOME CARE AGENCY
Your case has been referred to [email or web address]. A nurse will call you to schedule a visit the day after discharge. If you have any questions you may call [facility]

## 2018-09-24 NOTE — DISCHARGE NOTE ADULT - CARE PROVIDERS DIRECT ADDRESSES
[email or web address]

## 2018-09-24 NOTE — DISCHARGE NOTE ADULT - PLAN OF CARE
recover from surgery Follow up with Dr. Laura in 1-2 weeks. Please call to schedule an appointment.   NOTIFY YOUR SURGEON IF: You have any bleeding that does not stop, any pus draining from your wound, any fever (over 100.4 F) or chills, persistent nausea/vomiting, persistent diarrhea, or if your pain is not controlled on your discharge pain medications.   Activity: No heavy lifting or straining;  Do not drive while taking narcotic pain medication; Do not submerge or scrub incision sites. Follow up with Dr. Steven Goldberg, your Cardiologist. Please call to schedule an appointment. Follow up with Follow up with Dr. Steven Goldberg, your Cardiologist. Please call to schedule an appointment within 24-48 hours of discharge for an INR check Follow up with neurology Dr. Duque [phone]- Call to schedule an appointment

## 2018-09-24 NOTE — PROGRESS NOTE ADULT - PROVIDER SPECIALTY LIST ADULT
Cardiology
Surgery
Cardiology

## 2018-09-24 NOTE — PROGRESS NOTE ADULT - PROBLEM SELECTOR PLAN 3
-  - euvolemic.  - our team to follow.      Hector Luna M.D., [facility]  [phone]
-  - cont lasix 40 mg po daily.      Hector Luna M.D., [facility]  [phone]
-  - cont lasix 40 mg po daily.    Hector Luna M.D., [facility]  [phone]
-  - euvolemic, but feels as if he needs his full 40mg of lasix.  sodium a bit low today, careful diuresis.  - our team to follow.
-  - euvolemic. Cont lasix and spironolactone.  - follow-up with Dr. Goldberg after discharge.      Hector Luna M.D., [facility]  [phone]
-  - holding diuretics.  Currently euvolemic.    our team to follow.    Hector Luna M.D., [facility]  [phone]
-  - resume lasix 40 mg po daily - first dose now.      Hector Luna M.D., [facility]  [phone]
-  - holding diuretics.  Currently euvolemic.    our team to follow.    Hector Luna M.D., [facility]  [phone]

## 2018-09-24 NOTE — DISCHARGE NOTE ADULT - HOSPITAL COURSE
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 The patient describes the pain as diffuse, similar in nature to previous SBOs, non-radiating, and severe at times. He reports that 2 days ago he had some diarrhea, for which he took 2 imodium, and since then has not had much GI function. Of note, the patient had b/l inguinal hernia repairs in the '80s, and has a known recurrence of the left hernia. He had seen Dr. Eddie Laura in the office to schedule a L inguinal hernia repair, however in the course of preparing for surgery the patient had held his coumadin, and subsequently had an episode of amaurosis fugax, so the surgery was cancelled. He denies HA, CP, SOB, dysuria.    CT A/P:	IMPRESSION: Small bowel obstruction with transition points just distal to   left bowel containing inguinal hernia.    Pt was [facility]. Pt was made NPO, NGT place, IV fluid hydration started, and serial abdominal exams performed. Pt's cardiologist (Dr. Steven Goldberg) consulted. Hep gtt was started once INR <2.0. Pt's symptoms did not improve and CT repeated.  CT< from: CT Abdomen and Pelvis w/ Oral Cont (09.16.18 @ 17:02) > Persistent small bowel obstruction with transition point in the left inguinal hernia. Pt was optimized for surgery. Pt underwent repair of L recurrent inguinal hernia with bioabsorbable mesh plug on 9/19/2018. He tolerated the procedure well. On POD#1, Pt c/o LLE numbness.Imaging of CTH, CT A/P, and CT lumbar spine were all negative.  CT Head on 9/20 revealed  Age  appropriate involutional and ischemic gliotic changes. No hemorrhage. No significant change since 11/14/2017. CT A/P on 9/20 revealed Expected postoperative change in the left inguinal region. CT Lumbar spine on 9/20 revealed Mild multilevel degenerative changes as detailed in the body the report, similar to study from 8/15/2018.  Neurology was consulted after reviewing of images, pt's symptoms consistent with femoral nerve infiltration likely from anesthesia. Pt's symptoms continued to improve. Pt bridged from hep gtt to coumadin. PT evaluated patient and recommended Home PT. Pt's diet was advanced as tolerated. Pt currently tolerating a regular diet, ambulating, voiding and pain controlled. Pt stable for discharge to home with home PT.

## 2018-09-24 NOTE — PROGRESS NOTE ADULT - PROBLEM SELECTOR PROBLEM 1
Small bowel obstruction

## 2018-09-24 NOTE — DISCHARGE NOTE ADULT - PROVIDER TOKENS
TOKEN:'[id number]',TOKEN:'[id number]' TOKEN:'[id number]',TOKEN:'[id number]',TOKEN:'[id number]'

## 2018-09-24 NOTE — DISCHARGE NOTE ADULT - MEDICATION SUMMARY - MEDICATIONS TO TAKE
I will START or STAY ON the medications listed below when I get home from the hospital:    spironolactone 25 mg oral tablet  -- 1 tab(s) by mouth 2 times a day  -- Indication: For dieuretic    acetaminophen 325 mg oral tablet  -- 2 tab(s) by mouth every 6 hours, As needed, Mild Pain (1 - 3)  -- Indication: For pain    oxyCODONE 5 mg oral tablet  -- 1 tab(s) by mouth every 4 hours, As needed, Moderate Pain (4 - 6) MDD:6  -- Indication: For pain    Rapaflo 8 mg oral capsule  -- 1 cap(s) by mouth once a day  -- Indication: For home medication    sotalol 80 mg oral tablet  -- 1 tab(s) by mouth 2 times a day  -- Indication: For antiarhytmic    Coumadin 5 mg oral tablet  -- 1 tab(s) by mouth once a day  -- Indication: For anticoagulant    Lipitor 10 mg oral tablet  -- 1 tab(s) by mouth once a day (at bedtime)  -- Indication: For Cholesterol    Zetia  -- 5  mg by mouth   -- Indication: For Cholesterol    Xanax 0.5 mg oral tablet  -- orally once a day (at bedtime), As Needed  -- Indication: For anxiety    carvedilol 3.125 mg oral tablet  -- 3 tab(s) by mouth every 12 hours  -- Indication: For blood pressure    furosemide 20 mg oral tablet  -- 1 tab(s) by mouth once a day  -- Indication: For diueretic     senna oral tablet  -- 2 tab(s) by mouth once a day (at bedtime)  -- Indication: For prevent constipation    docusate sodium 100 mg oral capsule  -- 1 cap(s) by mouth 3 times a day  -- Indication: For prevent constipation    polyethylene glycol 3350 oral powder for reconstitution  -- 17 gram(s) by mouth once a day, As Needed  -- Indication: For prevent constipation    potassium chloride 10 mEq oral capsule, extended release  -- orally once a day  -- Indication: For Supplement    melatonin 3 mg oral tablet  -- 1 tab(s) by mouth once a day (at bedtime), As needed, Insomnia  -- Indication: For Sleep    lactobacillus acidophilus oral tablet  -- 1 tab(s) by mouth once a day  -- Indication: For Supplement    pantoprazole 40 mg oral delayed release tablet  -- 1 tab(s) by mouth once a day (before a meal)  -- Indication: For reflux    levothyroxine 112 mcg (0.112 mg) oral tablet  -- 1 tab(s) by mouth once a day  -- Indication: For thyroid    Multiple Vitamins oral tablet  -- 1 tab(s) by mouth once a day  -- Indication: For Supplement

## 2018-09-24 NOTE — DISCHARGE NOTE ADULT - PATIENT PORTAL LINK FT
You can access the [facility] Patient Portal, offered by [facility], by registering with the following website: [email or web address]

## 2018-09-24 NOTE — PROGRESS NOTE ADULT - SUBJECTIVE AND OBJECTIVE BOX
SURGERY DAILY PROGRESS NOTE:       SUBJECTIVE/ROS: Patient examined at bedside. No acute events overnight.  - Tolerates diet w/o N/V  - Flatus and bm  - Now able to raise OOB with LLE, ambulating down halls with walker  - Scrotal, foreskin swelling improved      OBJECTIVE:    Vital Signs Last 24 Hrs  T(C): 36.5 (24 Sep 2018 00:13), Max: 36.6 (23 Sep 2018 05:26)  T(F): 97.7 (24 Sep 2018 00:13), Max: 97.9 (23 Sep 2018 05:26)  HR: 75 (24 Sep 2018 00:13) (72 - 76)  BP: 110/71 (24 Sep 2018 00:13) (98/61 - 161/768)  BP(mean): --  RR: 18 (24 Sep 2018 00:13) (18 - 18)  SpO2: 95% (24 Sep 2018 00:13) (95% - 98%)    I&O's Detail    22 Sep 2018 07:01  -  23 Sep 2018 07:00  --------------------------------------------------------  IN:    heparin Infusion: 184 mL    Oral Fluid: 640 mL    Solution: 250 mL  Total IN: 1074 mL    OUT:    Voided: 2450 mL  Total OUT: 2450 mL    Total NET: -1376 mL      23 Sep 2018 07:01  -  24 Sep 2018 03:21  --------------------------------------------------------  IN:    Oral Fluid: 1260 mL  Total IN: 1260 mL    OUT:    Voided: 705 mL  Total OUT: 705 mL    Total NET: 555 mL          LABS:                        10.4   5.4   )-----------( 164      ( 23 Sep 2018 07:03 )             31.8     09-23    135  |  101  |  10  ----------------------------<  92  3.9   |  23  |  1.30    Ca    8.3<L>      23 Sep 2018 07:03  Phos  3.5     09-23  Mg     1.9     09-23      PT/INR - ( 23 Sep 2018 07:03 )   PT: 24.4 sec;   INR: 2.20 ratio         PTT - ( 23 Sep 2018 07:03 )  PTT:48.8 sec      EXAM:  GEN: NAD  Pulm: unlabored breathing on RA  CV: radial pulse 2+, cap refil <2sec  Abd: soft, nontender, nondistended incision CDI  : edema to foreskin, scrotum

## 2018-09-24 NOTE — PROGRESS NOTE ADULT - PROBLEM SELECTOR PROBLEM 3
Ischemic cardiomyopathy

## 2018-09-24 NOTE — PROGRESS NOTE ADULT - PROBLEM SELECTOR PLAN 2
-  - cont IV metoprolol for now.  - eventually resume home doses of coreg and sotalol.  - cont heparin gtt.  - coumadin dosing starting tonight.  - continue heparin drip until INR is > 2   on two separate blood draws.
-  - cont IV metoprolol for now.  - eventually resume home doses of coreg and sotalol.  - INR reversed with vitamin K yesterday.  - cont heparin gtt while INR is below 2.0
-  - cont IV metoprolol for now.  - eventually resume home doses of coreg and sotalol.  - cont heparin gtt.  - coumadin dosing 5 mg tonight recommended  - continue heparin drip until INR is > 2   on two separate blood draws.
-  - cont coreg and sotalol.  - cont coumadin.  - INR therapeutic yest. Repeat INR today.  - check EKG for QTc prior to discharge.
-  - resume home doses of coreg and sotalol.  - coumadin held on admission  - INR remains high at 3.2  likely due to nutritional vitamin K deficiency.   Suggest 5 mg po x 1 dose of vitamin K now then repeat INR tonight and again tomorrow.
-  - resume home doses of coreg and sotalol.  - coumadin held on admission  - INR remains therapeutic at 2.9 likely due to nutritional vitamin K deficiency.  Needs heparin drip if INR goes below 2.0.
-  - takes coreg at home.  - cont metoprolol 2.5 mg IVP q6h with hold parameters.  He is routinely hypotensive with SBP's in the 90's at baseline.  - coumadin held.  - monitor his INR closely.  Start heparin gtt as soon as his INR is below 2.0.  He has a history of TIA's when off AC.
-  - takes coreg at home.  - hold parameters on the IV metoprolol  - coumadin held.  - monitor his INR closely.  Start heparin gtt as soon as his INR is below 2.0.  He has a history of TIA's when off AC.

## 2018-09-24 NOTE — DISCHARGE NOTE ADULT - CARE PLAN
Principal Discharge DX:	Small bowel obstruction  Goal:	recover from surgery  Assessment and plan of treatment:	Follow up with Dr. Laura in 1-2 weeks. Please call to schedule an appointment.   NOTIFY YOUR SURGEON IF: You have any bleeding that does not stop, any pus draining from your wound, any fever (over 100.4 F) or chills, persistent nausea/vomiting, persistent diarrhea, or if your pain is not controlled on your discharge pain medications.   Activity: No heavy lifting or straining;  Do not drive while taking narcotic pain medication; Do not submerge or scrub incision sites.  Secondary Diagnosis:	Chronic atrial fibrillation  Assessment and plan of treatment:	Follow up with Dr. Steven Goldberg, your Cardiologist. Please call to schedule an appointment.  Assessment and plan of treatment:	Follow up with Principal Discharge DX:	Small bowel obstruction  Goal:	recover from surgery  Assessment and plan of treatment:	Follow up with Dr. Laura in 1-2 weeks. Please call to schedule an appointment.   NOTIFY YOUR SURGEON IF: You have any bleeding that does not stop, any pus draining from your wound, any fever (over 100.4 F) or chills, persistent nausea/vomiting, persistent diarrhea, or if your pain is not controlled on your discharge pain medications.   Activity: No heavy lifting or straining;  Do not drive while taking narcotic pain medication; Do not submerge or scrub incision sites.  Secondary Diagnosis:	Chronic atrial fibrillation  Assessment and plan of treatment:	Follow up with Dr. Steven Goldberg, your Cardiologist. Please call to schedule an appointment within 24-48 hours of discharge for an INR check  Assessment and plan of treatment:	Follow up with neurology Dr. Duque [phone]- Call to schedule an appointment

## 2018-09-24 NOTE — PROGRESS NOTE ADULT - ATTENDING COMMENTS
I have seen and evaluated patient and I agree with resident assessment and plan.
I saw and examined the pt and discussed the tx plan with the House Staff. I agree with the exam and plan as documented in the surgery resident's note from today.  L leg weakness improving.  Had some nausea this morning, AXR with distended stomach and old contrast in the colon.  Abdomen soft, distended, NT.  L groin soft, no ecchymosis.  Stable.  Instructed the pt to scale back to mostly liquids today, recommended Dulcolax supp.   Deloris Cardona MD
Patient seen/examined.  Agree w above note and plan and have discussed plan w house staff.  Ambulating, wound clean.  Home    Nnamdi Briones MD
Patient seen/examined.  Agree w above note and plan and have discussed plan w house staff.  Comfortable, tolerazting liqs,  passing gas.  OR in AM.  Appreciate cardiology help    Nnamdi Briones MD
Patient seen/examined.  Agree w above note and plan and have discussed plan w house staff.  Feels like he's beginning to regain motor to LLE.  Wound clean.  Diet, heparin per team    Nnamdi Briones MD
Patient seen/examined.  Agree w above note and plan and have discussed plan w house staff.  NG out, start clear liqs    Nnamdi Briones MD
Pt seen and examined  Agree with note which was reviewed and edited where appropriate.  D/W patient, RN, residents and Fellow
agree with NP note above  cv stable   pt remains optimized for the OR
kThe left inguinal hernia is reducible and the abdomen is less distended - INR down so on IV heparin to be stopped at 11 AM and or for hernia repair at 4 PM
I have seen and examined the patient. I agree with the above surgery resident's note.  LLE numbness- likely transient and secondary to exparel  abd benign, inc c/d/i- no hematoma  head CT neg  gary eval   supportive care

## 2018-09-24 NOTE — PROGRESS NOTE ADULT - SUBJECTIVE AND OBJECTIVE BOX
[facility] Cardiology Progress Note  _______________________________    Pt. seen and examined. No new cardiac-related complaints.      T(C): 36.7 (09-24-18 @ 05:04), Max: 36.7 (09-24-18 @ 05:04)  HR: 74 (09-24-18 @ 05:04) (74 - 76)  BP: 104/67 (09-24-18 @ 05:04) (98/61 - 110/71)  RR: 18 (09-24-18 @ 05:04) (18 - 18)  SpO2: 95% (09-24-18 @ 05:04) (95% - 98%)  I&O's Summary    23 Sep 2018 07:01  -  24 Sep 2018 07:00  --------------------------------------------------------  IN: 1260 mL / OUT: 905 mL / NET: 355 mL        PHYSICAL EXAM:  GENERAL: Alert, NAD.  NECK: Supple, No JVD, no carotid bruit.  CHEST/LUNG: cta b/l  HEART: S1 S2 normal, RRR; No murmurs, rubs, or gallops  ABDOMEN: deferred.  EXTREMITIES:  No LE edema.      LABS:                        10.4   5.4   )-----------( 164      ( 23 Sep 2018 07:03 )             31.8     09-24    133<L>  |  96  |  13  ----------------------------<  103<H>  4.0   |  27  |  1.33<H>    Ca    8.8      24 Sep 2018 06:47  Phos  3.4     09-24  Mg     1.8     09-24      PT/INR - ( 23 Sep 2018 07:03 )   PT: 24.4 sec;   INR: 2.20 ratio         PTT - ( 23 Sep 2018 07:03 )  PTT:48.8 sec              MEDICATIONS  (STANDING):  ALPRAZolam 0.5 milliGRAM(s) Oral at bedtime  carvedilol 9.375 milliGRAM(s) Oral every 12 hours  furosemide    Tablet 20 milliGRAM(s) Oral daily  pantoprazole    Tablet 40 milliGRAM(s) Oral before breakfast  potassium chloride    Tablet ER 10 milliEquivalent(s) Oral daily  silodosin 8 milliGRAM(s) Oral at bedtime  sodium chloride 1 Gram(s) Oral two times a day  sotalol 80 milliGRAM(s) Oral two times a day  spironolactone 25 milliGRAM(s) Oral at bedtime  warfarin 5 milliGRAM(s) Oral daily    MEDICATIONS  (PRN):  acetaminophen   Tablet .. 650 milliGRAM(s) Oral every 6 hours PRN Moderate Pain (4 - 6)  ALPRAZolam 0.5 milliGRAM(s) Oral daily PRN anxiety      RADIOLOGY & ADDITIONAL TESTS: [facility] Cardiology Progress Note  _______________________________    Pt. seen and examined. No new cardiac-related complaints.      T(C): 36.7 (09-24-18 @ 05:04), Max: 36.7 (09-24-18 @ 05:04)  HR: 74 (09-24-18 @ 05:04) (74 - 76)  BP: 104/67 (09-24-18 @ 05:04) (98/61 - 110/71)  RR: 18 (09-24-18 @ 05:04) (18 - 18)  SpO2: 95% (09-24-18 @ 05:04) (95% - 98%)  I&O's Summary    23 Sep 2018 07:01  -  24 Sep 2018 07:00  --------------------------------------------------------  IN: 1260 mL / OUT: 905 mL / NET: 355 mL        PHYSICAL EXAM:  GENERAL: Alert, NAD.  NECK: Supple, No JVD, no carotid bruit.  CHEST/LUNG: cta b/l  HEART: S1 S2 normal, RRR; No murmurs, rubs, or gallops  ABDOMEN: deferred.  EXTREMITIES:  Trace LE edema.      LABS:                        10.4   5.4   )-----------( 164      ( 23 Sep 2018 07:03 )             31.8     09-24    133<L>  |  96  |  13  ----------------------------<  103<H>  4.0   |  27  |  1.33<H>    Ca    8.8      24 Sep 2018 06:47  Phos  3.4     09-24  Mg     1.8     09-24      PT/INR - ( 23 Sep 2018 07:03 )   PT: 24.4 sec;   INR: 2.20 ratio         PTT - ( 23 Sep 2018 07:03 )  PTT:48.8 sec              MEDICATIONS  (STANDING):  ALPRAZolam 0.5 milliGRAM(s) Oral at bedtime  carvedilol 9.375 milliGRAM(s) Oral every 12 hours  furosemide    Tablet 20 milliGRAM(s) Oral daily  pantoprazole    Tablet 40 milliGRAM(s) Oral before breakfast  potassium chloride    Tablet ER 10 milliEquivalent(s) Oral daily  silodosin 8 milliGRAM(s) Oral at bedtime  sodium chloride 1 Gram(s) Oral two times a day  sotalol 80 milliGRAM(s) Oral two times a day  spironolactone 25 milliGRAM(s) Oral at bedtime  warfarin 5 milliGRAM(s) Oral daily    MEDICATIONS  (PRN):  acetaminophen   Tablet .. 650 milliGRAM(s) Oral every 6 hours PRN Moderate Pain (4 - 6)  ALPRAZolam 0.5 milliGRAM(s) Oral daily PRN anxiety      RADIOLOGY & ADDITIONAL TESTS:

## 2018-09-24 NOTE — PROGRESS NOTE ADULT - ASSESSMENT
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     1. Small bowel obstruction.    - No absolute cardiac contraindication to planned hernia surgery.  - cont BB in the perioperative period.     2. Chronic AFIB   - cont IV metoprolol for now.  - eventually resume home doses of coreg and sotalol.  - INR improved   - cont heparin gtt while INR is below 2.0.      3. Ischemic cardiomyopathy  -volume status stable   -NPO - s/p 20mg IV lasix x 1 this am     dvt ppx
84M w/ hx of SOB 2/2 incarcerated hernia s/p hernia repair on 9/19    PLAN:  -C/w regular diet  - F/u INR - d/c Hep drip when INR>2  - C/w home meds   - PT  - pain control      [facility]  [id number]
84M w/ hx of SOB 2/2 incarcerated hernia s/p hernia repair on 9/19    PLAN:  -C/w regular diet  - F/u INR - d/c Hep drip when INR>2  - C/w home meds   - PT  - pain control      [facility]  [id number]
84M w/ hx of SOB 2/2 incarcerated hernia s/p open left inguinal hernia repair with mesh and exprel on 9/19    PLAN:  - C/w regular diet  - F/u INR - d/c Hep drip when INR>2  - LLE: NEURO wkup neg, wkns/paresthesia 2/2 exprel, improving  - C/w home meds   - PT  - pain control  - dispo: home when INR >2 x 1 day    [facility]  p9003
84M w/ hx of SOB 2/2 incarcerated hernia s/p open left inguinal hernia repair with mesh and exprel on 9/19    PLAN:  - C/w regular diet  - INR now therapeutic x 1 day, f/u INR this AM  - LLE: NEURO wkup neg, wkns/paresthesia 2/2 exprel, improving  - C/w home meds   - PT  - pain control  - dispo: home today    [facility]  [id number]
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    Problem/Plan - 1:  ·  Problem: Small bowel obstruction.  Plan: -  - s/p hernia surgery      Problem/Plan - 2:  ·  Problem: Chronic atrial fibrillation.  Plan: -  - cont home doses of coreg and sotalol, check qtc on ekg before d/c  - cont heparin gtt, dose coumadin tonight, d/c hep abhay am if INR > 2    Problem/Plan - 3:  ·  Problem: Ischemic cardiomyopathy.  Plan: -  - cont lasix as ordered    d/w family at bedside
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    Problem/Plan - 1:  ·  Problem: Small bowel obstruction.  Plan: -  - s/p hernia surgery      Problem/Plan - 2:  ·  Problem: Chronic atrial fibrillation.  Plan: -  - cont home doses of coreg and sotalol, check qtc on ekg before d/c  - inr therap, heparin off, cont coumadin per inr     Problem/Plan - 3:  ·  Problem: Ischemic cardiomyopathy.  Plan: -  - cont lasix as ordered  -inc spirometer
CODY EDDY is a 84y Male admitted for SBO.    PLAN:  -  Advance diet as tolerated   - D/c NGT  - pain control after abd exam  - Pre-op clearance by cards   - F/u INR, when <2 start Hep ggt  - OR for hernia repair sometime early this week  - OOB to ambulate as tolerated    [facility]  [id number]
CODY EDDY is a 84y Male admitted for SBO.    PLAN:  -  NPO, MIVF  -  NGT to LCWS  -  serial abd exams  -  pain control after abd exam  -  OOB to ambulate as tolerated    [facility]  [id number]
CODY EDDY is a 84y Male admitted for SBO.    PLAN:  -  NPO, MIVF  -  NGT to LCWS  -  serial abd exams  -  pain control after abd exam  -  OOB to ambulate as tolerated    [facility]  p9003
CODY EDDY is a 84y Male admitted for SBO. Cleared by cards.     PLAN:  -  NPO at MN  - pain control after abd exam  - F/u INR, when <2 start Hep ggt  - OR for hernia repair maybe today   - OOB to ambulate as tolerated    [facility]  p9011
CODY EDDY is a 84y Male admitted for SBO. Cleared by cards.     PLAN:  -  NPO at MN  - pain control after abd exam  - F/u aPTT, adjust Hep ggt  - OR for hernia repair maybe today   - OOB to ambulate as tolerated    [facility]  p9040
CODY EDDY is a 84y Male admitted for SBO. Cleared by cards.     PLAN:  -  NPO at MN  - pain control after abd exam  - F/u aPTT, adjust Hep ggt  - OR for hernia repair today   - OOB to ambulate as tolerated    [facility]  p9003
84M w/ hx of SOB 2/2 incarcerated hernia s/p hernia repair on 9/19    PLAN:  -  advance diet as tolerated   - F/u aPTT, adjust Hep ggt  - pain control  - OOB to ambulate as tolerated    [facility]  [id number]

## 2018-10-02 ENCOUNTER — APPOINTMENT (OUTPATIENT)
Dept: SURGERY | Facility: CLINIC | Age: 83
End: 2018-10-02
Payer: MEDICARE

## 2018-10-02 VITALS
WEIGHT: 174 LBS | SYSTOLIC BLOOD PRESSURE: 92 MMHG | DIASTOLIC BLOOD PRESSURE: 58 MMHG | OXYGEN SATURATION: 97 % | HEIGHT: 70 IN | BODY MASS INDEX: 24.91 KG/M2 | TEMPERATURE: 97.5 F | RESPIRATION RATE: 17 BRPM | HEART RATE: 80 BPM

## 2018-10-02 DIAGNOSIS — K40.91 UNILATERAL INGUINAL HERNIA, W/OUT OBSTRUCTION OR GANGRENE, RECURRENT: ICD-10-CM

## 2018-10-02 DIAGNOSIS — Z09 ENCOUNTER FOR FOLLOW-UP EXAMINATION AFTER COMPLETED TREATMENT FOR CONDITIONS OTHER THAN MALIGNANT NEOPLASM: ICD-10-CM

## 2018-10-02 PROCEDURE — 99024 POSTOP FOLLOW-UP VISIT: CPT

## 2018-10-02 RX ORDER — PANTOPRAZOLE 40 MG/1
40 TABLET, DELAYED RELEASE ORAL
Qty: 14 | Refills: 0 | Status: ACTIVE | COMMUNITY
Start: 2018-09-24

## 2018-10-02 RX ORDER — CARVEDILOL 3.12 MG/1
3.12 TABLET, FILM COATED ORAL
Qty: 180 | Refills: 0 | Status: ACTIVE | COMMUNITY
Start: 2017-12-06

## 2018-10-02 RX ORDER — CARVEDILOL 6.25 MG/1
6.25 TABLET, FILM COATED ORAL
Refills: 0 | Status: DISCONTINUED | COMMUNITY
End: 2018-10-02

## 2018-10-16 NOTE — H&P PST ADULT - DOES THIS PATIENT HAVE A HISTORY OF OR HAS BEEN DX WITH HEART FAILURE?
Admission medication history interview status for the 10/16/2018  admission is complete. See EPIC admission navigator for prior to admission medications     Medication history source reliability:Good    Medication history interview source(s):Patient    Medication history resources (including written lists, pill bottles, clinic record):None    Primary pharmacy.[facility]    Additional medication history information not noted on PTA med list :None    Time spent in this activity: 45 minutes    Prior to Admission medications    Medication Sig Last Dose Taking? Auth Provider   Acetaminophen (TYLENOL PO) Take 325-650 mg by mouth daily as needed for mild pain or fever 10/14/2018 Yes Reported, Patient   amitriptyline (ELAVIL) 50 MG tablet Take 1 tablet (50 mg) by mouth At Bedtime 10/15/2018 at pm Yes Mustapha Velásquez MD   DULoxetine (CYMBALTA) 60 MG EC capsule Take 1 capsule (60 mg) by mouth daily 10/15/2018 at pm Yes Mustapha Velásquez MD   fluticasone (FLONASE) 50 MCG/ACT spray Spray 2 sprays into both nostrils daily 10/15/2018 at am Yes Mustapha Velásquez MD   hydrochlorothiazide 12.5 MG TABS tablet Take 1 tablet (12.5 mg) by mouth daily 10/16/2018 at 0430 Yes Mustapha Velásquez MD   metoprolol (LOPRESSOR) 25 MG tablet Take 1 tablet (25 mg) by mouth 2 times daily 10/16/2018 at 0430 Yes Mustapha Velásquez MD   omeprazole (PRILOSEC) 40 MG capsule Take 1 capsule (40 mg) by mouth daily 10/16/2018 at 0430 Yes Mustapha Velásquez MD          yes

## 2018-10-16 NOTE — CHART NOTE - NSCHARTNOTEFT_GEN_A_CORE
Was asked by clinical documentation nurse to clarify patient's history in retrospect. Pt is no longer admitted to the hospital.    He has a history of chronic diastolic heart failure (EF 50% on outpatient echo in July 2018) and is on PO lasix.    Hector Luna M.D., [facility]  [phone]

## 2018-10-24 PROCEDURE — 86850 RBC ANTIBODY SCREEN: CPT

## 2018-10-24 PROCEDURE — 96375 TX/PRO/DX INJ NEW DRUG ADDON: CPT

## 2018-10-24 PROCEDURE — 97161 PT EVAL LOW COMPLEX 20 MIN: CPT

## 2018-10-24 PROCEDURE — 81003 URINALYSIS AUTO W/O SCOPE: CPT

## 2018-10-24 PROCEDURE — 85027 COMPLETE CBC AUTOMATED: CPT

## 2018-10-24 PROCEDURE — 96376 TX/PRO/DX INJ SAME DRUG ADON: CPT

## 2018-10-24 PROCEDURE — 80048 BASIC METABOLIC PNL TOTAL CA: CPT

## 2018-10-24 PROCEDURE — 97116 GAIT TRAINING THERAPY: CPT

## 2018-10-24 PROCEDURE — 74176 CT ABD & PELVIS W/O CONTRAST: CPT

## 2018-10-24 PROCEDURE — 85730 THROMBOPLASTIN TIME PARTIAL: CPT

## 2018-10-24 PROCEDURE — 74018 RADEX ABDOMEN 1 VIEW: CPT

## 2018-10-24 PROCEDURE — 80053 COMPREHEN METABOLIC PANEL: CPT

## 2018-10-24 PROCEDURE — 86900 BLOOD TYPING SEROLOGIC ABO: CPT

## 2018-10-24 PROCEDURE — 86901 BLOOD TYPING SEROLOGIC RH(D): CPT

## 2018-10-24 PROCEDURE — 99285 EMERGENCY DEPT VISIT HI MDM: CPT | Mod: 25

## 2018-10-24 PROCEDURE — 97530 THERAPEUTIC ACTIVITIES: CPT

## 2018-10-24 PROCEDURE — C1781: CPT

## 2018-10-24 PROCEDURE — 85610 PROTHROMBIN TIME: CPT

## 2018-10-24 PROCEDURE — 70450 CT HEAD/BRAIN W/O DYE: CPT

## 2018-10-24 PROCEDURE — 84100 ASSAY OF PHOSPHORUS: CPT

## 2018-10-24 PROCEDURE — 71045 X-RAY EXAM CHEST 1 VIEW: CPT

## 2018-10-24 PROCEDURE — 84132 ASSAY OF SERUM POTASSIUM: CPT

## 2018-10-24 PROCEDURE — 83735 ASSAY OF MAGNESIUM: CPT

## 2018-10-24 PROCEDURE — 83605 ASSAY OF LACTIC ACID: CPT

## 2018-10-24 PROCEDURE — 83690 ASSAY OF LIPASE: CPT

## 2018-10-24 PROCEDURE — 93880 EXTRACRANIAL BILAT STUDY: CPT

## 2018-10-24 PROCEDURE — 97110 THERAPEUTIC EXERCISES: CPT

## 2018-10-24 PROCEDURE — 96374 THER/PROPH/DIAG INJ IV PUSH: CPT

## 2019-02-25 NOTE — ED ADULT NURSE NOTE - NS ED NURSE LEVEL OF CONSCIOUSNESS SPEECH
" I been having lower back pain since Thursday - I had sonogram done today that showed fluids around my kidneys "
Speaking Coherently

## 2020-01-28 NOTE — PATIENT PROFILE ADULT. - NS PRO ABUSE SCREEN SUSPICION NEGLECT YN
Patient ID: Nathan Canales is a 80 y.o. male    Chief Compliant:  Chief Complaint   Patient presents with    Follow-up     left knee         History of Present Illness:    Refer to previous clinic notes    80 y.o. male who presents for follow up of left knee pain. Patient has a healing left tibial plateau fracture from a fall on 11/5/19. He also has Medial/lateral menisci tears on the left. He still has pain with weight bearing activities. Sitting provides relief of pain. Review of Systems   Constitutional: Negative for fever, sweats, weight loss, recent injury, or recent illness  Neurological: Negative for  headaches, numbness, or focal weakness. Integumentary: Negative for abrasion, laceration, rash, ecchymosis. Musculoskeletal: Positive for Follow-up (left knee )         Physical Exam:  Constitutional: Patient is oriented to person, place, and time. Patient appears well-developed and well nourished. HENT: Negative otherwise noted  Head: Normocephalic and Atraumatic  Nose: Normal  Eyes: Conjunctivae and EOM are normal  Neck: Normal range of motion Neck supple. Respiratory/Cardio: Effort normal. No respiratory distress. Musculoskeletal: Walks with cane. Left lower leg slight swelling. Neurological: Patient is alert and oriented to person, place, and time. Normal strenght. No sensory deficit. Skin: Skin is warm and dry  Psychiatric: Behavior is normal. Thought content normal.  Nursing note and vitals reviewed.      Past History:    Current Outpatient Medications:     ACCU-CHEK AZEB PLUS strip, USE TO TEST 2 TO 3 TIMES DAILY, Disp: 100 strip, Rfl: 3    furosemide (LASIX) 20 MG tablet, Take 1 tablet by mouth daily, Disp: 60 tablet, Rfl: 3    famotidine (PEPCID) 20 MG tablet, TAKE 1 TABLET BY MOUTH TWICE A DAY, Disp: 60 tablet, Rfl: 3    losartan (COZAAR) 25 MG tablet, Take 1 tablet by mouth daily, Disp: 90 tablet, Rfl: 1    levothyroxine (SYNTHROID) 125 MCG tablet, Take 1 tablet by Not on file     Active member of club or organization: Not on file     Attends meetings of clubs or organizations: Not on file     Relationship status: Not on file    Intimate partner violence:     Fear of current or ex partner: Not on file     Emotionally abused: Not on file     Physically abused: Not on file     Forced sexual activity: Not on file   Other Topics Concern    Not on file   Social History Narrative    Not on file     Past Medical History:   Diagnosis Date    Arthritis     Diabetes mellitus ([address]     HISTORY OF, NO MEDS CURRENTLY    Hypertension     Hypothyroid     SOB (shortness of breath) on exertion      Past Surgical History:   Procedure Laterality Date    BACK SURGERY      Patient states he isnt sure if he had back surgery    CATARACT REMOVAL WITH IMPLANT      [address]      Right hip replacement    LUMBAR EPIDURAL STEROID INJECTION/CAUDAL  3/25/2019          Family History   Problem Relation Age of Onset    No Known Problems Mother     No Known Problems Father     No Known Problems Brother           Labs and Imaging:     XR taken today:  Xr Knee Left (1-2 Views)    Result Date: 1/28/2020  AP and lateral left knee 16 degree varus deformity no progressive change with respect to 1 January significant callus formation      Assessment and Plan:  1. Closed fracture of left tibial plateau with routine healing          80 y.o. male who presents for follow up of left knee pain. He has relief of pain with sitting but has pain with weight bearing activities. Discussed expectations and outcomes as the fracture is healing. Follow up in 4 weeks. Olga Attestation:  By signing my name below, Lyssa Maier attest that this documentation has been prepared under the direction and in the presence of Dr. Eduardo Alvarez.  Electronically signed: Olga Hernandez, 1/28/20     Please note that this chart was generated using voice no

## 2020-05-20 NOTE — PHYSICAL THERAPY INITIAL EVALUATION ADULT - HEALTH SCREEN CRITERIA
Please let the patient know that they have not been seen here since 2017 and they should be requesting these products from her current PCP. yes

## 2020-12-10 NOTE — PATIENT PROFILE ADULT. - TEACHING/LEARNING FACTORS IMPACT ABILITY TO LEARN
Physical Therapy Daily Note     Name: Dail Iqbal  Clinic Number: [id number]  Diagnosis:   Encounter Diagnoses   Name Primary?    Chronic right shoulder pain     Cervicalgia Yes     Physician: Miguel Quintana MD  Precautions: standard   Visit #: 4 of 12  PTA Visit #: 0  Time In:  8:30 am   Time Out:  9: 35 am     Subjective     Pt reports: Dali reports that she feels a burning across the base of her neck - along C6/C7 into T1/T2 and across to her upper traps; Indicated that Right shoulder itself feels better.   Pain Scale: Dali rates pain on a scale of 0-10 to be 5 currently.    Objective     Dali received individual therapeutic exercises to develop ROM for 20 minutes including:  UBE x 10 mins   Pulleys x 6 mins with end range hold of Right UE into flexion   Supine: shoulder flexion using UE [place] x 2 mins   Supine: shoulder abd/add in horizontal with 1# dowel x 2 mins   Standing: scapular retraction with green tubing x 20   Serratus Lifts: 2# x 15  Pullovers - 4# x 15   S/L shoulder ER - 2# to fagitue  S/L shoulder Abd - 0# x 15  Prone - shoulder extension, abduction,   S/L thoracic rotation - 10 reps each side     Dali received the following manual therapy techniques: Joint mobilizations, Manual traction and Soft tissue Mobilization were applied to the: cervical spine and right shoulder for 25 minutes including:  Standing in front of patient - patient sitting, arms crossed- MET for lower cervical/upper thoracic extension; MET to improve Right rotation : Resisted Left cervical rotation/SB while applying force to C7/T1 into left rotation; progressive increase in cervical movement to right with resistance as above; Repeated x 5 with 5 sec holds; Improved Right rotation noted following technique with less pain.   Cervical manual traction with end range hold; MET for right upper trap stretching and TrP release; Extension at C6/C7 and C7/T1; Right shoulder  Grade II GH jt inferior and posterior glides; STM of pectoralis major/minor to bring shoulder into a more neutral position. R/S into IR/ER at 45, 75 and 90 degrees abduction. Prone - grade II extension mobs of thoracic spine.     The patient received the following direct contact modalities after being cleared for contraindications:     The patient received the following supervised modalities after being cleared for contradictions: NMES to bialteral upper traps at 5sec on/5 sec off x 15 mins; ice pack applied to posterior cervical/upper traps.      Written Home Exercises Provided: Wall Slides; Neck retraction; scapular retraction.   Pt demo good understanding of the education provided. Dali demonstrated good return demonstration of activities.     Education provided re:  Dali verbalized good understanding of education provided.   No spiritual or educational barriers to learning provided    Assessment     Patient tolerated treatment well; Improved AROM right shoulder; improved cervical right rotation after treatment today; Reviewed neck retraction exercises as well as prone scapular movements to address forward posture.    This is a 60 y.o. female referred to outpatient physical therapy and presents with a medical diagnosis of cervicalgia, right shoulder pain and demonstrates limitations as described in the problem list. Pt prognosis is Good. Pt will continue to benefit from skilled outpatient physical therapy to address the deficits listed in the problem list, provide pt/family education and to maximize pt's level of independence in the home and community environment.     Goals as follows:    Long Term Goals: 6 weeks  Pain: Decrease pain to no more than 1/10 to allow for improved ability to perform daily and recreational activities   Strength: Improve strength in core and Right shoulder/periscapular muscles by 1/2 grade  for improved cervical/scapular stability  ROM: Improve ROM to within 85% of normal limits in  right shoulder; improve cervical rotation, flexion by at least 5 degrees   Functional scale: Improve score on neck pain index  to <10% limitation in function   Lifting: Lift 15 lbs to waist level, 10 lbs to shoulder level, 5 lbs to overhead without pain or compensation  Postures: Increase sitting and/or standing duration to 60 without pain   Transfers: Perform all transfers without increased pain or limitation  Exercise: demonstrate independence with home exercise program to maintain gains made in therapy.          Plan     Continue with established Plan of Care towards PT goals.    Therapist: Makayla Martins, PT  12/10/2020     none

## 2021-12-06 NOTE — PHYSICAL THERAPY INITIAL EVALUATION ADULT - MD/RN NOTIFIED
PAST MEDICAL HISTORY:  Asthma     Infection, skin Hospitalized 12/2014 x 2 days    No pertinent past medical history       
yes

## 2022-01-03 NOTE — PROVIDER CONTACT NOTE (OTHER) - ASSESSMENT
Pt BP 94/52 HR 75, asking for oxycodone. Pt denies SOB, lightheadedness, dizziness. Protopic Counseling: Patient may experience a mild burning sensation during topical application. Protopic is not approved in children less than 2 years of age. There have been case reports of hematologic and skin malignancies in patients using topical calcineurin inhibitors although causality is questionable.

## 2022-03-12 NOTE — DISCHARGE NOTE ADULT - LOW SALT DIET. ACTIVITY AS TOLERATED. CALL YOUR DOCTOR FOR FOLLOW UP APPOINTMENT
Problem: Respiratory  Intervention: Administer medication as ordered  Note: BRONCHOSPASM/BRONCHOCONSTRICTION     [x]         IMPROVE AERATION/BREATH SOUNDS  [x]   ADMINISTER BRONCHODILATOR THERAPY AS APPROPRIATE  [x]   ASSESS BREATH SOUNDS  []   IMPLEMENT AEROSOL/MDI PROTOCOL  [x]   PATIENT EDUCATION AS NEEDED Statement Selected

## 2022-03-19 NOTE — ED ADULT TRIAGE NOTE - CHIEF COMPLAINT QUOTE
Problem: PAIN - ADULT  Goal: Verbalizes/displays adequate comfort level or baseline comfort level  Description: Interventions:  - Encourage patient to monitor pain and request assistance  - Assess pain using appropriate pain scale  - Administer analgesics based on type and severity of pain and evaluate response  - Implement non-pharmacological measures as appropriate and evaluate response  - Consider cultural and social influences on pain and pain management  - Notify physician/advanced practitioner if interventions unsuccessful or patient reports new pain  Outcome: Progressing     Problem: INFECTION - ADULT  Goal: Absence or prevention of progression during hospitalization  Description: INTERVENTIONS:  - Assess and monitor for signs and symptoms of infection  - Monitor lab/diagnostic results  - Monitor all insertion sites, i e  indwelling lines, tubes, and drains  - Monitor endotracheal if appropriate and nasal secretions for changes in amount and color  - [place] appropriate cooling/warming therapies per order  - Administer medications as ordered  - Instruct and encourage patient and family to use good hand hygiene technique  - Identify and instruct in appropriate isolation precautions for identified infection/condition  Outcome: Progressing  Goal: Absence of fever/infection during neutropenic period  Description: INTERVENTIONS:  - Monitor WBC    Outcome: Progressing     Problem: SAFETY ADULT  Goal: Patient will remain free of falls  Description: INTERVENTIONS:  - Educate patient/family on patient safety including physical limitations  - Instruct patient to call for assistance with activity   - Consult OT/PT to assist with strengthening/mobility   - Keep Call bell within reach  - Keep bed low and locked with side rails adjusted as appropriate  - Keep care items and personal belongings within reach  - Initiate and maintain comfort rounds  - Make Fall Risk Sign visible to staff  - Offer Toileting every 2 Hours, abd pain h/o obstruction in advance of need  - Apply yellow socks and bracelet for high fall risk patients  - Consider moving patient to room near nurses station  Outcome: Progressing  Goal: Maintain or return to baseline ADL function  Description: INTERVENTIONS:  -  Assess patient's ability to carry out ADLs; assess patient's baseline for ADL function and identify physical deficits which impact ability to perform ADLs (bathing, care of mouth/teeth, toileting, grooming, dressing, etc )  - Assess/evaluate cause of self-care deficits   - Assess range of motion  - Assess patient's mobility; develop plan if impaired  - Assess patient's need for assistive devices and provide as appropriate  - Encourage maximum independence but intervene and supervise when necessary  - Involve family in performance of ADLs  - Assess for home care needs following discharge   - Consider OT consult to assist with ADL evaluation and planning for discharge  - Provide patient education as appropriate  Outcome: Progressing  Goal: Maintains/Returns to pre admission functional level  Description: INTERVENTIONS:  - Perform BMAT or MOVE assessment daily    - Set and communicate daily mobility goal to care team and patient/family/caregiver  - Collaborate with rehabilitation services on mobility goals if consulted  - Perform Range of Motion 3 times a day  - Reposition patient every 2 hours    - Out of bed to chair 3 times a day   - Out of bed for meals 3  times a day  - Out of bed for toileting  - Record patient progress and toleration of activity level   Outcome: Progressing     Problem: DISCHARGE PLANNING  Goal: Discharge to home or other facility with appropriate resources  Description: INTERVENTIONS:  - Identify barriers to discharge w/patient and caregiver  - Arrange for needed discharge resources and transportation as appropriate  - Identify discharge learning needs (meds, wound care, etc )  - Arrange for interpretive services to assist at discharge as needed  - Refer to Case Management Department for coordinating discharge planning if the patient needs post-hospital services based on physician/advanced practitioner order or complex needs related to functional status, cognitive ability, or social support system  Outcome: Progressing     Problem: Knowledge Deficit  Goal: Patient/family/caregiver demonstrates understanding of disease process, treatment plan, medications, and discharge instructions  Description: Complete learning assessment and assess knowledge base  Interventions:  - Provide teaching at level of understanding  - Provide teaching via preferred learning methods  Outcome: Progressing     Problem: Nutrition/Hydration-ADULT  Goal: Nutrient/Hydration intake appropriate for improving, restoring or maintaining nutritional needs  Description: Monitor and assess patient's nutrition/hydration status for malnutrition  Collaborate with interdisciplinary team and initiate plan and interventions as ordered  Monitor patient's weight and dietary intake as ordered or per policy  Utilize nutrition screening tool and intervene as necessary  Determine patient's food preferences and provide high-protein, high-caloric foods as appropriate       INTERVENTIONS:  - Monitor oral intake, urinary output, labs, and treatment plans  - Assess nutrition and hydration status and recommend course of action  - Evaluate amount of meals eaten  - Assist patient with eating if necessary   - Allow adequate time for meals  - Recommend/ encourage appropriate diets, oral nutritional supplements, and vitamin/mineral supplements  - Order, calculate, and assess calorie counts as needed  - Recommend, monitor, and adjust tube feedings and TPN/PPN based on assessed needs  - Assess need for intravenous fluids  - Provide specific nutrition/hydration education as appropriate  - Include patient/family/caregiver in decisions related to nutrition  Outcome: Progressing     Problem: Potential for Falls  Goal: Patient will remain free of falls  Description: INTERVENTIONS:  - Educate patient/family on patient safety including physical limitations  - Instruct patient to call for assistance with activity   - Consult OT/PT to assist with strengthening/mobility   - Keep Call bell within reach  - Keep bed low and locked with side rails adjusted as appropriate  - Keep care items and personal belongings within reach  - Initiate and maintain comfort rounds  - Make Fall Risk Sign visible to staff  - Offer Toileting every 2 Hours, in advance of need  - Apply yellow socks and bracelet for high fall risk patients  - Consider moving patient to room near nurses station  Outcome: Progressing

## 2023-09-26 NOTE — ED PROVIDER NOTE - CAS EDP CONSULT REGARDING 1
Prescription sent to pharmacy.  NOte to pharmacy:  Please have patient call Dr. Villela's office to schedule follow up appointment with pre-clinic labs.     consult

## 2023-10-09 NOTE — BRIEF OPERATIVE NOTE - PRE-OP
Today for injection of testosterone given 200 mg IM in left hip follow-up in 1 month or as needed
<<-----Click on this checkbox to enter Pre-Op Dx

## 2023-10-22 NOTE — DISCHARGE NOTE ADULT - WARFARIN/COUMADIN - COMPLIANCE
"10/22/2023    Time Called: 1530  Time Arrived: 0700      HPI: Kathleen Mendieta is a 89 y.o. female who presents with right hip pain after ground-level fall.  She was brought to the emergency department where x-rays revealed displaced right intertrochanteric femur fracture.  She has a history of Alzheimer's dementia and hypothyroidism.    Past Medical History:   Diagnosis Date    Dementia (HCC)     Glaucoma     Heart failure (HCC)     Hypothyroidism        History reviewed. No pertinent surgical history.    Medications  No current facility-administered medications on file prior to encounter.     Current Outpatient Medications on File Prior to Encounter   Medication Sig Dispense Refill    citalopram (CELEXA) 10 MG tablet Take 10 mg by mouth every evening.      levothyroxine (SYNTHROID) 88 MCG Tab Take 88 mcg by mouth every evening.      donepezil (ARICEPT) 5 MG Tab Take 5 mg by mouth every evening.         Allergies  Patient has no known allergies.    ROS  Unable to obtain    History reviewed. No pertinent family history.    Social History     Socioeconomic History    Marital status: Single   Tobacco Use    Smoking status: Never    Smokeless tobacco: Never       Physical Exam  Vitals  BP (!) 143/73   Pulse 97   Temp 37 °C (98.6 °F) (Temporal)   Resp 17   Ht 1.575 m (5' 2\")   Wt 57.5 kg (126 lb 12.2 oz)   SpO2 92%   General: Pleasantly confused  HEENT: Normocephalic, atraumatic  Psych: Normal mood and affect  Neck: Supple, nontender, no masses  Lungs: Breathing unlabored, No audible wheezing  Heart: Regular heart rate and rhythm  Abdomen: Soft, NT, ND  Neuro: Sensation grossly intact to BUE and BLE, moving all four extremities  Skin: Intact, no open wounds  Vascular: , Capillary refill <2 seconds  MSK: Pain with right lower extremity logroll.  Obvious shortening noted.      Radiographs:  EC-ECHOCARDIOGRAM COMPLETE W/ CONT   Final Result      US-RENAL   Final Result      1.  Small kidneys with bilateral cortical " thinning consistent with medical renal disease and/or age-related atrophy.      DX-CHEST-PORTABLE (1 VIEW)   Final Result         1.  Mild cardiomegaly.      2.  No consolidations identified.      3.  Elevation of right hemidiaphragm.      OUTSIDE IMAGES-CT CERVICAL SPINE   Final Result      OUTSIDE IMAGES-CT HEAD   Final Result      OUTSIDE IMAGES-DX LOWER EXTREMITY, RIGHT   Final Result      OUTSIDE IMAGES-DX PELVIS   Final Result      DX-PORTABLE FLUOROSCOPY < 1 HOUR    (Results Pending)   DX-HIP-UNILATERAL-W/O PELVIS-2/3 VIEWS RIGHT    (Results Pending)       Laboratory Values  Recent Labs     10/21/23  1427 10/22/23  0201   WBC 16.2* 11.8*   RBC 3.32* 2.81*   HEMOGLOBIN 10.5* 8.6*   HEMATOCRIT 32.4* 27.3*   MCV 97.6 97.2   MCH 31.6 30.6   MCHC 32.4 31.5*   RDW 53.0* 52.7*   PLATELETCT 282 210   MPV 9.5 9.9     Recent Labs     10/21/23  1427 10/22/23  0201   SODIUM 137 139   POTASSIUM 5.0 5.0   CHLORIDE 104 107   CO2 20 24   GLUCOSE 143* 112*   BUN 14 19     Recent Labs     10/21/23  1427   APTT 24.2*   INR 1.15*         Impression: 89-year-old female ground-level fall with right intertrochanteric femur fracture    Plan:We discussed the diagnosis and findings with the patient at length.  We reviewed possible non operative and operative interventions and the risks and benefits of each of these.  she had a chance to ask questions and all of these were answered to her satisfaction. The patient chose to proceed with surgical intervention. Risks and benefits of surgery were discussed which include but are not limited to bleeding, infection, neurovascular damage, malunion, nonunion, instability, limb length discrepancy, DVT, PE, MI, Stroke and death. They understand these risks and wish to proceed.      Jc Escobar MD  Orthopedic Trauma Surgery         Statement Selected

## 2023-11-22 NOTE — DISCHARGE NOTE ADULT - NS AS DC STROKE DX YN
Subjective:  Cyndi Wright is a 36 year old  at 24w0d who presents s/p fall.      Fell at work at 1330 tripping on equipment. Fell forwards and landed on her hands and abdomen directly. Denies any abdominal pain, just feels some bilateral groin discomfort, is usual for her. Denies any vaginal bleeding or leakage off fluid. Denies any contractions.    Her current pregnancy is significant for obesity, gestational diabetes on Insulin.      Patient Active Problem List    Diagnosis Date Noted   • Gestational diabetes mellitus (GDM)  INSULIN 10/16/2023     Priority: Low     Failed early 1 hour 148, failed early 3 hour  - 10/25/2023 referral placed to endocrine     • Supervision of high-risk pregnancy 2023     Priority: Low   • Antepartum multigravida of advanced maternal age 2023     Priority: Low   • History of gestational diabetes 2023     Priority: Low     Early dms planned for `6-18 weeks     • Obesity in pregnancy 2023     Priority: Low     [x ] Consider starting baby aspirin if more than 1 risk factor for preeclampsia  [scheduled ] Early 1 hour glucose tolerance test   [ ] Discuss optimal weight gain of 10-20 lbs in pregnancy  [ ] Growth US at 32 weeks   [ ] Growth US every 4 weeks in 3rd trimester if prepregnancy BMI > 50 or unable to assess fundal height.   [ ] Weekly BPP to start at 34 weeks   [ ] Consider IOL at 39 weeks, especially if other risk factors or BMI > 50       • S/P LEEP 2023     Priority: Low     Recent Leep with significant removal - orders placed for cervical length at 16 weeks     • Screening and evaluation for female sterilization 2023     Priority: Low     Considering, discussed, needs papers signed early given insurance.     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 06/10/2021     Priority: Low     2023: Neg/Neg         OB History    Para Term  AB Living   7 3 2 1 2 4   SAB IAB Ectopic  Molar Multiple Live Births   0 0 0   0 4      # Outcome Date GA Lbr John/2nd Weight Sex Delivery Anes PTL Lv   7 Current            6 Term 02/12/15   4536 g M Vag-Spont None  AYLEEN   5 Term 13 39w0d  3413 g M   N AYLEEN   4  10/31/07 35w0d  2410 g F Vag-Spont  Y AYLEEN   3  12/15/04 37w5d  2637 g F VAGINAL IV SEDATION  AYLEEN      Birth Comments: induced 37wks 5 days due to decreased fetal movement   2 AB            1 AB                No current facility-administered medications for this encounter.       ALLERGIES:  Patient has no known allergies.       A 10 point review of my standard systems was negative except for:     Objective:  Temp:  [96.6 °F (35.9 °C)] 96.6 °F (35.9 °C)  Heart Rate:  [96] 96  BP: (107)/(55) 107/55    Physical Exam:  General: AAO x 3 normal mood and affect no apparent distress  Abdomen: gravid soft  non tender no guarding  Pelvic: deferred  Extremities: non-tender  Musculoskeletal: normal gait, strength, and range of motion of all extremities  Skin: warm, dry, intact no marks of bruises    Presentation:    Cervix:     Dilation:     Effacement:    Station:     Consistency:     Position:      Fetal Heart Rate/ Movement:   Baseline: 125 by bedside ultrasound  Variability:    Periodic changes:   Interpretation: Category:     Uterine Activity/ Exam:  Mode: [employer]  Contraction frequency (min):      Labs:   No results found for this or any previous visit (from the past 24 hour(s)).  GBS:   CULTURE   Date Value Ref Range Status   2015 NO BETA HEMOLYTIC STREPTOCOCCI ISOLATED  Final       Imaging/Diagnostics:  MFM US OB FETAL ANATOMIC EVAL    Result Date: 10/25/2023  Narrative: To view Ultrasound report, click on top link to view most recent report under the \"Scans on Order\" section below.      Assessment: Cyndi Wright is a 36 year old  at 24w0d who presents s/p fall.    Plan:   Fetal heart tones 125 by bedside ultrasound. Very difficult to monitor fetal heart tones given  gestational age and maternal obesity.  Rh positive.  No contractions, no uterine tenderness, no vaginal bleeding. No abdominal pain. SVE deferred at this time.  Plan for extended fetal monitoring after fall. RN discussed with Dr. De Jesus for fetal monitoring recommendations given the difficulty, order for 20 minutes every 2 hours. If the patient develops pain or bleeding will reassess exam and need for monitoring.    Yao Salinas MD         no

## 2024-04-05 NOTE — ED ADULT NURSE NOTE - NS ED NURSE REPORT GIVEN TO FT
Additional Notes: Patient declined treatment Detail Level: Simple Render Risk Assessment In Note?: no Judy OSUNA

## 2024-06-07 NOTE — DISCHARGE NOTE ADULT - NS MD DC FALL RISK RISK
Aaron is a 60 y.o. year old male     Chief Complaint   Patient presents with    Hip Pain     LEFT HIP- Patient is here for a second opinion on his left hip pain. 6 epidural injetions. MRI on 05/02/23 & 12/20/23       History of Present Illness  History of Present Illness  The patient presents for evaluation of hip and back pain.,  Referred by Dr. Raymond    The patient has been under the care of Dr. Raymond for his hip and back pain. Despite undergoing epidural injections, which provided relief for only 2 days, an ablation procedure was performed. His back pain temporarily subsided after 6 to 7 days of waiting for a 2-week period, but has since recurred. He has experienced similar issues 4 times, twice on the right side and twice on the left side. The pain, initially localized to the belt line, was accompanied by testicular pain. His urologist attributed the pain to his back, and subsequently referred him to Dr. Faith who did not recommend surgery. Dr. Jabari Mckinley suspected the pain was related to his SI joint, and administered 2 epidurals in the SI joint, which proved ineffective. Dr. Mckinley referred him to Dr. Guidry, a hip specialist, who performed a second MRI, which yielded normal results. Subsequently, he consulted with Dr. Luu, who diagnosed him with a bulging disc at L3-4. The patient reported leg pain and stiffness in his thigh, which resolved after a month of physical activity. He received 2 epidural injections at L3-4, L4-5, L5-S1, the SI joint, and two injections at the belt line. He attended 3 months of rehabilitation last year, which exacerbated his pain. He resumed rehabilitation 2 weeks ago. 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 His pain is exacerbated by standing still for 10 minutes or more in one spot or physical activity. He received half an injection in the lower back and half on the glute, which provided relief for 2 days.  "He experienced similar pains a month later, which led to the ablation procedure. He experienced pain in his rib cage muscle around his back to the front while sleeping last night, which he attributes to weightlifting exercises. His physical therapist informed him that his hip was out of alignment.    Throughout all the history noted by the patient above, he states that over the past several days his pain has been considerably improved with recent physical therapy including sacroiliac manipulation.    I have reviewed the patient's medical, family, and social history in detail and updated the computerized patient record.    Review of Systems    /90 (BP Location: Left arm, Patient Position: Sitting, Cuff Size: Adult)   Pulse 113   Temp 97.2 °F (36.2 °C) (Temporal)   Ht 175.3 cm (69\")   Wt 88.5 kg (195 lb)   SpO2 96%   BMI 28.80 kg/m²      Physical Exam    Vital signs reviewed.   General: No acute distress.      Physical Exam  Mild tenderness to palpation on the left hamstring origin, as well as the left sacroiliac joint, and the superior medial gluteus. No tenderness on the greater trochanter and the gluteus medius, minimus. No tenderness in the sciatic notch along the piriformis. Posterior hip pain with external rotation to 90 degrees. Tenderness on the ischial tuberosity of the hamstring origin in the hip flexion position. Mild pain with endering hip flexion of the hamstring orig. No pain with resisted hip extension and straight leg. No pain with knee flexion.    Results  Results    I reviewed reports of previous imaging.    MRI of the left hip performed 12/21/2023 shows high-grade nonretracted delamination tear of the posterior bundle of the gluteus medius as well as cam morphology with intact labrum.  MRI of the lumbar spine performed 5/2/2023 shows   L1-L2 right lateral recess/foraminal disc protrusion contacting descending right L2 and right L1 nerve root with moderate foraminal narrowing.    L3-L4 " disc protrusion compressing the descending left L4 nerve root with moderate canal narrowing as well as bilateral foraminal narrowing affecting bilateral L3 nerve roots.    Additionally moderate L5-S1 foraminal narrowing with contact of the exiting right L5 nerve root.    Procedures     Diagnoses and all orders for this visit:    Sacroiliac joint dysfunction    Chronic bilateral low back pain without sciatica    I had a long discussion with the patient regarding considerations here.  He certainly has multilevel lumbar degenerative changes but has unfortunately not responded reliably to interventional pain management strategies.  His hip pathology does not appear to be primary pain generator based on the nature of his pain although he has a obvious gluteal insertional tendon tear.  It is interesting to me that he had such significant prevent from sacroiliac manipulation with his physical therapist recently, suggesting that this has been incompletely managed or not evidenced in other physical therapy sessions.  I am hopeful that this could be the source of his pain and feel that this warrants ongoing treatment.  I do not believe that with this finding he is at the point of maximal medical improvement and I am hopeful that we can get him back to some degree of work in the near future.  Recommend continued physical therapy and follow-up with me in 4 to 6 weeks.  Assessment & Plan      Patient or patient representative verbalized consent for the use of Ambient Listening during the visit with  Varinder Briones MD for chart documentation. 6/12/2024  19:23 EDT    Varinder Briones MD   19:18 EDT   06/12/24    For information on Fall & Injury Prevention, visit [email or web address]

## 2024-06-23 NOTE — H&P PST ADULT - NSANTHAGERD_ENT_A_CORE
Pt arriving c/o wound to RLE onset 2 weeks ago, which has worsened in the past 2 days. Denies any trauma/injury. Pt states [facility] comes to his house for wound care on his LLE but they have not started any wound care on his RLE. Denies fevers. +drainage. Pt ambulatory at  with walker.   
Yes

## 2024-07-16 ENCOUNTER — EMERGENCY (EMERGENCY)
Facility: HOSPITAL | Age: 89
LOS: 1 days | Discharge: HOME | End: 2024-07-16
Attending: EMERGENCY MEDICINE
Payer: MEDICARE

## 2024-07-16 VITALS
DIASTOLIC BLOOD PRESSURE: 73 MMHG | RESPIRATION RATE: 16 BRPM | HEART RATE: 86 BPM | HEIGHT: 70 IN | SYSTOLIC BLOOD PRESSURE: 118 MMHG | TEMPERATURE: 98 F | WEIGHT: 145.06 LBS | OXYGEN SATURATION: 96 %

## 2024-07-16 DIAGNOSIS — Z98.89 OTHER SPECIFIED POSTPROCEDURAL STATES: Chronic | ICD-10-CM

## 2024-07-16 DIAGNOSIS — K66.0 PERITONEAL ADHESIONS (POSTPROCEDURAL) (POSTINFECTION): Chronic | ICD-10-CM

## 2024-07-16 DIAGNOSIS — Z90.49 ACQUIRED ABSENCE OF OTHER SPECIFIED PARTS OF DIGESTIVE TRACT: Chronic | ICD-10-CM

## 2024-07-16 DIAGNOSIS — Z98.890 OTHER SPECIFIED POSTPROCEDURAL STATES: Chronic | ICD-10-CM

## 2024-07-16 DIAGNOSIS — Z85.828 PERSONAL HISTORY OF OTHER MALIGNANT NEOPLASM OF SKIN: Chronic | ICD-10-CM

## 2024-07-16 DIAGNOSIS — Z98.49 CATARACT EXTRACTION STATUS, UNSPECIFIED EYE: Chronic | ICD-10-CM

## 2024-07-16 DIAGNOSIS — Z95.1 PRESENCE OF AORTOCORONARY BYPASS GRAFT: Chronic | ICD-10-CM

## 2024-07-16 DIAGNOSIS — Z95.0 PRESENCE OF CARDIAC PACEMAKER: Chronic | ICD-10-CM

## 2024-07-16 DIAGNOSIS — K56.69 OTHER INTESTINAL OBSTRUCTION: Chronic | ICD-10-CM

## 2024-07-16 LAB
ALBUMIN SERPL ELPH-MCNC: 4.2 G/DL — SIGNIFICANT CHANGE UP (ref 3.3–5)
ALP SERPL-CCNC: 104 U/L — SIGNIFICANT CHANGE UP (ref 40–120)
ALT FLD-CCNC: 20 U/L — SIGNIFICANT CHANGE UP (ref 10–45)
ANION GAP SERPL CALC-SCNC: 13 MMOL/L — SIGNIFICANT CHANGE UP (ref 5–17)
APPEARANCE UR: CLEAR — SIGNIFICANT CHANGE UP
AST SERPL-CCNC: 23 U/L — SIGNIFICANT CHANGE UP (ref 10–40)
BACTERIA # UR AUTO: NEGATIVE /HPF — SIGNIFICANT CHANGE UP
BASE EXCESS BLDV CALC-SCNC: -0.8 MMOL/L — SIGNIFICANT CHANGE UP (ref -2–3)
BASOPHILS # BLD AUTO: 0.02 K/UL — SIGNIFICANT CHANGE UP (ref 0–0.2)
BASOPHILS NFR BLD AUTO: 0.2 % — SIGNIFICANT CHANGE UP (ref 0–2)
BILIRUB SERPL-MCNC: 0.8 MG/DL — SIGNIFICANT CHANGE UP (ref 0.2–1.2)
BILIRUB UR-MCNC: NEGATIVE — SIGNIFICANT CHANGE UP
BLD GP AB SCN SERPL QL: NEGATIVE — SIGNIFICANT CHANGE UP
BUN SERPL-MCNC: 34 MG/DL — HIGH (ref 7–23)
CA-I SERPL-SCNC: 1.17 MMOL/L — SIGNIFICANT CHANGE UP (ref 1.15–1.33)
CALCIUM SERPL-MCNC: 9.7 MG/DL — SIGNIFICANT CHANGE UP (ref 8.4–10.5)
CAST: 5 /LPF — HIGH (ref 0–4)
CHLORIDE BLDV-SCNC: 99 MMOL/L — SIGNIFICANT CHANGE UP (ref 96–108)
CHLORIDE SERPL-SCNC: 98 MMOL/L — SIGNIFICANT CHANGE UP (ref 96–108)
CO2 BLDV-SCNC: 26 MMOL/L — SIGNIFICANT CHANGE UP (ref 22–26)
CO2 SERPL-SCNC: 22 MMOL/L — SIGNIFICANT CHANGE UP (ref 22–31)
COLOR SPEC: YELLOW — SIGNIFICANT CHANGE UP
CREAT SERPL-MCNC: 1.55 MG/DL — HIGH (ref 0.5–1.3)
DIFF PNL FLD: NEGATIVE — SIGNIFICANT CHANGE UP
EGFR: 42 ML/MIN/1.73M2 — LOW
EOSINOPHIL # BLD AUTO: 0.12 K/UL — SIGNIFICANT CHANGE UP (ref 0–0.5)
EOSINOPHIL NFR BLD AUTO: 1.5 % — SIGNIFICANT CHANGE UP (ref 0–6)
GAS PNL BLDV: 130 MMOL/L — LOW (ref 136–145)
GAS PNL BLDV: SIGNIFICANT CHANGE UP
GAS PNL BLDV: SIGNIFICANT CHANGE UP
GLUCOSE BLDV-MCNC: 112 MG/DL — HIGH (ref 70–99)
GLUCOSE SERPL-MCNC: 112 MG/DL — HIGH (ref 70–99)
GLUCOSE UR QL: NEGATIVE MG/DL — SIGNIFICANT CHANGE UP
HCO3 BLDV-SCNC: 24 MMOL/L — SIGNIFICANT CHANGE UP (ref 22–29)
HCT VFR BLD CALC: 39.7 % — SIGNIFICANT CHANGE UP (ref 39–50)
HCT VFR BLDA CALC: 40 % — SIGNIFICANT CHANGE UP (ref 39–51)
HGB BLD CALC-MCNC: 13.4 G/DL — SIGNIFICANT CHANGE UP (ref 12.6–17.4)
HGB BLD-MCNC: 13.1 G/DL — SIGNIFICANT CHANGE UP (ref 13–17)
IMM GRANULOCYTES NFR BLD AUTO: 0.6 % — SIGNIFICANT CHANGE UP (ref 0–0.9)
KETONES UR-MCNC: ABNORMAL MG/DL
LACTATE BLDV-MCNC: 1.5 MMOL/L — SIGNIFICANT CHANGE UP (ref 0.5–2)
LEUKOCYTE ESTERASE UR-ACNC: NEGATIVE — SIGNIFICANT CHANGE UP
LIDOCAIN IGE QN: 30 U/L — SIGNIFICANT CHANGE UP (ref 7–60)
LYMPHOCYTES # BLD AUTO: 0.79 K/UL — LOW (ref 1–3.3)
LYMPHOCYTES # BLD AUTO: 9.7 % — LOW (ref 13–44)
MCHC RBC-ENTMCNC: 31.1 PG — SIGNIFICANT CHANGE UP (ref 27–34)
MCHC RBC-ENTMCNC: 33 GM/DL — SIGNIFICANT CHANGE UP (ref 32–36)
MCV RBC AUTO: 94.3 FL — SIGNIFICANT CHANGE UP (ref 80–100)
MONOCYTES # BLD AUTO: 0.41 K/UL — SIGNIFICANT CHANGE UP (ref 0–0.9)
MONOCYTES NFR BLD AUTO: 5 % — SIGNIFICANT CHANGE UP (ref 2–14)
NEUTROPHILS # BLD AUTO: 6.75 K/UL — SIGNIFICANT CHANGE UP (ref 1.8–7.4)
NEUTROPHILS NFR BLD AUTO: 83 % — HIGH (ref 43–77)
NITRITE UR-MCNC: NEGATIVE — SIGNIFICANT CHANGE UP
NRBC # BLD: 0 /100 WBCS — SIGNIFICANT CHANGE UP (ref 0–0)
PCO2 BLDV: 41 MMHG — LOW (ref 42–55)
PH BLDV: 7.38 — SIGNIFICANT CHANGE UP (ref 7.32–7.43)
PH UR: 5.5 — SIGNIFICANT CHANGE UP (ref 5–8)
PLATELET # BLD AUTO: 144 K/UL — LOW (ref 150–400)
PO2 BLDV: 29 MMHG — SIGNIFICANT CHANGE UP (ref 25–45)
POTASSIUM BLDV-SCNC: 5 MMOL/L — SIGNIFICANT CHANGE UP (ref 3.5–5.1)
POTASSIUM SERPL-MCNC: 4.8 MMOL/L — SIGNIFICANT CHANGE UP (ref 3.5–5.3)
POTASSIUM SERPL-SCNC: 4.8 MMOL/L — SIGNIFICANT CHANGE UP (ref 3.5–5.3)
PROT SERPL-MCNC: 7.6 G/DL — SIGNIFICANT CHANGE UP (ref 6–8.3)
PROT UR-MCNC: 30 MG/DL
RBC # BLD: 4.21 M/UL — SIGNIFICANT CHANGE UP (ref 4.2–5.8)
RBC # FLD: 16.6 % — HIGH (ref 10.3–14.5)
RBC CASTS # UR COMP ASSIST: 0 /HPF — SIGNIFICANT CHANGE UP (ref 0–4)
RH IG SCN BLD-IMP: POSITIVE — SIGNIFICANT CHANGE UP
SAO2 % BLDV: 45.4 % — LOW (ref 67–88)
SODIUM SERPL-SCNC: 133 MMOL/L — LOW (ref 135–145)
SP GR SPEC: 1.02 — SIGNIFICANT CHANGE UP (ref 1–1.03)
SQUAMOUS # UR AUTO: 1 /HPF — SIGNIFICANT CHANGE UP (ref 0–5)
UROBILINOGEN FLD QL: 1 MG/DL — SIGNIFICANT CHANGE UP (ref 0.2–1)
WBC # BLD: 8.14 K/UL — SIGNIFICANT CHANGE UP (ref 3.8–10.5)
WBC # FLD AUTO: 8.14 K/UL — SIGNIFICANT CHANGE UP (ref 3.8–10.5)
WBC UR QL: 0 /HPF — SIGNIFICANT CHANGE UP (ref 0–5)

## 2024-07-16 PROCEDURE — 83605 ASSAY OF LACTIC ACID: CPT

## 2024-07-16 PROCEDURE — 82947 ASSAY GLUCOSE BLOOD QUANT: CPT

## 2024-07-16 PROCEDURE — 82803 BLOOD GASES ANY COMBINATION: CPT

## 2024-07-16 PROCEDURE — 80053 COMPREHEN METABOLIC PANEL: CPT

## 2024-07-16 PROCEDURE — 85014 HEMATOCRIT: CPT

## 2024-07-16 PROCEDURE — 99284 EMERGENCY DEPT VISIT MOD MDM: CPT | Mod: 25

## 2024-07-16 PROCEDURE — 99285 EMERGENCY DEPT VISIT HI MDM: CPT | Mod: GC

## 2024-07-16 PROCEDURE — 85018 HEMOGLOBIN: CPT

## 2024-07-16 PROCEDURE — 96374 THER/PROPH/DIAG INJ IV PUSH: CPT

## 2024-07-16 PROCEDURE — 86850 RBC ANTIBODY SCREEN: CPT

## 2024-07-16 PROCEDURE — 83690 ASSAY OF LIPASE: CPT

## 2024-07-16 PROCEDURE — 82435 ASSAY OF BLOOD CHLORIDE: CPT

## 2024-07-16 PROCEDURE — 81001 URINALYSIS AUTO W/SCOPE: CPT

## 2024-07-16 PROCEDURE — 96375 TX/PRO/DX INJ NEW DRUG ADDON: CPT

## 2024-07-16 PROCEDURE — 96376 TX/PRO/DX INJ SAME DRUG ADON: CPT

## 2024-07-16 PROCEDURE — 84295 ASSAY OF SERUM SODIUM: CPT

## 2024-07-16 PROCEDURE — 85025 COMPLETE CBC W/AUTO DIFF WBC: CPT

## 2024-07-16 PROCEDURE — 86900 BLOOD TYPING SEROLOGIC ABO: CPT

## 2024-07-16 PROCEDURE — 84132 ASSAY OF SERUM POTASSIUM: CPT

## 2024-07-16 PROCEDURE — 74176 CT ABD & PELVIS W/O CONTRAST: CPT | Mod: MC

## 2024-07-16 PROCEDURE — 86901 BLOOD TYPING SEROLOGIC RH(D): CPT

## 2024-07-16 PROCEDURE — 82330 ASSAY OF CALCIUM: CPT

## 2024-07-16 PROCEDURE — 74176 CT ABD & PELVIS W/O CONTRAST: CPT | Mod: 26,MC

## 2024-07-16 RX ORDER — ONDANSETRON HYDROCHLORIDE 2 MG/ML
4 INJECTION INTRAMUSCULAR; INTRAVENOUS ONCE
Refills: 0 | Status: COMPLETED | OUTPATIENT
Start: 2024-07-16 | End: 2024-07-16

## 2024-07-16 RX ORDER — MORPHINE SULFATE 100 MG/1
4 TABLET, EXTENDED RELEASE ORAL ONCE
Refills: 0 | Status: DISCONTINUED | OUTPATIENT
Start: 2024-07-16 | End: 2024-07-16

## 2024-07-16 RX ORDER — SODIUM CHLORIDE 0.9 % (FLUSH) 0.9 %
1000 SYRINGE (ML) INJECTION ONCE
Refills: 0 | Status: COMPLETED | OUTPATIENT
Start: 2024-07-16 | End: 2024-07-16

## 2024-07-16 RX ADMIN — ONDANSETRON HYDROCHLORIDE 4 MILLIGRAM(S): 2 INJECTION INTRAMUSCULAR; INTRAVENOUS at 19:04

## 2024-07-16 RX ADMIN — MORPHINE SULFATE 4 MILLIGRAM(S): 100 TABLET, EXTENDED RELEASE ORAL at 16:26

## 2024-07-16 RX ADMIN — Medication 1000 MILLILITER(S): at 16:34

## 2024-07-16 RX ADMIN — MORPHINE SULFATE 4 MILLIGRAM(S): 100 TABLET, EXTENDED RELEASE ORAL at 16:16

## 2024-07-16 RX ADMIN — MORPHINE SULFATE 4 MILLIGRAM(S): 100 TABLET, EXTENDED RELEASE ORAL at 18:56

## 2024-07-16 RX ADMIN — ONDANSETRON HYDROCHLORIDE 4 MILLIGRAM(S): 2 INJECTION INTRAMUSCULAR; INTRAVENOUS at 16:31

## 2024-07-17 VITALS
HEART RATE: 69 BPM | DIASTOLIC BLOOD PRESSURE: 65 MMHG | RESPIRATION RATE: 17 BRPM | TEMPERATURE: 98 F | OXYGEN SATURATION: 95 % | SYSTOLIC BLOOD PRESSURE: 122 MMHG

## 2024-08-05 NOTE — PROVIDER CONTACT NOTE (OTHER) - REASON
PTT results
Pt BP low, asking for pain medication
Pt CBC and pTT never received by lab after being drawn.
no

## 2024-08-06 NOTE — HISTORY OF PRESENT ILLNESS
[de-identified] : CODY  is a 89 year  male  here for a consultation for possible recurrent hernia. hx of S/P Plug repair of recurrent left inguinal hernia on 09/19/18 with Dr. Laura.

## 2024-08-07 ENCOUNTER — APPOINTMENT (OUTPATIENT)
Dept: SURGERY | Facility: CLINIC | Age: 89
End: 2024-08-07

## 2024-08-20 NOTE — DISCHARGE NOTE ADULT - NS DC ANGIO PCI YN
Lipid panel shows worsening cholesterol compared to last time.  CMP is unremarkable.  Thyroid function is normal.  PSA is normal for age group at 1.87.  Lower than last time.  CBC is unremarkable.    Need to work on better dietary habits and exercise.  I would consider statin therapy if he is open to it.
no

## 2025-03-24 ENCOUNTER — INPATIENT (INPATIENT)
Facility: HOSPITAL | Age: 89
LOS: 8 days | Discharge: HOME HEALTH | DRG: 312 | End: 2025-04-02
Attending: INTERNAL MEDICINE | Admitting: INTERNAL MEDICINE
Payer: MEDICARE

## 2025-03-24 VITALS
DIASTOLIC BLOOD PRESSURE: 79 MMHG | OXYGEN SATURATION: 99 % | RESPIRATION RATE: 20 BRPM | HEART RATE: 75 BPM | TEMPERATURE: 94 F | SYSTOLIC BLOOD PRESSURE: 130 MMHG

## 2025-03-24 DIAGNOSIS — E87.1 HYPO-OSMOLALITY AND HYPONATREMIA: ICD-10-CM

## 2025-03-24 DIAGNOSIS — E87.6 HYPOKALEMIA: ICD-10-CM
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
## 2025-03-24 DIAGNOSIS — R53.2 FUNCTIONAL QUADRIPLEGIA: ICD-10-CM

## 2025-03-24 DIAGNOSIS — F43.21 ADJUSTMENT DISORDER WITH DEPRESSED MOOD: ICD-10-CM

## 2025-03-24 DIAGNOSIS — Z98.890 OTHER SPECIFIED POSTPROCEDURAL STATES: Chronic | ICD-10-CM

## 2025-03-24 DIAGNOSIS — I48.0 PAROXYSMAL ATRIAL FIBRILLATION: ICD-10-CM

## 2025-03-24 DIAGNOSIS — Z95.0 PRESENCE OF CARDIAC PACEMAKER: Chronic | ICD-10-CM

## 2025-03-24 DIAGNOSIS — Z90.49 ACQUIRED ABSENCE OF OTHER SPECIFIED PARTS OF DIGESTIVE TRACT: Chronic | ICD-10-CM

## 2025-03-24 DIAGNOSIS — Z98.89 OTHER SPECIFIED POSTPROCEDURAL STATES: Chronic | ICD-10-CM

## 2025-03-24 DIAGNOSIS — Z95.1 PRESENCE OF AORTOCORONARY BYPASS GRAFT: Chronic | ICD-10-CM

## 2025-03-24 DIAGNOSIS — Z91.89 OTHER SPECIFIED PERSONAL RISK FACTORS, NOT ELSEWHERE CLASSIFIED: ICD-10-CM

## 2025-03-24 DIAGNOSIS — Z98.49 CATARACT EXTRACTION STATUS, UNSPECIFIED EYE: Chronic | ICD-10-CM

## 2025-03-24 DIAGNOSIS — S06.5XAA TRAUMATIC SUBDURAL HEMORRHAGE WITH LOSS OF CONSCIOUSNESS STATUS UNKNOWN, INITIAL ENCOUNTER: ICD-10-CM

## 2025-03-24 DIAGNOSIS — I50.23 ACUTE ON CHRONIC SYSTOLIC (CONGESTIVE) HEART FAILURE: ICD-10-CM

## 2025-03-24 DIAGNOSIS — N17.9 ACUTE KIDNEY FAILURE, UNSPECIFIED: ICD-10-CM

## 2025-03-24 DIAGNOSIS — Z85.828 PERSONAL HISTORY OF OTHER MALIGNANT NEOPLASM OF SKIN: Chronic | ICD-10-CM

## 2025-03-24 DIAGNOSIS — K66.0 PERITONEAL ADHESIONS (POSTPROCEDURAL) (POSTINFECTION): Chronic | ICD-10-CM

## 2025-03-24 DIAGNOSIS — R55 SYNCOPE AND COLLAPSE: ICD-10-CM

## 2025-03-24 DIAGNOSIS — I95.9 HYPOTENSION, UNSPECIFIED: ICD-10-CM

## 2025-03-24 DIAGNOSIS — E78.5 HYPERLIPIDEMIA, UNSPECIFIED: ICD-10-CM

## 2025-03-24 DIAGNOSIS — Z51.5 ENCOUNTER FOR PALLIATIVE CARE: ICD-10-CM

## 2025-03-24 DIAGNOSIS — K56.69 OTHER INTESTINAL OBSTRUCTION: Chronic | ICD-10-CM

## 2025-03-24 DIAGNOSIS — I10 ESSENTIAL (PRIMARY) HYPERTENSION: ICD-10-CM

## 2025-03-24 DIAGNOSIS — I25.10 ATHEROSCLEROTIC HEART DISEASE OF NATIVE CORONARY ARTERY WITHOUT ANGINA PECTORIS: ICD-10-CM

## 2025-03-24 DIAGNOSIS — D63.8 ANEMIA IN OTHER CHRONIC DISEASES CLASSIFIED ELSEWHERE: ICD-10-CM

## 2025-03-24 LAB
ACANTHOCYTES BLD QL SMEAR: SLIGHT — SIGNIFICANT CHANGE UP
ALBUMIN SERPL ELPH-MCNC: 4 G/DL — SIGNIFICANT CHANGE UP (ref 3.3–5)
ALP SERPL-CCNC: 98 U/L — SIGNIFICANT CHANGE UP (ref 40–120)
ALT FLD-CCNC: 21 U/L — SIGNIFICANT CHANGE UP (ref 10–45)
ANION GAP SERPL CALC-SCNC: 18 MMOL/L — HIGH (ref 5–17)
ANION GAP SERPL CALC-SCNC: 20 MMOL/L — HIGH (ref 5–17)
ANISOCYTOSIS BLD QL: SLIGHT — SIGNIFICANT CHANGE UP
APPEARANCE UR: CLEAR — SIGNIFICANT CHANGE UP
APTT BLD: 35.2 SEC — SIGNIFICANT CHANGE UP (ref 24.5–35.6)
AST SERPL-CCNC: 23 U/L — SIGNIFICANT CHANGE UP (ref 10–40)
BASOPHILS # BLD AUTO: 0.08 K/UL — SIGNIFICANT CHANGE UP (ref 0–0.2)
BASOPHILS NFR BLD AUTO: 0.9 % — SIGNIFICANT CHANGE UP (ref 0–2)
BILIRUB SERPL-MCNC: 0.8 MG/DL — SIGNIFICANT CHANGE UP (ref 0.2–1.2)
BILIRUB UR-MCNC: NEGATIVE — SIGNIFICANT CHANGE UP
BLD GP AB SCN SERPL QL: NEGATIVE — SIGNIFICANT CHANGE UP
BUN SERPL-MCNC: 79 MG/DL — HIGH (ref 7–23)
BUN SERPL-MCNC: 85 MG/DL — HIGH (ref 7–23)
CALCIUM SERPL-MCNC: 8.9 MG/DL — SIGNIFICANT CHANGE UP (ref 8.4–10.5)
CALCIUM SERPL-MCNC: 9.4 MG/DL — SIGNIFICANT CHANGE UP (ref 8.4–10.5)
CHLORIDE SERPL-SCNC: 86 MMOL/L — LOW (ref 96–108)
CHLORIDE SERPL-SCNC: 88 MMOL/L — LOW (ref 96–108)
CK SERPL-CCNC: 67 U/L — SIGNIFICANT CHANGE UP (ref 30–200)
CO2 SERPL-SCNC: 22 MMOL/L — SIGNIFICANT CHANGE UP (ref 22–31)
CO2 SERPL-SCNC: 28 MMOL/L — SIGNIFICANT CHANGE UP (ref 22–31)
COLOR SPEC: YELLOW — SIGNIFICANT CHANGE UP
CREAT SERPL-MCNC: 1.99 MG/DL — HIGH (ref 0.5–1.3)
CREAT SERPL-MCNC: 2.08 MG/DL — HIGH (ref 0.5–1.3)
DACRYOCYTES BLD QL SMEAR: SLIGHT — SIGNIFICANT CHANGE UP
DIFF PNL FLD: NEGATIVE — SIGNIFICANT CHANGE UP
EGFR: 30 ML/MIN/1.73M2 — LOW
EGFR: 30 ML/MIN/1.73M2 — LOW
EGFR: 31 ML/MIN/1.73M2 — LOW
EGFR: 31 ML/MIN/1.73M2 — LOW
EOSINOPHIL # BLD AUTO: 0.22 K/UL — SIGNIFICANT CHANGE UP (ref 0–0.5)
EOSINOPHIL NFR BLD AUTO: 2.6 % — SIGNIFICANT CHANGE UP (ref 0–6)
FLUAV AG NPH QL: SIGNIFICANT CHANGE UP
FLUBV AG NPH QL: SIGNIFICANT CHANGE UP
GAS PNL BLDV: SIGNIFICANT CHANGE UP
GLUCOSE SERPL-MCNC: 169 MG/DL — HIGH (ref 70–99)
GLUCOSE SERPL-MCNC: 88 MG/DL — SIGNIFICANT CHANGE UP (ref 70–99)
GLUCOSE UR QL: NEGATIVE MG/DL — SIGNIFICANT CHANGE UP
HCT VFR BLD CALC: 36.4 % — LOW (ref 39–50)
HGB BLD-MCNC: 11.9 G/DL — LOW (ref 13–17)
HYPOCHROMIA BLD QL: SLIGHT — SIGNIFICANT CHANGE UP
INR BLD: 1.43 RATIO — HIGH (ref 0.85–1.16)
KETONES UR-MCNC: NEGATIVE MG/DL — SIGNIFICANT CHANGE UP
LEUKOCYTE ESTERASE UR-ACNC: NEGATIVE — SIGNIFICANT CHANGE UP
LYMPHOCYTES # BLD AUTO: 0.29 K/UL — LOW (ref 1–3.3)
LYMPHOCYTES # BLD AUTO: 3.5 % — LOW (ref 13–44)
MACROCYTES BLD QL: SLIGHT — SIGNIFICANT CHANGE UP
MAGNESIUM SERPL-MCNC: 2.4 MG/DL — SIGNIFICANT CHANGE UP (ref 1.6–2.6)
MANUAL SMEAR VERIFICATION: SIGNIFICANT CHANGE UP
MCHC RBC-ENTMCNC: 30.7 PG — SIGNIFICANT CHANGE UP (ref 27–34)
MCHC RBC-ENTMCNC: 32.7 G/DL — SIGNIFICANT CHANGE UP (ref 32–36)
MCV RBC AUTO: 94.1 FL — SIGNIFICANT CHANGE UP (ref 80–100)
MONOCYTES # BLD AUTO: 0.6 K/UL — SIGNIFICANT CHANGE UP (ref 0–0.9)
MONOCYTES NFR BLD AUTO: 7.1 % — SIGNIFICANT CHANGE UP (ref 2–14)
MYELOCYTES NFR BLD: 0.9 % — HIGH (ref 0–0)
NEUTROPHILS # BLD AUTO: 7.15 K/UL — SIGNIFICANT CHANGE UP (ref 1.8–7.4)
NEUTROPHILS NFR BLD AUTO: 82.3 % — HIGH (ref 43–77)
NEUTS BAND # BLD: 2.7 % — SIGNIFICANT CHANGE UP (ref 0–8)
NEUTS BAND NFR BLD: 2.7 % — SIGNIFICANT CHANGE UP (ref 0–8)
NITRITE UR-MCNC: NEGATIVE — SIGNIFICANT CHANGE UP
OVALOCYTES BLD QL SMEAR: SLIGHT — SIGNIFICANT CHANGE UP
PH UR: 7 — SIGNIFICANT CHANGE UP (ref 5–8)
PLAT MORPH BLD: NORMAL — SIGNIFICANT CHANGE UP
PLATELET # BLD AUTO: 121 K/UL — LOW (ref 150–400)
POIKILOCYTOSIS BLD QL AUTO: SLIGHT — SIGNIFICANT CHANGE UP
POLYCHROMASIA BLD QL SMEAR: SLIGHT — SIGNIFICANT CHANGE UP
POTASSIUM SERPL-MCNC: 2.9 MMOL/L — CRITICAL LOW (ref 3.5–5.3)
POTASSIUM SERPL-MCNC: 3.9 MMOL/L — SIGNIFICANT CHANGE UP (ref 3.5–5.3)
POTASSIUM SERPL-SCNC: 2.9 MMOL/L — CRITICAL LOW (ref 3.5–5.3)
POTASSIUM SERPL-SCNC: 3.9 MMOL/L — SIGNIFICANT CHANGE UP (ref 3.5–5.3)
PROT SERPL-MCNC: 6.9 G/DL — SIGNIFICANT CHANGE UP (ref 6–8.3)
PROT UR-MCNC: NEGATIVE MG/DL — SIGNIFICANT CHANGE UP
PROTHROM AB SERPL-ACNC: 16.3 SEC — HIGH (ref 9.9–13.4)
RBC # BLD: 3.87 M/UL — LOW (ref 4.2–5.8)
RBC # FLD: 16.4 % — HIGH (ref 10.3–14.5)
RBC BLD AUTO: ABNORMAL
RH IG SCN BLD-IMP: POSITIVE — SIGNIFICANT CHANGE UP
RSV RNA NPH QL NAA+NON-PROBE: SIGNIFICANT CHANGE UP
SARS-COV-2 RNA SPEC QL NAA+PROBE: SIGNIFICANT CHANGE UP
SODIUM SERPL-SCNC: 130 MMOL/L — LOW (ref 135–145)
SODIUM SERPL-SCNC: 132 MMOL/L — LOW (ref 135–145)
SOURCE RESPIRATORY: SIGNIFICANT CHANGE UP
SP GR SPEC: 1.01 — SIGNIFICANT CHANGE UP (ref 1–1.03)
TROPONIN T, HIGH SENSITIVITY RESULT: 78 NG/L — HIGH (ref 0–51)
TROPONIN T, HIGH SENSITIVITY RESULT: 91 NG/L — HIGH (ref 0–51)
UROBILINOGEN FLD QL: 0.2 MG/DL — SIGNIFICANT CHANGE UP (ref 0.2–1)
WBC # BLD: 8.41 K/UL — SIGNIFICANT CHANGE UP (ref 3.8–10.5)
WBC # FLD AUTO: 8.41 K/UL — SIGNIFICANT CHANGE UP (ref 3.8–10.5)

## 2025-03-24 PROCEDURE — 71045 X-RAY EXAM CHEST 1 VIEW: CPT | Mod: 26

## 2025-03-24 PROCEDURE — 74177 CT ABD & PELVIS W/CONTRAST: CPT | Mod: 26

## 2025-03-24 PROCEDURE — 70450 CT HEAD/BRAIN W/O DYE: CPT | Mod: 26

## 2025-03-24 PROCEDURE — 72132 CT LUMBAR SPINE W/DYE: CPT | Mod: 26

## 2025-03-24 PROCEDURE — 99285 EMERGENCY DEPT VISIT HI MDM: CPT

## 2025-03-24 PROCEDURE — 99291 CRITICAL CARE FIRST HOUR: CPT | Mod: FT,25

## 2025-03-24 PROCEDURE — 72125 CT NECK SPINE W/O DYE: CPT | Mod: 26

## 2025-03-24 PROCEDURE — 99497 ADVNCD CARE PLAN 30 MIN: CPT

## 2025-03-24 PROCEDURE — 71260 CT THORAX DX C+: CPT | Mod: 26

## 2025-03-24 PROCEDURE — 12002 RPR S/N/AX/GEN/TRNK2.6-7.5CM: CPT

## 2025-03-24 PROCEDURE — 93010 ELECTROCARDIOGRAM REPORT: CPT

## 2025-03-24 PROCEDURE — 72170 X-RAY EXAM OF PELVIS: CPT | Mod: 26

## 2025-03-24 PROCEDURE — 99498 ADVNCD CARE PLAN ADDL 30 MIN: CPT

## 2025-03-24 PROCEDURE — 72129 CT CHEST SPINE W/DYE: CPT | Mod: 26

## 2025-03-24 RX ORDER — CLOSTRIDIUM TETANI TOXOID ANTIGEN (FORMALDEHYDE INACTIVATED), CORYNEBACTERIUM DIPHTHERIAE TOXOID ANTIGEN (FORMALDEHYDE INACTIVATED), BORDETELLA PERTUSSIS TOXOID ANTIGEN (GLUTARALDEHYDE INACTIVATED), BORDETELLA PERTUSSIS FILAMENTOUS HEMAGGLUTININ ANTIGEN (FORMALDEHYDE INACTIVATED), BORDETELLA PERTUSSIS PERTACTIN ANTIGEN, AND BORDETELLA PERTUSSIS FIMBRIAE 2/3 ANTIGEN 5; 2; 2.5; 5; 3; 5 [LF]/.5ML; [LF]/.5ML; UG/.5ML; UG/.5ML; UG/.5ML; UG/.5ML
0.5 INJECTION, SUSPENSION INTRAMUSCULAR ONCE
Refills: 0 | Status: COMPLETED | OUTPATIENT
Start: 2025-03-24 | End: 2025-03-24

## 2025-03-24 RX ORDER — TAMSULOSIN HYDROCHLORIDE 0.4 MG/1
0.4 CAPSULE ORAL AT BEDTIME
Refills: 0 | Status: DISCONTINUED | OUTPATIENT
Start: 2025-03-24 | End: 2025-04-02

## 2025-03-24 RX ORDER — VANCOMYCIN HCL IN 5 % DEXTROSE 1.5G/250ML
1000 PLASTIC BAG, INJECTION (ML) INTRAVENOUS ONCE
Refills: 0 | Status: COMPLETED | OUTPATIENT
Start: 2025-03-24 | End: 2025-03-24

## 2025-03-24 RX ORDER — ACETAMINOPHEN 500 MG/5ML
650 LIQUID (ML) ORAL ONCE
Refills: 0 | Status: COMPLETED | OUTPATIENT
Start: 2025-03-24 | End: 2025-03-25

## 2025-03-24 RX ORDER — MIDODRINE HYDROCHLORIDE 5 MG/1
10 TABLET ORAL THREE TIMES A DAY
Refills: 0 | Status: DISCONTINUED | OUTPATIENT
Start: 2025-03-24 | End: 2025-04-02

## 2025-03-24 RX ORDER — TRAZODONE HCL 100 MG
200 TABLET ORAL AT BEDTIME
Refills: 0 | Status: DISCONTINUED | OUTPATIENT
Start: 2025-03-24 | End: 2025-03-25

## 2025-03-24 RX ORDER — INFLUENZA A VIRUS A/IDAHO/07/2018 (H1N1) ANTIGEN (MDCK CELL DERIVED, PROPIOLACTONE INACTIVATED, INFLUENZA A VIRUS A/INDIANA/08/2018 (H3N2) ANTIGEN (MDCK CELL DERIVED, PROPIOLACTONE INACTIVATED), INFLUENZA B VIRUS B/SINGAPORE/INFTT-16-0610/2016 ANTIGEN (MDCK CELL DERIVED, PROPIOLACTONE INACTIVATED), INFLUENZA B VIRUS B/IOWA/06/2017 ANTIGEN (MDCK CELL DERIVED, PROPIOLACTONE INACTIVATED) 15; 15; 15; 15 UG/.5ML; UG/.5ML; UG/.5ML; UG/.5ML
0.5 INJECTION, SUSPENSION INTRAMUSCULAR ONCE
Refills: 0 | Status: DISCONTINUED | OUTPATIENT
Start: 2025-03-24 | End: 2025-04-02

## 2025-03-24 RX ORDER — GABAPENTIN 400 MG/1
200 CAPSULE ORAL ONCE
Refills: 0 | Status: COMPLETED | OUTPATIENT
Start: 2025-03-24 | End: 2025-03-24

## 2025-03-24 RX ORDER — ACETAMINOPHEN 500 MG/5ML
650 LIQUID (ML) ORAL EVERY 6 HOURS
Refills: 0 | Status: DISCONTINUED | OUTPATIENT
Start: 2025-03-24 | End: 2025-04-02

## 2025-03-24 RX ORDER — BUMETANIDE 1 MG/1
1 TABLET ORAL
Refills: 0 | DISCHARGE

## 2025-03-24 RX ORDER — ACETAMINOPHEN 500 MG/5ML
1000 LIQUID (ML) ORAL ONCE
Refills: 0 | Status: COMPLETED | OUTPATIENT
Start: 2025-03-24 | End: 2025-03-24

## 2025-03-24 RX ORDER — ALPRAZOLAM 0.5 MG
0.5 TABLET, EXTENDED RELEASE 24 HR ORAL AT BEDTIME
Refills: 0 | Status: DISCONTINUED | OUTPATIENT
Start: 2025-03-24 | End: 2025-03-28

## 2025-03-24 RX ORDER — PIPERACILLIN-TAZO-DEXTROSE,ISO 3.375G/5
3.38 IV SOLUTION, PIGGYBACK PREMIX FROZEN(ML) INTRAVENOUS ONCE
Refills: 0 | Status: COMPLETED | OUTPATIENT
Start: 2025-03-24 | End: 2025-03-24

## 2025-03-24 RX ORDER — ATORVASTATIN CALCIUM 80 MG/1
10 TABLET, FILM COATED ORAL AT BEDTIME
Refills: 0 | Status: DISCONTINUED | OUTPATIENT
Start: 2025-03-24 | End: 2025-04-02

## 2025-03-24 RX ORDER — SILODOSIN 4 MG/1
1 CAPSULE ORAL
Refills: 0 | DISCHARGE

## 2025-03-24 RX ORDER — ONDANSETRON HCL/PF 4 MG/2 ML
4 VIAL (ML) INJECTION ONCE
Refills: 0 | Status: COMPLETED | OUTPATIENT
Start: 2025-03-24 | End: 2025-03-24

## 2025-03-24 RX ADMIN — Medication 250 MILLIGRAM(S): at 11:59

## 2025-03-24 RX ADMIN — Medication 0.5 MILLIGRAM(S): at 21:32

## 2025-03-24 RX ADMIN — Medication 100 MILLIEQUIVALENT(S): at 11:59

## 2025-03-24 RX ADMIN — Medication 650 MILLIGRAM(S): at 19:36

## 2025-03-24 RX ADMIN — Medication 1000 MILLIGRAM(S): at 16:01

## 2025-03-24 RX ADMIN — Medication 100 MILLIEQUIVALENT(S): at 16:56

## 2025-03-24 RX ADMIN — Medication 400 MILLIGRAM(S): at 10:38

## 2025-03-24 RX ADMIN — CLOSTRIDIUM TETANI TOXOID ANTIGEN (FORMALDEHYDE INACTIVATED), CORYNEBACTERIUM DIPHTHERIAE TOXOID ANTIGEN (FORMALDEHYDE INACTIVATED), BORDETELLA PERTUSSIS TOXOID ANTIGEN (GLUTARALDEHYDE INACTIVATED), BORDETELLA PERTUSSIS FILAMENTOUS HEMAGGLUTININ ANTIGEN (FORMALDEHYDE INACTIVATED), BORDETELLA PERTUSSIS PERTACTIN ANTIGEN, AND BORDETELLA PERTUSSIS FIMBRIAE 2/3 ANTIGEN 0.5 MILLILITER(S): 5; 2; 2.5; 5; 3; 5 INJECTION, SUSPENSION INTRAMUSCULAR at 12:00

## 2025-03-24 RX ADMIN — ATORVASTATIN CALCIUM 10 MILLIGRAM(S): 80 TABLET, FILM COATED ORAL at 21:33

## 2025-03-24 RX ADMIN — Medication 650 MILLIGRAM(S): at 20:30

## 2025-03-24 RX ADMIN — Medication 100 MILLIEQUIVALENT(S): at 17:58

## 2025-03-24 RX ADMIN — Medication 1000 MILLILITER(S): at 16:01

## 2025-03-24 RX ADMIN — Medication 200 MILLIGRAM(S): at 21:32

## 2025-03-24 RX ADMIN — Medication 1000 MILLILITER(S): at 10:38

## 2025-03-24 RX ADMIN — Medication 10 MILLIEQUIVALENT(S): at 16:01

## 2025-03-24 RX ADMIN — GABAPENTIN 200 MILLIGRAM(S): 400 CAPSULE ORAL at 22:51

## 2025-03-24 RX ADMIN — Medication 4 MILLIGRAM(S): at 16:21

## 2025-03-24 RX ADMIN — Medication 200 GRAM(S): at 10:57

## 2025-03-24 RX ADMIN — TAMSULOSIN HYDROCHLORIDE 0.4 MILLIGRAM(S): 0.4 CAPSULE ORAL at 21:33

## 2025-03-24 RX ADMIN — Medication 100 MILLIEQUIVALENT(S): at 19:55

## 2025-03-24 RX ADMIN — Medication 3.38 GRAM(S): at 16:01

## 2025-03-24 RX ADMIN — MIDODRINE HYDROCHLORIDE 10 MILLIGRAM(S): 5 TABLET ORAL at 17:57

## 2025-03-24 NOTE — CONSULT NOTE ADULT - PROBLEM SELECTOR RECOMMENDATION 5
Consider Delirium precautions below:  - place patient's bed near window  - optimize sleep-wake cycle, minimize environmental noise  - reorientation techniques and memory cues such as calendar, clocks, family photos  - use verbal redirection as first line  - minimize restraints and lines  - ensure adequate pain control, use opioid sparing regimens when possible (pt request)

## 2025-03-24 NOTE — H&P ADULT - PROBLEM SELECTOR PLAN 3
Replacing. Euvolemic so holding Diuretics.   Cardiology help appreciated.  D/W his cardiologist who doesn't come here.

## 2025-03-24 NOTE — ED PROVIDER NOTE - SECONDARY DIAGNOSIS.
Acute hypokalemia Laceration of head JASON (acute kidney injury) Cardiac enzymes elevated Lytic bone lesions on xray

## 2025-03-24 NOTE — H&P ADULT - HISTORY OF PRESENT ILLNESS
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  Patient is unsure how he fell, fell backwards and hit his head, had LOC, unknown downtime.  Complains of pain all over his body.  Denies chest pain, shortness of breath.

## 2025-03-24 NOTE — ED PROVIDER NOTE - NSICDXPASTSURGICALHX_GEN_ALL_CORE_FT
PAST SURGICAL HISTORY:  Adhesion of intestine Relese of abdominal adhesions- 2002    Artificial cardiac pacemaker 2008    H/O hernia repair bilateral IHR x 2    H/O shoulder surgery Right shoulder repair- 2001    History of appendectomy     History of carpal tunnel release, unspecified laterality right wrist- 2002    History of colon resection 1995    History of skin cancer     Hx of cataract surgery, unspecified laterality     S/P CABG x 3 2002    SBO (small bowel obstruction) 03/2017

## 2025-03-24 NOTE — CONSULT NOTE ADULT - SUBJECTIVE AND OBJECTIVE BOX
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  Patient is unsure how he fell, fell backwards and hit his head, had LOC, unknown downtime.  Complains of pain all over his body.  Denies chest pain, shortness of breath.    (24 Mar 2025 15:02)      Pt seen in ED w his daughter and son in law at bedside.   Pt initially lethargic, later arousable, oriented x 3 c/o diffuse body pain, alleviated by Tylenol given. No further symptoms. He details having poor quality of life w dminished eye sight, increasing debility, hearing loss and ongoing frustration with his overall medical management Pt well known to Dr. Goldberg who is his primary cardiologist.     Extensive conversation held w family wherein they highlight pt has been living at home with increasing care needs and refusal to consider moving in or close to family, also refusing 24/7 assistance. Family concerned for pt's safety at home.  See [facility] note for further details of conversation with pt.       Pt emphasized he has been taking xanax 1.5mg po qhs every night for 20years, also taking trazadone 100-200mg qhs for ongoing insomnia.       PERTINENT PM/SXH:   Coronary artery disease involving native heart without angina pectoris, unspecified vessel or lesion type    Complete heart block    Paroxysmal atrial fibrillation    Hypothyroidism, unspecified type    Chronic gout with tophus, unspecified cause, unspecified site    Benign prostatic hyperplasia, presence of lower urinary tract symptoms unspecified, unspecified morphology    Acute on chronic congestive heart failure, unspecified congestive heart failure type    Abscess    Basal cell carcinoma    Melanoma in situ of face excluding eyelid, nose, lip, and ear    Carpal tunnel syndrome of left wrist    BOOP (bronchiolitis obliterans with organizing pneumonia)      SBO (small bowel obstruction)    Adhesion of intestine    Hx of cataract surgery, unspecified laterality    History of skin cancer    History of carpal tunnel release, unspecified laterality    History of appendectomy    H/O hernia repair    S/P CABG x 3    History of colon resection    Artificial cardiac pacemaker    H/O shoulder surgery      FAMILY HISTORY:    Family Hx substance abuse [ ]yes [ ]no  ITEMS NOT CHECKED ARE NOT PRESENT    SOCIAL HISTORY:   Significant other/partner[ x]  (partner past in September)  Children[x ] 2 daughters  [employer]/Spirituality:  Substance hx:  [ ]   Tobacco hx:  [ ]   Alcohol hx: [ ]   Home Opioid hx:  [ ] I-Stop Reference No:  [id number]  Living Situation: [ x]Home  [ ]Long term care  [ ]Rehab [ ]Other    ADVANCE DIRECTIVES:    DNR/MOLST  [ ]  Living Will  [x ]   DECISION MAKER(s):  [x ] Health Care Proxy(s)  [ ] Surrogate(s)  [ ] Guardian           Name(s): Phone Number(s): daughters (will bring in documentation from home)     BASELINE (I)ADL(s) (prior to admission):  [place]: [ ]Total  [x ] Moderate [ ]Dependent    Allergies    Diprivan (Short breath)    Intolerances    MEDICATIONS  (STANDING):    MEDICATIONS  (PRN):    PRESENT SYMPTOMS: [ ]Unable to self-report  [ ] CPOT [ ] PAINADs [ ] RDOS  Source if other than patient:  [ ]Family   [ ]Team     Pain: [x ]yes [ ]no  QOL impact - debilitating   Location -  diffuse body pains                   Aggravating factors - movement   Quality - aching  Radiation -  Timing- constant   Severity (0-10 scale): 5/10  Minimal acceptable level (0-10 scale):     CPOT:    https://www.[facility].org/getattachment/[email or web address]/Critical-Care-Pain-Observation-Tool-(CPOT)    PAINAD Score: See PAINAD tool and score below     Dyspnea:                           [ ]Mild [ ]Moderate [ ]Severe    RDOS: See RDOS tool and score below   0 to 2  minimal or no respiratory distress   3  mild distress  4 to 6 moderate distress  >7 severe distress      Anxiety:                             [ ]Mild [ ]Moderate [ ]Severe  Fatigue:                             [ ]Mild [x ]Moderate [ ]Severe  Nausea:                             [ ]Mild [ ]Moderate [ ]Severe  Loss of appetite:              [ ]Mild [ ]Moderate [ ]Severe  Constipation:                    [ ]Mild [ ]Moderate [ ]Severe    PCSSQ[Palliative Care Spiritual Screening Question]   Severity (0-10):  Score of 4 or > indicate consideration of Chaplaincy referral.  Chaplaincy Referral: [ ] yes [ ] refused [ ] following [ ] Deferred     Caregiver [place]? : [ ] yes [ ] no [ ] Deferred [ ] Declined             Social work referral [ ] Patient & Family Centered Care Referral [ ]     Anticipatory Grief present?:  [ ] yes [ ] no  [ ] Deferred                  Social work referral [ ] Chaplaincy Referral [ ]    		  Other Symptoms:  [x ]All other review of systems negative     Palliative Performance Status Version 2:   See PPSv2 tool and score below          PHYSICAL EXAM:  Vital Signs Last 24 Hrs  T(C): 34.4 (24 Mar 2025 15:02), Max: 34.4 (24 Mar 2025 10:20)  T(F): 94 (24 Mar 2025 15:02), Max: 94 (24 Mar 2025 10:20)  HR: 75 (24 Mar 2025 15:02) (69 - 75)  BP: 130/79 (24 Mar 2025 15:02) (102/58 - 130/79)  BP(mean): 96 (24 Mar 2025 15:02) (77 - 98)  RR: 18 (24 Mar 2025 12:25) (18 - 20)  SpO2: 93% (24 Mar 2025 12:27) (88% - 99%)    Parameters below as of 24 Mar 2025 12:27  Patient On (Oxygen Delivery Method): nasal cannula  O2 Flow (L/min): 3   I&O's Summary    GENERAL: [ ]Cachexia    [x ]Alert  [x ]Oriented x 3  [x ]Lethargic  [ ]Unarousable  [x ]Verbal  [ ]Non-Verbal  Behavioral:   [ ] Anxiety  [ ] Delirium [ ] Agitation [ ] Other  HEENT:  [ ]Normal   [x]Dry mouth   [ ]ET Tube/Trach  [ ]Oral lesions  PULMONARY:   [x ]Clear [ ]Tachypnea  [ ]Audible excessive secretions   [ ]Rhonchi        [ ]Right [ ]Left [ ]Bilateral  [ ]Crackles        [ ]Right [ ]Left [ ]Bilateral  [ ]Wheezing     [ ]Right [ ]Left [ ]Bilateral  [ ]Diminished breath sounds [ ]right [ ]left [ ]bilateral  CARDIOVASCULAR:    [x ]Regular [ ]Irregular [ ]Tachy  [ ]Chris [ ]Murmur [ ]Other  GASTROINTESTINAL:  [x ]Soft  [ ]Distended   [ ]+BS  [ ]Non tender [ ]Tender  [ ]Other [ ]PEG [ ]OGT/ NGT  Last BM:  GENITOURINARY:  [ ]Normal [ ] Incontinent   [ ]Oliguria/Anuria   [ ]Gold  MUSCULOSKELETAL:   [ ]Normal   [x ]Weakness  [ ]Bed/Wheelchair bound [ ]Edema  NEUROLOGIC:   [ ]No focal deficits  [ ]Cognitive impairment  [ ]Dysphagia [ ]Dysarthria [ ]Paresis [ ]Other   SKIN:   [ ]Normal  [ ]Rash  [x ]Other, wound on LLE   [ ]Pressure ulcer(s)       Present on admission [ ]y [ ]n    CRITICAL CARE:  [ ] Shock Present  [ ]Septic [ ]Cardiogenic [ ]Neurologic [ ]Hypovolemic  [ ]  Vasopressors [ ]  Inotropes   [ ]Respiratory failure present [ ]Mechanical ventilation [ ]Non-invasive ventilatory support [ ]High flow    [ ]Acute  [ ]Chronic [ ]Hypoxic  [ ]Hypercarbic [ ]Other  [ ]Other organ failure     LABS:                        11.9   8.41  )-----------( 121      ( 24 Mar 2025 10:42 )             36.4   03-24    132[L]  |  86[L]  |  85[H]  ----------------------------<  169[H]  2.9[LL]   |  28  |  2.08[H]    Ca    9.4      24 Mar 2025 10:42  Mg     2.4     03-24    TPro  6.9  /  Alb  4.0  /  TBili  0.8  /  DBili  x   /  AST  23  /  ALT  21  /  AlkPhos  98  03-24  PT/INR - ( 24 Mar 2025 10:42 )   PT: 16.3 sec;   INR: 1.43 ratio         PTT - ( 24 Mar 2025 10:42 )  PTT:35.2 sec    Urinalysis Basic - ( 24 Mar 2025 10:46 )    Color: Yellow / Appearance: Clear / S.009 / pH: x  Gluc: x / Ketone: Negative mg/dL  / Bili: Negative / Urobili: 0.2 mg/dL   Blood: x / Protein: Negative mg/dL / Nitrite: Negative   Leuk Esterase: Negative / RBC: x / WBC x   Sq Epi: x / Non Sq Epi: x / Bacteria: x      RADIOLOGY & ADDITIONAL STUDIES:    < from: CT Lumbar Spine Reform w/ IV Cont (25 @ 12:06) >  IMPRESSION:    Brain CT: Age-appropriate involutional and ischemic gliotic changes   without change since 2018.    Cervical spine CT: Heterogeneity of the vertebral bodies with multiple   scattered lucencies suspicious for lytic neoplastic lesions such as   metastatic disease or multiple myeloma. Recommend clinical correlation..    Thoracic and lumbosacral spine CT: No lucency in L5 of undetermined   etiology.. No acute fractures or dislocations. Generalized osteopenia.   Bilateral pleural effusions.        PROTEIN CALORIE MALNUTRITION PRESENT: [ ]mild [ ]moderate [ ]severe [ ]underweight [ ]morbid obesity  https://www.andeal.org/vault/2440/web/files/ONC/Table_Clinical%20Characteristics%20to%20Document%20Malnutrition-White%20JV%20et%20al%179845.pdf    Height (cm): 177.8 (24 @ 15:38)  Weight (kg): 65.8 (24 @ 15:38)  BMI (kg/m2): 20.8 (24 @ 15:38)    [ ]PPSV2 < or = to 30% [ ]significant weight loss  [ ]poor nutritional intake  [ ]anasarca[ ]Artificial Nutrition      Other REFERRALS:  [ ]Hospice  [ ]Child Life  [ ]Social Work  [ ]Case management [ ]Holistic Therapy     Goals of Care Document:  Date of Service:25 @ 15:36  HPI:  89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  Patient is unsure how he fell, fell backwards and hit his head, had LOC, unknown downtime.  Complains of pain all over his body.  Denies chest pain, shortness of breath.    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   Pt initially lethargic, later arousable, oriented x 3 c/o diffuse body pain, alleviated by Tylenol given. No further symptoms. He details having poor quality of life w diminished eye sight, increasing debility, hearing loss and ongoing frustration with his overall medical management Pt well known to Dr. Goldberg who is his primary cardiologist. Pt attributes his fall to overdiuresis.     Extensive conversation held w family wherein they highlight pt has been living at home with increasing care needs and refusal to consider moving in or close to family, also refusing 24/7 assistance. Family concerned for pt's safety at home. At baseline he is ambulatory w walker, has 12hr HHA.   See [facility] note for further details of conversation with pt.       Pt emphasized he has been taking xanax 1.5mg po qhs every night for 20years, also taking trazadone 100-200mg qhs for ongoing insomnia.       PERTINENT PM/SXH:   Coronary artery disease involving native heart without angina pectoris, unspecified vessel or lesion type    Complete heart block    Paroxysmal atrial fibrillation    Hypothyroidism, unspecified type    Chronic gout with tophus, unspecified cause, unspecified site    Benign prostatic hyperplasia, presence of lower urinary tract symptoms unspecified, unspecified morphology    Acute on chronic congestive heart failure, unspecified congestive heart failure type    Abscess    Basal cell carcinoma    Melanoma in situ of face excluding eyelid, nose, lip, and ear    Carpal tunnel syndrome of left wrist    BOOP (bronchiolitis obliterans with organizing pneumonia)      SBO (small bowel obstruction)    Adhesion of intestine    Hx of cataract surgery, unspecified laterality    History of skin cancer    History of carpal tunnel release, unspecified laterality    History of appendectomy    H/O hernia repair    S/P CABG x 3    History of colon resection    Artificial cardiac pacemaker    H/O shoulder surgery      FAMILY HISTORY:    Family Hx substance abuse [ ]yes [ ]no  ITEMS NOT CHECKED ARE NOT PRESENT    SOCIAL HISTORY:   Significant other/partner[ x]  (partner past in September)  Children[x ] 2 daughters  Mandaen/Spirituality:  Substance hx:  [ ]   Tobacco hx:  [ ]   Alcohol hx: [ ]   Home Opioid hx:  [ ] I-Stop Reference No:  [id number]  Living Situation: [ x]Home  [ ]Long term care  [ ]Rehab [ ]Other    ADVANCE DIRECTIVES:    DNR/MOLST  [ ]  Living Will  [x ]   DECISION MAKER(s):  [x ] Health Care Proxy(s)  [ ] Surrogate(s)  [ ] Guardian           Name(s): Phone Number(s): daughters (will bring in documentation from home)     BASELINE (I)ADL(s) (prior to admission):  [place]: [ ]Total  [x ] Moderate [ ]Dependent    Allergies    Diprivan (Short breath)    Intolerances    MEDICATIONS  (STANDING):    MEDICATIONS  (PRN):    PRESENT SYMPTOMS: [ ]Unable to self-report  [ ] CPOT [ ] PAINADs [ ] RDOS  Source if other than patient:  [ ]Family   [ ]Team     Pain: [x ]yes [ ]no  QOL impact - debilitating   Location -  diffuse body pains                   Aggravating factors - movement   Quality - aching  Radiation -  Timing- constant   Severity (0-10 scale): 5/10  Minimal acceptable level (0-10 scale):     CPOT:    https://www.[facility].org/getattachment/[email or web address]/Critical-Care-Pain-Observation-Tool-(CPOT)    PAINAD Score: See PAINAD tool and score below     Dyspnea:                           [ ]Mild [ ]Moderate [ ]Severe    RDOS: See RDOS tool and score below   0 to 2  minimal or no respiratory distress   3  mild distress  4 to 6 moderate distress  >7 severe distress      Anxiety:                             [ ]Mild [ ]Moderate [ ]Severe  Fatigue:                             [ ]Mild [x ]Moderate [ ]Severe  Nausea:                             [ ]Mild [ ]Moderate [ ]Severe  Loss of appetite:              [ ]Mild [ ]Moderate [ ]Severe  Constipation:                    [ ]Mild [ ]Moderate [ ]Severe    PCSSQ[Palliative Care Spiritual Screening Question]   Severity (0-10):  Score of 4 or > indicate consideration of Chaplaincy referral.  Chaplaincy Referral: [ ] yes [ ] refused [ ] following [ ] Deferred     Caregiver [place]? : [ ] yes [ ] no [ ] Deferred [ ] Declined             Social work referral [ ] Patient & Family Centered Care Referral [ ]     Anticipatory Grief present?:  [ ] yes [ ] no  [ ] Deferred                  Social work referral [ ] Chaplaincy Referral [ ]    		  Other Symptoms:  [x ]All other review of systems negative     Palliative Performance Status Version 2:   See PPSv2 tool and score below          PHYSICAL EXAM:  Vital Signs Last 24 Hrs  T(C): 34.4 (24 Mar 2025 15:02), Max: 34.4 (24 Mar 2025 10:20)  T(F): 94 (24 Mar 2025 15:02), Max: 94 (24 Mar 2025 10:20)  HR: 75 (24 Mar 2025 15:02) (69 - 75)  BP: 130/79 (24 Mar 2025 15:02) (102/58 - 130/79)  BP(mean): 96 (24 Mar 2025 15:02) (77 - 98)  RR: 18 (24 Mar 2025 12:25) (18 - 20)  SpO2: 93% (24 Mar 2025 12:27) (88% - 99%)    Parameters below as of 24 Mar 2025 12:27  Patient On (Oxygen Delivery Method): nasal cannula  O2 Flow (L/min): 3   I&O's Summary    GENERAL: [ ]Cachexia    [x ]Alert  [x ]Oriented x 3  [x ]Lethargic  [ ]Unarousable  [x ]Verbal  [ ]Non-Verbal  Behavioral:   [ ] Anxiety  [ ] Delirium [ ] Agitation [ ] Other  HEENT:  [ ]Normal   [x]Dry mouth   [ ]ET Tube/Trach  [ ]Oral lesions  PULMONARY:   [x ]Clear [ ]Tachypnea  [ ]Audible excessive secretions   [ ]Rhonchi        [ ]Right [ ]Left [ ]Bilateral  [ ]Crackles        [ ]Right [ ]Left [ ]Bilateral  [ ]Wheezing     [ ]Right [ ]Left [ ]Bilateral  [ ]Diminished breath sounds [ ]right [ ]left [ ]bilateral  CARDIOVASCULAR:    [x ]Regular [ ]Irregular [ ]Tachy  [ ]Chris [ ]Murmur [ ]Other  GASTROINTESTINAL:  [x ]Soft  [ ]Distended   [ ]+BS  [ ]Non tender [ ]Tender  [ ]Other [ ]PEG [ ]OGT/ NGT  Last BM:  GENITOURINARY:  [ ]Normal [ ] Incontinent   [ ]Oliguria/Anuria   [ ]Gold  MUSCULOSKELETAL:   [ ]Normal   [x ]Weakness  [ ]Bed/Wheelchair bound [ ]Edema  NEUROLOGIC:   [ ]No focal deficits  [ ]Cognitive impairment  [ ]Dysphagia [ ]Dysarthria [ ]Paresis [ ]Other   SKIN:   [ ]Normal  [ ]Rash  [x ]Other, wound on LLE   [ ]Pressure ulcer(s)       Present on admission [ ]y [ ]n    CRITICAL CARE:  [ ] Shock Present  [ ]Septic [ ]Cardiogenic [ ]Neurologic [ ]Hypovolemic  [ ]  Vasopressors [ ]  Inotropes   [ ]Respiratory failure present [ ]Mechanical ventilation [ ]Non-invasive ventilatory support [ ]High flow    [ ]Acute  [ ]Chronic [ ]Hypoxic  [ ]Hypercarbic [ ]Other  [ ]Other organ failure     LABS:                        11.9   8.41  )-----------( 121      ( 24 Mar 2025 10:42 )             36.4       132[L]  |  86[L]  |  85[H]  ----------------------------<  169[H]  2.9[LL]   |  28  |  2.08[H]    Ca    9.4      24 Mar 2025 10:42  Mg     2.4     -    TPro  6.9  /  Alb  4.0  /  TBili  0.8  /  DBili  x   /  AST  23  /  ALT  21  /  AlkPhos  98  -  PT/INR - ( 24 Mar 2025 10:42 )   PT: 16.3 sec;   INR: 1.43 ratio         PTT - ( 24 Mar 2025 10:42 )  PTT:35.2 sec    Urinalysis Basic - ( 24 Mar 2025 10:46 )    Color: Yellow / Appearance: Clear / S.009 / pH: x  Gluc: x / Ketone: Negative mg/dL  / Bili: Negative / Urobili: 0.2 mg/dL   Blood: x / Protein: Negative mg/dL / Nitrite: Negative   Leuk Esterase: Negative / RBC: x / WBC x   Sq Epi: x / Non Sq Epi: x / Bacteria: x      RADIOLOGY & ADDITIONAL STUDIES:    < from: CT Lumbar Spine Reform w/ IV Cont (25 @ 12:06) >  IMPRESSION:    Brain CT: Age-appropriate involutional and ischemic gliotic changes   without change since 2018.    Cervical spine CT: Heterogeneity of the vertebral bodies with multiple   scattered lucencies suspicious for lytic neoplastic lesions such as   metastatic disease or multiple myeloma. Recommend clinical correlation..    Thoracic and lumbosacral spine CT: No lucency in L5 of undetermined   etiology.. No acute fractures or dislocations. Generalized osteopenia.   Bilateral pleural effusions.        PROTEIN CALORIE MALNUTRITION PRESENT: [ ]mild [ ]moderate [ ]severe [ ]underweight [ ]morbid obesity  https://www.andeal.org/vault/2440/web/files/ONC/Table_Clinical%20Characteristics%20to%20Document%20Malnutrition-White%20JV%20et%20al%643288.pdf    Height (cm): 177.8 (24 @ 15:38)  Weight (kg): 65.8 (24 @ 15:38)  BMI (kg/m2): 20.8 (24 @ 15:38)    [ ]PPSV2 < or = to 30% [ ]significant weight loss  [ ]poor nutritional intake  [ ]anasarca[ ]Artificial Nutrition      Other REFERRALS:  [ ]Hospice  [ ]Child Life  [ ]Social Work  [ ]Case management [ ]Holistic Therapy     Goals of Care Document:

## 2025-03-24 NOTE — ED PROVIDER NOTE - CLINICAL SUMMARY MEDICAL DECISION MAKING FREE TEXT BOX
Dr. Grant:  Ill-appearing patient with past medical history as above presenting with unwitnessed fall, unclear why, areas of ecchymosis and abrasions all over body, multiple skin tears, does not meet criteria for level 1 or level 2 trauma activation however will get trauma protocol CTs, syncope workup, likely will require admission.  Pt noted to have acute renal failure and hypokalemia. Majeste- 89m w hx cabg, af on eliquis, htn, hld, ckd bibems found on ground. Pt does not recall falling but woke up on ground, unknown amount of time, exam w occipital head lac, multiple left sided skin tears. will draw screening labs, pan scan, reass    Dr. Grant:  Ill-appearing patient with past medical history as above presenting with unwitnessed fall, unclear why, areas of ecchymosis and abrasions all over body, multiple skin tears, does not meet criteria for level 1 or level 2 trauma activation however will get trauma protocol CTs, syncope workup, likely will require admission.  Pt noted to have acute renal failure and hypokalemia.

## 2025-03-24 NOTE — CONSULT NOTE ADULT - CONVERSATION DETAILS
Clinical condition reviewed.  Palliative service introduced.     Family with concerns re: pt's unwillingness to receive more help at home and overall safety.  Pt oriented x 3, states he has lost quality of life, is clearly frustrated regarding his increasing debility and increasing care needs. Pt inquired about MAID (Medical Aid in Dying). Explained [facility] is not a participating state w MAID, offered viable alternatives (ie transition to comfort -focused care, forgoing further medical interventions and further consideration of hospice care if he is found to have a hospice eligible diagnosis) Clinical condition reviewed.  Palliative service introduced.     Family with concerns re: pt's unwillingness to receive more help at home and overall safety.     Pt oriented x 3, states he has lost quality of life, and states "it is time" (referencing his belief that it is time for him to transition to end of life). Pt is frustrated regarding his increasing debility and increasing care needs, leading to increasing feelings of demoralization and indignity. Pt inquired about MAID (Medical Aid in Dying). Explained [facility] is not a participating state w MAID, offered viable alternatives (ie transition to comfort -focused care, forgoing further medical interventions and further consideration of hospice care if he is found to have a hospice eligible diagnosis) this information was reassuring for pt to hear.     For now, he is agreeable to cont med management w no escalation of care. No invasive measures. No further telemetry monitoring.     Suggest repeat echo, pt agrees.   If continued clinical decline, or no significant improvement in status, pt aware he can opt for transition to comfort focused care at any time.     DNR/DNI status confirmed, MOLST completed to reflect pt preferences.

## 2025-03-24 NOTE — ED PROVIDER NOTE - CONVERSATION DETAILS
Dr. Grant: Had a discussion with patient about his advanced directives, patient has a DNR/DNI.  Spoke to his daughter Sarina over the phone as well, she confirmed that he has a DNR/DNI, but would be willing to be admitted depending on the results.

## 2025-03-24 NOTE — CONSULT NOTE ADULT - PROBLEM SELECTOR RECOMMENDATION 6
cont nursing care for all ADLs  comfort feeds as tolerated w aspiration precautions  turn/reposition per nursing protocol    OOB to chair/ambulate as tolerated

## 2025-03-24 NOTE — CONSULT NOTE ADULT - ASSESSMENT
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 No acute fractures noted on CT, electrolyte imbalance w JASON, likely secondary to home diuresis.     Case d/w primary team

## 2025-03-24 NOTE — ED PROVIDER NOTE - CARE PLAN
1 Principal Discharge DX:	Syncope  Secondary Diagnosis:	Laceration of head  Secondary Diagnosis:	Acute hypokalemia  Secondary Diagnosis:	JASON (acute kidney injury)   Principal Discharge DX:	Syncope  Secondary Diagnosis:	Laceration of head  Secondary Diagnosis:	Acute hypokalemia  Secondary Diagnosis:	JASON (acute kidney injury)  Secondary Diagnosis:	Cardiac enzymes elevated   Principal Discharge DX:	Syncope  Secondary Diagnosis:	Laceration of head  Secondary Diagnosis:	Acute hypokalemia  Secondary Diagnosis:	JASON (acute kidney injury)  Secondary Diagnosis:	Cardiac enzymes elevated  Secondary Diagnosis:	Lytic bone lesions on xray

## 2025-03-24 NOTE — H&P ADULT - PROBLEM SELECTOR PLAN 1
Neurosurgery input appreciated.  CT scan x 2 stable.   Rpt CT scan in 24 hrs.   Holding AP and AC. Trending creatinine .  Renal consulted.  Holding Diuretics .

## 2025-03-24 NOTE — CONSULT NOTE ADULT - SUBJECTIVE AND OBJECTIVE BOX
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  Patient is unsure how he fell, fell backwards and hit his head, had LOC, unknown downtime.  Complains of pain all over his body.  Denies chest pain, shortness of breath. Nephrology consulted for JASON.         Allergies:  Diprivan (Short breath)      PAST MEDICAL & SURGICAL HISTORY:  Coronary artery disease involving native heart without angina pectoris, unspecified vessel or lesion type      Complete heart block  s/p PPM insertion-  Battery change-   Last interrogation-2017      Paroxysmal atrial fibrillation  on Coumadin      Hypothyroidism, unspecified type      Chronic gout with tophus, unspecified cause, unspecified site      Benign prostatic hyperplasia, presence of lower urinary tract symptoms unspecified, unspecified morphology      Acute on chronic congestive heart failure, unspecified congestive heart failure type  2016      Abscess  diverticular      Basal cell carcinoma      Melanoma in situ of face excluding eyelid, nose, lip, and ear      Carpal tunnel syndrome of left wrist      BOOP (bronchiolitis obliterans with organizing pneumonia)  2016 after colonoscopy      SBO (small bowel obstruction)  2017      Adhesion of intestine  Relese of abdominal adhesions-       Hx of cataract surgery, unspecified laterality      History of skin cancer      History of carpal tunnel release, unspecified laterality  right wrist-       History of appendectomy      H/O hernia repair  bilateral IHR x 2      S/P CABG x 3        History of colon resection        Artificial cardiac pacemaker        H/O shoulder surgery  Right shoulder repair-           Home Medications Reviewed    Hospital Medications:   MEDICATIONS  (STANDING):  ALPRAZolam 0.5 milliGRAM(s) Oral at bedtime  influenza  Vaccine (HIGH DOSE) 0.5 milliLiter(s) IntraMuscular once  midodrine. 10 milliGRAM(s) Oral three times a day  potassium chloride  10 mEq/100 mL IVPB 10 milliEquivalent(s) IV Intermittent every 1 hour  traZODone 200 milliGRAM(s) Oral at bedtime      SOCIAL HISTORY:  Denies ETOh, Smoking,     FAMILY HISTORY:      REVIEW OF SYSTEMS:  CONSTITUTIONAL: as per hpi   EYES/ENT: No visual changes;  No vertigo or throat pain   NECK: No pain or stiffness  RESPIRATORY: No cough, wheezing, hemoptysis; No shortness of breath  CARDIOVASCULAR: No chest pain or palpitations.  GASTROINTESTINAL: No abdominal or epigastric pain. No nausea, vomiting, or hematemesis; No diarrhea or constipation. No melena or hematochezia.  GENITOURINARY: No dysuria, frequency, foamy urine, urinary urgency, incontinence or hematuria  NEUROLOGICAL: No numbness or weakness  SKIN: No itching, burning, rashes, or lesions   VASCULAR: No bilateral lower extremity edema.   All other review of systems is negative unless indicated above.    VITALS:  T(F): 97.5 (25 @ 16:15), Max: 97.5 (25 @ 16:15)  HR: 78 (25 @ 16:15)  BP: 91/56 (25 @ 16:15)  RR: 16 (25 @ 16:15)  SpO2: 94% (25 @ 16:15)  Wt(kg): --        PHYSICAL EXAM:  General: NAD  HEENT: anicteric sclera, oropharynx clear, MMM  Neck: No JVD  Respiratory: diminished  Cardiovascular: S1, S2, RRR  Gastrointestinal: BS+, soft, NT/ND  Extremities: b/l LE +2 edema   Neurological: A/O x 3, no focal deficits  Psychiatric: Normal mood, normal affect  : No CVA tenderness. No wynne.   Skin: No rashes  Vascular Access: none    LABS:      132[L]  |  86[L]  |  85[H]  ----------------------------<  169[H]  2.9[LL]   |  28  |  2.08[H]    Ca    9.4      24 Mar 2025 10:42  Mg     2.4         TPro  6.9  /  Alb  4.0  /  TBili  0.8  /  DBili      /  AST  23  /  ALT  21  /  AlkPhos  98      Creatinine Trend: 2.08 <--                        11.9   8.41  )-----------( 121      ( 24 Mar 2025 10:42 )             36.4     Urine Studies:  Urinalysis Basic - ( 24 Mar 2025 10:46 )    Color: Yellow / Appearance: Clear / S.009 / pH:   Gluc:  / Ketone: Negative mg/dL  / Bili: Negative / Urobili: 0.2 mg/dL   Blood:  / Protein: Negative mg/dL / Nitrite: Negative   Leuk Esterase: Negative / RBC:  / WBC    Sq Epi:  / Non Sq Epi:  / Bacteria:           RADIOLOGY & ADDITIONAL STUDIES:

## 2025-03-24 NOTE — ED PROVIDER NOTE - ATTENDING CONTRIBUTION TO CARE
Patient was critically ill with a high probability of imminent or life threatening deterioration.  I have performed direct patient care (not related to procedure), additional history taking, interpretation of diagnostic studies, documentation, consultation with other physicians, telephone consultation with the patient's family  I have personally and independently provided the amount of critical care time documented below excluding time spent on separate procedures.  32 mins    Dr. Grant: I have personally performed a face to face bedside history and physical examination of this patient. I have discussed the history, examination, review of systems, assessment and plan of management with the resident. I have reviewed the electronic medical record and amended it to reflect my history, review of systems, physical exam, assessment and plan.    Dr. Grant: This H&P has been written by myself in its entirety

## 2025-03-24 NOTE — ED PROVIDER NOTE - PROGRESS NOTE DETAILS
Dr. Grant: +trop noted, will hold ASA until CTs resulted. bradly- CT cervical spine without any acute injury.  Patient with no midline tenderness and currently with no neck pain w rom.  C-collar to be removed.

## 2025-03-24 NOTE — ED ADULT NURSE NOTE - NSFALLHARMRISKINTERV_ED_ALL_ED
Assistance OOB with selected safe patient handling equipment if applicable/Assistance with ambulation/Communicate risk of Fall with Harm to all staff, patient, and family/Monitor gait and stability/Provide visual cue: red socks, yellow wristband, yellow gown, etc/Reinforce activity limits and safety measures with patient and family/Bed in lowest position, wheels locked, appropriate side rails in place/Call bell, personal items and telephone in reach/Instruct patient to call for assistance before getting out of bed/chair/stretcher/Non-slip footwear applied when patient is off stretcher/[place] to call system/Physically safe environment - no spills, clutter or unnecessary equipment/Purposeful Proactive Rounding/Room/bathroom lighting operational, light cord in reach

## 2025-03-24 NOTE — PATIENT PROFILE ADULT - FALL HARM RISK - RISK INTERVENTIONS
Assistance OOB with selected safe patient handling equipment/Assistance with ambulation/Communicate Fall Risk and Risk Factors to all staff, patient, and family/Discuss with provider need for PT consult/Monitor gait and stability/Provide patient with walking aids - walker, cane, crutches/Reinforce activity limits and safety measures with patient and family/Sit up slowly, dangle for a short time, stand at bedside before walking/Visual Cue: Yellow wristband/Bed in lowest position, wheels locked, appropriate side rails in place/Call bell, personal items and telephone in reach/Instruct patient to call for assistance before getting out of bed or chair/Non-slip footwear when patient is out of bed/[place] to call system/Physically safe environment - no spills, clutter or unnecessary equipment/Purposeful Proactive Rounding/Room/bathroom lighting operational, light cord in reach

## 2025-03-24 NOTE — H&P ADULT - TIME BILLING
Admission H&P including bedside history , examination , reviewing test results and treatment plan. Palliative and Cardiology Consult noted and renal consulted .D/W Patient, his daughter in room  and ACP.

## 2025-03-24 NOTE — CONSULT NOTE ADULT - SUBJECTIVE AND OBJECTIVE BOX
Cardiovascular Disease Initial Evaluation  Date of service: 03-24-25 @ 15:57    CHIEF COMPLAINT:  Fall, unwitnessed.     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  Patient is unsure how he fell, fell backwards and hit his head, had LOC, unknown downtime.  Complains of pain all over his body.  Denies chest pain, shortness of breath.     Patient states that he saw his cardiologist Dr. Steven Goldberg a few days ago for LE edema and SOB/SMITH. Patient was instructed to continue his home dose of Bumex and to also to add Metolazone to his regimen for the next 5 days. Patient and daughter who is at bedside say that this has happened in the past and helps with his symptoms but he does become dizzy during this time.       Allergies    Diprivan (Short breath)    Intolerances    	    MEDICATIONS:                  PAST MEDICAL & SURGICAL HISTORY:  Coronary artery disease involving native heart without angina pectoris, unspecified vessel or lesion type      Complete heart block  s/p PPM insertion-2008  Battery change- 2015  Last interrogation-05/2017      Paroxysmal atrial fibrillation  on Coumadin      Hypothyroidism, unspecified type      Chronic gout with tophus, unspecified cause, unspecified site      Benign prostatic hyperplasia, presence of lower urinary tract symptoms unspecified, unspecified morphology      Acute on chronic congestive heart failure, unspecified congestive heart failure type  2016      Abscess  diverticular      Basal cell carcinoma      Melanoma in situ of face excluding eyelid, nose, lip, and ear      Carpal tunnel syndrome of left wrist      BOOP (bronchiolitis obliterans with organizing pneumonia)  09/2016 after colonoscopy      SBO (small bowel obstruction)  03/2017      Adhesion of intestine  Relese of abdominal adhesions- 2002      Hx of cataract surgery, unspecified laterality      History of skin cancer      History of carpal tunnel release, unspecified laterality  right wrist- 2002      History of appendectomy      H/O hernia repair  bilateral IHR x 2      S/P CABG x 3  2002      History of colon resection  1995      Artificial cardiac pacemaker  2008      H/O shoulder surgery  Right shoulder repair- 2001          FAMILY HISTORY:      SOCIAL HISTORY:    The patient is a nonsmoker       REVIEW OF SYSTEMS:  See HPI, otherwise complete 14 point review of systems negative    [x ] All others negative	  [ ] Unable to obtain    PHYSICAL EXAM:  T(C): 34.4 (03-24-25 @ 15:02), Max: 34.4 (03-24-25 @ 10:20)  HR: 75 (03-24-25 @ 15:02) (69 - 75)  BP: 130/79 (03-24-25 @ 15:02) (102/58 - 130/79)  RR: 18 (03-24-25 @ 12:25) (18 - 20)  SpO2: 93% (03-24-25 @ 12:27) (88% - 99%)  Wt(kg): --  I&O's Summary      Appearance: No Acute Distress; resting comfortably  HEENT:  Normal oral mucosa, PERRL, EOMI	  Cardiovascular: Normal S1 S2,  +JVD  Respiratory: Normal respiratory effort; Lungs clear to auscultation bilaterally  Gastrointestinal:  Soft, Non-tender, + BS	  Skin: No rashes, No ecchymoses, No cyanosis	  Neurologic: Non-focal; no weakness  Extremities: No clubbing, cyanosis or edema  Vascular: Peripheral pulses palpable 2+ bilaterally  Psychiatry: A & O x 3, lethargic     Laboratory Data:	 	    CBC Full  -  ( 24 Mar 2025 10:42 )  WBC Count : 8.41 K/uL  Hemoglobin : 11.9 g/dL  Hematocrit : 36.4 %  Platelet Count - Automated : 121 K/uL  Mean Cell Volume : 94.1 fl  Mean Cell Hemoglobin : 30.7 pg  Mean Cell Hemoglobin Concentration : 32.7 g/dL  Auto Neutrophil # : x  Auto Lymphocyte # : x  Auto Monocyte # : x  Auto Eosinophil # : x  Auto Basophil # : x  Auto Neutrophil % : x  Auto Lymphocyte % : x  Auto Monocyte % : x  Auto Eosinophil % : x  Auto Basophil % : x    03-24    132[L]  |  86[L]  |  85[H]  ----------------------------<  169[H]  2.9[LL]   |  28  |  2.08[H]    Ca    9.4      24 Mar 2025 10:42  Mg     2.4     03-24    TPro  6.9  /  Alb  4.0  /  TBili  0.8  /  DBili  x   /  AST  23  /  ALT  21  /  AlkPhos  98  03-24      proBNP:   Lipid Profile:   HgA1c:   TSH:       CARDIAC MARKERS: Trop T 78            Interpretation of Telemetry: V paced 	    ECG:  V paced.   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   - Recommend contacting Dr. 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     #Afib  - V paced on telemetry  - Patient is no longer on Coumadin  - Confirm home dose of Eliquis and resume if no contraindication    Plan of care discussed with patient and daughter at bedside. Daughter states that her father is DNR/DNI, please confirm     Complex medical decision making in a patient with multiple co-morbidities.   77 minutes spent on total encounter; more than 50% of the visit was spent counseling and/or coordinating care by the attending physician.   	  Darian Guerrero DO [facility]  Cardiovascular Diseases  [phone]     Cardiovascular Disease Initial Evaluation  Date of service: 03-24-25 @ 15:57    CHIEF COMPLAINT:  Fall, unwitnessed.     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  Patient is unsure how he fell, fell backwards and hit his head, had LOC, unknown downtime.  Complains of pain all over his body.  Denies chest pain, shortness of breath.     Patient states that he saw his cardiologist Dr. Steven Goldberg a few days ago for LE edema and SOB/SMITH. Patient was instructed to continue his home dose of Bumex and to also to add Metolazone to his regimen for the next 5 days. Patient and daughter who is at bedside say that this has happened in the past and helps with his symptoms but he does become dizzy during this time.       Allergies    Diprivan (Short breath)    Intolerances    	    MEDICATIONS:                  PAST MEDICAL & SURGICAL HISTORY:  Coronary artery disease involving native heart without angina pectoris, unspecified vessel or lesion type      Complete heart block  s/p PPM insertion-2008  Battery change- 2015  Last interrogation-05/2017      Paroxysmal atrial fibrillation  on Coumadin      Hypothyroidism, unspecified type      Chronic gout with tophus, unspecified cause, unspecified site      Benign prostatic hyperplasia, presence of lower urinary tract symptoms unspecified, unspecified morphology      Acute on chronic congestive heart failure, unspecified congestive heart failure type  2016      Abscess  diverticular      Basal cell carcinoma      Melanoma in situ of face excluding eyelid, nose, lip, and ear      Carpal tunnel syndrome of left wrist      BOOP (bronchiolitis obliterans with organizing pneumonia)  09/2016 after colonoscopy      SBO (small bowel obstruction)  03/2017      Adhesion of intestine  Relese of abdominal adhesions- 2002      Hx of cataract surgery, unspecified laterality      History of skin cancer      History of carpal tunnel release, unspecified laterality  right wrist- 2002      History of appendectomy      H/O hernia repair  bilateral IHR x 2      S/P CABG x 3  2002      History of colon resection  1995      Artificial cardiac pacemaker  2008      H/O shoulder surgery  Right shoulder repair- 2001          FAMILY HISTORY:      SOCIAL HISTORY:    The patient is a nonsmoker       REVIEW OF SYSTEMS:  See HPI, otherwise complete 14 point review of systems negative    [x ] All others negative	  [ ] Unable to obtain    PHYSICAL EXAM:  T(C): 34.4 (03-24-25 @ 15:02), Max: 34.4 (03-24-25 @ 10:20)  HR: 75 (03-24-25 @ 15:02) (69 - 75)  BP: 130/79 (03-24-25 @ 15:02) (102/58 - 130/79)  RR: 18 (03-24-25 @ 12:25) (18 - 20)  SpO2: 93% (03-24-25 @ 12:27) (88% - 99%)  Wt(kg): --  I&O's Summary      Appearance: No Acute Distress; resting comfortably  HEENT:  Normal oral mucosa, PERRL, EOMI	  Cardiovascular: Normal S1 S2,  +JVD  Respiratory: Normal respiratory effort; Lungs clear to auscultation bilaterally  Gastrointestinal:  Soft, Non-tender, + BS	  Skin: No rashes, No ecchymoses, No cyanosis	  Neurologic: Non-focal; no weakness  Extremities: No clubbing, cyanosis or edema  Vascular: Peripheral pulses palpable 2+ bilaterally  Psychiatry: A & O x 3, lethargic     Laboratory Data:	 	    CBC Full  -  ( 24 Mar 2025 10:42 )  WBC Count : 8.41 K/uL  Hemoglobin : 11.9 g/dL  Hematocrit : 36.4 %  Platelet Count - Automated : 121 K/uL  Mean Cell Volume : 94.1 fl  Mean Cell Hemoglobin : 30.7 pg  Mean Cell Hemoglobin Concentration : 32.7 g/dL  Auto Neutrophil # : x  Auto Lymphocyte # : x  Auto Monocyte # : x  Auto Eosinophil # : x  Auto Basophil # : x  Auto Neutrophil % : x  Auto Lymphocyte % : x  Auto Monocyte % : x  Auto Eosinophil % : x  Auto Basophil % : x    03-24    132[L]  |  86[L]  |  85[H]  ----------------------------<  169[H]  2.9[LL]   |  28  |  2.08[H]    Ca    9.4      24 Mar 2025 10:42  Mg     2.4     03-24    TPro  6.9  /  Alb  4.0  /  TBili  0.8  /  DBili  x   /  AST  23  /  ALT  21  /  AlkPhos  98  03-24      proBNP:   Lipid Profile:   HgA1c:   TSH:       CARDIAC MARKERS: Trop T 78            Interpretation of Telemetry: V paced 	    ECG:  V paced.   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 Would hold off on any aggressive hydration at this time given his history of  ischemic cardiomyopathy and LE edema.   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   - Recommend contacting Dr. 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     #Afib  - V paced on telemetry  - Patient is no longer on Coumadin  - Confirm home dose of Eliquis and resume if no contraindication    Plan of care discussed with patient and daughter at bedside. Daughter states that her father is DNR/DNI, please confirm     Complex medical decision making in a patient with multiple co-morbidities.   77 minutes spent on total encounter; more than 50% of the visit was spent counseling and/or coordinating care by the attending physician.   	  Darian Guerrero,  [facility]  Cardiovascular Diseases  [phone]

## 2025-03-24 NOTE — ED PROCEDURE NOTE - ATTENDING CONTRIBUTION TO CARE
Dr. Grant: I have personally performed a face to face bedside history and physical examination of this patient. I have discussed the history, examination, review of systems, assessment and plan of management with the resident. I have reviewed the electronic medical record and amended it to reflect my history, review of systems, physical exam, assessment and plan.

## 2025-03-24 NOTE — ED PROVIDER NOTE - NSICDXPASTMEDICALHX_GEN_ALL_CORE_FT
PAST MEDICAL HISTORY:  Abscess diverticular    Acute on chronic congestive heart failure, unspecified congestive heart failure type 2016    Basal cell carcinoma     Benign prostatic hyperplasia, presence of lower urinary tract symptoms unspecified, unspecified morphology     BOOP (bronchiolitis obliterans with organizing pneumonia) 09/2016 after colonoscopy    Carpal tunnel syndrome of left wrist     Chronic gout with tophus, unspecified cause, unspecified site     Complete heart block s/p PPM insertion-2008  Battery change- 2015  Last interrogation-05/2017    Coronary artery disease involving native heart without angina pectoris, unspecified vessel or lesion type     Hypothyroidism, unspecified type     Melanoma in situ of face excluding eyelid, nose, lip, and ear     Paroxysmal atrial fibrillation on Coumadin

## 2025-03-24 NOTE — CONSULT NOTE ADULT - TIME BILLING
Total time spent including the following  [x] Physical chart review and documentation   An extensive review of the physical chart, electronic health record, and documentation was conducted to obtain collateral information including but not limited to:   - Current inpatient records (ED, H&P, primary team, and consultants [   ])   - Inpatient values/results (CBC, CMP, Imaging)   - Outpatient records   - Prior inpatient records (if available)   - Social work assessments   - Current or proposed treatment plans   - Pharmacotherapy review (including I-STOP if applicable)  []review of medical literature related to pathogenesis, disease/illness progress, treatment and/or prognosis  [x]care coordination  [x]discussion with the primary team  [x]discussion with floor staff  [x]discussion with consultant(s)  [x]discussion with the patient, surrogate decision maker, or family  [x]Physical Exam and/or review of systems   [x]Formulation of assessment and plan   []Evaluating for response to treatment and side effects of opioids or benzodiazepines      The __90____ minutes spent in ACP (       90          ) were independent to the time spent in time based billing

## 2025-03-24 NOTE — CONSULT NOTE ADULT - ASSESSMENT
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  Patient is unsure how he fell, fell backwards and hit his head, had LOC, unknown downtime.  Complains of pain all over his body.  Denies chest pain, shortness of breath. Nephrology consulted for JASON.     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  Patient is unsure how he fell, fell backwards and hit his head, had LOC, unknown downtime.  Complains of pain all over his body.  Denies chest pain, shortness of breath. Nephrology consulted for JASON.     A/P  JASON   scr baseline 1.5  admitted with scr 2.1   likely prerenal state sec to diuresis vs cardio-renal  diuresis as per cardio  s/p 1L NS in the ER  check urine urea, urine creatinine  CTAP negative for hydronephrosis   s/p contrast study 03/24 - monitor for HELEN   s/p fall CK normal   monitor UO and scr    Hyponatremia  likely sec to fluid overload  hold home salt tabs   check urine osm, urine sodium  monitor Na     Hypokalemia   supplement K 30meq IV  repeat K post supplementation   supplement as needed  keep K >4.0

## 2025-03-24 NOTE — H&P ADULT - GENITOURINARY
Pt to ED w/ c/o \"bump\" on the left side of his face that's painful to touch. Concerned he has a blood clot in his head because he googled his symptoms. Denies dizziness. Also c/o bilateral numbness and tingling in his legs.  
negative

## 2025-03-24 NOTE — CONSULT NOTE ADULT - PROBLEM SELECTOR RECOMMENDATION 4
appropriate in setting of increasing debility/increasing care needs    support for demoralization    cont home meds: xanax 1.5mg po qhs  trazadone 100mg po qhs    consideration of mirtazepine if pt allows

## 2025-03-24 NOTE — ED PROVIDER NOTE - PHYSICAL EXAMINATION
Gen: ill appearing  HEENT: Mucous membranes dry, pink conjunctivae, EOMI, 3cm vertical linear lac to posterior occiput  CV: RRR, no clubbing/cyanosis/edema, diffuse chest wall tenderness to palpation  Resp: CTAB  GI: Abdomen soft, diffuse abdominal tenderness to palpation  : No CVAT  Neuro: A&O x 3, moving all 4 extremities  MSK: Patient logrolled while holding C spine precautions, no midline spinal tenderness to palpation  Skin: large skin tear on left lateral upper arm and left dorsal wrist, multiple abrasions noted over the mid thoracic area

## 2025-03-24 NOTE — CONSULT NOTE ADULT - PROBLEM SELECTOR RECOMMENDATION 9
GOC as above  Admit to Medicine in PCU for co-management    Ongoing GOC pending clinical course  DNR/DNI, no escalation of care    All questions answered in detail  Support provided   Family to bring in documentation of HCP/Living will

## 2025-03-24 NOTE — CONSULT NOTE ADULT - PROBLEM SELECTOR RECOMMENDATION 2
Cardio input pending  Suggest discussion w pt's primary treating Cardiologist (Dr. Goldberg) to review recent medication changes  dc tele in v/o GOC  suggest repeat echo  Pt accepting of repeat labs

## 2025-03-24 NOTE — CONSULT NOTE ADULT - PROBLEM SELECTOR RECOMMENDATION 3
mechanical , likely in setting of dehydration/over diuresis   no noted fractures  lytic lesions in c-spine discussed    cont Tylenol 1G q8h PRN mild pain   oxycodone 5-7.5mg po q4h PRN mod-severe pain (pt prefers to hold off on opioids for now)

## 2025-03-24 NOTE — ED PROVIDER NOTE - OBJECTIVE STATEMENT
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  Patient is unsure how he fell, fell backwards and hit his head, had LOC, unknown downtime.  Complains of pain all over his body.  Denies chest pain, shortness of breath.  Patient's daughter is Sarina, [phone], and she is boarding a plane in [place] right now to come see her father.

## 2025-03-24 NOTE — H&P ADULT - PROBLEM SELECTOR PLAN 4
Euvolemic so holding Diuretics.   Cardiology help appreciated.  D/W his cardiologist who doesn't come here. Exact cause not know.   Likely secondary to Hypotension with Orthostasis as Bumex was increased to 5mg and Zaroxolyn was added  recently added.

## 2025-03-24 NOTE — H&P ADULT - PROBLEM SELECTOR PLAN 5
Exact cause not know.   Likely secondary to Hypotension with Orthostasis as Bumex was increased to 5mg and Zaroxolyn was added  recently added. Holding AC as SDH

## 2025-03-24 NOTE — ED ADULT NURSE NOTE - OBJECTIVE STATEMENT
PT is a 89 year old A&OX4 male with PMH of CABG, atrial fibrillation on Eliquis, HTN, HLD, CKD and PSH of left inguinal hernia repair and subsequent revision in 2018, and biventricular pacemaker 3 years ago who presents to the ED via EMS from home ([facility]) with c/o fall. Per EMS, PT had an unwitnessed fall from standing height in his kitchen, PT states he does not remember the events preceding the fall or falling but states when he regained consciousness he pressed his life alert. EMS states there was dried blood on the floor, unsure how long PT was down for. PT endorsing pain "to the whole body." PT denies chest pain, SOB, N/V/D, dizziness, fevers, chills, blurry vision, urinary symptoms, and numbness/tingling. PT is resting comfortably in bed, breathing unlabored on room air, and speaking in complete sentences. C-collar applied by EMS. Abdomen is soft, non-tender, and non-distended. Skin is warm and dry, no diaphoresis noted. No edema noted to B/L extremities. PT moving all extremities spontaneously, sensation intact. Large skin tears noted to left upper arm and left lower arm, bandages applied in ED. Large laceration noted to PT's back of head. No deformities noted. IV access established 20G in right forearm. PT hypothermic to 94 rectally, PT placed on sara hugger hyperthermia blanket. PT placed in hospital gown. Safety and comfort maintained.

## 2025-03-25 DIAGNOSIS — S01.01XA LACERATION WITHOUT FOREIGN BODY OF SCALP, INITIAL ENCOUNTER: ICD-10-CM

## 2025-03-25 LAB
A1C WITH ESTIMATED AVERAGE GLUCOSE RESULT: 6.2 % — HIGH (ref 4–5.6)
ANION GAP SERPL CALC-SCNC: 14 MMOL/L — SIGNIFICANT CHANGE UP (ref 5–17)
BUN SERPL-MCNC: 81 MG/DL — HIGH (ref 7–23)
CALCIUM SERPL-MCNC: 8.8 MG/DL — SIGNIFICANT CHANGE UP (ref 8.4–10.5)
CHLORIDE SERPL-SCNC: 89 MMOL/L — LOW (ref 96–108)
CO2 SERPL-SCNC: 29 MMOL/L — SIGNIFICANT CHANGE UP (ref 22–31)
CREAT ?TM UR-MCNC: 70 MG/DL — SIGNIFICANT CHANGE UP
CREAT SERPL-MCNC: 2.6 MG/DL — HIGH (ref 0.5–1.3)
CULTURE RESULTS: ABNORMAL
EGFR: 23 ML/MIN/1.73M2 — LOW
EGFR: 23 ML/MIN/1.73M2 — LOW
ESTIMATED AVERAGE GLUCOSE: 131 MG/DL — HIGH (ref 68–114)
GLUCOSE SERPL-MCNC: 92 MG/DL — SIGNIFICANT CHANGE UP (ref 70–99)
HCT VFR BLD CALC: 33 % — LOW (ref 39–50)
HGB BLD-MCNC: 10.9 G/DL — LOW (ref 13–17)
MCHC RBC-ENTMCNC: 31.1 PG — SIGNIFICANT CHANGE UP (ref 27–34)
MCHC RBC-ENTMCNC: 33 G/DL — SIGNIFICANT CHANGE UP (ref 32–36)
MCV RBC AUTO: 94 FL — SIGNIFICANT CHANGE UP (ref 80–100)
NRBC BLD AUTO-RTO: 0 /100 WBCS — SIGNIFICANT CHANGE UP (ref 0–0)
OSMOLALITY UR: 345 MOS/KG — SIGNIFICANT CHANGE UP (ref 300–900)
PLATELET # BLD AUTO: 107 K/UL — LOW (ref 150–400)
POTASSIUM SERPL-MCNC: 4.2 MMOL/L — SIGNIFICANT CHANGE UP (ref 3.5–5.3)
POTASSIUM SERPL-SCNC: 4.2 MMOL/L — SIGNIFICANT CHANGE UP (ref 3.5–5.3)
RBC # BLD: 3.51 M/UL — LOW (ref 4.2–5.8)
RBC # FLD: 16.5 % — HIGH (ref 10.3–14.5)
SODIUM SERPL-SCNC: 132 MMOL/L — LOW (ref 135–145)
SODIUM UR-SCNC: 15 MMOL/L — SIGNIFICANT CHANGE UP
SPECIMEN SOURCE: SIGNIFICANT CHANGE UP
TSH SERPL-MCNC: 1.01 UIU/ML — SIGNIFICANT CHANGE UP (ref 0.27–4.2)
WBC # BLD: 5.86 K/UL — SIGNIFICANT CHANGE UP (ref 3.8–10.5)
WBC # FLD AUTO: 5.86 K/UL — SIGNIFICANT CHANGE UP (ref 3.8–10.5)

## 2025-03-25 PROCEDURE — 70450 CT HEAD/BRAIN W/O DYE: CPT | Mod: 26

## 2025-03-25 RX ORDER — POLYETHYLENE GLYCOL 3350 17 G/17G
17 POWDER, FOR SOLUTION ORAL DAILY
Refills: 0 | Status: DISCONTINUED | OUTPATIENT
Start: 2025-03-25 | End: 2025-04-02

## 2025-03-25 RX ORDER — FLUTICASONE PROPIONATE 50 UG/1
1 SPRAY, METERED NASAL
Refills: 0 | Status: DISCONTINUED | OUTPATIENT
Start: 2025-03-25 | End: 2025-04-02

## 2025-03-25 RX ORDER — SENNA 187 MG
2 TABLET ORAL AT BEDTIME
Refills: 0 | Status: DISCONTINUED | OUTPATIENT
Start: 2025-03-25 | End: 2025-04-02

## 2025-03-25 RX ORDER — MUPIROCIN CALCIUM 20 MG/G
1 CREAM TOPICAL ONCE
Refills: 0 | Status: COMPLETED | OUTPATIENT
Start: 2025-03-25 | End: 2025-03-25

## 2025-03-25 RX ORDER — TRAZODONE HCL 100 MG
100 TABLET ORAL AT BEDTIME
Refills: 0 | Status: DISCONTINUED | OUTPATIENT
Start: 2025-03-25 | End: 2025-03-29

## 2025-03-25 RX ADMIN — MIDODRINE HYDROCHLORIDE 10 MILLIGRAM(S): 5 TABLET ORAL at 12:57

## 2025-03-25 RX ADMIN — Medication 100 MILLIGRAM(S): at 12:56

## 2025-03-25 RX ADMIN — Medication 650 MILLIGRAM(S): at 01:00

## 2025-03-25 RX ADMIN — MIDODRINE HYDROCHLORIDE 10 MILLIGRAM(S): 5 TABLET ORAL at 17:48

## 2025-03-25 RX ADMIN — Medication 2 TABLET(S): at 21:55

## 2025-03-25 RX ADMIN — Medication 0.5 MILLIGRAM(S): at 21:54

## 2025-03-25 RX ADMIN — MUPIROCIN CALCIUM 1 APPLICATION(S): 20 CREAM TOPICAL at 21:57

## 2025-03-25 RX ADMIN — TAMSULOSIN HYDROCHLORIDE 0.4 MILLIGRAM(S): 0.4 CAPSULE ORAL at 21:55

## 2025-03-25 RX ADMIN — Medication 100 MILLIGRAM(S): at 21:55

## 2025-03-25 RX ADMIN — MIDODRINE HYDROCHLORIDE 10 MILLIGRAM(S): 5 TABLET ORAL at 05:46

## 2025-03-25 RX ADMIN — Medication 260 MILLIGRAM(S): at 00:13

## 2025-03-25 RX ADMIN — FLUTICASONE PROPIONATE 1 SPRAY(S): 50 SPRAY, METERED NASAL at 21:55

## 2025-03-25 RX ADMIN — ATORVASTATIN CALCIUM 10 MILLIGRAM(S): 80 TABLET, FILM COATED ORAL at 21:55

## 2025-03-25 NOTE — PHYSICAL THERAPY INITIAL EVALUATION ADULT - MANUAL MUSCLE TESTING RESULTS, REHAB EVAL
BUE >/=3 except L shoulder flexion 2- (old rotator cuff injury), BLE >/=3- except bilateral ankles >/=3

## 2025-03-25 NOTE — PHYSICAL THERAPY INITIAL EVALUATION ADULT - PERTINENT HX OF CURRENT PROBLEM, REHAB EVAL
PMHx: CABG, A-fib on Eliquis, HTN, hyperlipidemia, CKD, multiple inguinal hernia repairs, biventricular pacemaker. BIBEMS for unwitnessed fall at home(lives alone). Pt is unsure how he fell, fell backwards & hit his head, had LOC, unknown downtime. C/o pain all over his body. Acute kidney injury superimposed on CKD. Hypokalemia. Fall at home likely secondary to Hypotension with Orthostasis as Bumex was increased to 5mg and Zaroxolyn was added recently added.    Brain CT: Age-appropriate involutional and ischemic gliotic changes without change since 9/20/2018.  C-spine CT: Heterogeneity of the vertebral bodies with multiple scattered lucencies suspicious for lytic neoplastic lesions such as metastatic disease or multiple myeloma.   T/lumbosacral-spine CT: No lucency in L5 of undetermined etiology. No acute fractures or dislocations. Generalized osteopenia. Bilateral pleural effusions.  CT C/A/P: No imaging evidence of acute traumatic injury involving the chest,   abdomen or pelvis. Mild cardiomegaly with pulmonary interstitial edema, small R & trace L pleural effusions  x-ray pelvis: No definite acute displaced fracture within the pelvis. Mild bilateral hip arthrosis. Partially evaluated hardware within the proximal right femur from prior fracture fixation. PMHx: CABG, A-fib on Eliquis, HTN, hyperlipidemia, CKD, multiple inguinal hernia repairs, biventricular pacemaker. BIBEMS for unwitnessed fall at home (lives alone). Pt is unsure how he fell, fell backwards & hit his head, had LOC, unknown downtime. C/o pain all over his body. Acute kidney injury superimposed on CKD. Hypokalemia. Fall at home likely secondary to Hypotension with Orthostasis as Bumex was increased to 5mg and Zaroxolyn was added recently added.    Brain CT: Age-appropriate involutional and ischemic gliotic changes without change since 9/20/2018.  C-spine CT: Heterogeneity of the vertebral bodies with multiple scattered lucencies suspicious for lytic neoplastic lesions such as metastatic disease or multiple myeloma.   T/lumbosacral-spine CT: No lucency in L5 of undetermined etiology. No acute fractures or dislocations. Generalized osteopenia. Bilateral pleural effusions.  CT C/A/P: No imaging evidence of acute traumatic injury involving the chest,   abdomen or pelvis. Mild cardiomegaly with pulmonary interstitial edema, small R & trace L pleural effusions  x-ray pelvis: No definite acute displaced fracture within the pelvis. Mild bilateral hip arthrosis. Partially evaluated hardware within the proximal right femur from prior fracture fixation.

## 2025-03-25 NOTE — PHYSICAL THERAPY INITIAL EVALUATION ADULT - PHYSICAL ASSIST/NONPHYSICAL ASSIST: SUPINE/SIT, REHAB EVAL
Full range of motion of upper and lower extremities, no joint tenderness/swelling.
verbal cues/nonverbal cues (demo/gestures)/1 person assist

## 2025-03-25 NOTE — PHYSICAL THERAPY INITIAL EVALUATION ADULT - NSPTDISCHREC_GEN_A_CORE
to be determined with completion of functional assessment and supervision / assistance as needed, HAI Lentz made aware/Home PT

## 2025-03-25 NOTE — PROGRESS NOTE ADULT - ASSESSMENT
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  Patient is unsure how he fell, fell backwards and hit his head, had LOC, unknown downtime.  Complains of pain all over his body.  Denies chest pain, shortness of breath.        Problem/Plan - 1:  ·  Problem: Acute kidney injury superimposed on CKD.  ·  Plan: Trending creatinine .  Renal help appreciated.   Holding Diuretics .     Problem/Plan - 2:  ·  Problem: Hypokalemia.  ·  Plan: Replacing.     Problem/Plan - 3:  ·  Problem: Acute on chronic systolic congestive heart failure.  ·  Plan: Euvolemic so holding Diuretics.   Cardiology help appreciated.  D/W his cardiologist who doesn't come here.     Problem/Plan - 4:  ·  Problem: Fall at home.  ·  Plan: Exact cause not know.   Likely secondary to Hypotension with Orthostasis as Bumex was increased to 5mg and Zaroxolyn was added  recently added.     Problem/Plan - 5:  ·  Problem: Paroxysmal atrial fibrillation.  ·  Plan: Holding AC as laceration .     Problem/Plan - 6:  ·  Problem: CAD in native artery.  ·  Plan: Holding AP.     Problem/Plan - 7:  ·  Problem: Anxiety and depression.  ·  Plan: Continue Trazadone and Xanax.   Reduced dose as was too sleepy today.      Problem/Plan - 8:  ·  Problem: HLD (hyperlipidemia).  ·  Plan: Continue Statin.     Problem/Plan - 9:  ·  Problem: Bilateral leg ulcer.  ·  Plan: Will consult  wound care.     Problem/Plan - 10:  ·  Problem: Hyponatremia.  ·  Plan; Renal help appreciated.      Problem/Plan - 11:  ·  Problem: Hypotension.  ·  Plan: On Midodrine so will continue.     Problem/Plan - 12:  ·  Problem: Scalp laceration.  ·  Plan: S/P Stitching scalp .   Dressing changes.  Rpting CT head.     D/W patient's daughter , his cardiologist and ACP

## 2025-03-25 NOTE — PHYSICAL THERAPY INITIAL EVALUATION ADULT - GENERAL OBSERVATIONS, REHAB EVAL
received supine with head of bed elevated +O2 via nc, daughter at bedside, +serosanguinous drainage L elbow dressing/bed chen

## 2025-03-25 NOTE — PHYSICAL THERAPY INITIAL EVALUATION ADULT - LIGHT TOUCH SENSATION, RLE, REHAB EVAL
As discussed, it is recommend that you continue the lisinopril (bp med for kidney protection) and the statin for cholesterol due to increased cardiovascular risk for diabetic patients.   However, you can trial off those medications for the next 3 months and will re-evaluate with labwork and blood pressure check at that time.   Heart healthy, carbohydrate controlled diet- limit red meat, limit saturated fat, moderate salt intake, limit junk food, etc.   Regular exercise  .      within normal limits

## 2025-03-25 NOTE — PROGRESS NOTE ADULT - ASSESSMENT
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  Patient is unsure how he fell, fell backwards and hit his head, had LOC, unknown downtime.  Complains of pain all over his body.  Denies chest pain, shortness of breath. Nephrology consulted for JASON.     A/P  JASON   scr baseline 1.5  admitted with scr 2.1   likely prerenal state sec to diuresis vs cardio-renal  diuresis as per cardio  s/p 1L NS in the ER  urine creatinine 70, pending urine urea collection  CTAP negative for hydronephrosis   s/p contrast study 03/24 - monitor for HELEN   scr worsened today, likely secondary to contrast   hold off diuretics in view of worsening renal function   s/p fall CK normal   monitor UO and scr    Hyponatremia  likely sec to fluid overload  hold home salt tabs   urine sodium low with high urine osm  monitor Na     Hypokalemia   s/p KCl 40 meq IV x 4 doses 3/24  K better  supplement as needed  keep K >4.0   89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  Patient is unsure how he fell, fell backwards and hit his head, had LOC, unknown downtime.  Complains of pain all over his body.  Denies chest pain, shortness of breath. Nephrology consulted for JASON.     A/P  JASON   scr baseline 1.5  admitted with scr 2.1   likely prerenal state sec to diuresis vs cardio-renal  diuresis as per cardio  s/p 1L NS in the ER  urine creatinine 70, pending urine urea collection  CTAP negative for hydronephrosis   s/p contrast study 03/24 - monitor for HELEN   scr worsened today, likely secondary to contrast   hold off diuretics in view of worsening renal function   s/p fall CK normal   monitor UO and scr  Adjust the dose of meds per GFR    Hyponatremia  likely sec to fluid overload  hold home salt tabs   urine sodium low with high urine osm  monitor Na     Hypokalemia   s/p KCl 40 meq IV x 4 doses 3/24  K better  supplement as needed  keep K >4.0

## 2025-03-25 NOTE — PHYSICAL THERAPY INITIAL EVALUATION ADULT - ADDITIONAL COMMENTS
lives alone in apartment with no steps to enter and no steps inside, walk in shower with grab bars and a seat, right hand dominant, wears glasses, +macular degeneration, uses rollator, has an aide 4days/wk ~10am-6pm which increased when pt's cardiac meds were changed but was not 24hr  pt reports he has difficulty putting on socks/shoes    daughter's house has 2 steps to enter without rail from the garage followed by 2 more steps without rail, shower stall with seat without grab bars lives alone in apartment with no steps to enter and no steps inside, walk in shower with grab bars and a seat, right hand dominant, wears glasses, +macular degeneration, uses rollator, has an aide 4days/wk ~10am-6pm which increased when pt's cardiac meds were changed but was not 24hr, daughter stated they can get 24hr aides  pt reports he has difficulty putting on socks/shoes    daughter's house has 2 steps to enter without rail from the garage followed by 2 more steps without rail, shower stall with seat without grab bars

## 2025-03-25 NOTE — PHYSICAL THERAPY INITIAL EVALUATION ADULT - STRENGTHENING, PT EVAL
GOAL: (to be met in 4 wks) increased BUE/BLE strength to 5/5 to decrease assistance with functional activities ( except L shoulder flexion due to old rotator cuff injury)

## 2025-03-25 NOTE — PROGRESS NOTE ADULT - ASSESSMENT
Assessment/plan:    Left anterior shin/ankle superficial ulcerations x 3: Stable, noninfected, present on admission    Recommend decubitus precautions and use of Z flow boots while in house.  Recommend normal saline cleanse with mupirocin ointment Adaptic touch and DSD to left lower extremity ulcerations daily.  Will continue to monitor for signs of infection and/or wound care recommendations.

## 2025-03-26 ENCOUNTER — RESULT REVIEW (OUTPATIENT)
Age: 89
End: 2025-03-26

## 2025-03-26 LAB
ANION GAP SERPL CALC-SCNC: 17 MMOL/L — SIGNIFICANT CHANGE UP (ref 5–17)
BUN SERPL-MCNC: 96 MG/DL — HIGH (ref 7–23)
CALCIUM SERPL-MCNC: 8.7 MG/DL — SIGNIFICANT CHANGE UP (ref 8.4–10.5)
CHLORIDE SERPL-SCNC: 85 MMOL/L — LOW (ref 96–108)
CO2 SERPL-SCNC: 26 MMOL/L — SIGNIFICANT CHANGE UP (ref 22–31)
CREAT SERPL-MCNC: 4.02 MG/DL — HIGH (ref 0.5–1.3)
EGFR: 13 ML/MIN/1.73M2 — LOW
EGFR: 13 ML/MIN/1.73M2 — LOW
GLUCOSE SERPL-MCNC: 104 MG/DL — HIGH (ref 70–99)
HCT VFR BLD CALC: 33.1 % — LOW (ref 39–50)
HGB BLD-MCNC: 10.8 G/DL — LOW (ref 13–17)
MCHC RBC-ENTMCNC: 30.9 PG — SIGNIFICANT CHANGE UP (ref 27–34)
MCHC RBC-ENTMCNC: 32.6 G/DL — SIGNIFICANT CHANGE UP (ref 32–36)
MCV RBC AUTO: 94.6 FL — SIGNIFICANT CHANGE UP (ref 80–100)
NRBC BLD AUTO-RTO: 0 /100 WBCS — SIGNIFICANT CHANGE UP (ref 0–0)
PLATELET # BLD AUTO: 109 K/UL — LOW (ref 150–400)
POTASSIUM SERPL-MCNC: 5 MMOL/L — SIGNIFICANT CHANGE UP (ref 3.5–5.3)
POTASSIUM SERPL-SCNC: 5 MMOL/L — SIGNIFICANT CHANGE UP (ref 3.5–5.3)
RBC # BLD: 3.5 M/UL — LOW (ref 4.2–5.8)
RBC # FLD: 16.1 % — HIGH (ref 10.3–14.5)
SODIUM SERPL-SCNC: 128 MMOL/L — LOW (ref 135–145)
UUN UR-MCNC: 461 MG/DL — SIGNIFICANT CHANGE UP
UUN UR-MCNC: 462 MG/DL — SIGNIFICANT CHANGE UP
WBC # BLD: 7.45 K/UL — SIGNIFICANT CHANGE UP (ref 3.8–10.5)
WBC # FLD AUTO: 7.45 K/UL — SIGNIFICANT CHANGE UP (ref 3.8–10.5)

## 2025-03-26 PROCEDURE — 97597 DBRDMT OPN WND 1ST 20 CM/<: CPT

## 2025-03-26 PROCEDURE — 93306 TTE W/DOPPLER COMPLETE: CPT | Mod: 26

## 2025-03-26 PROCEDURE — 99223 1ST HOSP IP/OBS HIGH 75: CPT | Mod: 25

## 2025-03-26 RX ADMIN — MIDODRINE HYDROCHLORIDE 10 MILLIGRAM(S): 5 TABLET ORAL at 17:45

## 2025-03-26 RX ADMIN — Medication 2 TABLET(S): at 21:36

## 2025-03-26 RX ADMIN — Medication 100 MILLIGRAM(S): at 21:36

## 2025-03-26 RX ADMIN — POLYETHYLENE GLYCOL 3350 17 GRAM(S): 17 POWDER, FOR SOLUTION ORAL at 12:32

## 2025-03-26 RX ADMIN — FLUTICASONE PROPIONATE 1 SPRAY(S): 50 SPRAY, METERED NASAL at 06:22

## 2025-03-26 RX ADMIN — ATORVASTATIN CALCIUM 10 MILLIGRAM(S): 80 TABLET, FILM COATED ORAL at 21:36

## 2025-03-26 RX ADMIN — Medication 100 MILLIGRAM(S): at 12:32

## 2025-03-26 RX ADMIN — Medication 0.5 MILLIGRAM(S): at 21:36

## 2025-03-26 RX ADMIN — MIDODRINE HYDROCHLORIDE 10 MILLIGRAM(S): 5 TABLET ORAL at 06:22

## 2025-03-26 RX ADMIN — MIDODRINE HYDROCHLORIDE 10 MILLIGRAM(S): 5 TABLET ORAL at 12:32

## 2025-03-26 NOTE — PROGRESS NOTE ADULT - ASSESSMENT
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  Patient is unsure how he fell, fell backwards and hit his head, had LOC, unknown downtime.  Complains of pain all over his body.  Denies chest pain, shortness of breath.        Problem/Plan - 1:  ·  Problem: Acute kidney injury superimposed on CKD.  ·  Plan: Trending creatinine .  Renal help appreciated.   Holding Diuretics .  Ordered BMP.     Problem/Plan - 2:  ·  Problem: Hypokalemia.  ·  Plan: Replacing.     Problem/Plan - 3:  ·  Problem: Acute on chronic systolic congestive heart failure.  ·  Plan: Euvolemic so holding Diuretics.   Cardiology help appreciated.  D/W his cardiologist who doesn't come here.     Problem/Plan - 4:  ·  Problem: Fall at home.  ·  Plan: Exact cause not know.   Likely secondary to Hypotension with Orthostasis as Bumex was increased to 5mg and Zaroxolyn was added  recently added.     Problem/Plan - 5:  ·  Problem: Paroxysmal atrial fibrillation.  ·  Plan: Holding AC as laceration .D/W his cardiologist yesterday and okay to hold it for few days. .     Problem/Plan - 6:  ·  Problem: CAD in native artery.  ·  Plan: Holding AP.     Problem/Plan - 7:  ·  Problem: Anxiety and depression.  ·  Plan: Continue Trazadone and Xanax.   Reduced dose as was too sleepy .      Problem/Plan - 8:  ·  Problem: HLD (hyperlipidemia).  ·  Plan: Continue Statin.     Problem/Plan - 9:  ·  Problem: Bilateral leg ulcer.  ·  Plan:   wound care consulted .     Problem/Plan - 10:  ·  Problem: Hyponatremia.  ·  Plan; Renal help appreciated.      Problem/Plan - 11:  ·  Problem: Hypotension.  ·  Plan: On Midodrine so will continue.     Problem/Plan - 12:  ·  Problem: Scalp laceration.  ·  Plan: S/P Stitching scalp .   Dressing changes.  Rpting CT head noted .        Problem/Plan - 12:  ·  Problem: Tremor.  ·  Plan: Neurology consulted.    D/W patient's daughter in detail and ACP

## 2025-03-26 NOTE — DIETITIAN INITIAL EVALUATION ADULT - PROBLEM SELECTOR PLAN 3
Euvolemic so holding Diuretics.   Cardiology help appreciated.  D/W his cardiologist who doesn't come here.

## 2025-03-26 NOTE — DIETITIAN INITIAL EVALUATION ADULT - PERTINENT MEDS FT
MEDICATIONS  (STANDING):  allopurinol 100 milliGRAM(s) Oral daily  ALPRAZolam 0.5 milliGRAM(s) Oral at bedtime  atorvastatin 10 milliGRAM(s) Oral at bedtime  fluticasone propionate 50 MICROgram(s)/spray Nasal Spray 1 Spray(s) Both Nostrils two times a day  influenza  Vaccine (HIGH DOSE) 0.5 milliLiter(s) IntraMuscular once  midodrine. 10 milliGRAM(s) Oral three times a day  polyethylene glycol 3350 17 Gram(s) Oral daily  senna 2 Tablet(s) Oral at bedtime  tamsulosin 0.4 milliGRAM(s) Oral at bedtime  traZODone 100 milliGRAM(s) Oral at bedtime    MEDICATIONS  (PRN):  acetaminophen     Tablet .. 650 milliGRAM(s) Oral every 6 hours PRN Temp greater or equal to 38.5C (101.3F), Moderate Pain (4 - 6)

## 2025-03-26 NOTE — CONSULT NOTE ADULT - ASSESSMENT
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         Wound Consult requested to assist w/ management of BUE wounds  Scalp Laceration    BUE- adaptic + Aquacel dressing QD  Scalp- warm soaks and gentle cleaning to remove dried drainage material  BLE elevation & Compression  Consider MEENU/PVR, Duplex, Xray, A/P BLE CT or MRI  Abx per Medicine/ ID  Moisturize intact skin w/ SWEEN cream BID  Nutrition Consult for optimization          encourage high quality protein, andres/ prosource, MVI & Vit C to promote wound healing  Continue turning and positioning w/ offloading assistive devices as per protocol  Buttocks/ Sacrum Grazyna BID and prn soiling        Continue w/ attends under pads and Pericare as per protocol  Waffle Cushion to chair when oob to chair  Continue w/ low air loss pressure redistribution bed surface   Care as per medicine, will follow w/ you  Upon discharge f/u as outpatient at [facility] [address] [place] [address] [phone]  Seen w/ attng & RN and D/w team   Thank you for this consult  Dianna Vo PA-C [id number]  Nights/ Weekends/ Holidays please call:  General Surgery Consult pager ([phone]) for emergencies  Wound PT for multilayer leg wrapping or VAC issues (x [phone])   I spent 55minutes face to face w/ this pt of which more than 50% of the time was spent counseling & coordinating care of this pt.  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         Wound Consult requested to assist w/ management of:  BUE wounds  Scalp Laceration    BUE- adaptic + Aquacel dressing QD  Scalp- warm soaks and gentle cleaning to remove dried drainage material  BLE elevation & Compression  Foot/leg wounds as per podiatry  Abx per Medicine/ ID  Moisturize intact skin w/ SWEEN cream BID  Nutrition Consult for optimization          encourage high quality protein, andres/ prosource, MVI & Vit C to promote wound healing  Continue turning and positioning w/ offloading assistive devices as per protocol  Buttocks/ Sacrum Grazyna BID and prn soiling        Continue w/ attends under pads and Pericare as per protocol  Waffle Cushion to chair when oob to chair  Continue w/ low air loss pressure redistribution bed surface   Care as per medicine, will follow w/ you  Upon discharge f/u as outpatient at Wound Center [address] [place] [address] [phone]  Seen w/ attng & RN and D/w team   Thank you for this consult  Dianna Vo PA-C [id number] [id number]  Nights/ Weekends/ Holidays please call:  General Surgery Consult pager ([phone]) for emergencies  Wound PT for multilayer leg wrapping or VAC issues (x [phone])

## 2025-03-26 NOTE — DIETITIAN INITIAL EVALUATION ADULT - REASON INDICATOR FOR ASSESSMENT
RD assessment warranted for: Consult for MST score 2 or >. Chart reviewed, events noted.  Source: medical record, patient, family at bedside, RN.

## 2025-03-26 NOTE — PROGRESS NOTE ADULT - ASSESSMENT
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  Patient is unsure how he fell, fell backwards and hit his head, had LOC, unknown downtime.  Complains of pain all over his body.  Denies chest pain, shortness of breath. Nephrology consulted for JASON.     A/P  JASON   scr baseline 1.5  admitted with scr 2.1   likely prerenal state sec to diuresis vs cardio-renal  diuresis as per cardio  s/p 1L NS in the ER  urine creatinine 70, pending urine urea collection  CTAP negative for hydronephrosis   s/p contrast study 03/24 - monitor for HELEN   scr worsened yesterday, likely secondary to contrast - no new labs today   hold off diuretics in view of worsening renal function   s/p fall CK normal   monitor UO and scr  Adjust the dose of meds per GFR    Hyponatremia  likely sec to fluid overload  hold home salt tabs   urine sodium low with high urine osm  monitor Na- no new labs today    Hypokalemia   s/p KCl 40 meq IV x 4 doses 3/24  K better - no new labs today   supplement as needed  keep K >4.0

## 2025-03-26 NOTE — DIETITIAN INITIAL EVALUATION ADULT - PERTINENT LABORATORY DATA
03-25    132[L]  |  89[L]  |  81[H]  ----------------------------<  92  4.2   |  29  |  2.60[H]    Ca    8.8      25 Mar 2025 06:37    A1C with Estimated Average Glucose Result: 6.2 % (03-24-25 @ 19:44)

## 2025-03-26 NOTE — DIETITIAN INITIAL EVALUATION ADULT - PROBLEM SELECTOR PLAN 4
Exact cause not know.   Likely secondary to Hypotension with Orthostasis as Bumex was increased to 5mg and Zaroxolyn was added  recently added.

## 2025-03-26 NOTE — DIETITIAN INITIAL EVALUATION ADULT - OTHER INFO
Patient and family report weight loss from 180lb to ~140-150lb x 3-4 years.  No dosing height or weight. 5'10 per [facility] HIE.  IBW: 166lb.  Weight history per [facility] HIE: 145lb (7/16/24), 174lb (10/2/18).  Weight appears decreasing  x 6 years but overall stable with recent weight from last year. RD to continue to monitor weight trends as available/able.     Nutrition-Related Concerns  -Acute systolic heart failure  -JASON on CKD. potassium low 3/24, now WDL. No phosphorus to assess. Patient and family report weight loss from 180lb to ~140-150lb x 3-4 years. Family notes recent weight fluctuations due to fluid shifts.  No dosing height or weight. 5'10 per [facility].  IBW: 166lb.  [facility] 174lb (10/2/18).  Weight appears decreasing  x 6 years but overall stable with recent weight from last year. RD to continue to monitor weight trends as available/able.     Nutrition-Related Concerns  -Acute systolic heart failure  -JASON on CKD. potassium low 3/24, now WDL. No phosphorus to assess.

## 2025-03-26 NOTE — DIETITIAN INITIAL EVALUATION ADULT - NSFNSPHYEXAMSKINFT_GEN_A_CORE
Wound Care consult pending.  per flowsheets:  Pressure Injury 1: sacrum, Suspected deep tissue injury

## 2025-03-26 NOTE — DIETITIAN INITIAL EVALUATION ADULT - ADD RECOMMEND
-Liberalize DASH diet to Low Sodium to promote po intake.  -Add Nepro x 1/day (provides 425 kcal, 19 g protein) to promote protein-energy intake.  -Add multivitamin and vitamin C for wound healing pending no medical contraindications.

## 2025-03-26 NOTE — DIETITIAN INITIAL EVALUATION ADULT - OTHER CALCULATIONS
Defer fluid needs to team.  Estimated nutrient needs based on reported UBW 145lb, with consideration for age

## 2025-03-26 NOTE — CONSULT NOTE ADULT - SUBJECTIVE AND OBJECTIVE BOX
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  Patient is unsure how he fell, fell backwards and hit his head, had LOC, unknown downtime.  Complains of pain all over his body.  Denies chest pain, shortness of breath.    (24 Mar 2025 15:02)        N/V/D,  BM/ Flatus,   NGT,     palp/ sob/dyspnea/ cp,       F/C/S  Wound consult requested by team to assist w/ management of      wound/ pressure injury.   Pt (unable to)  c/o pain, drainage, odor, color change,  or worsening swelling. Offloading and pericare initiated upon admission as pt Increasingly sedentary 2/2 to illness. Pt is Incontinent of urine & stool. (+)wynne/ ostomy.   No h/o bites, scratches, falls, trauma.  Pt seen by Wound RN  THO Advance/  Grazyna,TRIAD/ Aquacell/ medihoney/ Allevyn foam/ dakins/ Adaptic/ DSD recommended used at home/ while awaiting consult.  Appetite good/ decreased.  weight loss.  S&S / RD consult appreciated All questions asked and answered to pt's and family's expressed understanding and satisfaction.    Current Diet: Diet, DASH/TLC:   Sodium & Cholesterol Restricted (03-24-25 @ 16:46)      PAST MEDICAL & SURGICAL HISTORY:  Coronary artery disease involving native heart without angina pectoris, unspecified vessel or lesion type      Complete heart block  s/p PPM insertion-2008  Battery change- 2015  Last interrogation-05/2017      Paroxysmal atrial fibrillation  on Coumadin      Hypothyroidism, unspecified type      Chronic gout with tophus, unspecified cause, unspecified site      Benign prostatic hyperplasia, presence of lower urinary tract symptoms unspecified, unspecified morphology      Acute on chronic congestive heart failure, unspecified congestive heart failure type  2016      Abscess  diverticular      Basal cell carcinoma      Melanoma in situ of face excluding eyelid, nose, lip, and ear      Carpal tunnel syndrome of left wrist      BOOP (bronchiolitis obliterans with organizing pneumonia)  09/2016 after colonoscopy      SBO (small bowel obstruction)  03/2017      Adhesion of intestine  Relese of abdominal adhesions- 2002      Hx of cataract surgery, unspecified laterality      History of skin cancer      History of carpal tunnel release, unspecified laterality  right wrist- 2002      History of appendectomy      H/O hernia repair  bilateral IHR x 2      S/P CABG x 3  2002      History of colon resection  1995      Artificial cardiac pacemaker  2008      H/O shoulder surgery  Right shoulder repair- 2001          REVIEW OF SYSTEMS: Pt unable to offer  General/ Breast/ Skin/Vasc/ Neuro/ MSK: see HPI  All other systems negative    MEDICATIONS  (STANDING):  allopurinol 100 milliGRAM(s) Oral daily  ALPRAZolam 0.5 milliGRAM(s) Oral at bedtime  atorvastatin 10 milliGRAM(s) Oral at bedtime  fluticasone propionate 50 MICROgram(s)/spray Nasal Spray 1 Spray(s) Both Nostrils two times a day  influenza  Vaccine (HIGH DOSE) 0.5 milliLiter(s) IntraMuscular once  midodrine. 10 milliGRAM(s) Oral three times a day  polyethylene glycol 3350 17 Gram(s) Oral daily  senna 2 Tablet(s) Oral at bedtime  tamsulosin 0.4 milliGRAM(s) Oral at bedtime  traZODone 100 milliGRAM(s) Oral at bedtime    MEDICATIONS  (PRN):  acetaminophen     Tablet .. 650 milliGRAM(s) Oral every 6 hours PRN Temp greater or equal to 38.5C (101.3F), Moderate Pain (4 - 6)      Allergies    Diprivan (Short breath)    Intolerances        SOCIAL HISTORY:  / /single/ ; (+)HHA/ lives in SNF; Former smoker, No current/ Denies smoking, ETOH, drugs    FAMILY HISTORY:   no h/o PVD or wound healing or skin/ significant problems    PHYSICAL EXAM:  Vital Signs Last 24 Hrs  T(C): 36.8 (26 Mar 2025 00:11), Max: 37.1 (25 Mar 2025 09:40)  T(F): 98.3 (26 Mar 2025 00:11), Max: 98.7 (25 Mar 2025 09:40)  HR: 76 (26 Mar 2025 00:11) (70 - 76)  BP: 103/64 (26 Mar 2025 00:11) (85/47 - 103/64)  BP(mean): --  RR: 16 (26 Mar 2025 00:11) (16 - 18)  SpO2: 94% (26 Mar 2025 00:11) (91% - 95%)    Parameters below as of 26 Mar 2025 00:11  Patient On (Oxygen Delivery Method): nasal cannula  O2 Flow (L/min): 2      NAD, Guarded but stable,  A&Ox3/ Alert/ Confused  cachectic/ thin, MO/ Obese, frail,  WD/ WN/ WG,  Disheveled  Total Care Sport/ Versa Care P500 / Envella Progressa bed     HEENT:  NC/AT, PERRL, EOMI, sclera clear, mucosa moist, throat clear, trachea midline, neck supple, trach  Respiratory: nonlabored w/ equal chest rise  Gastrointestinal: soft NT/ND (+)BS  (+)PEG (+)ostomy (+)NGT  : (+)wynne/ purewick/ condom cath  Neurology:  weakened strength & sensation grossly intact, paraesthesia  nonverbal, no follow commands, paraplegic  Psych: calm/ appropriate/ flat affect/ easily agitated/ restless/ anxious/ difficult to assess  Musculoskeletal:  limited stiff / p/FROM, no deformities/ contractures  Vascular: BLE equally warm/ cool,  no cyanosis, clubbing, edema nor acute ischemia           >LE //BLE edema equal           BLE DP/PT pulses palpable          BLE hemosiderin staining/ varicose veins  Skin:  moist w/ good turgor  thin, dry, pale, frail,  ecchymosis w/o hematoma  blistering  or serosanguinous drainage  No odor, erythema, increased warmth, tenderness, induration, fluctuance, nor crepitus    LABS/ CULTURES/ RADIOLOGY:                        10.9   5.86  )-----------( 107      ( 25 Mar 2025 06:37 )             33.0       132  |  89  |  81  ----------------------------<  92      [03-25-25 @ 06:37]  4.2   |  29  |  2.60        Ca     8.8     [03-25-25 @ 06:37]      Mg     2.4     [03-24-25 @ 10:42]    TPro  6.9  /  Alb  4.0  /  TBili  0.8  /  DBili  x   /  AST  23  /  ALT  21  /  AlkPhos  98  [03-24-25 @ 10:42]    PT/INR: PT 16.3 , INR 1.43       [03-24-25 @ 10:42]  PTT: 35.2       [03-24-25 @ 10:42]        A1C with Estimated Average Glucose Result: 6.2 % (03-24-25 @ 19:44)        Culture - Blood (collected 03-24-25 @ 10:31)  Source: Blood Blood  Preliminary Report (03-25-25 @ 14:02):    No growth at 24 hours    Culture - Blood (collected 03-24-25 @ 10:15)  Source: Blood Blood  Preliminary Report (03-25-25 @ 14:02):    No growth at 24 hours                      A/P:    Wound Consult requested to assist w/ management of    BLE elevation & Compression  Consider MEENU/PVR, Duplex, Xray, A/P BLE CT or MRI  Abx per Medicine/ ID  Moisturize intact skin w/ SWEEN cream BID  Nutrition Consult for optimization in pt w/ Severe Protein Calorie Malnutrition,        Inadequate PO intake, & Increased nutritional needs            encourage high quality protein, andres/ prosource, MVI & Vit C to promote wound healing  Hyperglycemia - improving w/ ADA diet and Lantus/ NPH & FS w/ ISS, consider Endo Consult, consider HgA1c  Anemia- improving w/ transfusions, Fe studies, protonix  Continue turning and positioning w/ offloading assistive devices as per protocol  Buttocks/ Sacrum Grazyna/ TRIAD BID /CAVILON ADVANCE TIW and prn soiling        Continue w/ attends under pads and Pericare w/ wynne/ condom cath maintenance / purewick care as per protocol  Waffle Cushion to chair when oob to chair  Continue w/ low air loss pressure redistribution bed surface   Pt will need Group 2 mattress on hospital bed and ROHO cushion for wheel chair upon discharge home  Care as per medicine, will follow w/ you/ remain available as requested  Upon discharge f/u as outpatient at [facility] [address] [place] [address] [phone]  Seen w/ attng & RN and D/w team & RN  Thank you for this consult  Dianna Vo PA-C [id number]  Nights/ Weekends/ Holidays please call:  General Surgery Consult pager ([phone]) for emergencies  Wound PT for multilayer leg wrapping or VAC issues (x [phone])      Wound SURGERY CONSULT NOTE    HPI:  89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  Patient is unsure how he fell, fell backwards and hit his head, had LOC, unknown downtime.  Complains of pain all over his body.  Denies chest pain, shortness of breath.     Wound consult requested by team to assist w/ management of BUE and head wounds. Scalp laceration stapled in ED. Pt w/o c/o pain, drainage, odor, color change, or worsening swelling. Pt on blood thinners and skin noted to be frail. Offloading and pericare initiated upon admission as pt Increasingly sedentary 2/2 to illness. Pt is mostly continent of urine & stool. insisted on diaper. R&B explained to patient & daughter. No h/o bites, scratches, falls, trauma.  Pt seen by Podiatry for feet/ legs. Appetite good w/o weight loss.All questions asked and answered to pt's and his daughter's expressed understanding and satisfaction.    Current Diet: Diet, DASH/TLC:   Sodium & Cholesterol Restricted (03-24-25 @ 16:46)      PAST MEDICAL & SURGICAL HISTORY:  Coronary artery disease involving native heart without angina pectoris, unspecified vessel or lesion type  S/P CABG x 3    Complete heart block  s/p PPM insertion, Battery change-    Paroxysmal atrial fibrillation    Hypothyroidism, unspecified type    Chronic gout with tophus, unspecified cause, unspecified site    Benign prostatic hyperplasia, presence of lower urinary tract symptoms unspecified, unspecified morphology    Acute on chronic congestive heart failure, unspecified congestive heart failure type    Abscess, diverticular    s/p Colon Resection    Basal cell carcinoma, s/p Excision    Melanoma in situ of face excluding eyelid, nose, lip, and ear    Carpal tunnel syndrome   s/p carpal tunnel release    BOOP (bronchiolitis obliterans with organizing pneumonia)    SBO (small bowel obstruction)    Adhesion of intestine s/p RAMA    s/p cataract surgery, unspecified laterality    s/p appendectomy    s/p hernia repair, bilateral IHR x 2    s/p shoulder surgery- Right shoulder repair        REVIEW OF SYSTEMS: General/ Skin/ Vasc/  MSK: see HPI  All other systems negative    MEDICATIONS  (STANDING):  allopurinol 100 milliGRAM(s) Oral daily  ALPRAZolam 0.5 milliGRAM(s) Oral at bedtime  atorvastatin 10 milliGRAM(s) Oral at bedtime  fluticasone propionate 50 MICROgram(s)/spray Nasal Spray 1 Spray(s) Both Nostrils two times a day  influenza  Vaccine (HIGH DOSE) 0.5 milliLiter(s) IntraMuscular once  midodrine. 10 milliGRAM(s) Oral three times a day  polyethylene glycol 3350 17 Gram(s) Oral daily  senna 2 Tablet(s) Oral at bedtime  tamsulosin 0.4 milliGRAM(s) Oral at bedtime  traZODone 100 milliGRAM(s) Oral at bedtime    MEDICATIONS  (PRN):  acetaminophen  Tablet 650 milliGRAM(s) Oral every 6 hours PRN Temp greater or equal to 38.5C (101.3F), Moderate Pain (4 - 6)      Allergies  Diprivan (Short breath)      SOCIAL HISTORY:  ; (+)HHA;  Denies smoking, ETOH, drugs    FAMILY HISTORY: no h/o PVD or wound healing or skin/ significant problems    PHYSICAL EXAM:  Vital Signs Last 24 Hrs  T(C): 36.8 (26 Mar 2025 00:11), Max: 37.1 (25 Mar 2025 09:40)  T(F): 98.3 (26 Mar 2025 00:11), Max: 98.7 (25 Mar 2025 09:40)  HR: 76 (26 Mar 2025 00:11) (70 - 76)  BP: 103/64 (26 Mar 2025 00:11) (85/47 - 103/64)  BP(mean): --  RR: 16 (26 Mar 2025 00:11) (16 - 18)  SpO2: 94% (26 Mar 2025 00:11) (91% - 95%)    Parameters below as of 26 Mar 2025 00:11  Patient On (Oxygen Delivery Method): nasal cannula  O2 Flow (L/min): 2      NAD, A&Ox3, thin, frail,  WD/ WN/ WG,   Versa Care P500 bed  HEENT:  NC, EOMI, sclera clear, mucosa moist, throat clear, trachea midline, neck supple    posterior scalp w/ dried crusted blood over staples    no drainage  No odor, erythema, increased warmth, tenderness, induration, fluctuance, nor crepitus  Respiratory: nonlabored w/ equal chest rise  Gastrointestinal: soft NT/ND  Neurology:  weakened strength & sensation grossly intact  Psych: calm/ appropriate  Musculoskeletal:  FROM, no deformities/ contractures  Vascular: BLE equally warm, no cyanosis, clubbing, edema nor acute ischemia               BLE hemosiderin staining               dressing c/d/i  Skin:  thin, dry, pale, frail,  ecchymosis w/o hematoma  BUE w/ multiple partial thickness moist pink wounds     Dressings soaked off w/ Normal Saline    RUE skin tear flap ecchymotic and nonviable 4cm x 2cm x 0.1cm  Procedure Note  Using aseptic technique limited excisional debrided of RUE wound using a scissor and forceps through nonviable epidermis tissue only.  Pt tolerated procedure well.  Hemostasis was maintained throughout.    Debulking debridement of nonviable tissue w/  measurements same Pre/Post.     scant serosanguinous drainage from all wounds  No odor, erythema, increased warmth, tenderness, induration, fluctuance, nor crepitus    LABS/ CULTURES/ RADIOLOGY:                        10.9   5.86  )-----------( 107      ( 25 Mar 2025 06:37 )             33.0       132  |  89  |  81  ----------------------------<  92      [03-25-25 @ 06:37]  4.2   |  29  |  2.60        Ca     8.8     [03-25-25 @ 06:37]      Mg     2.4     [03-24-25 @ 10:42]    TPro  6.9  /  Alb  4.0  /  TBili  0.8  /  DBili  x   /  AST  23  /  ALT  21  /  AlkPhos  98  [03-24-25 @ 10:42]    PT/INR: PT 16.3 , INR 1.43       [03-24-25 @ 10:42]  PTT: 35.2       [03-24-25 @ 10:42]      A1C with Estimated Average Glucose Result: 6.2 % (03-24-25 @ 19:44)      Culture - Blood (collected 03-24-25 @ 10:31)  Source: Blood Blood  Preliminary Report (03-25-25 @ 14:02):    No growth at 24 hours    Culture - Blood (collected 03-24-25 @ 10:15)  Source: Blood Blood  Preliminary Report (03-25-25 @ 14:02):    No growth at 24 hours             Wound SURGERY CONSULT NOTE    HPI:  89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  Patient is unsure how he fell, fell backwards and hit his head, had LOC, unknown downtime.  Complains of pain all over his body.  Denies chest pain, shortness of breath.     Wound consult requested by team to assist w/ management of BUE and head wounds. Scalp laceration stapled in ED. Pt w/o c/o pain, drainage, odor, color change, or worsening swelling. Pt on blood thinners and skin noted to be frail. Offloading and pericare initiated upon admission as pt Increasingly sedentary 2/2 to illness. Pt is mostly continent of urine & stool. insisted on diaper. R&B explained to patient & daughter. No h/o bites, scratches, falls, trauma.  Pt seen by Podiatry for feet/ legs. Appetite good w/o weight loss.All questions asked and answered to pt's and his daughter's expressed understanding and satisfaction.    Current Diet: Diet, DASH/TLC:   Sodium & Cholesterol Restricted (03-24-25 @ 16:46)      PAST MEDICAL & SURGICAL HISTORY:  Coronary artery disease involving native heart without angina pectoris, unspecified vessel or lesion type  S/P CABG x 3    Complete heart block  s/p PPM insertion, Battery change-    Paroxysmal atrial fibrillation    Hypothyroidism, unspecified type    Chronic gout with tophus, unspecified cause, unspecified site    Benign prostatic hyperplasia, presence of lower urinary tract symptoms unspecified, unspecified morphology    Acute on chronic congestive heart failure, unspecified congestive heart failure type    Abscess, diverticular    s/p Colon Resection    Basal cell carcinoma, s/p Excision    Melanoma in situ of face excluding eyelid, nose, lip, and ear    Carpal tunnel syndrome   s/p carpal tunnel release    BOOP (bronchiolitis obliterans with organizing pneumonia)    SBO (small bowel obstruction)    Adhesion of intestine s/p RAMA    s/p cataract surgery, unspecified laterality    s/p appendectomy    s/p hernia repair, bilateral IHR x 2    s/p shoulder surgery- Right shoulder repair        REVIEW OF SYSTEMS: General/ Skin/ Vasc/  MSK: see HPI  All other systems negative    MEDICATIONS  (STANDING):  allopurinol 100 milliGRAM(s) Oral daily  ALPRAZolam 0.5 milliGRAM(s) Oral at bedtime  atorvastatin 10 milliGRAM(s) Oral at bedtime  fluticasone propionate 50 MICROgram(s)/spray Nasal Spray 1 Spray(s) Both Nostrils two times a day  influenza  Vaccine (HIGH DOSE) 0.5 milliLiter(s) IntraMuscular once  midodrine. 10 milliGRAM(s) Oral three times a day  polyethylene glycol 3350 17 Gram(s) Oral daily  senna 2 Tablet(s) Oral at bedtime  tamsulosin 0.4 milliGRAM(s) Oral at bedtime  traZODone 100 milliGRAM(s) Oral at bedtime    MEDICATIONS  (PRN):  acetaminophen  Tablet 650 milliGRAM(s) Oral every 6 hours PRN Temp greater or equal to 38.5C (101.3F), Moderate Pain (4 - 6)      Allergies  Diprivan (Short breath)      SOCIAL HISTORY:  ; (+)HHA;  Denies smoking, ETOH, drugs    FAMILY HISTORY: no h/o PVD or wound healing or skin/ significant problems    PHYSICAL EXAM:  Vital Signs Last 24 Hrs  T(C): 36.8 (26 Mar 2025 00:11), Max: 37.1 (25 Mar 2025 09:40)  T(F): 98.3 (26 Mar 2025 00:11), Max: 98.7 (25 Mar 2025 09:40)  HR: 76 (26 Mar 2025 00:11) (70 - 76)  BP: 103/64 (26 Mar 2025 00:11) (85/47 - 103/64)  BP(mean): --  RR: 16 (26 Mar 2025 00:11) (16 - 18)  SpO2: 94% (26 Mar 2025 00:11) (91% - 95%)    Parameters below as of 26 Mar 2025 00:11  Patient On (Oxygen Delivery Method): nasal cannula  O2 Flow (L/min): 2      NAD, A&Ox3, thin, frail,  WD/ WN/ WG,   Versa Care P500 bed  HEENT:  NC, EOMI, sclera clear, mucosa moist, throat clear, trachea midline, neck supple    posterior scalp w/ dried crusted blood over staples    no drainage  No odor, erythema, increased warmth, tenderness, induration, fluctuance, nor crepitus  Respiratory: nonlabored w/ equal chest rise  Gastrointestinal: soft NT/ND  Neurology:  weakened strength & sensation grossly intact  Psych: calm/ appropriate  Musculoskeletal:  FROM, no deformities/ contractures  Vascular: BLE equally warm, no cyanosis, clubbing, edema nor acute ischemia               BLE hemosiderin staining               dressing c/d/i  Skin:  thin, dry, pale, frail,  ecchymosis w/o hematoma  BUE w/ multiple partial thickness moist pink wounds     Dressings soaked off w/ Normal Saline    RUE skin tear flap ecchymotic and nonviable 4cm x 2cm x 0.1cm  Procedure Note  Using aseptic technique selective excisional debridement of RUE woundwas performed using a scissor and forceps through nonviable epidermis tissue only.  Pt tolerated procedure well.  Hemostasis was maintained throughout.    Debulking debridement of nonviable tissue w/  measurements same Pre/Post.     scant serosanguinous drainage from all wounds  No odor, erythema, increased warmth, tenderness, induration, fluctuance, nor crepitus    LABS/ CULTURES/ RADIOLOGY:                        10.9   5.86  )-----------( 107      ( 25 Mar 2025 06:37 )             33.0       132  |  89  |  81  ----------------------------<  92      [03-25-25 @ 06:37]  4.2   |  29  |  2.60        Ca     8.8     [03-25-25 @ 06:37]      Mg     2.4     [03-24-25 @ 10:42]    TPro  6.9  /  Alb  4.0  /  TBili  0.8  /  DBili  x   /  AST  23  /  ALT  21  /  AlkPhos  98  [03-24-25 @ 10:42]    PT/INR: PT 16.3 , INR 1.43       [03-24-25 @ 10:42]  PTT: 35.2       [03-24-25 @ 10:42]      A1C with Estimated Average Glucose Result: 6.2 % (03-24-25 @ 19:44)      Culture - Blood (collected 03-24-25 @ 10:31)  Source: Blood Blood  Preliminary Report (03-25-25 @ 14:02):    No growth at 24 hours    Culture - Blood (collected 03-24-25 @ 10:15)  Source: Blood Blood  Preliminary Report (03-25-25 @ 14:02):    No growth at 24 hours

## 2025-03-26 NOTE — CONSULT NOTE ADULT - NS ATTEND AMEND GEN_ALL_CORE FT
JASON pre-renal state sec to diuresis vs cardio-renal  s/p 1L NS in ER  monitor renal function  diuresis per cardio
Pt seen and examined with ACP.  Assessment and plan reviewed and discussed.  Agree with above.    Status of wounds and treatment recommendations d/w  pt and pt's daughter at length. .  All questions answered.   Pt and daughter expressed understanding.    I spent  75 minutes face to face w/ this pt of which more than 50% of the time was spent counseling & coordinating care of this pt.

## 2025-03-26 NOTE — DIETITIAN INITIAL EVALUATION ADULT - ENERGY INTAKE
Family and RN encouraging meal intake. Fair intake reported and noted per flowsheets. Patient reports difficulty with gripping food/utensils while eating. Fair (50-75%)

## 2025-03-26 NOTE — DIETITIAN INITIAL EVALUATION ADULT - ORAL INTAKE PTA/DIET HISTORY
Patient reports fair appetite and intake at baseline. Following low sodium diet for a few months PTA. Confirms no known food allergies. Takes Preservision eye vitamins PTA. Not taking any oral nutrition supplements PTA.

## 2025-03-26 NOTE — DIETITIAN INITIAL EVALUATION ADULT - PERSON TAUGHT/METHOD
Educated pt on the importance of consuming a diet adequate in protein and micronutrients for wound healing/skin integrity. Family requesting oral nutrition supplement suitable for CKD. Patient amenable to trialing Nepro.   Discussed continuing low sodium diet at home for heart failure + CKD management. Discussed relationship between fluid shifts and weight fluctuations.  Pt and family made aware RD remains available and will follow up for any additional questions/concerns and per protocol./family/verbal instruction/patient instructed

## 2025-03-27 LAB
ANION GAP SERPL CALC-SCNC: 18 MMOL/L — HIGH (ref 5–17)
BUN SERPL-MCNC: 104 MG/DL — HIGH (ref 7–23)
CALCIUM SERPL-MCNC: 8.5 MG/DL — SIGNIFICANT CHANGE UP (ref 8.4–10.5)
CHLORIDE SERPL-SCNC: 85 MMOL/L — LOW (ref 96–108)
CO2 SERPL-SCNC: 26 MMOL/L — SIGNIFICANT CHANGE UP (ref 22–31)
CREAT SERPL-MCNC: 4.37 MG/DL — HIGH (ref 0.5–1.3)
EGFR: 12 ML/MIN/1.73M2 — LOW
EGFR: 12 ML/MIN/1.73M2 — LOW
GLUCOSE SERPL-MCNC: 119 MG/DL — HIGH (ref 70–99)
HCT VFR BLD CALC: 31.9 % — LOW (ref 39–50)
HGB BLD-MCNC: 10.5 G/DL — LOW (ref 13–17)
MCHC RBC-ENTMCNC: 30.6 PG — SIGNIFICANT CHANGE UP (ref 27–34)
MCHC RBC-ENTMCNC: 32.9 G/DL — SIGNIFICANT CHANGE UP (ref 32–36)
MCV RBC AUTO: 93 FL — SIGNIFICANT CHANGE UP (ref 80–100)
NRBC BLD AUTO-RTO: 0 /100 WBCS — SIGNIFICANT CHANGE UP (ref 0–0)
PLATELET # BLD AUTO: 106 K/UL — LOW (ref 150–400)
POTASSIUM SERPL-MCNC: 4.9 MMOL/L — SIGNIFICANT CHANGE UP (ref 3.5–5.3)
POTASSIUM SERPL-SCNC: 4.9 MMOL/L — SIGNIFICANT CHANGE UP (ref 3.5–5.3)
RBC # BLD: 3.43 M/UL — LOW (ref 4.2–5.8)
RBC # FLD: 16 % — HIGH (ref 10.3–14.5)
SODIUM SERPL-SCNC: 129 MMOL/L — LOW (ref 135–145)
WBC # BLD: 6.49 K/UL — SIGNIFICANT CHANGE UP (ref 3.8–10.5)
WBC # FLD AUTO: 6.49 K/UL — SIGNIFICANT CHANGE UP (ref 3.8–10.5)

## 2025-03-27 RX ORDER — PREDNISONE 20 MG/1
20 TABLET ORAL DAILY
Refills: 0 | Status: DISCONTINUED | OUTPATIENT
Start: 2025-03-27 | End: 2025-03-30

## 2025-03-27 RX ORDER — LACTOBACILLUS ACIDOPHILUS/PECT 75 MM-100
1 CAPSULE ORAL DAILY
Refills: 0 | Status: DISCONTINUED | OUTPATIENT
Start: 2025-03-27 | End: 2025-04-02

## 2025-03-27 RX ORDER — LEVOTHYROXINE SODIUM 300 MCG
112 TABLET ORAL DAILY
Refills: 0 | Status: DISCONTINUED | OUTPATIENT
Start: 2025-03-27 | End: 2025-04-02

## 2025-03-27 RX ORDER — B1/B2/B3/B5/B6/B12/VIT C/FOLIC 500-0.5 MG
1 TABLET ORAL DAILY
Refills: 0 | Status: DISCONTINUED | OUTPATIENT
Start: 2025-03-27 | End: 2025-04-02

## 2025-03-27 RX ORDER — HYDROCORTISONE 10 MG/G
1 CREAM TOPICAL ONCE
Refills: 0 | Status: COMPLETED | OUTPATIENT
Start: 2025-03-27 | End: 2025-03-28

## 2025-03-27 RX ADMIN — Medication 100 MILLIGRAM(S): at 22:08

## 2025-03-27 RX ADMIN — Medication 112 MICROGRAM(S): at 14:54

## 2025-03-27 RX ADMIN — MIDODRINE HYDROCHLORIDE 10 MILLIGRAM(S): 5 TABLET ORAL at 12:38

## 2025-03-27 RX ADMIN — Medication 2 TABLET(S): at 22:07

## 2025-03-27 RX ADMIN — Medication 100 MILLIGRAM(S): at 12:39

## 2025-03-27 RX ADMIN — MIDODRINE HYDROCHLORIDE 10 MILLIGRAM(S): 5 TABLET ORAL at 17:09

## 2025-03-27 RX ADMIN — ATORVASTATIN CALCIUM 10 MILLIGRAM(S): 80 TABLET, FILM COATED ORAL at 22:09

## 2025-03-27 RX ADMIN — Medication 1 TABLET(S): at 12:38

## 2025-03-27 RX ADMIN — Medication 0.5 MILLIGRAM(S): at 22:08

## 2025-03-27 RX ADMIN — MIDODRINE HYDROCHLORIDE 10 MILLIGRAM(S): 5 TABLET ORAL at 05:22

## 2025-03-27 RX ADMIN — FLUTICASONE PROPIONATE 1 SPRAY(S): 50 SPRAY, METERED NASAL at 17:09

## 2025-03-27 RX ADMIN — POLYETHYLENE GLYCOL 3350 17 GRAM(S): 17 POWDER, FOR SOLUTION ORAL at 12:39

## 2025-03-27 RX ADMIN — PREDNISONE 20 MILLIGRAM(S): 20 TABLET ORAL at 14:54

## 2025-03-27 RX ADMIN — FLUTICASONE PROPIONATE 1 SPRAY(S): 50 SPRAY, METERED NASAL at 05:23

## 2025-03-27 RX ADMIN — TAMSULOSIN HYDROCHLORIDE 0.4 MILLIGRAM(S): 0.4 CAPSULE ORAL at 22:07

## 2025-03-27 NOTE — PROGRESS NOTE ADULT - ASSESSMENT
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  Patient is unsure how he fell, fell backwards and hit his head, had LOC, unknown downtime.  Complains of pain all over his body.  Denies chest pain, shortness of breath. Nephrology consulted for JASON.     A/P  JASON   scr baseline 1.7 per pt.  admitted with scr 2.1   likely prerenal state sec to diuresis vs cardio-renal  s/p 1L NS in the ER  s/p fall - CK normal   FeUrea 21.1% -- suggests pre-renal vs. HF.  CTAP negative for hydronephrosis   s/p contrast study 03/24 - monitor for HELEN   S.Cr continues to worsen -- likely secondary to contrast  BUN elevated -- hold off diuretics in view of worsening renal function.  NP d/w pt and daughter, Madhavi at bedside regarding worsening renal function and symptom of fatigue -- possibly d/t uremia in light of elevated BUN.  RRT recommended; pt refused at this time.   Renal diet.  monitor UO and scr  Adjust the dose of meds per GFR  Avoid further nephrotoxic agents; no more studies with contrast.    Hyponatremia  likely sec to fluid overload  hold home salt tabs   urine sodium low with high urine osm  Stable.  Off diuretics in view of worsening JASON.  Free fluid restriction to 1.2-1.5L/day -- pt and daughter educated.  Monitor Na.    Hypokalemia   s/p KCl 40 meq IV x 4 doses 3/24  K better.  supplement as needed  keep K >4.0    Hypotension:  On midodrine.  Monitor BP.    R wrist pain / tremors:  Pt reports pain has been present for over 1 month.  He has been taking prednisone, tapered down to 2.5mg BID.  Pt reports tremors of R hand present before admission but worsened yesterday; possibly d/t uremia.  Neuro following.    Syncope:  Workup per team.  Neuro following.  Orthostatic +.  On midodrine.  Cardiology following.

## 2025-03-27 NOTE — PROGRESS NOTE ADULT - ASSESSMENT
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  Patient is unsure how he fell, fell backwards and hit his head, had LOC, unknown downtime.  Complains of pain all over his body.  Denies chest pain, shortness of breath.        Problem/Plan - 1:  ·  Problem: Acute kidney injury superimposed on CKD.  ·  Plan: Trending BMP.  Likely secondary to HELEN.   Renal help appreciated.   Refusing HD.      Problem/Plan - 2:  ·  Problem: Hypokalemia.  ·  Plan: Replacing.     Problem/Plan - 3:  ·  Problem: Acute on chronic systolic congestive heart failure.  ·  Plan: Euvolemic so holding Diuretics.   Cardiology help appreciated.  D/W his cardiologist who doesn't come here.     Problem/Plan - 4:  ·  Problem: Fall at home.  ·  Plan: Exact cause not know.   Likely secondary to Hypotension with Orthostasis as Bumex was increased to 5mg and Zaroxolyn was added  recently added.     Problem/Plan - 5:  ·  Problem: Paroxysmal atrial fibrillation.  ·  Plan: Holding AC as laceration .D/W his cardiologist yesterday and okay to hold it for few days. .     Problem/Plan - 6:  ·  Problem: CAD in native artery.  ·  Plan: Holding AP.     Problem/Plan - 7:  ·  Problem: Anxiety and depression.  ·  Plan: Continue Trazadone and Xanax.   Reduced dose as was too sleepy .      Problem/Plan - 8:  ·  Problem: HLD (hyperlipidemia).  ·  Plan: Continue Statin.     Problem/Plan - 9:  ·  Problem: Bilateral leg ulcer.  ·  Plan:   wound care help appreciated.      Problem/Plan - 10:  ·  Problem: Hyponatremia.  ·  Plan; Renal help appreciated.      Problem/Plan - 11:  ·  Problem: Hypotension.  ·  Plan: On Midodrine so will continue.     Problem/Plan - 12:  ·  Problem: Scalp laceration.  ·  Plan: S/P Stitching scalp .   Dressing changes.  Rpting CT head noted .      Problem/Plan - 12:  ·  Problem: Tremor/ Asterixis  ·  Plan: Neurology consult noted.     D/W patient ,patient's daughter in detail and ACP

## 2025-03-27 NOTE — CONSULT NOTE ADULT - ASSESSMENT
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  Patient is unsure how he fell, fell backwards and hit his head, had LOC, unknown downtime.  Complains of pain all over his body.  Denies chest pain, shortness of breath.   + LOC, no tongue bite, no incontinence. no conuvlsions   o/e AAOx2-3, PITT but LUE pain limited, walker baseline   orthostatics +   CTH neg   CT C/T/L spine; multiple scattered lucensies suspicioous for lytic neoplastic lesions such as mets or multiple myeloma.      Imprssion:   1) Syncope, unclear; possible multifactorial, dehydration, hypotension, orthostatasis   2) tremor, likely metabolic at this point but component of esesntial tremor  3) dizzy/vertigo when turns head  4) hyponatremia to 128    - orthostatics +; comproession stockings, hydration, trend orthostatics.  may need to consider midodrine  - cardio recs appreicated   - consider checking vessels if not already done so with carotid duplex    - r/o multiple myeloma. check ca, SPEP, UPEP, hematology eval   - outpatient EEG   - slow correction of Na no more than 8-12 meq in 24hrs   - device interrogation  - unable for MRI.    - telemetry  - check b12 level   - PT/OT  - check FS, glucose control <180  - GI/DVT ppx  - Counseling on diet, exercise, and medication adherence was done  - Counseling on smoking cessation and alcohol consumption offered when appropriate.  - Pain assessed and judicious use of narcotics when appropriate was discussed.    - Stroke education given when appropriate.  - Importance of fall prevention discussed.   - Differential diagnosis and plan of care discussed with patient and/or family and primary team  - Thank you for allowing me to participate in the care of this patient. Call with questions.     Jose Luis Claire MD  Vascular Neurology  Office: [phone]

## 2025-03-27 NOTE — CONSULT NOTE ADULT - SUBJECTIVE AND OBJECTIVE BOX
Neurology Consult    Reason for Consult: Patient is a 89y old  Male who presents with a chief complaint of Fall at home. 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  Patient is unsure how he fell, fell backwards and hit his head, had LOC, unknown downtime.  Complains of pain all over his body.  Denies chest pain, shortness of breath.    (24 Mar 2025 15:02)       PAST MEDICAL & SURGICAL HISTORY:  Coronary artery disease involving native heart without angina pectoris, unspecified vessel or lesion type      Complete heart block  s/p PPM insertion-2008  Battery change- 2015  Last interrogation-05/2017      Paroxysmal atrial fibrillation  on Coumadin      Hypothyroidism, unspecified type      Chronic gout with tophus, unspecified cause, unspecified site      Benign prostatic hyperplasia, presence of lower urinary tract symptoms unspecified, unspecified morphology      Acute on chronic congestive heart failure, unspecified congestive heart failure type  2016      Abscess  diverticular      Basal cell carcinoma      Melanoma in situ of face excluding eyelid, nose, lip, and ear      Carpal tunnel syndrome of left wrist      BOOP (bronchiolitis obliterans with organizing pneumonia)  09/2016 after colonoscopy      SBO (small bowel obstruction)  03/2017      Adhesion of intestine  Relese of abdominal adhesions- 2002      Hx of cataract surgery, unspecified laterality      History of skin cancer      History of carpal tunnel release, unspecified laterality  right wrist- 2002      History of appendectomy      H/O hernia repair  bilateral IHR x 2      S/P CABG x 3  2002      History of colon resection  1995      Artificial cardiac pacemaker  2008      H/O shoulder surgery  Right shoulder repair- 2001          Allergies: Allergies    Diprivan (Short breath)    Intolerances        Social History: Denies toxic habits including tobacco, ETOH or illicit drugs.    Family History: FAMILY HISTORY:  . No family history of strokes    Medications: MEDICATIONS  (STANDING):  allopurinol 100 milliGRAM(s) Oral daily  ALPRAZolam 0.5 milliGRAM(s) Oral at bedtime  atorvastatin 10 milliGRAM(s) Oral at bedtime  fluticasone propionate 50 MICROgram(s)/spray Nasal Spray 1 Spray(s) Both Nostrils two times a day  hydrocortisone 1% Cream 1 Application(s) Topical once  influenza  Vaccine (HIGH DOSE) 0.5 milliLiter(s) IntraMuscular once  midodrine. 10 milliGRAM(s) Oral three times a day  polyethylene glycol 3350 17 Gram(s) Oral daily  senna 2 Tablet(s) Oral at bedtime  tamsulosin 0.4 milliGRAM(s) Oral at bedtime  traZODone 100 milliGRAM(s) Oral at bedtime    MEDICATIONS  (PRN):  acetaminophen     Tablet .. 650 milliGRAM(s) Oral every 6 hours PRN Temp greater or equal to 38.5C (101.3F), Moderate Pain (4 - 6)      Review of Systems:  CONSTITUTIONAL:  No weight loss, fever, chills, weakness or fatigue.  HEENT:  Eyes:  No visual loss, blurred vision, double vision or yellow sclera. Ears, Nose, Throat:  No hearing loss, sneezing, congestion, runny nose or sore throat.  SKIN:  No rash or itching.  CARDIOVASCULAR:  No chest pain, chest pressure or chest discomfort. No palpitations or edema.  RESPIRATORY:  No shortness of breath, cough or sputum.  GASTROINTESTINAL:  No anorexia, nausea, vomiting or diarrhea. No abdominal pain or blood.  GENITOURINARY:  No burning on urination or incontinence   NEUROLOGICAL:  No headache,+ dizziness, syncope, no  paralysis, ataxia, numbness or tingling in the extremities. No change in bowel or bladder control. no limb weakness. no vision changes.   MUSCULOSKELETAL:  LUE shoulder pain   HEMATOLOGIC:  No anemia, bleeding or bruising.  LYMPHATICS:  No enlarged nodes. No history of splenectomy.  PSYCHIATRIC:  No history of depression or anxiety.  ENDOCRINOLOGIC:  No reports of sweating, cold or heat intolerance. No polyuria or polydipsia.      Vitals:  Vital Signs Last 24 Hrs  T(C): 36.5 (27 Mar 2025 04:33), Max: 37.1 (26 Mar 2025 12:20)  T(F): 97.7 (27 Mar 2025 04:33), Max: 98.7 (26 Mar 2025 12:20)  HR: 70 (27 Mar 2025 04:33) (70 - 74)  BP: 101/59 (27 Mar 2025 04:33) (93/58 - 101/59)  BP(mean): --  RR: 18 (27 Mar 2025 04:33) (18 - 18)  SpO2: 90% (27 Mar 2025 04:33) (90% - 96%)    Parameters below as of 27 Mar 2025 04:33  Patient On (Oxygen Delivery Method): room air    Orthostatic VS    03-26-25 @ 08:50  Lying BP: Orthostatic BP (Lying Systolic): 89/Orthostatic BP (Lying Diastolic (mm Hg)): 56 HR: Orthostatic Pulse (Heart Rate (beats/min)): 70   Sitting BP: Orthostatic BP (Sitting Systolic): 91/Orthostatic BP (Sitting Diastolic (mm Hg)): 62 HR: Orthostatic Pulse (Heart Rate (beats/min)): 70  Standing BP: Orthostatic BP (Standing Systolic): 71/Orthostatic BP (Standing Diastolic (mm Hg)): 49 HR: Orthostatic Pulse (Heart Rate (beats/min)): 73  Site: --   Mode: --      General Exam:   General Appearance: Appropriately dressed and in no acute distress       Head: Normocephalic, atraumatic and no dysmorphic features  Ear, Nose, and Throat: Moist mucous membranes  CVS: S1S2+  Resp: No SOB, no wheeze or rhonchi  GI: soft NT/ND  Extremities: No edema or cyanosis  Skin: No bruises or rashes     Neurological Exam:  Mental Status: Awake, alert and oriented x 2-3.  Able to follow simple and complex verbal commands. Able to name and repeat. fluent speech. No obvious aphasia or dysarthria noted.   Cranial Nerves: PERRL, EOMI, VFFC, sensation V1-V3 intact,  no obvious facial asymmetry, equal elevation of palate, scm/trap 5/5, tongue is midline on protrusion. no obvious papilledema on fundoscopic exam. hearing is grossly intact.   Motor: Normal bulk, tone and strength throughout except LUE pain limited Fine finger movements were intact and symmetric. no tremors or drift noted.    Sensation: Intact to light touch and pinprick throughout. no right/left confusion. no extinction to tactile on DSS.    Reflexes: 1+ throughout at biceps, brachioradialis, triceps, patellars and ankles bilaterally and equal. No clonus. R toe and L toe were both downgoing.  Coordination: No dysmetria on FNF or HKS  Gait: walker     Data/Labs/Imaging which I personally reviewed.     Labs:     CBC Full  -  ( 27 Mar 2025 04:35 )  WBC Count : 6.49 K/uL  RBC Count : 3.43 M/uL  Hemoglobin : 10.5 g/dL  Hematocrit : 31.9 %  Platelet Count - Automated : 106 K/uL  Mean Cell Volume : 93.0 fl  Mean Cell Hemoglobin : 30.6 pg  Mean Cell Hemoglobin Concentration : 32.9 g/dL  Auto Neutrophil # : x  Auto Lymphocyte # : x  Auto Monocyte # : x  Auto Eosinophil # : x  Auto Basophil # : x  Auto Neutrophil % : x  Auto Lymphocyte % : x  Auto Monocyte % : x  Auto Eosinophil % : x  Auto Basophil % : x    03-27    129[L]  |  85[L]  |  104[H]  ----------------------------<  119[H]  4.9   |  26  |  4.37[H]    Ca    8.5      27 Mar 2025 04:40          Urinalysis Basic - ( 27 Mar 2025 04:40 )    Color: x / Appearance: x / SG: x / pH: x  Gluc: 119 mg/dL / Ketone: x  / Bili: x / Urobili: x   Blood: x / Protein: x / Nitrite: x   Leuk Esterase: x / RBC: x / WBC x   Sq Epi: x / Non Sq Epi: x / Bacteria: x      < from: CT Head No Cont (03.25.25 @ 15:57) >    ACC: [id number] EXAM:  CT BRAIN   ORDERED BY: NITHYA STILL     PROCEDURE DATE:  03/25/2025          INTERPRETATION:  CLINICAL INFORMATION: s/p fall at home    COMPARISON: CT head 3/24/2025.    TECHNIQUE:  Serial axial images were obtained fromthe skull base to the   vertex using multi-slice helical technique. Sagittal and coronal   reformats were obtained.    FINDINGS:    VENTRICLES AND SULCI: Age appropriate involutional changes.  INTRA-AXIAL: No mass effect, acute hemorrhage, or midlineshift.  There   are periventricular and subcortical white matter hypodensities,   consistent with microvascular type changes.  EXTRA-AXIAL: No mass or fluid collection. Basal cisterns are normal in   appearance.    VISUALIZED SINUSES:  Clear.  TYMPANOMASTOID CAVITIES:  Clear.  VISUALIZED ORBITS: Bilateral lens replacement.  CALVARIUM: Intact.    MISCELLANEOUS: None.      IMPRESSION:  No acute intracranial hemorrhage, mass effect, or midline shift.        --- End of Report ---            CLAIR POWRE MD; Attending Radiologist  This document has been electronically signed. Mar 25 2025  4:03PM    < end of copied text >

## 2025-03-28 LAB
ANION GAP SERPL CALC-SCNC: 19 MMOL/L — HIGH (ref 5–17)
BUN SERPL-MCNC: 92 MG/DL — HIGH (ref 7–23)
CALCIUM SERPL-MCNC: 8.5 MG/DL — SIGNIFICANT CHANGE UP (ref 8.4–10.5)
CHLORIDE SERPL-SCNC: 85 MMOL/L — LOW (ref 96–108)
CO2 SERPL-SCNC: 21 MMOL/L — LOW (ref 22–31)
CREAT SERPL-MCNC: 3.65 MG/DL — HIGH (ref 0.5–1.3)
EGFR: 15 ML/MIN/1.73M2 — LOW
EGFR: 15 ML/MIN/1.73M2 — LOW
GLUCOSE SERPL-MCNC: 252 MG/DL — HIGH (ref 70–99)
POTASSIUM SERPL-MCNC: 4.2 MMOL/L — SIGNIFICANT CHANGE UP (ref 3.5–5.3)
POTASSIUM SERPL-SCNC: 4.2 MMOL/L — SIGNIFICANT CHANGE UP (ref 3.5–5.3)
SODIUM SERPL-SCNC: 125 MMOL/L — LOW (ref 135–145)
VIT B12 SERPL-MCNC: 836 PG/ML — SIGNIFICANT CHANGE UP (ref 232–1245)

## 2025-03-28 PROCEDURE — 93281 PM DEVICE PROGR EVAL MULTI: CPT | Mod: 26

## 2025-03-28 RX ORDER — ALPRAZOLAM 0.5 MG
1 TABLET, EXTENDED RELEASE 24 HR ORAL AT BEDTIME
Refills: 0 | Status: DISCONTINUED | OUTPATIENT
Start: 2025-03-28 | End: 2025-04-02

## 2025-03-28 RX ORDER — FUROSEMIDE 10 MG/ML
40 INJECTION INTRAMUSCULAR; INTRAVENOUS ONCE
Refills: 0 | Status: COMPLETED | OUTPATIENT
Start: 2025-03-28 | End: 2025-03-28

## 2025-03-28 RX ORDER — APIXABAN 2.5 MG/1
2.5 TABLET, FILM COATED ORAL EVERY 12 HOURS
Refills: 0 | Status: DISCONTINUED | OUTPATIENT
Start: 2025-03-28 | End: 2025-03-29

## 2025-03-28 RX ADMIN — Medication 112 MICROGRAM(S): at 05:52

## 2025-03-28 RX ADMIN — MIDODRINE HYDROCHLORIDE 10 MILLIGRAM(S): 5 TABLET ORAL at 17:10

## 2025-03-28 RX ADMIN — FUROSEMIDE 40 MILLIGRAM(S): 10 INJECTION INTRAMUSCULAR; INTRAVENOUS at 14:41

## 2025-03-28 RX ADMIN — Medication 1 MILLIGRAM(S): at 22:09

## 2025-03-28 RX ADMIN — APIXABAN 2.5 MILLIGRAM(S): 2.5 TABLET, FILM COATED ORAL at 17:10

## 2025-03-28 RX ADMIN — HYDROCORTISONE 1 APPLICATION(S): 10 CREAM TOPICAL at 11:21

## 2025-03-28 RX ADMIN — TAMSULOSIN HYDROCHLORIDE 0.4 MILLIGRAM(S): 0.4 CAPSULE ORAL at 22:08

## 2025-03-28 RX ADMIN — ATORVASTATIN CALCIUM 10 MILLIGRAM(S): 80 TABLET, FILM COATED ORAL at 22:09

## 2025-03-28 RX ADMIN — Medication 650 MILLIGRAM(S): at 19:45

## 2025-03-28 RX ADMIN — PREDNISONE 20 MILLIGRAM(S): 20 TABLET ORAL at 05:52

## 2025-03-28 RX ADMIN — MIDODRINE HYDROCHLORIDE 10 MILLIGRAM(S): 5 TABLET ORAL at 05:51

## 2025-03-28 RX ADMIN — Medication 650 MILLIGRAM(S): at 18:55

## 2025-03-28 RX ADMIN — Medication 100 MILLIGRAM(S): at 22:09

## 2025-03-28 RX ADMIN — Medication 1 TABLET(S): at 11:20

## 2025-03-28 RX ADMIN — FLUTICASONE PROPIONATE 1 SPRAY(S): 50 SPRAY, METERED NASAL at 05:52

## 2025-03-28 RX ADMIN — Medication 2 TABLET(S): at 22:09

## 2025-03-28 RX ADMIN — Medication 100 MILLIGRAM(S): at 11:20

## 2025-03-28 RX ADMIN — FLUTICASONE PROPIONATE 1 SPRAY(S): 50 SPRAY, METERED NASAL at 17:19

## 2025-03-28 RX ADMIN — MIDODRINE HYDROCHLORIDE 10 MILLIGRAM(S): 5 TABLET ORAL at 11:20

## 2025-03-28 NOTE — CHART NOTE - NSCHARTNOTEFT_GEN_A_CORE
The patient requires 3-1 bedside commode as he is confined to a single room at home.             .   Pt is at risk to fall. Patient cannot safely ambulate to the bathroom.
Met with patient and daughter at bedside.  Patient asymptomatic, MOLST completed.  Patient with issues surrounding safe discharge.  He qualifies for hospice at home but seems to need 24 hour care (at home or facility) which he is presently refusing.  Patient to be discharged from the [facility] team consult service today.  Please call [phone] if we can be of further assistance with discharge planning - re hospice care or other issues.
Patient will require a polyfly wheelchair due to weakness/deconditioning. The beneficiary has a mobility limitation that significantly impairs his ability to participate in one or more MRADLs such as toileting, feeding, dressing, grooming, and bathing in customary locations in the home. The patient’s mobility limitation cannot be sufficiently resolved by the use of an appropriately fitted cane or walker. The patient is unable to ambulated with a walker. Use of a manual wheelchair will significantly improve the beneficiary’s ability to participate in MRADLs and the beneficiary will use it on a regular basis in the home. The beneficiary is able and willing to use the wheelchair in the home. The beneficiary cannot self-propel in a standard wheelchair. The patient can self-propel in a polyfly wheelchair. The beneficiary has sufficient upper extremity function and other physical and mental capabilities needed to safely self-propel the manual lightweight wheelchair that is provided in the home during a typical day

## 2025-03-28 NOTE — PROGRESS NOTE ADULT - ASSESSMENT
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  Patient is unsure how he fell, fell backwards and hit his head, had LOC, unknown downtime.  Complains of pain all over his body.  Denies chest pain, shortness of breath.        Problem/Plan - 1:  ·  Problem: Acute kidney injury superimposed on CKD.  ·  Plan: Trending BMP.  Likely secondary to HELEN.   Renal help appreciated.   Refusing HD.      Problem/Plan - 2:  ·  Problem: Hypokalemia.  ·  Plan: Replacing.     Problem/Plan - 3:  ·  Problem: Acute on chronic systolic congestive heart failure.  ·  Plan: Euvolemic so holding Diuretics.   Cardiology help appreciated.  D/W his cardiologist who doesn't come here.     Problem/Plan - 4:  ·  Problem: Fall at home.  ·  Plan: Exact cause not know.   Likely secondary to Hypotension with Orthostasis as Bumex was increased to 5mg and Zaroxolyn was added  recently added.     Problem/Plan - 5:  ·  Problem: Paroxysmal atrial fibrillation.  ·  Plan: Holding AC as laceration .D/W his cardiologist yesterday and okay to hold it for few days. .     Problem/Plan - 6:  ·  Problem: CAD in native artery.  ·  Plan: Holding AP.     Problem/Plan - 7:  ·  Problem: Anxiety and depression.  ·  Plan: Continue Trazadone and will increase  Xanax to 1mg.   Reduced dose as was too sleepy .      Problem/Plan - 8:  ·  Problem: HLD (hyperlipidemia).  ·  Plan: Continue Statin.     Problem/Plan - 9:  ·  Problem: Bilateral leg ulcer.  ·  Plan:   wound care help appreciated.      Problem/Plan - 10:  ·  Problem: Hyponatremia.  ·  Plan; Renal help appreciated.      Problem/Plan - 11:  ·  Problem: Hypotension.  ·  Plan: On Midodrine so will continue.     Problem/Plan - 12:  ·  Problem: Scalp laceration.  ·  Plan: S/P Stitching scalp .   Dressing changes.  Rpting CT head noted .      Problem/Plan - 12:  ·  Problem: Tremor/ Asterixis  ·  Plan: Neurology consult noted.     D/W patient ,patient's daughter in detail and ACP

## 2025-03-28 NOTE — PROGRESS NOTE ADULT - ASSESSMENT
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  Patient is unsure how he fell, fell backwards and hit his head, had LOC, unknown downtime.  Complains of pain all over his body.  Denies chest pain, shortness of breath.   + LOC, no tongue bite, no incontinence. no conuvlsions   o/e AAOx2-3, PITT but LUE pain limited, walker baseline   orthostatics +   CTH neg   CT C/T/L spine; multiple scattered lucensies suspicioous for lytic neoplastic lesions such as mets or multiple myeloma.      Imprssion:   1) Syncope, unclear; possible multifactorial, dehydration, hypotension, orthostatasis   2) tremor, likely metabolic at this point but component of esesntial tremor  3) dizzy/vertigo when turns head  4) hyponatremia to 128-->129     - orthostatics +; comproession stockings, hydration, trend orthostatics.  may need to consider midodrine, started 10 TID.  trend orthostatics   - cardio recs appreicated   - consider checking vessels if not already done so with carotid duplex    - r/o multiple myeloma. check ca, SPEP, UPEP, hematology eval   - outpatient EEG   - slow correction of Na no more than 8-12 meq in 24hrs 128-->129.  consider salt tabs   - device interrogation  - unable for MRI.    - telemetry  - check b12 level   - PT/OT  - check FS, glucose control <180  - GI/DVT ppx   - Thank you for allowing me to participate in the care of this patient. Call with questions.     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  Patient is unsure how he fell, fell backwards and hit his head, had LOC, unknown downtime.  Complains of pain all over his body.  Denies chest pain, shortness of breath.   + LOC, no tongue bite, no incontinence. no conuvlsions   o/e AAOx2-3, PITT but LUE pain limited, walker baseline   orthostatics +   CTH neg   CT C/T/L spine; multiple scattered lucensies suspicioous for lytic neoplastic lesions such as mets or multiple myeloma.      Imprssion:   1) Syncope, unclear; possible multifactorial, dehydration, hypotension, orthostatasis   2) tremor, likely metabolic at this point but component of esesntial tremor  3) dizzy/vertigo when turns head  4) hyponatremia to 128-->129     - already on DOAC.  low suscpioun for stroke . CTH neg.  unable for MRI   - orthostatics +; comproession stockings, hydration, trend orthostatics.  may need to consider midodrine, started 10 TID.  trend orthostatics   - cardio recs appreicated   - consider checking vessels if not already done so with carotid duplex    - r/o multiple myeloma. check ca, SPEP, UPEP, hematology eval   - outpatient EEG   - slow correction of Na no more than 8-12 meq in 24hrs 128-->129.  consider salt tabs   - device interrogation  - unable for MRI.    - telemetry  - check b12 level   - PT/OT  - check FS, glucose control <180  - GI/DVT ppx   - Thank you for allowing me to participate in the care of this patient. Call with questions.     Jose Luis Claire MD  Vascular Neurology  Office: [phone]

## 2025-03-28 NOTE — PROCEDURE NOTE - ADDITIONAL PROCEDURE DETAILS
Indication: Syncope  Presenting rhythm: AF-  Underlying rhythm: AF with CHB, no escape at VVI 30.  Pt is dependent.    98%.  Normal device function and lead measurements.   Episode arrhythmia log c/w known longstanding AF (Pt states he has been in AF x 10 years, confirmed to be in AF x at least 1 year by device interrogation). Disabled AT/AF therapies. AT/AF detection still enabled. Agree with cardiology plan to resume pt's home OAC when able.   Nothing on device interrogation to explain pt's syncope.

## 2025-03-29 LAB
ANION GAP SERPL CALC-SCNC: 18 MMOL/L — HIGH (ref 5–17)
BUN SERPL-MCNC: 90 MG/DL — HIGH (ref 7–23)
CALCIUM SERPL-MCNC: 8.8 MG/DL — SIGNIFICANT CHANGE UP (ref 8.4–10.5)
CHLORIDE SERPL-SCNC: 86 MMOL/L — LOW (ref 96–108)
CO2 SERPL-SCNC: 26 MMOL/L — SIGNIFICANT CHANGE UP (ref 22–31)
CREAT SERPL-MCNC: 3.4 MG/DL — HIGH (ref 0.5–1.3)
CREATININE, URINE RESULT: 46 MG/DL — SIGNIFICANT CHANGE UP
CULTURE RESULTS: SIGNIFICANT CHANGE UP
CULTURE RESULTS: SIGNIFICANT CHANGE UP
EGFR: 16 ML/MIN/1.73M2 — LOW
EGFR: 16 ML/MIN/1.73M2 — LOW
GLUCOSE SERPL-MCNC: 151 MG/DL — HIGH (ref 70–99)
HCT VFR BLD CALC: 29.4 % — LOW (ref 39–50)
HGB BLD-MCNC: 9.9 G/DL — LOW (ref 13–17)
MCHC RBC-ENTMCNC: 30.9 PG — SIGNIFICANT CHANGE UP (ref 27–34)
MCHC RBC-ENTMCNC: 33.7 G/DL — SIGNIFICANT CHANGE UP (ref 32–36)
MCV RBC AUTO: 91.9 FL — SIGNIFICANT CHANGE UP (ref 80–100)
NRBC BLD AUTO-RTO: 0 /100 WBCS — SIGNIFICANT CHANGE UP (ref 0–0)
PLATELET # BLD AUTO: 124 K/UL — LOW (ref 150–400)
POTASSIUM SERPL-MCNC: 3.8 MMOL/L — SIGNIFICANT CHANGE UP (ref 3.5–5.3)
POTASSIUM SERPL-SCNC: 3.8 MMOL/L — SIGNIFICANT CHANGE UP (ref 3.5–5.3)
PROT ?TM UR-MCNC: 16 MG/DL — HIGH (ref 0–12)
RBC # BLD: 3.2 M/UL — LOW (ref 4.2–5.8)
RBC # FLD: 15.6 % — HIGH (ref 10.3–14.5)
SODIUM SERPL-SCNC: 130 MMOL/L — LOW (ref 135–145)
SPECIMEN SOURCE: SIGNIFICANT CHANGE UP
SPECIMEN SOURCE: SIGNIFICANT CHANGE UP
WBC # BLD: 9.02 K/UL — SIGNIFICANT CHANGE UP (ref 3.8–10.5)
WBC # FLD AUTO: 9.02 K/UL — SIGNIFICANT CHANGE UP (ref 3.8–10.5)

## 2025-03-29 RX ORDER — TRAZODONE HCL 100 MG
50 TABLET ORAL AT BEDTIME
Refills: 0 | Status: DISCONTINUED | OUTPATIENT
Start: 2025-03-29 | End: 2025-04-02

## 2025-03-29 RX ORDER — APIXABAN 2.5 MG/1
2.5 TABLET, FILM COATED ORAL
Refills: 0 | Status: DISCONTINUED | OUTPATIENT
Start: 2025-03-30 | End: 2025-04-02

## 2025-03-29 RX ADMIN — Medication 1 TABLET(S): at 16:22

## 2025-03-29 RX ADMIN — MIDODRINE HYDROCHLORIDE 10 MILLIGRAM(S): 5 TABLET ORAL at 16:22

## 2025-03-29 RX ADMIN — POLYETHYLENE GLYCOL 3350 17 GRAM(S): 17 POWDER, FOR SOLUTION ORAL at 12:42

## 2025-03-29 RX ADMIN — Medication 2 TABLET(S): at 21:41

## 2025-03-29 RX ADMIN — MIDODRINE HYDROCHLORIDE 10 MILLIGRAM(S): 5 TABLET ORAL at 12:42

## 2025-03-29 RX ADMIN — FLUTICASONE PROPIONATE 1 SPRAY(S): 50 SPRAY, METERED NASAL at 06:21

## 2025-03-29 RX ADMIN — ATORVASTATIN CALCIUM 10 MILLIGRAM(S): 80 TABLET, FILM COATED ORAL at 21:42

## 2025-03-29 RX ADMIN — APIXABAN 2.5 MILLIGRAM(S): 2.5 TABLET, FILM COATED ORAL at 06:20

## 2025-03-29 RX ADMIN — Medication 50 MILLIGRAM(S): at 21:42

## 2025-03-29 RX ADMIN — Medication 1 TABLET(S): at 12:42

## 2025-03-29 RX ADMIN — Medication 100 MILLIGRAM(S): at 13:20

## 2025-03-29 RX ADMIN — Medication 112 MICROGRAM(S): at 06:20

## 2025-03-29 RX ADMIN — FLUTICASONE PROPIONATE 1 SPRAY(S): 50 SPRAY, METERED NASAL at 17:56

## 2025-03-29 RX ADMIN — MIDODRINE HYDROCHLORIDE 10 MILLIGRAM(S): 5 TABLET ORAL at 06:21

## 2025-03-29 RX ADMIN — Medication 1 MILLIGRAM(S): at 21:42

## 2025-03-29 RX ADMIN — TAMSULOSIN HYDROCHLORIDE 0.4 MILLIGRAM(S): 0.4 CAPSULE ORAL at 21:42

## 2025-03-29 NOTE — PROGRESS NOTE ADULT - ASSESSMENT
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  Patient is unsure how he fell, fell backwards and hit his head, had LOC, unknown downtime.  Complains of pain all over his body.  Denies chest pain, shortness of breath.        Problem/Plan - 1:  ·  Problem: Acute kidney injury superimposed on CKD.  ·  Plan: Trending BMP.  Improving.   Likely secondary to HELEN.   Renal help appreciated.   Refusing HD.      Problem/Plan - 2:  ·  Problem: Hypokalemia.  ·  Plan: Replacing.     Problem/Plan - 3:  ·  Problem: Acute on chronic systolic congestive heart failure.  ·  Plan: Euvolemic so holding Diuretics.   Cardiology help appreciated.  D/W his cardiologist who doesn't come here.     Problem/Plan - 4:  ·  Problem: Fall at home.  ·  Plan: Exact cause not know.   Likely secondary to Hypotension with Orthostasis as Bumex was increased to 5mg and Zaroxolyn was added  recently added.     Problem/Plan - 5:  ·  Problem: Paroxysmal atrial fibrillation.  ·  Plan: Wants Eliquis once a day only.    .     Problem/Plan - 6:  ·  Problem: CAD in native artery.  ·  Plan: Holding AP.     Problem/Plan - 7:  ·  Problem: Anxiety and depression.  ·  Plan: Continue Trazadone 50 mg and  Xanax to 1mg.   Reduced dose as was too sleepy .      Problem/Plan - 8:  ·  Problem: HLD (hyperlipidemia).  ·  Plan: Continue Statin.     Problem/Plan - 9:  ·  Problem: Bilateral leg ulcer.  ·  Plan:   wound care help appreciated.      Problem/Plan - 10:  ·  Problem: Hyponatremia.  ·  Plan; Renal help appreciated.      Problem/Plan - 11:  ·  Problem: Hypotension.  ·  Plan: On Midodrine so will continue.     Problem/Plan - 12:  ·  Problem: Scalp laceration.  ·  Plan: S/P Stitching scalp .   Dressing changes.  Rpting CT head noted .      Problem/Plan - 12:  ·  Problem: Tremor/ Asterixis  ·  Plan: Neurology consult noted.     D/W patient ,patient's daughter in detail and ACP

## 2025-03-29 NOTE — PROGRESS NOTE ADULT - ASSESSMENT
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  Patient is unsure how he fell, fell backwards and hit his head, had LOC, unknown downtime.  Complains of pain all over his body.  Denies chest pain, shortness of breath. Nephrology consulted for JASON.     A/P  JASON   scr baseline 1.7 per pt.  admitted with scr 2.1   likely prerenal state sec to diuresis vs cardio-renal  s/p 1L NS in the ER  s/p fall - CK normal   FeUrea 21.1% -- suggests pre-renal vs. HF.  CTAP negative for hydronephrosis   s/p contrast study 03/24 - monitor for HELEN   3/27 - S.Cr continues to worsen -- likely sec to contrast.  BUN elevated -- hold off diuretics in view of worsening renal function.  NP d/w pt and daughter, Madhavi at bedside regarding worsening renal function and symptom of fatigue -- possibly d/t uremia in light of elevated BUN.  RRT recommended; pt refused at this time.   3/28 - S.Cr was improving, clinically hypervolemic, s/p Lasix 40mg IVP one dose  3/29- Scr improving, volume status is better-monitor today  Renal diet.  monitor UO and scr  Adjust the dose of meds per GFR  Avoid further nephrotoxic agents; no more studies with contrast.    Hyponatremia  likely sec to fluid overload  hold home salt tabs   urine sodium low with high urine osm  Na better   Free fluid restriction to 1L/day --daughter again educated.  Optimize glycemic control.  Monitor Na.    Acidosis:  Marginal.  Monitor CO2 w/ diuretics.    Hypokalemia   s/p KCl 40 meq IV x 4 doses 3/24  K better.  supplement as needed  keep K >4.0.    Hypotension:  On midodrine 10mg TID.  Avoid hypotension.  Monitor BP.    R wrist pain / tremors:  Pt reports pain has been present for over 1 month.  He has been taking prednisone, tapered down to 2.5mg BID.  Pt reports tremors of R hand present before admission but worsened on 3/26; possibly d/t uremia.  Neuro following.    Syncope:  Workup per team.  Neuro following.  Orthostatic +.  On midodrine.  Cardiology following.

## 2025-03-30 LAB
ANION GAP SERPL CALC-SCNC: 13 MMOL/L — SIGNIFICANT CHANGE UP (ref 5–17)
BUN SERPL-MCNC: 93 MG/DL — HIGH (ref 7–23)
CALCIUM SERPL-MCNC: 8.7 MG/DL — SIGNIFICANT CHANGE UP (ref 8.4–10.5)
CHLORIDE SERPL-SCNC: 92 MMOL/L — LOW (ref 96–108)
CO2 SERPL-SCNC: 28 MMOL/L — SIGNIFICANT CHANGE UP (ref 22–31)
CREAT SERPL-MCNC: 2.81 MG/DL — HIGH (ref 0.5–1.3)
EGFR: 21 ML/MIN/1.73M2 — LOW
EGFR: 21 ML/MIN/1.73M2 — LOW
GLUCOSE SERPL-MCNC: 101 MG/DL — HIGH (ref 70–99)
POTASSIUM SERPL-MCNC: 3.5 MMOL/L — SIGNIFICANT CHANGE UP (ref 3.5–5.3)
POTASSIUM SERPL-SCNC: 3.5 MMOL/L — SIGNIFICANT CHANGE UP (ref 3.5–5.3)
SODIUM SERPL-SCNC: 133 MMOL/L — LOW (ref 135–145)

## 2025-03-30 RX ORDER — PREDNISONE 20 MG/1
10 TABLET ORAL DAILY
Refills: 0 | Status: DISCONTINUED | OUTPATIENT
Start: 2025-03-30 | End: 2025-04-02

## 2025-03-30 RX ORDER — BUMETANIDE 1 MG/1
1 TABLET ORAL ONCE
Refills: 0 | Status: COMPLETED | OUTPATIENT
Start: 2025-03-30 | End: 2025-03-30

## 2025-03-30 RX ADMIN — Medication 650 MILLIGRAM(S): at 12:50

## 2025-03-30 RX ADMIN — Medication 2 TABLET(S): at 22:05

## 2025-03-30 RX ADMIN — TAMSULOSIN HYDROCHLORIDE 0.4 MILLIGRAM(S): 0.4 CAPSULE ORAL at 22:05

## 2025-03-30 RX ADMIN — MIDODRINE HYDROCHLORIDE 10 MILLIGRAM(S): 5 TABLET ORAL at 11:51

## 2025-03-30 RX ADMIN — APIXABAN 2.5 MILLIGRAM(S): 2.5 TABLET, FILM COATED ORAL at 17:41

## 2025-03-30 RX ADMIN — MIDODRINE HYDROCHLORIDE 10 MILLIGRAM(S): 5 TABLET ORAL at 16:56

## 2025-03-30 RX ADMIN — Medication 1 TABLET(S): at 11:51

## 2025-03-30 RX ADMIN — BUMETANIDE 1 MILLIGRAM(S): 1 TABLET ORAL at 16:56

## 2025-03-30 RX ADMIN — Medication 100 MILLIGRAM(S): at 11:51

## 2025-03-30 RX ADMIN — FLUTICASONE PROPIONATE 1 SPRAY(S): 50 SPRAY, METERED NASAL at 17:41

## 2025-03-30 RX ADMIN — Medication 1 MILLIGRAM(S): at 22:06

## 2025-03-30 RX ADMIN — ATORVASTATIN CALCIUM 10 MILLIGRAM(S): 80 TABLET, FILM COATED ORAL at 22:05

## 2025-03-30 RX ADMIN — POLYETHYLENE GLYCOL 3350 17 GRAM(S): 17 POWDER, FOR SOLUTION ORAL at 11:49

## 2025-03-30 RX ADMIN — MIDODRINE HYDROCHLORIDE 10 MILLIGRAM(S): 5 TABLET ORAL at 05:47

## 2025-03-30 RX ADMIN — PREDNISONE 10 MILLIGRAM(S): 20 TABLET ORAL at 12:58

## 2025-03-30 RX ADMIN — Medication 112 MICROGRAM(S): at 05:48

## 2025-03-30 RX ADMIN — FLUTICASONE PROPIONATE 1 SPRAY(S): 50 SPRAY, METERED NASAL at 05:47

## 2025-03-30 RX ADMIN — Medication 20 MILLIEQUIVALENT(S): at 14:11

## 2025-03-30 RX ADMIN — Medication 650 MILLIGRAM(S): at 11:50

## 2025-03-30 RX ADMIN — Medication 40 MILLIGRAM(S): at 08:54

## 2025-03-30 RX ADMIN — Medication 50 MILLIGRAM(S): at 22:05

## 2025-03-30 NOTE — PROGRESS NOTE ADULT - ASSESSMENT
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  Patient is unsure how he fell, fell backwards and hit his head, had LOC, unknown downtime.  Complains of pain all over his body.  Denies chest pain, shortness of breath. Nephrology consulted for JASON.     A/P  JASON   scr baseline 1.7 per pt.  admitted with scr 2.1   likely prerenal state sec to diuresis vs cardio-renal  s/p 1L NS in the ER  s/p fall - CK normal   FeUrea 21.1% -- suggests pre-renal vs. HF.  CTAP negative for hydronephrosis   s/p contrast study 03/24 - monitor for HELEN   3/27 - S.Cr continues to worsen -- likely sec to contrast.  BUN elevated -- hold off diuretics in view of worsening renal function.  NP d/w pt and daughter, Madhavi at bedside regarding worsening renal function and symptom of fatigue -- possibly d/t uremia in light of elevated BUN.  RRT recommended; pt refused at this time.   3/28 - S.Cr was improving, clinically hypervolemic, s/p Lasix 40mg IVP one dose  3/29- Scr improving, volume status is better-monitor today  3/30- Scr improving, volume status is better-monitor today  Renal diet.  monitor UO and scr  Adjust the dose of meds per GFR  Avoid further nephrotoxic agents; no more studies with contrast.    Hyponatremia  likely sec to fluid overload  hold home salt tabs   urine sodium low with high urine osm  Na better   Free fluid restriction to 1L/day --daughter again educated.  Optimize glycemic control.  Monitor Na.    Acidosis:  Marginal.  Monitor CO2 w/ diuretics.    Hypokalemia   s/p KCl 40 meq IV x 4 doses 3/24  K better  Supplement Kcl 20meq PO today  supplement as needed  keep K >4.0.    Hypotension:  On midodrine 10mg TID.  Avoid hypotension.  Monitor BP.    R wrist pain / tremors:  Pt reports pain has been present for over 1 month.  He has been taking prednisone, tapered down to 2.5mg BID.  Pt reports tremors of R hand present before admission but worsened on 3/26; possibly d/t uremia.  Neuro following.    Syncope:  Workup per team.  Neuro following.  Orthostatic +.  On midodrine.  Cardiology following.

## 2025-03-30 NOTE — PROGRESS NOTE ADULT - ASSESSMENT
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  Patient is unsure how he fell, fell backwards and hit his head, had LOC, unknown downtime.  Complains of pain all over his body.  Denies chest pain, shortness of breath.   + LOC, no tongue bite, no incontinence. no conuvlsions   o/e AAOx2-3, PITT but LUE pain limited, walker baseline   orthostatics +   CTH neg   CT C/T/L spine; multiple scattered lucensies suspicioous for lytic neoplastic lesions such as mets or multiple myeloma.      Imprssion:   1) Syncope, unclear; possible multifactorial, dehydration, hypotension, orthostatasis   2) tremor, likely metabolic at this point but component of esesntial tremor  3) dizzy/vertigo when turns head  4) hyponatremia to 128-->129     - sutures remove 3/31   - already on DOAC.  low suscpioun for stroke . CTH neg.  unable for MRI   - orthostatics +; comproession stockings, hydration, trend orthostatics.  may need to consider midodrine, started 10 TID.  trend orthostatics   - cardio recs appreicated   - consider checking vessels if not already done so with carotid duplex    - r/o multiple myeloma. check ca, SPEP, UPEP, hematology eval   - outpatient EEG   - slow correction of Na no more than 8-12 meq in 24hrs 128-->129.  consider salt tabs   - device interrogation  - unable for MRI.    - telemetry  - check b12 level   - PT/OT  - check FS, glucose control <180  - GI/DVT ppx   - Thank you for allowing me to participate in the care of this patient. Call with questions.     Jose Luis Claire MD  Vascular Neurology  Office: [phone]

## 2025-03-31 ENCOUNTER — TRANSCRIPTION ENCOUNTER (OUTPATIENT)
Age: 89
End: 2025-03-31

## 2025-03-31 LAB
ANION GAP SERPL CALC-SCNC: 16 MMOL/L — SIGNIFICANT CHANGE UP (ref 5–17)
BUN SERPL-MCNC: 90 MG/DL — HIGH (ref 7–23)
CALCIUM SERPL-MCNC: 8.9 MG/DL — SIGNIFICANT CHANGE UP (ref 8.4–10.5)
CHLORIDE SERPL-SCNC: 90 MMOL/L — LOW (ref 96–108)
CO2 SERPL-SCNC: 25 MMOL/L — SIGNIFICANT CHANGE UP (ref 22–31)
CREAT SERPL-MCNC: 2.74 MG/DL — HIGH (ref 0.5–1.3)
EGFR: 21 ML/MIN/1.73M2 — LOW
EGFR: 21 ML/MIN/1.73M2 — LOW
GLUCOSE SERPL-MCNC: 100 MG/DL — HIGH (ref 70–99)
HCT VFR BLD CALC: 31.2 % — LOW (ref 39–50)
HGB BLD-MCNC: 10.3 G/DL — LOW (ref 13–17)
MCHC RBC-ENTMCNC: 31.1 PG — SIGNIFICANT CHANGE UP (ref 27–34)
MCHC RBC-ENTMCNC: 33 G/DL — SIGNIFICANT CHANGE UP (ref 32–36)
MCV RBC AUTO: 94.3 FL — SIGNIFICANT CHANGE UP (ref 80–100)
NRBC BLD AUTO-RTO: 0 /100 WBCS — SIGNIFICANT CHANGE UP (ref 0–0)
PLATELET # BLD AUTO: 124 K/UL — LOW (ref 150–400)
POTASSIUM SERPL-MCNC: 4.1 MMOL/L — SIGNIFICANT CHANGE UP (ref 3.5–5.3)
POTASSIUM SERPL-SCNC: 4.1 MMOL/L — SIGNIFICANT CHANGE UP (ref 3.5–5.3)
RBC # BLD: 3.31 M/UL — LOW (ref 4.2–5.8)
RBC # FLD: 15.9 % — HIGH (ref 10.3–14.5)
SODIUM SERPL-SCNC: 131 MMOL/L — LOW (ref 135–145)
WBC # BLD: 8.41 K/UL — SIGNIFICANT CHANGE UP (ref 3.8–10.5)
WBC # FLD AUTO: 8.41 K/UL — SIGNIFICANT CHANGE UP (ref 3.8–10.5)

## 2025-03-31 RX ADMIN — ATORVASTATIN CALCIUM 10 MILLIGRAM(S): 80 TABLET, FILM COATED ORAL at 21:25

## 2025-03-31 RX ADMIN — MIDODRINE HYDROCHLORIDE 10 MILLIGRAM(S): 5 TABLET ORAL at 05:19

## 2025-03-31 RX ADMIN — TAMSULOSIN HYDROCHLORIDE 0.4 MILLIGRAM(S): 0.4 CAPSULE ORAL at 21:25

## 2025-03-31 RX ADMIN — Medication 40 MILLIGRAM(S): at 08:46

## 2025-03-31 RX ADMIN — Medication 1 TABLET(S): at 11:12

## 2025-03-31 RX ADMIN — FLUTICASONE PROPIONATE 1 SPRAY(S): 50 SPRAY, METERED NASAL at 17:33

## 2025-03-31 RX ADMIN — APIXABAN 2.5 MILLIGRAM(S): 2.5 TABLET, FILM COATED ORAL at 17:33

## 2025-03-31 RX ADMIN — Medication 100 MILLIGRAM(S): at 11:12

## 2025-03-31 RX ADMIN — Medication 2 TABLET(S): at 21:25

## 2025-03-31 RX ADMIN — POLYETHYLENE GLYCOL 3350 17 GRAM(S): 17 POWDER, FOR SOLUTION ORAL at 11:11

## 2025-03-31 RX ADMIN — Medication 112 MICROGRAM(S): at 05:19

## 2025-03-31 RX ADMIN — Medication 50 MILLIGRAM(S): at 21:25

## 2025-03-31 RX ADMIN — PREDNISONE 10 MILLIGRAM(S): 20 TABLET ORAL at 05:19

## 2025-03-31 RX ADMIN — MIDODRINE HYDROCHLORIDE 10 MILLIGRAM(S): 5 TABLET ORAL at 11:11

## 2025-03-31 RX ADMIN — Medication 1 MILLIGRAM(S): at 21:25

## 2025-03-31 RX ADMIN — FLUTICASONE PROPIONATE 1 SPRAY(S): 50 SPRAY, METERED NASAL at 05:19

## 2025-03-31 RX ADMIN — MIDODRINE HYDROCHLORIDE 10 MILLIGRAM(S): 5 TABLET ORAL at 17:33

## 2025-03-31 NOTE — DISCHARGE NOTE PROVIDER - NSDCCPCAREPLAN_GEN_ALL_CORE_FT
PRINCIPAL DISCHARGE DIAGNOSIS  Diagnosis: Syncope  Assessment and Plan of Treatment: Syncope w/ fall at home- CTH negative; no events on PPM; (+) orthostatics. Patient seen by cardiology.   Hypotension- c/w midodrine.  Active or Pending Issues Requiring Follow-up:  Follow-up with primary care doctor within one week.  Follow-up with cardiologist.  Follow-up with nephrologist.      SECONDARY DISCHARGE DIAGNOSES  Diagnosis: Laceration of head  Assessment and Plan of Treatment: Staples placed in Emergency Department.   Per Dr. López jackson to be removed outpatient 4/3/2025.    Diagnosis: Acute hypokalemia  Assessment and Plan of Treatment: Seen by nephrology. Improved.    Diagnosis: JASON (acute kidney injury)  Assessment and Plan of Treatment: Nephrology followed patient during admission.   Improved.    Diagnosis: Cardiac enzymes elevated  Assessment and Plan of Treatment: Seen by cardiology.   Follow-up with outpatient cardiologist.    Diagnosis: Lytic bone lesions on xray  Assessment and Plan of Treatment: Abnormal findings on imaging.   Follow-up with primary care doctor within one week.     PRINCIPAL DISCHARGE DIAGNOSIS  Diagnosis: Syncope  Assessment and Plan of Treatment: Syncope w/ fall at home- CTH negative; no events on PPM; (+) orthostatics. Patient seen by cardiology.   Hypotension- c/w midodrine.  Active or Pending Issues Requiring Follow-up:  Follow-up with primary care doctor within one week.  Follow-up with cardiologist.  Follow-up with nephrologist.      SECONDARY DISCHARGE DIAGNOSES  Diagnosis: Laceration of head  Assessment and Plan of Treatment: Staples placed in Emergency Department.   Per Dr. López jackson to be removed outpatient 4/3/2025.    Diagnosis: Acute hypokalemia  Assessment and Plan of Treatment: Seen by nephrology. Improved.    Diagnosis: JASON (acute kidney injury)  Assessment and Plan of Treatment: Nephrology followed patient during admission.   Improved.    Diagnosis: Cardiac enzymes elevated  Assessment and Plan of Treatment: Seen by cardiology.   Follow-up with outpatient cardiologist.    Diagnosis: Lytic bone lesions on xray  Assessment and Plan of Treatment: Abnormal findings on imaging.   Follow-up outpatient with hematologist.   Follow-up with primary care doctor within one week.

## 2025-03-31 NOTE — DISCHARGE NOTE PROVIDER - HOSPITAL COURSE
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     Hospital Course:  Syncope w/ fall at home- CTH negative; no events on PPM; (+) orthostatics. Patient seen by cardiolog.   Nonspecific lucency in L5 of undetermined etiology on CT, pending lab work. Follow-up outpatient.   Scalp laceration s/p staples to back of head in ED. Per Dr. López jackson to be removed 4/3/2025.   AoC systolic HF- s/p Bumex IVP x1 on 3/30/2025 per cardiology.  JASON/HELEN on CKD4 (baseline SCr 1.5)- renal following; improving.  Hyponatremia 2/2 fluid overload- fluid restriction, improving.  Hypotension- c/w midodrine.    Active or Pending Issues Requiring Follow-up:  Follow-up with primary care doctor within one week.  Follow-up with cardiologist.  Follow-up with nephrologist.     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     Hospital Course:  Syncope w/ fall at home- CTH negative; no events on PPM; (+) orthostatics. Patient seen by cardiology.   Nonspecific lucency in L5 of undetermined etiology on CT, pending lab work. Patient had blood work sent, pending at this time. Patient may require hematology evaluation and work-up outpatinet. Follow-up outpatient.   Scalp laceration s/p staples to back of head in ED. Per Dr. López jackson to be removed 4/3/2025.   AoC systolic HF- s/p Bumex IVP x1 on 3/30/2025 per cardiology.  JASON/HELEN on CKD4 (baseline SCr 1.5)- renal following; improving.  Hyponatremia 2/2 fluid overload- fluid restriction, improving.  Hypotension- c/w midodrine.    Active or Pending Issues Requiring Follow-up:  Follow-up with primary care doctor within one week.  Follow-up with cardiologist.  Follow-up with nephrologist.     Advanced Directives:   [ ] Full code  [X] DNR  [ ] Hospice    Discharge Diagnoses:  Syncope  Abnormal imaging  JASON       Request from Dr. López to facilitate patient discharge. Medication reconciliation reviewed, revised and resolved with Dr. López, who has medically cleared patient for discharge with follow-up as advised.

## 2025-03-31 NOTE — DISCHARGE NOTE PROVIDER - NSDCFUADDAPPT_GEN_ALL_CORE_FT
APPTS ARE READY TO BE MADE: [X] YES    Best Family or Patient Contact (if needed):    Additional Information about above appointments (if needed):    1: Follow-up with cardiologist outpatient.   2: Follow-up with primary care doctor within one week.   3:     Other comments or requests:    APPTS ARE READY TO BE MADE: [X] YES    Best Family or Patient Contact (if needed):    Additional Information about above appointments (if needed):    1: Patient must have staples removed 4/3/2025 per Dr. López. Teodoro were placed on scalp in Emergency Department.  2: Follow-up with cardiologist outpatient.   3: Follow-up with primary care doctor within one week.   4: Follow-up with nephrologist outpatient.     Other comments or requests:

## 2025-03-31 NOTE — DISCHARGE NOTE PROVIDER - NSDCMRMEDTOKEN_GEN_ALL_CORE_FT
3:1 Commode: As directed  acetaminophen 325 mg oral tablet: 2 tab(s) orally every 6 hours, As needed, Mild Pain (1 - 3)  allopurinol 100 mg oral tablet: orally once a day  bumetanide 1 mg oral tablet: 1 tab(s) orally once a day  carvedilol 3.125 mg oral tablet: 3 tab(s) orally every 12 hours  docusate sodium 100 mg oral capsule: 1 cap(s) orally 3 times a day  Eliquis 2.5 mg oral tablet: 1 tab(s) orally 2 times a day  furosemide 20 mg oral tablet: 1 tab(s) orally once a day  lactobacillus acidophilus oral tablet: 1 tab(s) orally once a day  levothyroxine 112 mcg (0.112 mg) oral tablet: 1 tab(s) orally once a day  Lipitor 10 mg oral tablet: 1 tab(s) orally once a day (at bedtime)  melatonin 3 mg oral tablet: 1 tab(s) orally once a day (at bedtime), As needed, Insomnia  midodrine 10 mg oral tablet: 1 tab(s) orally 2 times a day  Multiple Vitamins oral tablet: 1 tab(s) orally once a day  pantoprazole 40 mg oral delayed release tablet: 1 tab(s) orally once a day (before a meal)  polyethylene glycol 3350 oral powder for reconstitution: 17 gram(s) orally once a day, As Needed  polyfly wheelchair: as directed  potassium chloride:   potassium chloride 10 mEq oral capsule, extended release: orally once a day  senna oral tablet: 2 tab(s) orally once a day (at bedtime)  silodosin 8 mg oral capsule: 1 cap(s) orally once a day  sodium chloride 1 g oral tablet: 1 tab(s) orally once a day   Xanax 0.5 mg oral tablet: orally once a day (at bedtime), As Needed   3:1 Commode: As directed  acetaminophen 325 mg oral tablet: 2 tab(s) orally every 6 hours, As needed, Mild Pain (1 - 3)  allopurinol 100 mg oral tablet: orally once a day  bumetanide 1 mg oral tablet: 1 tab(s) orally once a day  carvedilol 3.125 mg oral tablet: 3 tab(s) orally every 12 hours  docusate sodium 100 mg oral capsule: 1 cap(s) orally 3 times a day  Eliquis 2.5 mg oral tablet: 1 tab(s) orally 2 times a day  furosemide 20 mg oral tablet: 1 tab(s) orally once a day  Home physical therapy: ICD-10: R55  Home physical therapy: ICD-10: R55  lactobacillus acidophilus oral tablet: 1 tab(s) orally once a day  levothyroxine 112 mcg (0.112 mg) oral tablet: 1 tab(s) orally once a day  Lipitor 10 mg oral tablet: 1 tab(s) orally once a day (at bedtime)  melatonin 3 mg oral tablet: 1 tab(s) orally once a day (at bedtime), As needed, Insomnia  midodrine 10 mg oral tablet: 1 tab(s) orally 2 times a day  Multiple Vitamins oral tablet: 1 tab(s) orally once a day  Outpatient occupational therapy: ICD-10: R55  pantoprazole 40 mg oral delayed release tablet: 1 tab(s) orally once a day (before a meal)  polyethylene glycol 3350 oral powder for reconstitution: 17 gram(s) orally once a day, As Needed  polyfly wheelchair: as directed  potassium chloride:   potassium chloride 10 mEq oral capsule, extended release: orally once a day  senna oral tablet: 2 tab(s) orally once a day (at bedtime)  silodosin 8 mg oral capsule: 1 cap(s) orally once a day  sodium chloride 1 g oral tablet: 1 tab(s) orally once a day   Xanax 0.5 mg oral tablet: orally once a day (at bedtime), As Needed

## 2025-03-31 NOTE — DISCHARGE NOTE PROVIDER - CARE PROVIDER_API CALL
Goldberg, Steven M  Cardiovascular Disease  [address] [place] [id number]  Phone: [phone]  Fax: [phone]  Follow Up Time: 1-3 days

## 2025-03-31 NOTE — PROGRESS NOTE ADULT - ASSESSMENT
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  Patient is unsure how he fell, fell backwards and hit his head, had LOC, unknown downtime.  Complains of pain all over his body.  Denies chest pain, shortness of breath.        Problem/Plan - 1:  ·  Problem: Acute kidney injury superimposed on CKD.  ·  Plan: Trending BMP.  Improving.   Likely secondary to HELEN.   Renal help appreciated.      Problem/Plan - 2:  ·  Problem: Hypokalemia.  ·  Plan: Replacing.     Problem/Plan - 3:  ·  Problem: Acute on chronic systolic congestive heart failure.  ·  Plan: Euvolemic so holding Diuretics.   Cardiology help appreciated.  D/W his cardiologist who doesn't come here.     Problem/Plan - 4:  ·  Problem: Fall at home.  ·  Plan: Exact cause not know.   Likely secondary to Hypotension with Orthostasis as Bumex was increased to 5mg and Zaroxolyn was added  recently added.     Problem/Plan - 5:  ·  Problem: Paroxysmal atrial fibrillation.  ·  Plan: Wants Eliquis once a day only.    .     Problem/Plan - 6:  ·  Problem: CAD in native artery.  ·  Plan: Holding AP.     Problem/Plan - 7:  ·  Problem: Anxiety and depression.  ·  Plan: Continue Trazadone 50 mg and  Xanax to 1mg.   Reduced dose as was too sleepy .      Problem/Plan - 8:  ·  Problem: HLD (hyperlipidemia).  ·  Plan: Continue Statin.     Problem/Plan - 9:  ·  Problem: Bilateral leg ulcer.  ·  Plan:   wound care help appreciated.      Problem/Plan - 10:  ·  Problem: Hyponatremia.  ·  Plan; Renal help appreciated.      Problem/Plan - 11:  ·  Problem: Hypotension.  ·  Plan: On Midodrine so will continue.     Problem/Plan - 12:  ·  Problem: Scalp laceration.  ·  Plan: S/P Stitching scalp .   Dressing changes.  Rpting CT head noted .      Problem/Plan - 12:  ·  Problem: Tremor/ Asterixis  ·  Plan: Neurology following.      D/W patient ,patient's daughter in detail and ACP    Dispo : Dc Planning home as refuses YVON.

## 2025-03-31 NOTE — DISCHARGE NOTE PROVIDER - CONDITIONS AT DISCHARGE
Request from Dr. López to facilitate patient discharge. Medication reconciliation reviewed, revised and resolved with Dr. López, who has medically cleared patient for discharge with follow-up as advised.

## 2025-03-31 NOTE — PROGRESS NOTE ADULT - ASSESSMENT
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  Patient is unsure how he fell, fell backwards and hit his head, had LOC, unknown downtime.  Complains of pain all over his body.  Denies chest pain, shortness of breath.   + LOC, no tongue bite, no incontinence. no conuvlsions   o/e AAOx2-3, PITT but LUE pain limited, walker baseline   orthostatics +   CTH neg   CT C/T/L spine; multiple scattered lucensies suspicioous for lytic neoplastic lesions such as mets or multiple myeloma.      Imprssion:   1) Syncope, unclear; possible multifactorial, dehydration, hypotension, orthostatasis   2) tremor, likely metabolic at this point but component of esesntial tremor  3) dizzy/vertigo when turns head  4) hyponatremia to 128-->129     - sutures remove 3/31   - already on DOAC.  low suscpioun for stroke . CTH neg.  unable for MRI   - orthostatics +; comproession stockings, hydration, trend orthostatics.  may need to consider midodrine, started 10 TID.  trend orthostatics   - cardio recs appreicated   - consider checking vessels if not already done so with carotid duplex    - r/o multiple myeloma. check ca, SPEP, UPEP, hematology eval   - outpatient EEG   - slow correction of Na no more than 8-12 meq in 24hrs 128-->129.  consider salt tabs   - device interrogation  - unable for MRI.    - telemetry  - check b12 level   - PT/OT  - check FS, glucose control <180  - GI/DVT ppx   - Thank you for allowing me to participate in the care of this patient. Call with questions.     Jose Luis Claire MD  Vascular Neurology  Office: [phone]

## 2025-03-31 NOTE — PROGRESS NOTE ADULT - ASSESSMENT
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  Patient is unsure how he fell, fell backwards and hit his head, had LOC, unknown downtime.  Complains of pain all over his body.  Denies chest pain, shortness of breath. Nephrology consulted for JASON.     A/P  JASON   scr baseline 1.7 per pt.  admitted with scr 2.1   likely prerenal state sec to diuresis vs cardio-renal  s/p 1L NS in the ER  s/p fall - CK normal   FeUrea 21.1% -- suggests pre-renal vs. HF.  CTAP negative for hydronephrosis   s/p contrast study 03/24 - monitor for HELEN   3/27 - S.Cr continues to worsen -- likely sec to contrast.  BUN elevated -- hold off diuretics in view of worsening renal function.  NP d/w pt and daughter, Madhavi at bedside regarding worsening renal function and symptom of fatigue -- possibly d/t uremia in light of elevated BUN.  RRT recommended; pt refused at this time.   3/28 - S.Cr was improving, clinically hypervolemic, s/p Lasix 40mg IVP one dose  3/29- Scr improving, volume status is better-monitor today  3/30- Scr improving, volume status is better-monitor today  3/31 - Scr continues to improve; volume status better  s/p bumex IV 1mg 3/30; UO 1100 over 24 hrs  diuresis as per cardiology  Renal diet.  monitor UO and scr  Adjust the dose of meds per GFR  Avoid further nephrotoxic agents; no more studies with contrast.    Hyponatremia  likely sec to fluid overload  hold home salt tabs   urine sodium low with high urine osm  Na fluctuating  Free fluid restriction to 1L/day --daughter and patient again educated.  Optimize glycemic control.  Monitor Na.    Acidosis:  Marginal.  Monitor CO2 w/ diuretics.    Hypokalemia   s/p KCl 40 meq IV x 4 doses 3/24  K better  Supplement Kcl 20meq PO today  supplement as needed  keep K >4.0.    Hypotension:  On midodrine 10mg TID.  Avoid hypotension.  Monitor BP.    R wrist pain / tremors:  Pt reports pain has been present for over 1 month.  He has been taking prednisone, tapered down to 2.5mg BID.  Pt reports tremors of R hand present before admission but worsened on 3/26; possibly d/t uremia.  Neuro following.    Syncope:  Workup per team.  Neuro following.  Orthostatic +.  On midodrine.  Cardiology following.

## 2025-04-01 LAB
% GAMMA, URINE: 14 % — SIGNIFICANT CHANGE UP
ALBUMIN 24H MFR UR ELPH: 10.9 % — SIGNIFICANT CHANGE UP
ALPHA1 GLOB 24H MFR UR ELPH: 28.8 % — SIGNIFICANT CHANGE UP
ALPHA2 GLOB 24H MFR UR ELPH: 18.3 % — SIGNIFICANT CHANGE UP
ANION GAP SERPL CALC-SCNC: 13 MMOL/L — SIGNIFICANT CHANGE UP (ref 5–17)
B-GLOBULIN 24H MFR UR ELPH: 28 % — SIGNIFICANT CHANGE UP
BUN SERPL-MCNC: 78 MG/DL — HIGH (ref 7–23)
CALCIUM SERPL-MCNC: 8.9 MG/DL — SIGNIFICANT CHANGE UP (ref 8.4–10.5)
CHLORIDE SERPL-SCNC: 91 MMOL/L — LOW (ref 96–108)
CO2 SERPL-SCNC: 28 MMOL/L — SIGNIFICANT CHANGE UP (ref 22–31)
CREAT SERPL-MCNC: 2.35 MG/DL — HIGH (ref 0.5–1.3)
EGFR: 25 ML/MIN/1.73M2 — LOW
EGFR: 25 ML/MIN/1.73M2 — LOW
GLUCOSE SERPL-MCNC: 105 MG/DL — HIGH (ref 70–99)
HCT VFR BLD CALC: 30.7 % — LOW (ref 39–50)
HGB BLD-MCNC: 10.1 G/DL — LOW (ref 13–17)
INTERPRETATION 24H UR IFE-IMP: SIGNIFICANT CHANGE UP
M PROTEIN 24H UR ELPH-MRATE: SIGNIFICANT CHANGE UP
MCHC RBC-ENTMCNC: 31.2 PG — SIGNIFICANT CHANGE UP (ref 27–34)
MCHC RBC-ENTMCNC: 32.9 G/DL — SIGNIFICANT CHANGE UP (ref 32–36)
MCV RBC AUTO: 94.8 FL — SIGNIFICANT CHANGE UP (ref 80–100)
NRBC BLD AUTO-RTO: 0 /100 WBCS — SIGNIFICANT CHANGE UP (ref 0–0)
PLATELET # BLD AUTO: 129 K/UL — LOW (ref 150–400)
POTASSIUM SERPL-MCNC: 3.8 MMOL/L — SIGNIFICANT CHANGE UP (ref 3.5–5.3)
POTASSIUM SERPL-SCNC: 3.8 MMOL/L — SIGNIFICANT CHANGE UP (ref 3.5–5.3)
PROT ?TM UR-MCNC: 16 MG/DL — HIGH (ref 0–12)
PROT PATTERN 24H UR ELPH-IMP: SIGNIFICANT CHANGE UP
RBC # BLD: 3.24 M/UL — LOW (ref 4.2–5.8)
RBC # FLD: 16 % — HIGH (ref 10.3–14.5)
SODIUM SERPL-SCNC: 132 MMOL/L — LOW (ref 135–145)
TOTAL VOLUME - 24 HOUR: SIGNIFICANT CHANGE UP ML
URINE CREATININE CALCULATION: SIGNIFICANT CHANGE UP G/24 H (ref 1–2)
WBC # BLD: 10.61 K/UL — HIGH (ref 3.8–10.5)
WBC # FLD AUTO: 10.61 K/UL — HIGH (ref 3.8–10.5)

## 2025-04-01 RX ADMIN — Medication 40 MILLIGRAM(S): at 08:11

## 2025-04-01 RX ADMIN — Medication 1 TABLET(S): at 13:00

## 2025-04-01 RX ADMIN — MIDODRINE HYDROCHLORIDE 10 MILLIGRAM(S): 5 TABLET ORAL at 13:01

## 2025-04-01 RX ADMIN — Medication 650 MILLIGRAM(S): at 22:25

## 2025-04-01 RX ADMIN — TAMSULOSIN HYDROCHLORIDE 0.4 MILLIGRAM(S): 0.4 CAPSULE ORAL at 21:25

## 2025-04-01 RX ADMIN — PREDNISONE 10 MILLIGRAM(S): 20 TABLET ORAL at 05:46

## 2025-04-01 RX ADMIN — Medication 100 MILLIGRAM(S): at 13:01

## 2025-04-01 RX ADMIN — FLUTICASONE PROPIONATE 1 SPRAY(S): 50 SPRAY, METERED NASAL at 17:14

## 2025-04-01 RX ADMIN — MIDODRINE HYDROCHLORIDE 10 MILLIGRAM(S): 5 TABLET ORAL at 17:13

## 2025-04-01 RX ADMIN — ATORVASTATIN CALCIUM 10 MILLIGRAM(S): 80 TABLET, FILM COATED ORAL at 21:25

## 2025-04-01 RX ADMIN — FLUTICASONE PROPIONATE 1 SPRAY(S): 50 SPRAY, METERED NASAL at 05:46

## 2025-04-01 RX ADMIN — APIXABAN 2.5 MILLIGRAM(S): 2.5 TABLET, FILM COATED ORAL at 17:14

## 2025-04-01 RX ADMIN — Medication 650 MILLIGRAM(S): at 02:16

## 2025-04-01 RX ADMIN — Medication 50 MILLIGRAM(S): at 21:24

## 2025-04-01 RX ADMIN — Medication 112 MICROGRAM(S): at 05:46

## 2025-04-01 RX ADMIN — Medication 1 MILLIGRAM(S): at 21:24

## 2025-04-01 RX ADMIN — Medication 650 MILLIGRAM(S): at 21:25

## 2025-04-01 RX ADMIN — Medication 2 TABLET(S): at 21:25

## 2025-04-01 RX ADMIN — MIDODRINE HYDROCHLORIDE 10 MILLIGRAM(S): 5 TABLET ORAL at 05:46

## 2025-04-01 RX ADMIN — Medication 650 MILLIGRAM(S): at 03:16

## 2025-04-01 NOTE — PROGRESS NOTE ADULT - ASSESSMENT
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  Patient is unsure how he fell, fell backwards and hit his head, had LOC, unknown downtime.  Complains of pain all over his body.  Denies chest pain, shortness of breath.        Problem/Plan - 1:  ·  Problem: Acute kidney injury superimposed on CKD.  ·  Plan: Trending BMP.  Improving.   Likely secondary to HELEN.   Renal help appreciated.      Problem/Plan - 2:  ·  Problem: Hypokalemia.  ·  Plan: Replacing.     Problem/Plan - 3:  ·  Problem: Acute on chronic systolic congestive heart failure.  ·  Plan: Euvolemic so holding Diuretics.   Cardiology help appreciated.  D/W his cardiologist who doesn't come here.     Problem/Plan - 4:  ·  Problem: Fall at home.  ·  Plan: Exact cause not know.   Likely secondary to Hypotension with Orthostasis as Bumex was increased to 5mg and Zaroxolyn was added  recently added.     Problem/Plan - 5:  ·  Problem: Paroxysmal atrial fibrillation.  ·  Plan: Wants Eliquis once a day only.    .     Problem/Plan - 6:  ·  Problem: CAD in native artery.  ·  Plan: Holding AP.     Problem/Plan - 7:  ·  Problem: Anxiety and depression.  ·  Plan: Continue Trazadone 50 mg and  Xanax to 1mg.   Reduced dose as was too sleepy .      Problem/Plan - 8:  ·  Problem: HLD (hyperlipidemia).  ·  Plan: Continue Statin.     Problem/Plan - 9:  ·  Problem: Bilateral leg ulcer.  ·  Plan:   wound care help appreciated.      Problem/Plan - 10:  ·  Problem: Hyponatremia.  ·  Plan; Renal help appreciated.      Problem/Plan - 11:  ·  Problem: Hypotension.  ·  Plan: On Midodrine so will continue.     Problem/Plan - 12:  ·  Problem: Scalp laceration.  ·  Plan: S/P Stitching scalp .   Dressing changes.  Rpting CT head noted .      Problem/Plan - 12:  ·  Problem: Tremor/ Asterixis  ·  Plan: Neurology following.      D/W patient ,patient's daughter in detail and ACP    Dispo : Dc Planning home as refuses YVON. D/W patient and his daughter.

## 2025-04-01 NOTE — OCCUPATIONAL THERAPY INITIAL EVALUATION ADULT - LEVEL OF INDEPENDENCE: TOILET, REHAB EVAL
SCRIBE #1 NOTE: I, Hillary Solo, am scribing for, and in the presence of, German Porter NP. I have scribed the entire note.        History     Chief Complaint   Patient presents with    Cough    Otalgia     tubes to bilat ears two weeks ago; tugging on both ears     Pustules     scattered pustules       Review of patient's allergies indicates:  No Known Allergies    History of Present Illness   HPI    12/9/2019, 8:30 PM  History obtained from the parents      History of Present Illness: Colt Lucas is a 13 m.o. female patient who is brought by her parents to the Emergency Department for evaluation of multiple complaints. Pt's parents c/o JERONIMO ear pain, scattered pustules, and a dry cough which onset four days ago. Pt had tubes placed in both ears two weeks ago. Pt has been using ear drops prescribed by her pediatrician for the past two weeks with slight relief. Sxs are constant and moderate in severity. There are no mitigating or exacerbating factors noted. Associated sxs include increased fussiness and rhinorrhea. parents denies any fever, urine output changes, V/D, drooling, sneezing, appetite change, abd pain, trouble swallowing, and all other sxs at this time. No other prior tx reported. No further complaints or concerns at this time.       Arrival mode: Personal vehicle     PCP: Alisha Meléndez MD    Immunization status: UTD       Past Medical History:  History reviewed. No pertinent past medical history.     Past Surgical History:  Past Surgical History:   Procedure Laterality Date    MYRINGOTOMY WITH INSERTION OF VENTILATION TUBE Bilateral 11/15/2019    Procedure: MYRINGOTOMY, WITH TYMPANOSTOMY TUBE INSERTION;  Surgeon: Wilma Javier MD;  Location: [facility];  Service: ENT;  Laterality: Bilateral;    NO PAST SURGERIES           Family History:  Family History   Problem Relation Age of Onset    Miscarriages / Stillbirths Maternal Grandmother         Copied from mother's family history at  birth    Anemia Mother         Copied from mother's history at birth    No Known Problems Father        Social History:  Pediatric History   Patient Guardian Status    Father:  Choco Lucas     Other Topics Concern    Other   Social History Narrative    No pets or smokers in household       Review of Systems     Review of Systems   Constitutional: Negative for appetite change and fever.        (+) fussiness   HENT: Positive for ear pain (JERONIMO) and rhinorrhea. Negative for drooling, sneezing, sore throat and trouble swallowing.    Respiratory: Positive for cough (dry).    Cardiovascular: Negative for palpitations.   Gastrointestinal: Negative for abdominal pain, diarrhea, nausea and vomiting.   Genitourinary: Negative for decreased urine volume and difficulty urinating.   Musculoskeletal: Negative for joint swelling.   Skin: Negative for rash.        (+) scattered pustules   Neurological: Negative for seizures and headaches.   Hematological: Does not bruise/bleed easily.   All other systems reviewed and are negative.     Physical Exam     Initial Vitals [12/09/19 1950]   BP Pulse Resp Temp SpO2   -- 113 26 98.6 °F (37 °C) 100 %      MAP       --          Physical Exam  Vital signs and nursing notes reviewed.  Constitutional: Patient is in no apparent distress. Patient is active. Non-toxic. Well-hydrated. Well-appearing. Patient is attentive and interactive. Patient is appropriate for age. No evidence of lethargy or irritability.   Head: Normocephalic and atraumatic.  Ears: Mucoid otitis media with green drainage to R ear.   Nose and Throat: Moist mucous membranes. Symmetric palate. Posterior pharynx is clear without exudates. No palatal petechiae.  Eyes: PERRL. Conjunctivae are normal. No scleral icterus.  Neck: Supple. No cervical lymphadenopathy. No meningismus.  Cardiovascular: Regular rate and rhythm. No murmurs. Well perfused.  Pulmonary/Chest: No respiratory distress. Dry cough. No retraction, nasal flaring,  or grunting. Breath sounds are clear bilaterally. No stridor, wheezes, rales, or rhonchi.  Abdominal: Soft. Non-distended. No crying or grimacing with deep abd palpation. Bowel sounds are normal.  Musculoskeletal: Moves all extremities. Brisk cap refill.  Skin: Warm and dry. Four pustules--one we expressed pus out of.  Neurological: Alert and interactive. Age appropriate behavior.     ED Course   Procedures    ED Vital Signs:  Vitals:    12/09/19 1950   Pulse: 113   Resp: 26   Temp: 98.6 °F (37 °C)   SpO2: 100%   Weight: 10.6 kg (23 lb 6 oz)       Abnormal Lab Results:  Labs Reviewed - No data to display     All Lab Results:  None.      Imaging Results:  Imaging Results    None                   The Emergency Provider reviewed the vital signs and test results, which are outlined above.     ED Discussion   8:46 PM: Reassessed pt at this time.  Pt states her condition has improved at this time. Discussed with pt all pertinent ED information. Discussed pt dx and plan of tx. Gave pt all f/u and return to the ED instructions. All questions and concerns were addressed at this time. Pt expresses understanding of information and instructions, and is comfortable with plan to discharge. Pt is stable for discharge.    I discussed with patient and/or family/caretaker that evaluation in the ED does not suggest any emergent or life threatening medical conditions requiring immediate intervention beyond what was provided in the ED, and I believe patient is safe for discharge.  Regardless, an unremarkable evaluation in the ED does not preclude the development or presence of a serious of life threatening condition. As such, patient was instructed to return immediately for any worsening or change in current symptoms.      ED Medication(s):  Medications - No data to display  Current Discharge Medication List          Follow-up Information     Alisha Meléndez MD. Schedule an appointment as soon as possible for a visit in 1 day.     Specialty:  Pediatrics  Contact information:  [address]  [place] [address]  [phone]             [facility] - .    Specialty:  Emergency Medicine  Why:  As needed, If symptoms worsen  Contact information:  [address] [facility] [id number] [phone]                      Medical Decision Making                    Scribe Attestation:   Scribe #1: I performed the above scribed service and the documentation accurately describes the services I performed. I attest to the accuracy of the note. 12/10/2019 8:30 PM    Attending:   Physician Attestation Statement for Scribe #1: I, German Porter NP, personally performed the services described in this documentation, as scribed by Hillary Solo, in my presence, and it is both accurate and complete.           Clinical Impression       ICD-10-CM ICD-9-CM   1. Recurrent acute suppurative otitis media of right ear with spontaneous rupture of tympanic membrane H66.014 382.01   2. Pustules determined by examination L08.9 686.9   3. Staph infection B95.8 041.10       Disposition:   Disposition: Discharged  Condition: Stable               German Porter NP  12/10/19 0126     minimum assist (75% patients effort)

## 2025-04-01 NOTE — PROGRESS NOTE ADULT - ASSESSMENT
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  Patient is unsure how he fell, fell backwards and hit his head, had LOC, unknown downtime.  Complains of pain all over his body.  Denies chest pain, shortness of breath. Nephrology consulted for JASON.     A/P  JASON   scr baseline 1.7 per pt.  admitted with scr 2.1   likely prerenal state sec to diuresis vs cardio-renal  s/p 1L NS in the ER  s/p fall - CK normal   FeUrea 21.1% -- suggests pre-renal vs. HF.  CTAP negative for hydronephrosis   s/p contrast study 03/24 - monitor for HELEN   3/27 - S.Cr continues to worsen -- likely sec to contrast.  BUN elevated -- hold off diuretics in view of worsening renal function.  NP d/w pt and daughter, Madhavi at bedside regarding worsening renal function and symptom of fatigue -- possibly d/t uremia in light of elevated BUN.  RRT recommended; pt refused at this time.   3/28 - S.Cr was improving, clinically hypervolemic, s/p Lasix 40mg IVP one dose  3/29- Scr improving, volume status is better-monitor today  3/30- Scr improving, volume status is better-monitor today  3/31 - Scr continues to improve; volume status better  s/p bumex IV 1mg 3/30; UO 1100 over 24 hrs  diuresis as per cardiology  Renal flunction improving  Renal diet.  monitor UO and scr  Adjust the dose of meds per GFR  Avoid further nephrotoxic agents; no more studies with contrast.    Hyponatremia  likely sec to fluid overload  hold home salt tabs   urine sodium low with high urine osm  Na fluctuating  Free fluid restriction to 1L/day --daughter and patient again educated.  Optimize glycemic control.  Monitor Na.    Acidosis:  Better  Monitor CO2 w/ diuretics.    Hypokalemia   s/p KCl 40 meq IV x 4 doses 3/24 and 20meq po 3/30  K better  supplement as needed  keep K >4.0.    Hypotension:  On midodrine 10mg TID.  Avoid hypotension.  Monitor BP.    R wrist pain / tremors:  Pt reports pain has been present for over 1 month.  He has been taking prednisone, tapered down to 2.5mg BID.  Pt reports tremors of R hand present before admission but worsened on 3/26; possibly d/t uremia.  Neuro following.    Syncope:  Workup per team.  Neuro following.  Orthostatic +.  On midodrine.  Cardiology following.

## 2025-04-01 NOTE — PROGRESS NOTE ADULT - ASSESSMENT
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  Patient is unsure how he fell, fell backwards and hit his head, had LOC, unknown downtime.  Complains of pain all over his body.  Denies chest pain, shortness of breath.   + LOC, no tongue bite, no incontinence. no conuvlsions   o/e AAOx2-3, PITT but LUE pain limited, walker baseline   orthostatics +   CTH neg   CT C/T/L spine; multiple scattered lucensies suspicioous for lytic neoplastic lesions such as mets or multiple myeloma.    b12      Imprssion:   1) Syncope, unclear; possible multifactorial, dehydration, hypotension, orthostatasis   2) tremor, likely metabolic at this point but component of esesntial tremor  3) dizzy/vertigo when turns head  4) hyponatremia to 128-->129 -->132     - sutures remove  4/3   - already on DOAC.  low suscpioun for stroke . CTH neg.  unable for MRI   - orthostatics +; comproession stockings, hydration, trend orthostatics.  may need to consider midodrine, started 10 TID.  trend orthostatics   - cardio recs appreicated   - consider checking vessels if not already done so with carotid duplex    - r/o multiple myeloma. check ca, SPEP, UPEP, hematology eval   - outpatient EEG   - slow correction of Na no more than 8-12 meq in 24hrs 128-->129-->132   - device interrogation  - unable for MRI.    - telemetry  - PT/OT  - check FS, glucose control <180  - GI/DVT ppx   - Thank you for allowing me to participate in the care of this patient. Call with questions.     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  Patient is unsure how he fell, fell backwards and hit his head, had LOC, unknown downtime.  Complains of pain all over his body.  Denies chest pain, shortness of breath.   + LOC, no tongue bite, no incontinence. no conuvlsions   o/e AAOx2-3, PITT but LUE pain limited, walker baseline   orthostatics +   CTH neg   CT C/T/L spine; multiple scattered lucensies suspicioous for lytic neoplastic lesions such as mets or multiple myeloma.    b12      Imprssion:   1) Syncope, unclear; possible multifactorial, dehydration, hypotension, orthostatasis   2) tremor, likely metabolic at this point but component of esesntial tremor  3) dizzy/vertigo when turns head  4) hyponatremia to 128-->129 -->132     - sutures remove  4/3   - already on DOAC.  low suscpioun for stroke . CTH neg.  unable for MRI   - orthostatics +; comproession stockings, hydration, trend orthostatics.  may need to consider midodrine, started 10 TID.  trend orthostatics   - cardio recs appreicated   - consider checking vessels if not already done so with carotid duplex    - r/o multiple myeloma. check ca, SPEP, UPEP, hematology eval   - outpatient EEG   - slow correction of Na no more than 8-12 meq in 24hrs 128-->129-->132   - device interrogation, neg for events to correlate with syncope   - unable for MRI.    - telemetry  - PT/OT  - check FS, glucose control <180  - GI/DVT ppx   - Thank you for allowing me to participate in the care of this patient. Call with questions.     Jose Luis Claire MD  Vascular Neurology  Office: [phone]

## 2025-04-01 NOTE — OCCUPATIONAL THERAPY INITIAL EVALUATION ADULT - PERTINENT HX OF CURRENT PROBLEM, REHAB EVAL
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  Patient is unsure how he fell, fell backwards and hit his head, had LOC, unknown downtime.  Complains of pain all over his body.  Denies chest pain, shortness of breath.   CT C/T/L spine; multiple scattered lucensies suspicioous for lytic neoplastic lesions such as mets or multiple myeloma. Imprssion:   1) Syncope, unclear; possible multifactorial, dehydration, hypotension, orthostatasis   2) tremor, likely metabolic at this point but component of esesntial tremor  3) dizzy/vertigo when turns head  4) hyponatremia to 128-->129 -->132

## 2025-04-01 NOTE — PROGRESS NOTE ADULT - NS ATTEND AMEND GEN_ALL_CORE FT
Patient discussed in details, plan as above
scr worsening likely sec to contrast   pending labs today
Scr worsened likely sec to contrast  clinically slightly hypervoelmic but acceptable  Hold diuretics if possible-use PRN  monitor Scr closely  had extensive discussion with family
Scr improving  diuretics can be restarted per cardio
Scr worsening sec to contrast  possibly mildy uremic  monitor at present   family not interested in RRT
Lasix 40mg IV one dose  Fluid restriction 1L/day  Monitor Na level and Scr

## 2025-04-01 NOTE — OCCUPATIONAL THERAPY INITIAL EVALUATION ADULT - ADDITIONAL COMMENTS
Pt lives alone in apartment with no steps to enter and no steps inside. Pt has a walk in shower with grab bars and a built in bench. Pt uses rollator. Pt has an aide 4days/wk to assist as needed.

## 2025-04-02 ENCOUNTER — TRANSCRIPTION ENCOUNTER (OUTPATIENT)
Age: 89
End: 2025-04-02

## 2025-04-02 VITALS
RESPIRATION RATE: 18 BRPM | TEMPERATURE: 98 F | DIASTOLIC BLOOD PRESSURE: 59 MMHG | OXYGEN SATURATION: 93 % | HEART RATE: 70 BPM | SYSTOLIC BLOOD PRESSURE: 104 MMHG

## 2025-04-02 LAB
% ALBUMIN: 53.1 % — SIGNIFICANT CHANGE UP
% ALPHA 1: 6.8 % — SIGNIFICANT CHANGE UP
% ALPHA 2: 11.8 % — SIGNIFICANT CHANGE UP
% BETA: 13.4 % — SIGNIFICANT CHANGE UP
% GAMMA: 14.9 % — SIGNIFICANT CHANGE UP
ALBUMIN SERPL ELPH-MCNC: 3.4 G/DL — LOW (ref 3.6–5.5)
ALBUMIN/GLOB SERPL ELPH: 1.1 RATIO — SIGNIFICANT CHANGE UP
ALPHA1 GLOB SERPL ELPH-MCNC: 0.4 G/DL — SIGNIFICANT CHANGE UP (ref 0.1–0.4)
ALPHA2 GLOB SERPL ELPH-MCNC: 0.8 G/DL — SIGNIFICANT CHANGE UP (ref 0.5–1)
B-GLOBULIN SERPL ELPH-MCNC: 0.9 G/DL — SIGNIFICANT CHANGE UP (ref 0.5–1)
GAMMA GLOBULIN: 1 G/DL — SIGNIFICANT CHANGE UP (ref 0.6–1.6)
PROT PATTERN SERPL ELPH-IMP: SIGNIFICANT CHANGE UP
PROT SERPL-MCNC: 6.4 G/DL — SIGNIFICANT CHANGE UP (ref 6–8.3)
PROT SERPL-MCNC: 6.4 G/DL — SIGNIFICANT CHANGE UP (ref 6–8.3)

## 2025-04-02 PROCEDURE — 80053 COMPREHEN METABOLIC PANEL: CPT

## 2025-04-02 PROCEDURE — 87086 URINE CULTURE/COLONY COUNT: CPT

## 2025-04-02 PROCEDURE — 87077 CULTURE AEROBIC IDENTIFY: CPT

## 2025-04-02 PROCEDURE — 81003 URINALYSIS AUTO W/O SCOPE: CPT

## 2025-04-02 PROCEDURE — 84443 ASSAY THYROID STIM HORMONE: CPT

## 2025-04-02 PROCEDURE — 83036 HEMOGLOBIN GLYCOSYLATED A1C: CPT

## 2025-04-02 PROCEDURE — 70450 CT HEAD/BRAIN W/O DYE: CPT | Mod: MC

## 2025-04-02 PROCEDURE — 94640 AIRWAY INHALATION TREATMENT: CPT

## 2025-04-02 PROCEDURE — 83735 ASSAY OF MAGNESIUM: CPT

## 2025-04-02 PROCEDURE — 84540 ASSAY OF URINE/UREA-N: CPT

## 2025-04-02 PROCEDURE — 85730 THROMBOPLASTIN TIME PARTIAL: CPT

## 2025-04-02 PROCEDURE — 86900 BLOOD TYPING SEROLOGIC ABO: CPT

## 2025-04-02 PROCEDURE — 84155 ASSAY OF PROTEIN SERUM: CPT

## 2025-04-02 PROCEDURE — 84156 ASSAY OF PROTEIN URINE: CPT

## 2025-04-02 PROCEDURE — 93306 TTE W/DOPPLER COMPLETE: CPT

## 2025-04-02 PROCEDURE — 71260 CT THORAX DX C+: CPT | Mod: MC

## 2025-04-02 PROCEDURE — 85014 HEMATOCRIT: CPT

## 2025-04-02 PROCEDURE — 85025 COMPLETE CBC W/AUTO DIFF WBC: CPT

## 2025-04-02 PROCEDURE — 87637 SARSCOV2&INF A&B&RSV AMP PRB: CPT

## 2025-04-02 PROCEDURE — 82330 ASSAY OF CALCIUM: CPT

## 2025-04-02 PROCEDURE — 97161 PT EVAL LOW COMPLEX 20 MIN: CPT

## 2025-04-02 PROCEDURE — 82550 ASSAY OF CK (CPK): CPT

## 2025-04-02 PROCEDURE — 86334 IMMUNOFIX E-PHORESIS SERUM: CPT

## 2025-04-02 PROCEDURE — 71045 X-RAY EXAM CHEST 1 VIEW: CPT

## 2025-04-02 PROCEDURE — 96376 TX/PRO/DX INJ SAME DRUG ADON: CPT

## 2025-04-02 PROCEDURE — 86901 BLOOD TYPING SEROLOGIC RH(D): CPT

## 2025-04-02 PROCEDURE — 86335 IMMUNFIX E-PHORSIS/URINE/CSF: CPT

## 2025-04-02 PROCEDURE — 84132 ASSAY OF SERUM POTASSIUM: CPT

## 2025-04-02 PROCEDURE — 72125 CT NECK SPINE W/O DYE: CPT | Mod: MC

## 2025-04-02 PROCEDURE — 85018 HEMOGLOBIN: CPT

## 2025-04-02 PROCEDURE — 72170 X-RAY EXAM OF PELVIS: CPT

## 2025-04-02 PROCEDURE — 84300 ASSAY OF URINE SODIUM: CPT

## 2025-04-02 PROCEDURE — 82435 ASSAY OF BLOOD CHLORIDE: CPT

## 2025-04-02 PROCEDURE — 74177 CT ABD & PELVIS W/CONTRAST: CPT | Mod: MC

## 2025-04-02 PROCEDURE — 84484 ASSAY OF TROPONIN QUANT: CPT

## 2025-04-02 PROCEDURE — 82570 ASSAY OF URINE CREATININE: CPT

## 2025-04-02 PROCEDURE — 85610 PROTHROMBIN TIME: CPT

## 2025-04-02 PROCEDURE — 86850 RBC ANTIBODY SCREEN: CPT

## 2025-04-02 PROCEDURE — 97116 GAIT TRAINING THERAPY: CPT

## 2025-04-02 PROCEDURE — 96375 TX/PRO/DX INJ NEW DRUG ADDON: CPT

## 2025-04-02 PROCEDURE — 84166 PROTEIN E-PHORESIS/URINE/CSF: CPT

## 2025-04-02 PROCEDURE — 97165 OT EVAL LOW COMPLEX 30 MIN: CPT

## 2025-04-02 PROCEDURE — 82947 ASSAY GLUCOSE BLOOD QUANT: CPT

## 2025-04-02 PROCEDURE — 99285 EMERGENCY DEPT VISIT HI MDM: CPT

## 2025-04-02 PROCEDURE — 84295 ASSAY OF SERUM SODIUM: CPT

## 2025-04-02 PROCEDURE — 82607 VITAMIN B-12: CPT

## 2025-04-02 PROCEDURE — 83605 ASSAY OF LACTIC ACID: CPT

## 2025-04-02 PROCEDURE — 93005 ELECTROCARDIOGRAM TRACING: CPT

## 2025-04-02 PROCEDURE — 97530 THERAPEUTIC ACTIVITIES: CPT

## 2025-04-02 PROCEDURE — 96374 THER/PROPH/DIAG INJ IV PUSH: CPT

## 2025-04-02 PROCEDURE — 80048 BASIC METABOLIC PNL TOTAL CA: CPT

## 2025-04-02 PROCEDURE — 85027 COMPLETE CBC AUTOMATED: CPT

## 2025-04-02 PROCEDURE — 82803 BLOOD GASES ANY COMBINATION: CPT

## 2025-04-02 PROCEDURE — 87040 BLOOD CULTURE FOR BACTERIA: CPT

## 2025-04-02 PROCEDURE — 83935 ASSAY OF URINE OSMOLALITY: CPT

## 2025-04-02 PROCEDURE — 84165 PROTEIN E-PHORESIS SERUM: CPT

## 2025-04-02 PROCEDURE — 90715 TDAP VACCINE 7 YRS/> IM: CPT

## 2025-04-02 PROCEDURE — 36415 COLL VENOUS BLD VENIPUNCTURE: CPT

## 2025-04-02 RX ORDER — FLUTICASONE PROPIONATE 50 UG/1
1 SPRAY, METERED NASAL
Qty: 0 | Refills: 0 | DISCHARGE
Start: 2025-04-02

## 2025-04-02 RX ORDER — PREDNISONE 20 MG/1
1 TABLET ORAL
Qty: 0 | Refills: 0 | DISCHARGE
Start: 2025-04-02

## 2025-04-02 RX ORDER — APIXABAN 2.5 MG/1
1 TABLET, FILM COATED ORAL
Qty: 0 | Refills: 0 | DISCHARGE
Start: 2025-04-02

## 2025-04-02 RX ORDER — MIDODRINE HYDROCHLORIDE 5 MG/1
1 TABLET ORAL
Qty: 0 | Refills: 0 | DISCHARGE
Start: 2025-04-02

## 2025-04-02 RX ORDER — TRAZODONE HCL 100 MG
1 TABLET ORAL
Qty: 0 | Refills: 0 | DISCHARGE
Start: 2025-04-02

## 2025-04-02 RX ORDER — APIXABAN 2.5 MG/1
1 TABLET, FILM COATED ORAL
Refills: 0 | DISCHARGE

## 2025-04-02 RX ORDER — MIDODRINE HYDROCHLORIDE 5 MG/1
1 TABLET ORAL
Refills: 0 | DISCHARGE

## 2025-04-02 RX ADMIN — Medication 650 MILLIGRAM(S): at 07:14

## 2025-04-02 RX ADMIN — Medication 1 TABLET(S): at 12:47

## 2025-04-02 RX ADMIN — POLYETHYLENE GLYCOL 3350 17 GRAM(S): 17 POWDER, FOR SOLUTION ORAL at 13:19

## 2025-04-02 RX ADMIN — MIDODRINE HYDROCHLORIDE 10 MILLIGRAM(S): 5 TABLET ORAL at 12:46

## 2025-04-02 RX ADMIN — FLUTICASONE PROPIONATE 1 SPRAY(S): 50 SPRAY, METERED NASAL at 06:10

## 2025-04-02 RX ADMIN — PREDNISONE 10 MILLIGRAM(S): 20 TABLET ORAL at 06:09

## 2025-04-02 RX ADMIN — Medication 112 MICROGRAM(S): at 06:09

## 2025-04-02 RX ADMIN — Medication 100 MILLIGRAM(S): at 12:47

## 2025-04-02 RX ADMIN — Medication 650 MILLIGRAM(S): at 06:14

## 2025-04-02 RX ADMIN — Medication 40 MILLIGRAM(S): at 08:18

## 2025-04-02 RX ADMIN — MIDODRINE HYDROCHLORIDE 10 MILLIGRAM(S): 5 TABLET ORAL at 06:09

## 2025-04-02 NOTE — PROGRESS NOTE ADULT - ASSESSMENT
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  Patient is unsure how he fell, fell backwards and hit his head, had LOC, unknown downtime.  Complains of pain all over his body.  Denies chest pain, shortness of breath.        Problem/Plan - 1:  ·  Problem: Acute kidney injury superimposed on CKD.  ·  Plan: Trending BMP.  Improving.   Likely secondary to HELEN.   Renal help appreciated.      Problem/Plan - 2:  ·  Problem: Hypokalemia.  ·  Plan: Replacing.     Problem/Plan - 3:  ·  Problem: Acute on chronic systolic congestive heart failure.  ·  Plan: Euvolemic so holding Diuretics.   Cardiology help appreciated.  D/W his cardiologist who doesn't come here.     Problem/Plan - 4:  ·  Problem: Fall at home.  ·  Plan: Exact cause not know.   Likely secondary to Hypotension with Orthostasis as Bumex was increased to 5mg and Zaroxolyn was added  recently added.     Problem/Plan - 5:  ·  Problem: Paroxysmal atrial fibrillation.  ·  Plan: Wants Eliquis once a day only.    .     Problem/Plan - 6:  ·  Problem: CAD in native artery.  ·  Plan: Holding AP.     Problem/Plan - 7:  ·  Problem: Anxiety and depression.  ·  Plan: Continue Trazadone 50 mg and  Xanax to 1mg.   Reduced dose as was too sleepy .      Problem/Plan - 8:  ·  Problem: HLD (hyperlipidemia).  ·  Plan: Continue Statin.     Problem/Plan - 9:  ·  Problem: Bilateral leg ulcer.  ·  Plan:   wound care help appreciated.      Problem/Plan - 10:  ·  Problem: Hyponatremia.  ·  Plan; Renal help appreciated.      Problem/Plan - 11:  ·  Problem: Hypotension.  ·  Plan: On Midodrine so will continue.     Problem/Plan - 12:  ·  Problem: Scalp laceration.  ·  Plan: S/P Stitching scalp .   Dressing changes.  Rpting CT head noted .      Problem/Plan - 12:  ·  Problem: Tremor/ Asterixis  ·  Plan: Neurology following.      D/W patient  and ACP in detail.    Dispo : Dc Planning home as refuses YVON. D/W patient and his daughter .

## 2025-04-02 NOTE — PROGRESS NOTE ADULT - SUBJECTIVE AND OBJECTIVE BOX
Dr. Sanabria  Office [phone] (9 am to 5 pm)  Service: [phone] (5pm to 9am)  Holly ARITA      RENAL PROGRESS NOTE: DATE OF SERVICE 03-30-25 @ 13:26    Patient is a 89y old  Male who presents with a chief complaint of Fall at home. (30 Mar 2025 12:04)      Patient seen and examined at bedside. No chest pain/sob    VITALS:  T(F): 98 (03-30-25 @ 06:18), Max: 98 (03-30-25 @ 06:18)  HR: 70 (03-30-25 @ 06:18)  BP: 100/61 (03-30-25 @ 06:18)  RR: 17 (03-30-25 @ 06:18)  SpO2: 92% (03-30-25 @ 06:18)  Wt(kg): --    03-29 @ 07:01  -  03-30 @ 07:00  --------------------------------------------------------  IN: 0 mL / OUT: 300 mL / NET: -300 mL          PHYSICAL EXAM:  Constitutional: NAD  Neck: No JVD  Respiratory: CTAB, no wheezes, rales or rhonchi  Cardiovascular: S1, S2, RRR  Gastrointestinal: BS+, soft, NT/ND  Extremities: No peripheral edema    Hospital Medications:   MEDICATIONS  (STANDING):  allopurinol 100 milliGRAM(s) Oral daily  ALPRAZolam 1 milliGRAM(s) Oral at bedtime  apixaban 2.5 milliGRAM(s) Oral <User Schedule>  atorvastatin 10 milliGRAM(s) Oral at bedtime  fluticasone propionate 50 MICROgram(s)/spray Nasal Spray 1 Spray(s) Both Nostrils two times a day  influenza  Vaccine (HIGH DOSE) 0.5 milliLiter(s) IntraMuscular once  lactobacillus acidophilus 1 Tablet(s) Oral daily  levothyroxine 112 MICROGram(s) Oral daily  midodrine. 10 milliGRAM(s) Oral three times a day  multivitamin 1 Tablet(s) Oral daily  pantoprazole    Tablet 40 milliGRAM(s) Oral before breakfast  polyethylene glycol 3350 17 Gram(s) Oral daily  predniSONE   Tablet 10 milliGRAM(s) Oral daily  senna 2 Tablet(s) Oral at bedtime  tamsulosin 0.4 milliGRAM(s) Oral at bedtime  traZODone 50 milliGRAM(s) Oral at bedtime      LABS:  03-30    133[L]  |  92[L]  |  93[H]  ----------------------------<  101[H]  3.5   |  28  |  2.81[H]    Ca    8.7      30 Mar 2025 08:01      Creatinine Trend: 2.81 <--, 3.40 <--, 3.65 <--, 4.37 <--, 4.02 <--, 2.60 <--, 1.99 <--, 2.08 <--                                9.9    9.02  )-----------( 124      ( 29 Mar 2025 07:31 )             29.4     Urine Studies:  Urinalysis - [03-30-25 @ 08:01]      Color  / Appearance  / SG  / pH       Gluc 101 / Ketone   / Bili  / Urobili        Blood  / Protein  / Leuk Est  / Nitrite       RBC  / WBC  / Hyaline  / Gran  / Sq Epi  / Non Sq Epi  / Bacteria     Urine Creatinine 70      [03-25-25 @ 06:43]  Urine Protein 16      [03-28-25 @ 18:47]  Urine Sodium 15      [03-25-25 @ 06:43]  Urine Urea Nitrogen 462      [03-25-25 @ 16:08]  Urine Osmolality 345      [03-25-25 @ 06:43]    TSH 1.01      [03-24-25 @ 19:09]        RADIOLOGY & ADDITIONAL STUDIES:  
Cardiovascular Disease Progress Note  Date of service: 03-30-25 @ 12:04    Overnight events: No acute events overnight.  Patient is in no distress. More lethargic today. DaughterMadhavi is at bedside.   Otherwise review of systems negative    Objective Findings:  T(C): 36.7 (03-30-25 @ 06:18), Max: 36.7 (03-30-25 @ 06:18)  HR: 70 (03-30-25 @ 06:18) (69 - 70)  BP: 100/61 (03-30-25 @ 06:18) (93/46 - 104/61)  RR: 17 (03-30-25 @ 06:18) (17 - 18)  SpO2: 92% (03-30-25 @ 06:18) (92% - 96%)  Wt(kg): --  Daily     Daily       Physical Exam:  Gen: NAD; Patient resting comfortably  HEENT: EOMI, Normocephalic/ atraumatic  CV: RRR, normal S1 + S2, no m/r/g  Lungs:  Normal respiratory effort; diffuse crackles.   Abd: soft, non-tender; bowel sounds present  Ext: No edema; warm and well perfused    Telemetry:  N/a    Laboratory Data:                        9.9    9.02  )-----------( 124      ( 29 Mar 2025 07:31 )             29.4     03-30    133[L]  |  92[L]  |  93[H]  ----------------------------<  101[H]  3.5   |  28  |  2.81[H]    Ca    8.7      30 Mar 2025 08:01                Inpatient Medications:  MEDICATIONS  (STANDING):  allopurinol 100 milliGRAM(s) Oral daily  ALPRAZolam 1 milliGRAM(s) Oral at bedtime  apixaban 2.5 milliGRAM(s) Oral <User Schedule>  atorvastatin 10 milliGRAM(s) Oral at bedtime  fluticasone propionate 50 MICROgram(s)/spray Nasal Spray 1 Spray(s) Both Nostrils two times a day  influenza  Vaccine (HIGH DOSE) 0.5 milliLiter(s) IntraMuscular once  lactobacillus acidophilus 1 Tablet(s) Oral daily  levothyroxine 112 MICROGram(s) Oral daily  midodrine. 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   - Continue current management     #JASON  - Renal function worsening, likely HELEN  - Diuretics on hold as per renal  - Renal input appreciated.     #Hyponatremia  - Fluid restriction as per renal     #Acute systolic heart failure  - Patient Volume status improved   - Some congestion on exam  - Fluid restriction  - Diuretics on hold in setting of JASON  - Recommend 1x time dose of Bumex 1 mg IV if no renal objection     #Afib  - V paced on telemetry  - Patient take Eliquis at home currently on hold, would resume if no contraindication.   - Given age and renal function would resume at 2.5mg   - On daily dosing as per request of patient.     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      #ACP (advance care planning)-  Advanced care planning was discussed with the patient and daughter.  Risks, benefits and alternatives of medical treatment and procedures were discussed in detail and all questions were answered. Patient is DNR/DNI. 30 additional minutes spent addressing advance care plans.        Over 55 minutes spent on total encounter; more than 50% of the visit was spent counseling and/or coordinating care by the attending physician.      Darian Guerrero,  [facility]  Cardiovascular Disease  [phone]
Cardiovascular Disease Progress Note  Date of service: 04-02-25 @ 11:42    Overnight events: No acute events overnight.  Patient is in no distress.   Otherwise review of systems negative    Objective Findings:  T(C): 36.7 (04-02-25 @ 08:18), Max: 36.7 (04-02-25 @ 00:00)  HR: 70 (04-02-25 @ 08:18) (70 - 70)  BP: 104/59 (04-02-25 @ 08:18) (98/63 - 114/67)  RR: 18 (04-02-25 @ 08:18) (18 - 18)  SpO2: 93% (04-02-25 @ 08:18) (93% - 95%)  Wt(kg): --  Daily     Daily       Physical Exam:  Gen: NAD; Patient resting comfortably  HEENT: EOMI, Normocephalic/ atraumatic  CV: RRR, normal S1 + S2, no m/r/g  Lungs:  Normal respiratory effort; clear to auscultation bilaterally  Abd: soft, non-tender; bowel sounds present  Ext: No edema; warm and well perfused    Telemetry: n/a    Laboratory Data:                        10.1   10.61 )-----------( 129      ( 01 Apr 2025 06:50 )             30.7     04-01    132[L]  |  91[L]  |  78[H]  ----------------------------<  105[H]  3.8   |  28  |  2.35[H]    Ca    8.9      01 Apr 2025 07:21                Inpatient Medications:  MEDICATIONS  (STANDING):  allopurinol 100 milliGRAM(s) Oral daily  ALPRAZolam 1 milliGRAM(s) Oral at bedtime  apixaban 2.5 milliGRAM(s) Oral <User Schedule>  atorvastatin 10 milliGRAM(s) Oral at bedtime  fluticasone propionate 50 MICROgram(s)/spray Nasal Spray 1 Spray(s) Both Nostrils two times a day  influenza  Vaccine (HIGH DOSE) 0.5 milliLiter(s) IntraMuscular once  lactobacillus acidophilus 1 Tablet(s) Oral daily  levothyroxine 112 MICROGram(s) Oral daily  midodrine. 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   - Continue current management     #JASON  - Renal function improving.   - Renal input appreciated.       #Acute systolic heart failure  - Patient Volume status improved   - Fluid restriction  - Diuretics as needed.     #Afib  - V paced on telemetry  - Given age and renal function would resume at 2.5mg   - On daily dosing as per request of patient. (Patient with excessive bruising and ecchymosis)    Patient to be discharged today. Will follow up with his private cardiologist Dr. Goldberg upon discharge.       #ACP (advance care planning)-  Advanced care planning was discussed with the patient.  Risks, benefits and alternatives of medical treatment and procedures were discussed in detail and all questions were answered. 30 additional minutes spent addressing advance care plans.    Over 55 minutes spent on total encounter; more than 50% of the visit was spent counseling and/or coordinating care by the attending physician.      Darian Guerrero,  [facility]  Cardiovascular Disease  [phone]
Date of Service  : 03-28-25     INTERVAL HPI/OVERNIGHT EVENTS: I feel better.    Vital Signs Last 24 Hrs  T(C): 36.7 (28 Mar 2025 09:12), Max: 36.9 (28 Mar 2025 00:34)  T(F): 98 (28 Mar 2025 09:12), Max: 98.4 (28 Mar 2025 00:34)  HR: 69 (28 Mar 2025 09:43) (69 - 74)  BP: 112/61 (28 Mar 2025 09:43) (99/64 - 112/61)  BP(mean): --  RR: 18 (28 Mar 2025 09:12) (18 - 18)  SpO2: 92% (28 Mar 2025 09:12) (91% - 92%)    Parameters below as of 28 Mar 2025 09:12  Patient On (Oxygen Delivery Method): room air      I&O's Summary    27 Mar 2025 07:01  -  28 Mar 2025 07:00  --------------------------------------------------------  IN: 1150 mL / OUT: 320 mL / NET: 830 mL      MEDICATIONS  (STANDING):  allopurinol 100 milliGRAM(s) Oral daily  ALPRAZolam 0.5 milliGRAM(s) Oral at bedtime  atorvastatin 10 milliGRAM(s) Oral at bedtime  fluticasone propionate 50 MICROgram(s)/spray Nasal Spray 1 Spray(s) Both Nostrils two times a day  furosemide   Injectable 40 milliGRAM(s) IV Push once  influenza  Vaccine (HIGH DOSE) 0.5 milliLiter(s) IntraMuscular once  lactobacillus acidophilus 1 Tablet(s) Oral daily  levothyroxine 112 MICROGram(s) Oral daily  midodrine. 10 milliGRAM(s) Oral three times a day  multivitamin 1 Tablet(s) Oral daily  pantoprazole    Tablet 40 milliGRAM(s) Oral before breakfast  polyethylene glycol 3350 17 Gram(s) Oral daily  predniSONE   Tablet 20 milliGRAM(s) Oral daily  senna 2 Tablet(s) Oral at bedtime  tamsulosin 0.4 milliGRAM(s) Oral at bedtime  traZODone 100 milliGRAM(s) Oral at bedtime    MEDICATIONS  (PRN):  acetaminophen     Tablet .. 650 milliGRAM(s) Oral every 6 hours PRN Temp greater or equal to 38.5C (101.3F), Moderate Pain (4 - 6)    LABS:                        10.5   6.49  )-----------( 106      ( 27 Mar 2025 04:35 )             31.9     03-28    125[L]  |  85[L]  |  92[H]  ----------------------------<  252[H]  4.2   |  21[L]  |  3.65[H]    Ca    8.5      28 Mar 2025 11:56        Urinalysis Basic - ( 28 Mar 2025 11:56 )    Color: x / Appearance: x / SG: x / pH: x  Gluc: 252 mg/dL / Ketone: x  / Bili: x / Urobili: x   Blood: x / Protein: x / Nitrite: x   Leuk Esterase: x / RBC: x / WBC x   Sq Epi: x / Non Sq Epi: x / Bacteria: x      CAPILLARY BLOOD GLUCOSE            Urinalysis Basic - ( 28 Mar 2025 11:56 )    Color: x / Appearance: x / SG: x / pH: x  Gluc: 252 mg/dL / Ketone: x  / Bili: x / Urobili: x   Blood: x / Protein: x / Nitrite: x   Leuk Esterase: x / RBC: x / WBC x   Sq Epi: x / Non Sq Epi: x / Bacteria: x      REVIEW OF SYSTEMS:  CONSTITUTIONAL: No fever, weight loss, or fatigue  EYES: No eye pain, visual disturbances, or discharge  ENMT:  No difficulty hearing, tinnitus, vertigo; No sinus or throat pain  NECK: No pain or stiffness  RESPIRATORY: No cough, wheezing, chills or hemoptysis; No shortness of breath  CARDIOVASCULAR: No chest pain, palpitations, dizziness, or leg swelling  GASTROINTESTINAL: No abdominal or epigastric pain. No nausea, vomiting, or hematemesis; No diarrhea or constipation. No melena or hematochezia.  GENITOURINARY: No dysuria, frequency, hematuria, or incontinence  NEUROLOGICAL: No headaches, memory loss, loss of strength, numbness, or tremors    RADIOLOGY & ADDITIONAL TESTS:    Consultant(s) Notes Reviewed:  [x ] YES  [ ] NO    PHYSICAL EXAM:  GENERAL: NAD,,not in any distress ,  HEAD:  Atraumatic, Normocephalic  NECK: Supple, No JVD, Normal thyroid  NERVOUS SYSTEM:  Alert & Oriented X3, No focal deficit   CHEST/LUNG: Good air entry bilateral with no  rales, rhonchi, wheezing, or rubs  HEART: Regular rate and rhythm; No murmurs, rubs, or gallops  ABDOMEN: Soft, Nontender, Nondistended; Bowel sounds present  EXTREMITIES:  Bandaged edema      Care Discussed with Consultants/Other Providers [ x] YES  [ ] NO
Date of Service  : 03-31-25     INTERVAL HPI/OVERNIGHT EVENTS: I feel better but cant sleep here.   Vital Signs Last 24 Hrs  T(C): 36.7 (31 Mar 2025 18:25), Max: 36.8 (31 Mar 2025 08:23)  T(F): 98.1 (31 Mar 2025 18:25), Max: 98.3 (31 Mar 2025 08:23)  HR: 70 (31 Mar 2025 18:25) (69 - 70)  BP: 109/62 (31 Mar 2025 18:25) (101/58 - 109/62)  BP(mean): --  RR: 18 (31 Mar 2025 18:25) (17 - 18)  SpO2: 97% (31 Mar 2025 18:25) (93% - 97%)    Parameters below as of 31 Mar 2025 18:25  Patient On (Oxygen Delivery Method): room air      I&O's Summary    30 Mar 2025 07:01  -  31 Mar 2025 07:00  --------------------------------------------------------  IN: 0 mL / OUT: 1100 mL / NET: -1100 mL    31 Mar 2025 07:01  -  31 Mar 2025 19:49  --------------------------------------------------------  IN: 450 mL / OUT: 350 mL / NET: 100 mL      MEDICATIONS  (STANDING):  allopurinol 100 milliGRAM(s) Oral daily  ALPRAZolam 1 milliGRAM(s) Oral at bedtime  apixaban 2.5 milliGRAM(s) Oral <User Schedule>  atorvastatin 10 milliGRAM(s) Oral at bedtime  fluticasone propionate 50 MICROgram(s)/spray Nasal Spray 1 Spray(s) Both Nostrils two times a day  influenza  Vaccine (HIGH DOSE) 0.5 milliLiter(s) IntraMuscular once  lactobacillus acidophilus 1 Tablet(s) Oral daily  levothyroxine 112 MICROGram(s) Oral daily  midodrine. 10 milliGRAM(s) Oral three times a day  multivitamin 1 Tablet(s) Oral daily  pantoprazole    Tablet 40 milliGRAM(s) Oral before breakfast  polyethylene glycol 3350 17 Gram(s) Oral daily  predniSONE   Tablet 10 milliGRAM(s) Oral daily  senna 2 Tablet(s) Oral at bedtime  tamsulosin 0.4 milliGRAM(s) Oral at bedtime  traZODone 50 milliGRAM(s) Oral at bedtime    MEDICATIONS  (PRN):  acetaminophen     Tablet .. 650 milliGRAM(s) Oral every 6 hours PRN Temp greater or equal to 38.5C (101.3F), Moderate Pain (4 - 6)    LABS:                        10.3   8.41  )-----------( 124      ( 31 Mar 2025 06:44 )             31.2     03-31    131[L]  |  90[L]  |  90[H]  ----------------------------<  100[H]  4.1   |  25  |  2.74[H]    Ca    8.9      31 Mar 2025 06:51        Urinalysis Basic - ( 31 Mar 2025 06:51 )    Color: x / Appearance: x / SG: x / pH: x  Gluc: 100 mg/dL / Ketone: x  / Bili: x / Urobili: x   Blood: x / Protein: x / Nitrite: x   Leuk Esterase: x / RBC: x / WBC x   Sq Epi: x / Non Sq Epi: x / Bacteria: x      CAPILLARY BLOOD GLUCOSE            Urinalysis Basic - ( 31 Mar 2025 06:51 )    Color: x / Appearance: x / SG: x / pH: x  Gluc: 100 mg/dL / Ketone: x  / Bili: x / Urobili: x   Blood: x / Protein: x / Nitrite: x   Leuk Esterase: x / RBC: x / WBC x   Sq Epi: x / Non Sq Epi: x / Bacteria: x      REVIEW OF SYSTEMS:  CONSTITUTIONAL: No fever, weight loss, or fatigue  EYES: No eye pain, visual disturbances, or discharge  ENMT:  No difficulty hearing, tinnitus, vertigo; No sinus or throat pain  NECK: No pain or stiffness  RESPIRATORY: No cough, wheezing, chills or hemoptysis; No shortness of breath  CARDIOVASCULAR: No chest pain, palpitations, dizziness, or leg swelling  GASTROINTESTINAL: No abdominal or epigastric pain. No nausea, vomiting, or hematemesis; No diarrhea or constipation. No melena or hematochezia.  GENITOURINARY: No dysuria, frequency, hematuria, or incontinence  NEUROLOGICAL: No headaches, memory loss, loss of strength, numbness, or tremors    RADIOLOGY & ADDITIONAL TESTS:    Consultant(s) Notes Reviewed:  [x ] YES  [ ] NO    PHYSICAL EXAM:  GENERAL: NAD, well-groomed, well-developed,not in any distress ,  HEAD:  Atraumatic, Normocephalic  NECK: Supple, No JVD, Normal thyroid  NERVOUS SYSTEM:  Alert & Oriented X3, No focal deficit   CHEST/LUNG: Good air entry bilateral with no  rales, rhonchi, wheezing, or rubs  HEART: Regular rate and rhythm; No murmurs, rubs, or gallops  ABDOMEN: Soft, Nontender, Nondistended; Bowel sounds present  EXTREMITIES:  2+ Peripheral Pulses, No clubbing, cyanosis, or edema    Care Discussed with Consultants/Other Providers [ x] YES  [ ] NO
Date of Service  : 04-02-25    INTERVAL HPI/OVERNIGHT EVENTS: I feel fine so will go home today.   Vital Signs Last 24 Hrs  T(C): 36.7 (02 Apr 2025 08:18), Max: 36.7 (02 Apr 2025 00:00)  T(F): 98.1 (02 Apr 2025 08:18), Max: 98.1 (02 Apr 2025 00:00)  HR: 70 (02 Apr 2025 08:18) (70 - 70)  BP: 104/59 (02 Apr 2025 08:18) (98/63 - 104/64)  BP(mean): --  RR: 18 (02 Apr 2025 08:18) (18 - 18)  SpO2: 93% (02 Apr 2025 08:18) (93% - 93%)    Parameters below as of 02 Apr 2025 08:18  Patient On (Oxygen Delivery Method): room air      I&O's Summary    01 Apr 2025 07:01  -  02 Apr 2025 07:00  --------------------------------------------------------  IN: 0 mL / OUT: 750 mL / NET: -750 mL      MEDICATIONS  (STANDING):  allopurinol 100 milliGRAM(s) Oral daily  ALPRAZolam 1 milliGRAM(s) Oral at bedtime  apixaban 2.5 milliGRAM(s) Oral <User Schedule>  atorvastatin 10 milliGRAM(s) Oral at bedtime  fluticasone propionate 50 MICROgram(s)/spray Nasal Spray 1 Spray(s) Both Nostrils two times a day  influenza  Vaccine (HIGH DOSE) 0.5 milliLiter(s) IntraMuscular once  lactobacillus acidophilus 1 Tablet(s) Oral daily  levothyroxine 112 MICROGram(s) Oral daily  midodrine. 10 milliGRAM(s) Oral three times a day  multivitamin 1 Tablet(s) Oral daily  pantoprazole    Tablet 40 milliGRAM(s) Oral before breakfast  polyethylene glycol 3350 17 Gram(s) Oral daily  predniSONE   Tablet 10 milliGRAM(s) Oral daily  senna 2 Tablet(s) Oral at bedtime  tamsulosin 0.4 milliGRAM(s) Oral at bedtime  traZODone 50 milliGRAM(s) Oral at bedtime    MEDICATIONS  (PRN):  acetaminophen     Tablet .. 650 milliGRAM(s) Oral every 6 hours PRN Temp greater or equal to 38.5C (101.3F), Moderate Pain (4 - 6)    LABS:                        10.1   10.61 )-----------( 129      ( 01 Apr 2025 06:50 )             30.7     04-01    132[L]  |  91[L]  |  78[H]  ----------------------------<  105[H]  3.8   |  28  |  2.35[H]    Ca    8.9      01 Apr 2025 07:21        Urinalysis Basic - ( 01 Apr 2025 07:21 )    Color: x / Appearance: x / SG: x / pH: x  Gluc: 105 mg/dL / Ketone: x  / Bili: x / Urobili: x   Blood: x / Protein: x / Nitrite: x   Leuk Esterase: x / RBC: x / WBC x   Sq Epi: x / Non Sq Epi: x / Bacteria: x      CAPILLARY BLOOD GLUCOSE            Urinalysis Basic - ( 01 Apr 2025 07:21 )    Color: x / Appearance: x / SG: x / pH: x  Gluc: 105 mg/dL / Ketone: x  / Bili: x / Urobili: x   Blood: x / Protein: x / Nitrite: x   Leuk Esterase: x / RBC: x / WBC x   Sq Epi: x / Non Sq Epi: x / Bacteria: x      REVIEW OF SYSTEMS:  CONSTITUTIONAL: No fever, weight loss, or fatigue  EYES: No eye pain, visual disturbances, or discharge  ENMT:  No difficulty hearing, tinnitus, vertigo; No sinus or throat pain  NECK: No pain or stiffness  RESPIRATORY: No cough, wheezing, chills or hemoptysis; No shortness of breath  CARDIOVASCULAR: No chest pain, palpitations, dizziness, or leg swelling  GASTROINTESTINAL: No abdominal or epigastric pain. No nausea, vomiting, or hematemesis; No diarrhea or constipation. No melena or hematochezia.  GENITOURINARY: No dysuria, frequency, hematuria, or incontinence  NEUROLOGICAL: No headaches, memory loss, loss of strength, numbness, or tremors      RADIOLOGY & ADDITIONAL TESTS:    Consultant(s) Notes Reviewed:  [x ] YES  [ ] NO    PHYSICAL EXAM:  GENERAL: NAD, well-groomed, well-developed,not in any distress ,  HEAD:  Atraumatic, Normocephalic  NECK: Supple, No JVD, Normal thyroid  NERVOUS SYSTEM:  Alert & Oriented X3, No focal deficit   CHEST/LUNG: Good air entry bilateral with no  rales, rhonchi, wheezing, or rubs  HEART: Regular rate and rhythm; No murmurs, rubs, or gallops  ABDOMEN: Soft, Nontender, Nondistended; Bowel sounds present  EXTREMITIES:  2+ Peripheral Pulses, No clubbing, cyanosis, or edema    Care Discussed with Consultants/Other Providers [ x] YES  [ ] NO
[facility]   MD OLE ADAMS MD MARIA SANTIAGO, NP    TEL:  OFFICE: [phone]  From 5pm-7am Answering Service [phone]    -- RENAL FOLLOW UP NOTE ---Date of Service 04-01-25 @ 12:46    Patient is a 89y old  Male who presents with a chief complaint of Fall at home.      Patient seen and examined at bedside. No chest pain/sob    VITALS:  T(F): 98.1 (04-01-25 @ 05:15), Max: 98.1 (03-31-25 @ 18:25)  HR: 78 (04-01-25 @ 05:15)  BP: 101/63 (04-01-25 @ 05:15)  RR: 18 (04-01-25 @ 05:15)  SpO2: 96% (04-01-25 @ 05:15)  Wt(kg): --    03-31 @ 07:01  -  04-01 @ 07:00  --------------------------------------------------------  IN: 450 mL / OUT: 1125 mL / NET: -675 mL    04-01 @ 07:01  -  04-01 @ 12:46  --------------------------------------------------------  IN: 0 mL / OUT: 150 mL / NET: -150 mL          PHYSICAL EXAM:  General: NAD  Neck: No JVD  Respiratory: CTAB, no wheezes, rales or rhonchi  Cardiovascular: S1, S2, RRR  Gastrointestinal: BS+, soft, NT/ND  Extremities: b/l LE trace edema    Hospital Medications:   MEDICATIONS  (STANDING):  allopurinol 100 milliGRAM(s) Oral daily  ALPRAZolam 1 milliGRAM(s) Oral at bedtime  apixaban 2.5 milliGRAM(s) Oral <User Schedule>  atorvastatin 10 milliGRAM(s) Oral at bedtime  fluticasone propionate 50 MICROgram(s)/spray Nasal Spray 1 Spray(s) Both Nostrils two times a day  influenza  Vaccine (HIGH DOSE) 0.5 milliLiter(s) IntraMuscular once  lactobacillus acidophilus 1 Tablet(s) Oral daily  levothyroxine 112 MICROGram(s) Oral daily  midodrine. 10 milliGRAM(s) Oral three times a day  multivitamin 1 Tablet(s) Oral daily  pantoprazole    Tablet 40 milliGRAM(s) Oral before breakfast  polyethylene glycol 3350 17 Gram(s) Oral daily  predniSONE   Tablet 10 milliGRAM(s) Oral daily  senna 2 Tablet(s) Oral at bedtime  tamsulosin 0.4 milliGRAM(s) Oral at bedtime  traZODone 50 milliGRAM(s) Oral at bedtime      LABS:  04-01    132[L]  |  91[L]  |  78[H]  ----------------------------<  105[H]  3.8   |  28  |  2.35[H]    Ca    8.9      01 Apr 2025 07:21      Creatinine Trend: 2.35 <--, 2.74 <--, 2.81 <--, 3.40 <--, 3.65 <--, 4.37 <--, 4.02 <--                                10.1   10.61 )-----------( 129      ( 01 Apr 2025 06:50 )             30.7     Urine Studies:  Urinalysis - [04-01-25 @ 07:21]      Color  / Appearance  / SG  / pH       Gluc 105 / Ketone   / Bili  / Urobili        Blood  / Protein  / Leuk Est  / Nitrite       RBC  / WBC  / Hyaline  / Gran  / Sq Epi  / Non Sq Epi  / Bacteria     Urine Protein 16      [03-28-25 @ 18:47]  Urine Urea Nitrogen 462      [03-25-25 @ 16:08]    TSH 1.01      [03-24-25 @ 19:09]      Immunofixation Urine: No Monoclonal Band Identified      Reference Range: None Detected      [03-28-25 @ 18:47]  UPEP Interpretation: Normal Electrophoresis Pattern      [03-28-25 @ 18:47]    RADIOLOGY & ADDITIONAL STUDIES:  
Podiatry pager #: [phone]/ [phone]    Patient is a 89y old  Male who presents with a chief complaint of Fall at home. 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  Patient is unsure how he fell, fell backwards and hit his head, had LOC, unknown downtime.  Complains of pain all over his body.  Denies chest pain, shortness of breath.    (24 Mar 2025 15:02)      PAST MEDICAL & SURGICAL HISTORY:  Coronary artery disease involving native heart without angina pectoris, unspecified vessel or lesion type      Complete heart block  s/p PPM insertion-2008  Battery change- 2015  Last interrogation-05/2017      Paroxysmal atrial fibrillation  on Coumadin      Hypothyroidism, unspecified type      Chronic gout with tophus, unspecified cause, unspecified site      Benign prostatic hyperplasia, presence of lower urinary tract symptoms unspecified, unspecified morphology      Acute on chronic congestive heart failure, unspecified congestive heart failure type  2016      Abscess  diverticular      Basal cell carcinoma      Melanoma in situ of face excluding eyelid, nose, lip, and ear      Carpal tunnel syndrome of left wrist      BOOP (bronchiolitis obliterans with organizing pneumonia)  09/2016 after colonoscopy      SBO (small bowel obstruction)  03/2017      Adhesion of intestine  Relese of abdominal adhesions- 2002      Hx of cataract surgery, unspecified laterality      History of skin cancer      History of carpal tunnel release, unspecified laterality  right wrist- 2002      History of appendectomy      H/O hernia repair  bilateral IHR x 2      S/P CABG x 3  2002      History of colon resection  1995      Artificial cardiac pacemaker  2008      H/O shoulder surgery  Right shoulder repair- 2001          MEDICATIONS  (STANDING):  allopurinol 100 milliGRAM(s) Oral daily  ALPRAZolam 0.5 milliGRAM(s) Oral at bedtime  atorvastatin 10 milliGRAM(s) Oral at bedtime  fluticasone propionate 50 MICROgram(s)/spray Nasal Spray 1 Spray(s) Both Nostrils two times a day  influenza  Vaccine (HIGH DOSE) 0.5 milliLiter(s) IntraMuscular once  midodrine. 10 milliGRAM(s) Oral three times a day  polyethylene glycol 3350 17 Gram(s) Oral daily  senna 2 Tablet(s) Oral at bedtime  tamsulosin 0.4 milliGRAM(s) Oral at bedtime  traZODone 100 milliGRAM(s) Oral at bedtime    MEDICATIONS  (PRN):  acetaminophen     Tablet .. 650 milliGRAM(s) Oral every 6 hours PRN Temp greater or equal to 38.5C (101.3F), Moderate Pain (4 - 6)      Allergies    Diprivan (Short breath)    Intolerances        VITALS:    Vital Signs Last 24 Hrs  T(C): 37.1 (25 Mar 2025 17:38), Max: 37.1 (25 Mar 2025 09:40)  T(F): 98.7 (25 Mar 2025 17:38), Max: 98.7 (25 Mar 2025 09:40)  HR: 73 (25 Mar 2025 17:38) (70 - 77)  BP: 94/60 (25 Mar 2025 17:38) (85/47 - 119/64)  BP(mean): --  RR: 18 (25 Mar 2025 17:38) (16 - 18)  SpO2: 93% (25 Mar 2025 17:38) (91% - 95%)    Parameters below as of 25 Mar 2025 17:38  Patient On (Oxygen Delivery Method): nasal cannula  O2 Flow (L/min): 2      LABS:                          10.9   5.86  )-----------( 107      ( 25 Mar 2025 06:37 )             33.0       03-25    132[L]  |  89[L]  |  81[H]  ----------------------------<  92  4.2   |  29  |  2.60[H]    Ca    8.8      25 Mar 2025 06:37  Mg     2.4     03-24    TPro  6.9  /  Alb  4.0  /  TBili  0.8  /  DBili  x   /  AST  23  /  ALT  21  /  AlkPhos  98  03-24      CAPILLARY BLOOD GLUCOSE          PT/INR - ( 24 Mar 2025 10:42 )   PT: 16.3 sec;   INR: 1.43 ratio         PTT - ( 24 Mar 2025 10:42 )  PTT:35.2 sec    LOWER EXTREMITY PHYSICAL EXAM:    Vasular: DP/PT 1_/4, B/L, CFT <_3 seconds B/L, Temperature gradient _wnl, B/L.   Neuro: Epicritic sensation _  intact to the level of _,toes B/L.  Skin: Anterior aspect of left shin/ankle superficial ulcerations: Ulcerations x 3 measuring 1 cm in diameter each superficial with 0.1 cm in depth with red granular tissue.  No fibrosis no necrosis no fluctuance purulence malodor or clinical signs of infection minimal periwound erythema.  No heel ulcerations.      RADIOLOGY & ADDITIONAL STUDIES:    
[facility]    Dr. Elly Cifuentes     Office [phone] (9 am to 5 pm)  Service: [phone] (5pm to 9am)  Also Available on TEAMS      RENAL PROGRESS NOTE: DATE OF SERVICE 04-02-25 @ 12:27    Patient is a 89y old  Male who presents with a chief complaint of Fall at home. (02 Apr 2025 11:42)      Patient seen and examined at bedside. No chest pain/sob    VITALS:  T(F): 98.1 (04-02-25 @ 08:18), Max: 98.1 (04-02-25 @ 00:00)  HR: 70 (04-02-25 @ 08:18)  BP: 104/59 (04-02-25 @ 08:18)  RR: 18 (04-02-25 @ 08:18)  SpO2: 93% (04-02-25 @ 08:18)  Wt(kg): --    04-01 @ 07:01  -  04-02 @ 07:00  --------------------------------------------------------  IN: 0 mL / OUT: 750 mL / NET: -750 mL          PHYSICAL EXAM:  Constitutional: NAD  Neck: No JVD  Respiratory: CTAB, no wheezes, rales or rhonchi  Cardiovascular: S1, S2, RRR  Gastrointestinal: BS+, soft, NT/ND  Extremities: No peripheral edema    Hospital Medications:   MEDICATIONS  (STANDING):  allopurinol 100 milliGRAM(s) Oral daily  ALPRAZolam 1 milliGRAM(s) Oral at bedtime  apixaban 2.5 milliGRAM(s) Oral <User Schedule>  atorvastatin 10 milliGRAM(s) Oral at bedtime  fluticasone propionate 50 MICROgram(s)/spray Nasal Spray 1 Spray(s) Both Nostrils two times a day  influenza  Vaccine (HIGH DOSE) 0.5 milliLiter(s) IntraMuscular once  lactobacillus acidophilus 1 Tablet(s) Oral daily  levothyroxine 112 MICROGram(s) Oral daily  midodrine. 10 milliGRAM(s) Oral three times a day  multivitamin 1 Tablet(s) Oral daily  pantoprazole    Tablet 40 milliGRAM(s) Oral before breakfast  polyethylene glycol 3350 17 Gram(s) Oral daily  predniSONE   Tablet 10 milliGRAM(s) Oral daily  senna 2 Tablet(s) Oral at bedtime  tamsulosin 0.4 milliGRAM(s) Oral at bedtime  traZODone 50 milliGRAM(s) Oral at bedtime      LABS:  04-01    132[L]  |  91[L]  |  78[H]  ----------------------------<  105[H]  3.8   |  28  |  2.35[H]    Ca    8.9      01 Apr 2025 07:21      Creatinine Trend: 2.35 <--, 2.74 <--, 2.81 <--, 3.40 <--, 3.65 <--, 4.37 <--, 4.02 <--                                10.1   10.61 )-----------( 129      ( 01 Apr 2025 06:50 )             30.7     Urine Studies:  Urinalysis - [04-01-25 @ 07:21]      Color  / Appearance  / SG  / pH       Gluc 105 / Ketone   / Bili  / Urobili        Blood  / Protein  / Leuk Est  / Nitrite       RBC  / WBC  / Hyaline  / Gran  / Sq Epi  / Non Sq Epi  / Bacteria     Urine Protein 16      [03-28-25 @ 18:47]    TSH 1.01      [03-24-25 @ 19:09]      Immunofixation Urine: No Monoclonal Band Identified      Reference Range: None Detected      [03-28-25 @ 18:47]  UPEP Interpretation: Normal Electrophoresis Pattern      [03-28-25 @ 18:47]    RADIOLOGY & ADDITIONAL STUDIES:  
  Dr. Sanabria  Office [phone] (9 am to 5 pm)  Service: [phone] (5pm to 9am)  Holly ARITA      RENAL PROGRESS NOTE: DATE OF SERVICE 03-29-25 @ 18:37    Patient is a 89y old  Male who presents with a chief complaint of Fall at home. (29 Mar 2025 12:50)      Patient seen and examined at bedside. No chest pain/sob    VITALS:  T(F): 97.5 (03-29-25 @ 16:39), Max: 97.5 (03-29-25 @ 06:00)  HR: 69 (03-29-25 @ 16:39)  BP: 93/46 (03-29-25 @ 16:39)  RR: 18 (03-29-25 @ 16:39)  SpO2: 93% (03-29-25 @ 16:39)  Wt(kg): --    03-28 @ 07:01  -  03-29 @ 07:00  --------------------------------------------------------  IN: 0 mL / OUT: 950 mL / NET: -950 mL          PHYSICAL EXAM:  Constitutional: NAD  Neck: No JVD  Respiratory: CTAB, no wheezes, rales or rhonchi  Cardiovascular: S1, S2, RRR  Gastrointestinal: BS+, soft, NT/ND  Extremities: No peripheral edema    Hospital Medications:   MEDICATIONS  (STANDING):  allopurinol 100 milliGRAM(s) Oral daily  ALPRAZolam 1 milliGRAM(s) Oral at bedtime  atorvastatin 10 milliGRAM(s) Oral at bedtime  fluticasone propionate 50 MICROgram(s)/spray Nasal Spray 1 Spray(s) Both Nostrils two times a day  influenza  Vaccine (HIGH DOSE) 0.5 milliLiter(s) IntraMuscular once  lactobacillus acidophilus 1 Tablet(s) Oral daily  levothyroxine 112 MICROGram(s) Oral daily  midodrine. 10 milliGRAM(s) Oral three times a day  multivitamin 1 Tablet(s) Oral daily  pantoprazole    Tablet 40 milliGRAM(s) Oral before breakfast  polyethylene glycol 3350 17 Gram(s) Oral daily  predniSONE   Tablet 20 milliGRAM(s) Oral daily  senna 2 Tablet(s) Oral at bedtime  tamsulosin 0.4 milliGRAM(s) Oral at bedtime  traZODone 50 milliGRAM(s) Oral at bedtime      LABS:  03-29    130[L]  |  86[L]  |  90[H]  ----------------------------<  151[H]  3.8   |  26  |  3.40[H]    Ca    8.8      29 Mar 2025 07:31      Creatinine Trend: 3.40 <--, 3.65 <--, 4.37 <--, 4.02 <--, 2.60 <--, 1.99 <--, 2.08 <--                                9.9    9.02  )-----------( 124      ( 29 Mar 2025 07:31 )             29.4     Urine Studies:  Urinalysis - [03-29-25 @ 07:31]      Color  / Appearance  / SG  / pH       Gluc 151 / Ketone   / Bili  / Urobili        Blood  / Protein  / Leuk Est  / Nitrite       RBC  / WBC  / Hyaline  / Gran  / Sq Epi  / Non Sq Epi  / Bacteria     Urine Creatinine 70      [03-25-25 @ 06:43]  Urine Protein 16      [03-28-25 @ 18:47]  Urine Sodium 15      [03-25-25 @ 06:43]  Urine Urea Nitrogen 462      [03-25-25 @ 16:08]  Urine Osmolality 345      [03-25-25 @ 06:43]    TSH 1.01      [03-24-25 @ 19:09]        RADIOLOGY & ADDITIONAL STUDIES:  
Cardiovascular Disease Progress Note  Date of service: 03-25-25 @ 08:07    Overnight events: No acute events overnight.  Patient lethargic during encounter.   Otherwise review of systems negative    Objective Findings:  T(C): 36.9 (03-25-25 @ 07:55), Max: 36.9 (03-25-25 @ 07:55)  HR: 73 (03-25-25 @ 07:55) (69 - 78)  BP: 97/57 (03-25-25 @ 07:55) (88/65 - 130/79)  RR: 16 (03-25-25 @ 07:55) (16 - 20)  SpO2: 91% (03-25-25 @ 07:55) (88% - 99%)  Wt(kg): --  Daily     Daily       Physical Exam:  Gen: NAD; Patient resting comfortably  HEENT: EOMI, Normocephalic/ atraumatic  CV: RRR, normal S1 + S2, no m/r/g  Lungs:  Normal respiratory effort; clear to auscultation bilaterally  Abd: soft, non-tender; bowel sounds present  Ext: 2+ LE edema; warm and well perfused    Telemetry: N/a    Laboratory Data:                        10.9   5.86  )-----------( 107      ( 25 Mar 2025 06:37 )             33.0     03-25    132[L]  |  89[L]  |  81[H]  ----------------------------<  92  4.2   |  29  |  2.60[H]    Ca    8.8      25 Mar 2025 06:37  Mg     2.4     03-24    TPro  6.9  /  Alb  4.0  /  TBili  0.8  /  DBili  x   /  AST  23  /  ALT  21  /  AlkPhos  98  03-24    PT/INR - ( 24 Mar 2025 10:42 )   PT: 16.3 sec;   INR: 1.43 ratio         PTT - ( 24 Mar 2025 10:42 )  PTT:35.2 sec          Inpatient Medications:  MEDICATIONS  (STANDING):  allopurinol 100 milliGRAM(s) Oral daily  ALPRAZolam 0.5 milliGRAM(s) Oral at bedtime  atorvastatin 10 milliGRAM(s) Oral at bedtime  influenza  Vaccine (HIGH DOSE) 0.5 milliLiter(s) IntraMuscular once  midodrine. 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 Would hold off on any aggressive hydration at this time given his history of  ischemic cardiomyopathy and LE edema.   - CT head negative for acute pathology    #Acute systolic heart failure  - Patient remains edematous on exam with JVD  - Diuretics on hold in setting of JASON  - Med list in H and P is outdated, please confirm home medications.     #Afib  - V paced on telemetry  - Patient take Eliquis at home    Dr. Corona is currently in the palliative unit, not yet comfort measures. Will follow as needed.   Palliative on board    #ACP (advance care planning)-  Advanced care planning was discussed. Patient is DNR/DNI. 30 additional minutes spent addressing advance care plans.        Over 55 minutes spent on total encounter; more than 50% of the visit was spent counseling and/or coordinating care by the attending physician.      Darian Guerrero,  [facility]  Cardiovascular Disease  [phone]
Cardiovascular Disease Progress Note  Date of service: 03-26-25 @ 07:09    Overnight events: No acute events overnight.  Patient is lethargic.   Otherwise review of systems negative    Objective Findings:  T(C): 36.8 (03-26-25 @ 00:11), Max: 37.1 (03-25-25 @ 09:40)  HR: 76 (03-26-25 @ 00:11) (70 - 76)  BP: 103/64 (03-26-25 @ 00:11) (85/47 - 103/64)  RR: 16 (03-26-25 @ 00:11) (16 - 18)  SpO2: 94% (03-26-25 @ 00:11) (91% - 95%)  Wt(kg): --  Daily     Daily       Physical Exam:  Gen: NAD; Patient resting comfortably  HEENT: EOMI, Normocephalic/ atraumatic  CV: RRR, normal S1 + S2, no m/r/g  Lungs:  Normal respiratory effort; clear to auscultation bilaterally  Abd: soft, non-tender; bowel sounds present  Ext: 2+ Le edema    Telemetry: N/a    Laboratory Data:                        10.9   5.86  )-----------( 107      ( 25 Mar 2025 06:37 )             33.0     03-25    132[L]  |  89[L]  |  81[H]  ----------------------------<  92  4.2   |  29  |  2.60[H]    Ca    8.8      25 Mar 2025 06:37  Mg     2.4     03-24    TPro  6.9  /  Alb  4.0  /  TBili  0.8  /  DBili  x   /  AST  23  /  ALT  21  /  AlkPhos  98  03-24    PT/INR - ( 24 Mar 2025 10:42 )   PT: 16.3 sec;   INR: 1.43 ratio         PTT - ( 24 Mar 2025 10:42 )  PTT:35.2 sec          Inpatient Medications:  MEDICATIONS  (STANDING):  allopurinol 100 milliGRAM(s) Oral daily  ALPRAZolam 0.5 milliGRAM(s) Oral at bedtime  atorvastatin 10 milliGRAM(s) Oral at bedtime  fluticasone propionate 50 MICROgram(s)/spray Nasal Spray 1 Spray(s) Both Nostrils two times a day  influenza  Vaccine (HIGH DOSE) 0.5 milliLiter(s) IntraMuscular once  midodrine. 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 Would hold off on any aggressive hydration at this time given his history of  ischemic cardiomyopathy and LE edema.     #JASON  - Renal function worsening, likely HELEN  - Diuretics on hold as per renal  - Pt with LE edema  - Continue to monitor closely  - May consider a 1 time dose of IV Bumex 2mg   - Renal input appreciated.     #Acute systolic heart failure  - Patient remains edematous on exam with JVD  - Diuretics on hold in setting of JASON    #Afib  - V paced on telemetry  - Patient take Eliquis at home currently on hold    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       Over 55 minutes spent on total encounter; more than 50% of the visit was spent counseling and/or coordinating care by the attending physician.      Darian Guerrero,  [facility]  Cardiovascular Disease  [phone]
Cardiovascular Disease Progress Note  Date of service: 03-27-25 @ 07:20    Overnight events: No acute events overnight.  Patient is in no distress. More alert this morning. Denies chest pain. States his SOB is improved.   Otherwise review of systems negative    Objective Findings:  T(C): 36.5 (03-27-25 @ 04:33), Max: 37.1 (03-26-25 @ 12:20)  HR: 70 (03-27-25 @ 04:33) (70 - 74)  BP: 101/59 (03-27-25 @ 04:33) (93/58 - 102/62)  RR: 18 (03-27-25 @ 04:33) (16 - 18)  SpO2: 90% (03-27-25 @ 04:33) (90% - 96%)  Wt(kg): --  Daily     Daily       Physical Exam:  Gen: NAD; Patient resting comfortably  HEENT: EOMI, Normocephalic/ atraumatic  CV: RRR, normal S1 + S2, no m/r/g  Lungs:  Normal respiratory effort; clear to auscultation bilaterally  Abd: soft, non-tender; bowel sounds present  Ext: Trace LE edema; warm and well perfused    Telemetry:    Laboratory Data:                        10.5   6.49  )-----------( 106      ( 27 Mar 2025 04:35 )             31.9     03-27    129[L]  |  85[L]  |  104[H]  ----------------------------<  119[H]  4.9   |  26  |  4.37[H]    Ca    8.5      27 Mar 2025 04:40                Inpatient Medications:  MEDICATIONS  (STANDING):  allopurinol 100 milliGRAM(s) Oral daily  ALPRAZolam 0.5 milliGRAM(s) Oral at bedtime  atorvastatin 10 milliGRAM(s) Oral at bedtime  fluticasone propionate 50 MICROgram(s)/spray Nasal Spray 1 Spray(s) Both Nostrils two times a day  hydrocortisone 1% Cream 1 Application(s) Topical once  influenza  Vaccine (HIGH DOSE) 0.5 milliLiter(s) IntraMuscular once  midodrine. 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, history of CABG circa 2002, ischemic cardiomyopathy s/p CRT-D,  A-fib on Eliquis, hypertension, hyperlipidemia, CKD, history of multiple inguinal hernia repairs in the past, biventricular pacemaker, BIBEMS for unwitnessed fall at home    Plan of Care:    #Syncope  - Unwitnessed, rule out cardiogenic etiology  - Likely from overdiuresis as per HPI  - Patient was taking additional doses of Metolazone on top of his Bumex  - Echo shows preserved LV function with RV dysfunction, pulm HTN and severe TR  - Continue current management     #JASON  - Renal function worsening, likely HELEN  - Diuretics on hold as per renal  - Renal input appreciated.     #Acute systolic heart failure  - Patient Volume status much improved.   - Diuretics on hold in setting of JASON    #Afib  - V paced on telemetry  - Patient take Eliquis at home currently on hold, would resume if no contraindication.     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         Over 55 minutes spent on total encounter; more than 50% of the visit was spent counseling and/or coordinating care by the attending physician.      Darian Guerrero DO [facility]  Cardiovascular Disease  [phone]
Cardiovascular Disease Progress Note  Date of service: 03-28-25 @ 08:34    Overnight events: No acute events overnight.  Patient denies chest pain.   Otherwise review of systems negative    Objective Findings:  T(C): 36.9 (03-28-25 @ 00:34), Max: 36.9 (03-28-25 @ 00:34)  HR: 74 (03-28-25 @ 00:34) (69 - 74)  BP: 104/63 (03-28-25 @ 00:34) (104/63 - 105/57)  RR: 18 (03-28-25 @ 00:34) (18 - 18)  SpO2: 91% (03-28-25 @ 00:34) (91% - 91%)  Wt(kg): --  Daily     Daily       Physical Exam:  Gen: NAD; Patient resting comfortably  HEENT: EOMI, Normocephalic/ atraumatic  CV: RRR, normal S1 + S2, no m/r/g  Lungs:  Normal respiratory effort; diffuse crackles throughout lung fields.   Abd: soft, non-tender; bowel sounds present  Ext: Trace LE edema; warm and well perfused    Telemetry: N/a    Laboratory Data:                        10.5   6.49  )-----------( 106      ( 27 Mar 2025 04:35 )             31.9     03-27    129[L]  |  85[L]  |  104[H]  ----------------------------<  119[H]  4.9   |  26  |  4.37[H]    Ca    8.5      27 Mar 2025 04:40                Inpatient Medications:  MEDICATIONS  (STANDING):  allopurinol 100 milliGRAM(s) Oral daily  ALPRAZolam 0.5 milliGRAM(s) Oral at bedtime  atorvastatin 10 milliGRAM(s) Oral at bedtime  fluticasone propionate 50 MICROgram(s)/spray Nasal Spray 1 Spray(s) Both Nostrils two times a day  hydrocortisone 1% Cream 1 Application(s) Topical once  influenza  Vaccine (HIGH DOSE) 0.5 milliLiter(s) IntraMuscular once  lactobacillus acidophilus 1 Tablet(s) Oral daily  levothyroxine 112 MICROGram(s) Oral daily  midodrine. 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, history of CABG circa 2002, ischemic cardiomyopathy s/p CRT-D,  A-fib on Eliquis, hypertension, hyperlipidemia, CKD, history of multiple inguinal hernia repairs in the past, biventricular pacemaker, BIBEMS for unwitnessed fall at home    Plan of Care:    #Syncope  - Unwitnessed, rule out cardiogenic etiology  - Likely from overdiuresis as per HPI  - Patient was taking additional doses of Metolazone on top of his Bumex  - Echo shows preserved LV function with RV dysfunction, pulm HTN and severe TR  - Continue current management     #JASON  - Renal function worsening, likely HELEN  - Diuretics on hold as per renal  - Renal input appreciated.     #Hyponatremia  - Fluid restriction as per renal     #Acute systolic heart failure  - Patient Volume status improved but now with recurrence of LE edema and crackles   - Fluid restriction  - Diuretics on hold in setting of JASON    #Afib  - V paced on telemetry  - Patient take Eliquis at home currently on hold, would resume if no contraindication.   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       Over 55 minutes spent on total encounter; more than 50% of the visit was spent counseling and/or coordinating care by the attending physician.      Darian Guerrero,  [facility]  Cardiovascular Disease  [phone]
Cardiovascular Disease Progress Note  Date of service: 03-29-25 @ 11:19    Overnight events: No acute events overnight.  Patient is in no distress. Ambulating with PT at time of encounter.   Otherwise review of systems negative    Objective Findings:  T(C): 36.4 (03-29-25 @ 06:00), Max: 36.4 (03-29-25 @ 06:00)  HR: 70 (03-28-25 @ 16:46) (70 - 70)  BP: 107/64 (03-29-25 @ 01:58) (107/64 - 128/70)  RR: 18 (03-29-25 @ 01:58) (18 - 18)  SpO2: 94% (03-29-25 @ 01:58) (94% - 95%)  Wt(kg): --  Daily     Daily       Physical Exam:  Gen: NAD; Patient resting comfortably  HEENT: EOMI, Normocephalic/ atraumatic  CV: RRR, normal S1 + S2, no m/r/g  Lungs:  Normal respiratory effort; clear to auscultation bilaterally  Abd: soft, non-tender; bowel sounds present  Ext: Trace LE edema; warm and well perfused    Telemetry:    Laboratory Data:                        9.9    9.02  )-----------( 124      ( 29 Mar 2025 07:31 )             29.4     03-29    130[L]  |  86[L]  |  90[H]  ----------------------------<  151[H]  3.8   |  26  |  3.40[H]    Ca    8.8      29 Mar 2025 07:31                Inpatient Medications:  MEDICATIONS  (STANDING):  allopurinol 100 milliGRAM(s) Oral daily  ALPRAZolam 1 milliGRAM(s) Oral at bedtime  apixaban 2.5 milliGRAM(s) Oral every 12 hours  atorvastatin 10 milliGRAM(s) Oral at bedtime  fluticasone propionate 50 MICROgram(s)/spray Nasal Spray 1 Spray(s) Both Nostrils two times a day  influenza  Vaccine (HIGH DOSE) 0.5 milliLiter(s) IntraMuscular once  lactobacillus acidophilus 1 Tablet(s) Oral daily  levothyroxine 112 MICROGram(s) Oral daily  midodrine. 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   - Continue current management     #JASON  - Renal function worsening, likely HELEN  - Diuretics on hold as per renal  - Renal input appreciated.     #Hyponatremia  - Fluid restriction as per renal     #Acute systolic heart failure  - Patient Volume status improved   - Fluid restriction  - Diuretics on hold in setting of JASON    #Afib  - V paced on telemetry  - Patient take Eliquis at home currently on hold, would resume if no contraindication.   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            Over 55 minutes spent on total encounter; more than 50% of the visit was spent counseling and/or coordinating care by the attending physician.      Darian Guerrero,  [facility]  Cardiovascular Disease  [phone]
Cardiovascular Disease Progress Note  Date of service: 03-31-25 @ 08:48    Overnight events: No acute events overnight.  Patient denies chest pain. States he is tired as he did not get much rest last night going to the restroom.   Otherwise review of systems negative    Objective Findings:  T(C): 36.8 (03-31-25 @ 08:23), Max: 36.8 (03-31-25 @ 08:23)  HR: 69 (03-31-25 @ 08:23) (67 - 79)  BP: 101/58 (03-31-25 @ 08:23) (98/61 - 106/73)  RR: 18 (03-31-25 @ 08:23) (16 - 18)  SpO2: 93% (03-31-25 @ 08:23) (92% - 95%)  Wt(kg): --  Daily     Daily       Physical Exam:  Gen: NAD; Patient resting comfortably  HEENT: EOMI, Normocephalic/ atraumatic  CV: RRR, normal S1 + S2, no m/r/g  Lungs:  Normal respiratory effort; clear to auscultation bilaterally  Abd: soft, non-tender; bowel sounds present  Ext: Trace LE edema; warm and well perfused    Telemetry:    Laboratory Data:                        10.3   8.41  )-----------( 124      ( 31 Mar 2025 06:44 )             31.2     03-31    131[L]  |  90[L]  |  90[H]  ----------------------------<  100[H]  4.1   |  25  |  2.74[H]    Ca    8.9      31 Mar 2025 06:51                Inpatient Medications:  MEDICATIONS  (STANDING):  allopurinol 100 milliGRAM(s) Oral daily  ALPRAZolam 1 milliGRAM(s) Oral at bedtime  apixaban 2.5 milliGRAM(s) Oral <User Schedule>  atorvastatin 10 milliGRAM(s) Oral at bedtime  fluticasone propionate 50 MICROgram(s)/spray Nasal Spray 1 Spray(s) Both Nostrils two times a day  influenza  Vaccine (HIGH DOSE) 0.5 milliLiter(s) IntraMuscular once  lactobacillus acidophilus 1 Tablet(s) Oral daily  levothyroxine 112 MICROGram(s) Oral daily  midodrine. 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   - Continue current management     #JASON  - Renal function worsening, likely HELEN  - Diuretics on hold as per renal  - Renal input appreciated.     #Hyponatremia  - Fluid restriction as per renal     #Acute systolic heart failure  - Patient Volume status improved   - Some congestion on exam  - Fluid restriction  - Diuretics as needed.     #Afib  - V paced on telemetry  - Patient take Eliquis at home currently on hold, would resume if no contraindication.   - Given age and renal function would resume at 2.5mg   - On daily dosing as per request of patient.     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  Spoke to daughter Kendra over the phone to update them of their fathers clinical status.         Over 55 minutes spent on total encounter; more than 50% of the visit was spent counseling and/or coordinating care by the attending physician.      Darian Guerrero,  [facility]  Cardiovascular Disease  [phone]
Cardiovascular Disease Progress Note  Date of service: 04-01-25 @ 09:29    Overnight events: No acute events overnight.  Patient is in no distress.   Otherwise review of systems negative    Objective Findings:  T(C): 36.7 (04-01-25 @ 05:15), Max: 36.7 (03-31-25 @ 18:25)  HR: 78 (04-01-25 @ 05:15) (70 - 78)  BP: 101/63 (04-01-25 @ 05:15) (101/63 - 109/62)  RR: 18 (04-01-25 @ 05:15) (18 - 18)  SpO2: 96% (04-01-25 @ 05:15) (96% - 97%)  Wt(kg): --  Daily     Daily       Physical Exam:  Gen: NAD; Patient resting comfortably  HEENT: EOMI, Normocephalic/ atraumatic  CV: RRR, normal S1 + S2, no m/r/g  Lungs:  Normal respiratory effort; clear to auscultation bilaterally  Abd: soft, non-tender; bowel sounds present  Ext: No edema; warm and well perfused    Telemetry: N/a    Laboratory Data:                        10.1   10.61 )-----------( 129      ( 01 Apr 2025 06:50 )             30.7     04-01    132[L]  |  91[L]  |  78[H]  ----------------------------<  105[H]  3.8   |  28  |  2.35[H]    Ca    8.9      01 Apr 2025 07:21                Inpatient Medications:  MEDICATIONS  (STANDING):  allopurinol 100 milliGRAM(s) Oral daily  ALPRAZolam 1 milliGRAM(s) Oral at bedtime  apixaban 2.5 milliGRAM(s) Oral <User Schedule>  atorvastatin 10 milliGRAM(s) Oral at bedtime  fluticasone propionate 50 MICROgram(s)/spray Nasal Spray 1 Spray(s) Both Nostrils two times a day  influenza  Vaccine (HIGH DOSE) 0.5 milliLiter(s) IntraMuscular once  lactobacillus acidophilus 1 Tablet(s) Oral daily  levothyroxine 112 MICROGram(s) Oral daily  midodrine. 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   - Continue current management     #JASON  - Renal function improving.   - Renal input appreciated.     #Hyponatremia  - Fluid restriction as per renal     #Acute systolic heart failure  - Patient Volume status improved   - Fluid restriction  - Diuretics as needed.     #Afib  - V paced on telemetry  - Patient take Eliquis at home currently on hold, would resume if no contraindication.   - Given age and renal function would resume at 2.5mg   - On daily dosing as per request of patient.     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  Spoke to daughter Kendra over the phone to update them of their fathers clinical status.           Over 55 minutes spent on total encounter; more than 50% of the visit was spent counseling and/or coordinating care by the attending physician.      Darian Guerrero,  [facility]  Cardiovascular Disease  [phone]
[facility] [place] NEPHROLOGY PRACT[facility]   MD OLE ADAMS MD ANGELA WONG, PA QIAN CHEN, RENITA    TEL:  OFFICE: [phone]  From 5pm-7am Answering Service [phone]    -- RENAL FOLLOW UP NOTE ---Date of Service 03-27-25 @ 16:35    Patient is a 89y old  Male who presents with a chief complaint of Fall at home.    Patient seen and examined at bedside. No chest pain/SOB.    VITALS:  T(F): 97.5 (03-27-25 @ 16:00), Max: 98.7 (03-26-25 @ 17:45)  HR: 69 (03-27-25 @ 16:00)  BP: 105/57 (03-27-25 @ 16:00)  RR: 18 (03-27-25 @ 16:00)  SpO2: 91% (03-27-25 @ 16:00)  Wt(kg): --    03-26 @ 07:01  -  03-27 @ 07:00  --------------------------------------------------------  IN: 280 mL / OUT: 200 mL / NET: 80 mL      PHYSICAL EXAM:  General: NAD  Neck: No JVD  Respiratory: CTAB, no wheezes, rales or rhonchi  Cardiovascular: S1, S2, RRR  Gastrointestinal: BS+, soft, NT/ND  Extremities: 1+ b/l LE edema.    Hospital Medications:   MEDICATIONS  (STANDING):  allopurinol 100 milliGRAM(s) Oral daily  ALPRAZolam 0.5 milliGRAM(s) Oral at bedtime  atorvastatin 10 milliGRAM(s) Oral at bedtime  fluticasone propionate 50 MICROgram(s)/spray Nasal Spray 1 Spray(s) Both Nostrils two times a day  hydrocortisone 1% Cream 1 Application(s) Topical once  influenza  Vaccine (HIGH DOSE) 0.5 milliLiter(s) IntraMuscular once  lactobacillus acidophilus 1 Tablet(s) Oral daily  levothyroxine 112 MICROGram(s) Oral daily  midodrine. 10 milliGRAM(s) Oral three times a day  multivitamin 1 Tablet(s) Oral daily  pantoprazole    Tablet 40 milliGRAM(s) Oral before breakfast  polyethylene glycol 3350 17 Gram(s) Oral daily  predniSONE   Tablet 20 milliGRAM(s) Oral daily  senna 2 Tablet(s) Oral at bedtime  tamsulosin 0.4 milliGRAM(s) Oral at bedtime  traZODone 100 milliGRAM(s) Oral at bedtime      LABS:  03-27    129[L]  |  85[L]  |  104[H]  ----------------------------<  119[H]  4.9   |  26  |  4.37[H]    Ca    8.5      27 Mar 2025 04:40      Creatinine Trend: 4.37 <--, 4.02 <--, 2.60 <--, 1.99 <--, 2.08 <--                          10.5   6.49  )-----------( 106      ( 27 Mar 2025 04:35 )             31.9     Urine Studies:  Urinalysis - [03-27-25 @ 04:40]      Color  / Appearance  / SG  / pH       Gluc 119 / Ketone   / Bili  / Urobili        Blood  / Protein  / Leuk Est  / Nitrite       RBC  / WBC  / Hyaline  / Gran  / Sq Epi  / Non Sq Epi  / Bacteria     Urine Creatinine 70      [03-25-25 @ 06:43]  Urine Sodium 15      [03-25-25 @ 06:43]  Urine Urea Nitrogen 462      [03-25-25 @ 16:08]  Urine Osmolality 345      [03-25-25 @ 06:43]    TSH 1.01      [03-24-25 @ 19:09]        
[facility]   MD OLE ADAMS MD ANGELA WONG, PA Venitha Krishnan, NP    TEL:  OFFICE: [phone]  From 5pm-7am Answering Service [phone]    -- RENAL FOLLOW UP NOTE ---Date of Service 03-26-25 @ 14:19    Patient is a 89y old  Male who presents with a chief complaint of Syncope and collapse     (26 Mar 2025 13:34)      Patient seen and examined at bedside. No chest pain/sob    VITALS:  T(F): 98.7 (03-26-25 @ 12:20), Max: 98.7 (03-25-25 @ 17:38)  HR: 72 (03-26-25 @ 12:20)  BP: 95/60 (03-26-25 @ 12:20)  RR: 18 (03-26-25 @ 12:20)  SpO2: 96% (03-26-25 @ 12:20)  Wt(kg): --        PHYSICAL EXAM:  General: NAD  Neck: No JVD  Respiratory: CTAB, no wheezes, rales or rhonchi  Cardiovascular: S1, S2, RRR  Gastrointestinal: BS+, soft, NT/ND  Extremities: b/l LE + edema    Hospital Medications:   MEDICATIONS  (STANDING):  allopurinol 100 milliGRAM(s) Oral daily  ALPRAZolam 0.5 milliGRAM(s) Oral at bedtime  atorvastatin 10 milliGRAM(s) Oral at bedtime  fluticasone propionate 50 MICROgram(s)/spray Nasal Spray 1 Spray(s) Both Nostrils two times a day  influenza  Vaccine (HIGH DOSE) 0.5 milliLiter(s) IntraMuscular once  midodrine. 10 milliGRAM(s) Oral three times a day  polyethylene glycol 3350 17 Gram(s) Oral daily  senna 2 Tablet(s) Oral at bedtime  tamsulosin 0.4 milliGRAM(s) Oral at bedtime  traZODone 100 milliGRAM(s) Oral at bedtime      LABS:  03-25    132[L]  |  89[L]  |  81[H]  ----------------------------<  92  4.2   |  29  |  2.60[H]    Ca    8.8      25 Mar 2025 06:37      Creatinine Trend: 2.60 <--, 1.99 <--, 2.08 <--                                10.9   5.86  )-----------( 107      ( 25 Mar 2025 06:37 )             33.0     Urine Studies:  Urinalysis - [03-25-25 @ 06:37]      Color  / Appearance  / SG  / pH       Gluc 92 / Ketone   / Bili  / Urobili        Blood  / Protein  / Leuk Est  / Nitrite       RBC  / WBC  / Hyaline  / Gran  / Sq Epi  / Non Sq Epi  / Bacteria     Urine Creatinine 70      [03-25-25 @ 06:43]  Urine Sodium 15      [03-25-25 @ 06:43]  Urine Urea Nitrogen 462      [03-25-25 @ 16:08]  Urine Osmolality 345      [03-25-25 @ 06:43]    TSH 1.01      [03-24-25 @ 19:09]        RADIOLOGY & ADDITIONAL STUDIES:  
Neurology      S; patient seen. resting in bed           Medications: MEDICATIONS  (STANDING):  allopurinol 100 milliGRAM(s) Oral daily  ALPRAZolam 1 milliGRAM(s) Oral at bedtime  apixaban 2.5 milliGRAM(s) Oral <User Schedule>  atorvastatin 10 milliGRAM(s) Oral at bedtime  fluticasone propionate 50 MICROgram(s)/spray Nasal Spray 1 Spray(s) Both Nostrils two times a day  influenza  Vaccine (HIGH DOSE) 0.5 milliLiter(s) IntraMuscular once  lactobacillus acidophilus 1 Tablet(s) Oral daily  levothyroxine 112 MICROGram(s) Oral daily  midodrine. 10 milliGRAM(s) Oral three times a day  multivitamin 1 Tablet(s) Oral daily  pantoprazole    Tablet 40 milliGRAM(s) Oral before breakfast  polyethylene glycol 3350 17 Gram(s) Oral daily  predniSONE   Tablet 10 milliGRAM(s) Oral daily  senna 2 Tablet(s) Oral at bedtime  tamsulosin 0.4 milliGRAM(s) Oral at bedtime  traZODone 50 milliGRAM(s) Oral at bedtime    MEDICATIONS  (PRN):  acetaminophen     Tablet .. 650 milliGRAM(s) Oral every 6 hours PRN Temp greater or equal to 38.5C (101.3F), Moderate Pain (4 - 6)       Vitals:  Vital Signs Last 24 Hrs  T(C): 36.7 (01 Apr 2025 05:15), Max: 36.7 (31 Mar 2025 18:25)  T(F): 98.1 (01 Apr 2025 05:15), Max: 98.1 (31 Mar 2025 18:25)  HR: 78 (01 Apr 2025 05:15) (70 - 78)  BP: 101/63 (01 Apr 2025 05:15) (101/63 - 109/62)  BP(mean): --  RR: 18 (01 Apr 2025 05:15) (18 - 18)  SpO2: 96% (01 Apr 2025 05:15) (96% - 97%)    Parameters below as of 01 Apr 2025 05:15  Patient On (Oxygen Delivery Method): room air                  Orthostatic VS    03-26-25 @ 08:50  Lying BP: Orthostatic BP (Lying Systolic): 89/Orthostatic BP (Lying Diastolic (mm Hg)): 56 HR: Orthostatic Pulse (Heart Rate (beats/min)): 70   Sitting BP: Orthostatic BP (Sitting Systolic): 91/Orthostatic BP (Sitting Diastolic (mm Hg)): 62 HR: Orthostatic Pulse (Heart Rate (beats/min)): 70  Standing BP: Orthostatic BP (Standing Systolic): 71/Orthostatic BP (Standing Diastolic (mm Hg)): 49 HR: Orthostatic Pulse (Heart Rate (beats/min)): 73  Site: --   Mode: --      General Exam:   General Appearance: Appropriately dressed and in no acute distress       Head: Normocephalic, atraumatic and no dysmorphic features  Ear, Nose, and Throat: Moist mucous membranes  Orthostatic VS  CVS: S1S2+  Resp: No SOB, no wheeze or rhonchi  GI: soft NT/ND  Extremities: No edema or cyanosis  Skin: No bruises or rashes     Neurological Exam:  Mental Status: Awake, alert and oriented x 2-3.  Able to follow simple and complex verbal commands. Able to name and repeat. fluent speech. No obvious aphasia or dysarthria noted.   Cranial Nerves: PERRL, EOMI, VFFC, sensation V1-V3 intact,  no obvious facial asymmetry, equal elevation of palate, scm/trap 5/5, tongue is midline on protrusion. no obvious papilledema on fundoscopic exam. hearing is grossly intact.   Motor: Normal bulk, tone and strength throughout except LUE pain limited Fine finger movements were intact and symmetric. no tremors or drift noted.    Sensation: Intact to light touch and pinprick throughout. no right/left confusion. no extinction to tactile on DSS.    Reflexes: 1+ throughout at biceps, brachioradialis, triceps, patellars and ankles bilaterally and equal. No clonus. R toe and L toe were both downgoing.  Coordination: No dysmetria on FNF or HKS  Gait: walker     Data/Labs/Imaging which I personally reviewed.      LABS:     LABS:                          10.1   10.61 )-----------( 129      ( 01 Apr 2025 06:50 )             30.7     04-01    132[L]  |  91[L]  |  78[H]  ----------------------------<  105[H]  3.8   |  28  |  2.35[H]    Ca    8.9      01 Apr 2025 07:21          Urinalysis Basic - ( 01 Apr 2025 07:21 )    Color: x / Appearance: x / SG: x / pH: x  Gluc: 105 mg/dL / Ketone: x  / Bili: x / Urobili: x   Blood: x / Protein: x / Nitrite: x   Leuk Esterase: x / RBC: x / WBC x   Sq Epi: x / Non Sq Epi: x / Bacteria: x          < from: CT Head No Cont (03.25.25 @ 15:57) >    ACC: [id number] EXAM:  CT BRAIN   ORDERED BY: NITHYA STILL     PROCEDURE DATE:  03/25/2025          INTERPRETATION:  CLINICAL INFORMATION: s/p fall at home    COMPARISON: CT head 3/24/2025.    TECHNIQUE:  Serial axial images were obtained fromthe skull base to the   vertex using multi-slice helical technique. Sagittal and coronal   reformats were obtained.    FINDINGS:    VENTRICLES AND SULCI: Age appropriate involutional changes.  INTRA-AXIAL: No mass effect, acute hemorrhage, or midlineshift.  There   are periventricular and subcortical white matter hypodensities,   consistent with microvascular type changes.  EXTRA-AXIAL: No mass or fluid collection. Basal cisterns are normal in   appearance.    VISUALIZED SINUSES:  Clear.  TYMPANOMASTOID CAVITIES:  Clear.  VISUALIZED ORBITS: Bilateral lens replacement.  CALVARIUM: Intact.    MISCELLANEOUS: None.      IMPRESSION:  No acute intracranial hemorrhage, mass effect, or midline shift.        --- End of Report ---            CLAIR POWER MD; Attending Radiologist  This document has been electronically signed. Mar 25 2025  4:03PM    < end of copied text >      
Neurology      S; patient seen. resting in bed       Medications: MEDICATIONS  (STANDING):  allopurinol 100 milliGRAM(s) Oral daily  ALPRAZolam 0.5 milliGRAM(s) Oral at bedtime  atorvastatin 10 milliGRAM(s) Oral at bedtime  fluticasone propionate 50 MICROgram(s)/spray Nasal Spray 1 Spray(s) Both Nostrils two times a day  hydrocortisone 1% Cream 1 Application(s) Topical once  influenza  Vaccine (HIGH DOSE) 0.5 milliLiter(s) IntraMuscular once  lactobacillus acidophilus 1 Tablet(s) Oral daily  levothyroxine 112 MICROGram(s) Oral daily  midodrine. 10 milliGRAM(s) Oral three times a day  multivitamin 1 Tablet(s) Oral daily  pantoprazole    Tablet 40 milliGRAM(s) Oral before breakfast  polyethylene glycol 3350 17 Gram(s) Oral daily  predniSONE   Tablet 20 milliGRAM(s) Oral daily  senna 2 Tablet(s) Oral at bedtime  tamsulosin 0.4 milliGRAM(s) Oral at bedtime  traZODone 100 milliGRAM(s) Oral at bedtime    MEDICATIONS  (PRN):  acetaminophen     Tablet .. 650 milliGRAM(s) Oral every 6 hours PRN Temp greater or equal to 38.5C (101.3F), Moderate Pain (4 - 6)       Vitals:  Vital Signs Last 24 Hrs  T(C): 36.7 (28 Mar 2025 09:12), Max: 36.9 (28 Mar 2025 00:34)  T(F): 98 (28 Mar 2025 09:12), Max: 98.4 (28 Mar 2025 00:34)  HR: 69 (28 Mar 2025 09:43) (69 - 74)  BP: 112/61 (28 Mar 2025 09:43) (99/64 - 112/61)  BP(mean): --  RR: 18 (28 Mar 2025 09:12) (18 - 18)  SpO2: 92% (28 Mar 2025 09:12) (91% - 92%)    Parameters below as of 28 Mar 2025 09:12  Patient On (Oxygen Delivery Method): room air          Orthostatic VS    03-26-25 @ 08:50  Lying BP: Orthostatic BP (Lying Systolic): 89/Orthostatic BP (Lying Diastolic (mm Hg)): 56 HR: Orthostatic Pulse (Heart Rate (beats/min)): 70   Sitting BP: Orthostatic BP (Sitting Systolic): 91/Orthostatic BP (Sitting Diastolic (mm Hg)): 62 HR: Orthostatic Pulse (Heart Rate (beats/min)): 70  Standing BP: Orthostatic BP (Standing Systolic): 71/Orthostatic BP (Standing Diastolic (mm Hg)): 49 HR: Orthostatic Pulse (Heart Rate (beats/min)): 73  Site: --   Mode: --      General Exam:   General Appearance: Appropriately dressed and in no acute distress       Head: Normocephalic, atraumatic and no dysmorphic features  Ear, Nose, and Throat: Moist mucous membranes  CVS: S1S2+  Resp: No SOB, no wheeze or rhonchi  GI: soft NT/ND  Extremities: No edema or cyanosis  Skin: No bruises or rashes     Neurological Exam:  Mental Status: Awake, alert and oriented x 2-3.  Able to follow simple and complex verbal commands. Able to name and repeat. fluent speech. No obvious aphasia or dysarthria noted.   Cranial Nerves: PERRL, EOMI, VFFC, sensation V1-V3 intact,  no obvious facial asymmetry, equal elevation of palate, scm/trap 5/5, tongue is midline on protrusion. no obvious papilledema on fundoscopic exam. hearing is grossly intact.   Motor: Normal bulk, tone and strength throughout except LUE pain limited Fine finger movements were intact and symmetric. no tremors or drift noted.    Sensation: Intact to light touch and pinprick throughout. no right/left confusion. no extinction to tactile on DSS.    Reflexes: 1+ throughout at biceps, brachioradialis, triceps, patellars and ankles bilaterally and equal. No clonus. R toe and L toe were both downgoing.  Coordination: No dysmetria on FNF or HKS  Gait: walker     Data/Labs/Imaging which I personally reviewed.     Labs:    LABS:                          10.5   6.49  )-----------( 106      ( 27 Mar 2025 04:35 )             31.9     03-27    129[L]  |  85[L]  |  104[H]  ----------------------------<  119[H]  4.9   |  26  |  4.37[H]    Ca    8.5      27 Mar 2025 04:40          Urinalysis Basic - ( 27 Mar 2025 04:40 )    Color: x / Appearance: x / SG: x / pH: x  Gluc: 119 mg/dL / Ketone: x  / Bili: x / Urobili: x   Blood: x / Protein: x / Nitrite: x   Leuk Esterase: x / RBC: x / WBC x   Sq Epi: x / Non Sq Epi: x / Bacteria: x        < from: CT Head No Cont (03.25.25 @ 15:57) >    ACC: [id number] EXAM:  CT BRAIN   ORDERED BY: NITHYA STILL     PROCEDURE DATE:  03/25/2025          INTERPRETATION:  CLINICAL INFORMATION: s/p fall at home    COMPARISON: CT head 3/24/2025.    TECHNIQUE:  Serial axial images were obtained fromthe skull base to the   vertex using multi-slice helical technique. Sagittal and coronal   reformats were obtained.    FINDINGS:    VENTRICLES AND SULCI: Age appropriate involutional changes.  INTRA-AXIAL: No mass effect, acute hemorrhage, or midlineshift.  There   are periventricular and subcortical white matter hypodensities,   consistent with microvascular type changes.  EXTRA-AXIAL: No mass or fluid collection. Basal cisterns are normal in   appearance.    VISUALIZED SINUSES:  Clear.  TYMPANOMASTOID CAVITIES:  Clear.  VISUALIZED ORBITS: Bilateral lens replacement.  CALVARIUM: Intact.    MISCELLANEOUS: None.      IMPRESSION:  No acute intracranial hemorrhage, mass effect, or midline shift.        --- End of Report ---            CLAIR POWER MD; Attending Radiologist  This document has been electronically signed. Mar 25 2025  4:03PM    < end of copied text >      
[facility] [place] [facility]   MD OLE ADAMS MD MARIA SANTIAGO, NP    TEL:  OFFICE: [phone]  From 5pm-7am Answering Service [phone]    -- RENAL FOLLOW UP NOTE ---Date of Service 03-25-25 @ 14:33    Patient is a 89y old  Male who presents with a chief complaint of Fall at home.       Patient seen and examined at bedside. No chest pain/sob    VITALS:  T(F): 98.7 (03-25-25 @ 12:45), Max: 98.7 (03-25-25 @ 09:40)  HR: 70 (03-25-25 @ 12:45)  BP: 85/47 (03-25-25 @ 12:45)  RR: 18 (03-25-25 @ 12:45)  SpO2: 94% (03-25-25 @ 12:45)  Wt(kg): --    03-24 @ 07:01  -  03-25 @ 07:00  --------------------------------------------------------  IN: 0 mL / OUT: 150 mL / NET: -150 mL          PHYSICAL EXAM:  General: NAD  Neck: JVD  Respiratory: CTAB, no wheezes, rales or rhonchi  Cardiovascular: S1, S2, RRR  Gastrointestinal: BS+, soft, NT/ND  Extremities: b/l LE +2 edema    Hospital Medications:   MEDICATIONS  (STANDING):  allopurinol 100 milliGRAM(s) Oral daily  ALPRAZolam 0.5 milliGRAM(s) Oral at bedtime  atorvastatin 10 milliGRAM(s) Oral at bedtime  influenza  Vaccine (HIGH DOSE) 0.5 milliLiter(s) IntraMuscular once  midodrine. 10 milliGRAM(s) Oral three times a day  tamsulosin 0.4 milliGRAM(s) Oral at bedtime  traZODone 100 milliGRAM(s) Oral at bedtime      LABS:  03-25    132[L]  |  89[L]  |  81[H]  ----------------------------<  92  4.2   |  29  |  2.60[H]    Ca    8.8      25 Mar 2025 06:37  Mg     2.4     03-24    TPro  6.9  /  Alb  4.0  /  TBili  0.8  /  DBili      /  AST  23  /  ALT  21  /  AlkPhos  98  03-24    Creatinine Trend: 2.60 <--, 1.99 <--, 2.08 <--                                10.9   5.86  )-----------( 107      ( 25 Mar 2025 06:37 )             33.0     Urine Studies:  Urinalysis - [03-25-25 @ 06:37]      Color  / Appearance  / SG  / pH       Gluc 92 / Ketone   / Bili  / Urobili        Blood  / Protein  / Leuk Est  / Nitrite       RBC  / WBC  / Hyaline  / Gran  / Sq Epi  / Non Sq Epi  / Bacteria     Urine Creatinine 70      [03-25-25 @ 06:43]  Urine Sodium 15      [03-25-25 @ 06:43]  Urine Osmolality 345      [03-25-25 @ 06:43]    TSH 1.01      [03-24-25 @ 19:09]        RADIOLOGY & ADDITIONAL STUDIES:  
[facility] – [place], [place] [facility]   MD OLE ADAMS MD ANGELA WONG, PA QIAN CHEN, NP    TEL:  OFFICE: [phone]  From 5pm-7am Answering Service [phone]    -- RENAL FOLLOW UP NOTE ---Date of Service 03-28-25 @ 13:41    Patient is a 89y old  Male who presents with a chief complaint of Fall at home.    Patient seen and examined at bedside. No chest pain/SOB.    VITALS:  T(F): 98 (03-28-25 @ 09:12), Max: 98.4 (03-28-25 @ 00:34)  HR: 69 (03-28-25 @ 09:43)  BP: 112/61 (03-28-25 @ 09:43)  RR: 18 (03-28-25 @ 09:12)  SpO2: 92% (03-28-25 @ 09:12)  Wt(kg): --    03-27 @ 07:01  -  03-28 @ 07:00  --------------------------------------------------------  IN: 1150 mL / OUT: 320 mL / NET: 830 mL      PHYSICAL EXAM:  General: NAD  Neck: No JVD  Respiratory: + fine bibasilar crackles.  Cardiovascular: S1, S2, RRR  Gastrointestinal: BS+, soft, NT/ND  Extremities: 1+ b/l LE edema.    Hospital Medications:   MEDICATIONS  (STANDING):  allopurinol 100 milliGRAM(s) Oral daily  ALPRAZolam 0.5 milliGRAM(s) Oral at bedtime  atorvastatin 10 milliGRAM(s) Oral at bedtime  fluticasone propionate 50 MICROgram(s)/spray Nasal Spray 1 Spray(s) Both Nostrils two times a day  furosemide   Injectable 40 milliGRAM(s) IV Push once  influenza  Vaccine (HIGH DOSE) 0.5 milliLiter(s) IntraMuscular once  lactobacillus acidophilus 1 Tablet(s) Oral daily  levothyroxine 112 MICROGram(s) Oral daily  midodrine. 10 milliGRAM(s) Oral three times a day  multivitamin 1 Tablet(s) Oral daily  pantoprazole    Tablet 40 milliGRAM(s) Oral before breakfast  polyethylene glycol 3350 17 Gram(s) Oral daily  predniSONE   Tablet 20 milliGRAM(s) Oral daily  senna 2 Tablet(s) Oral at bedtime  tamsulosin 0.4 milliGRAM(s) Oral at bedtime  traZODone 100 milliGRAM(s) Oral at bedtime      LABS:  03-28    125[L]  |  85[L]  |  92[H]  ----------------------------<  252[H]  4.2   |  21[L]  |  3.65[H]    Ca    8.5      28 Mar 2025 11:56      Creatinine Trend: 3.65 <--, 4.37 <--, 4.02 <--, 2.60 <--, 1.99 <--, 2.08 <--                          10.5   6.49  )-----------( 106      ( 27 Mar 2025 04:35 )             31.9     Urine Studies:  Urinalysis - [03-28-25 @ 11:56]      Color  / Appearance  / SG  / pH       Gluc 252 / Ketone   / Bili  / Urobili        Blood  / Protein  / Leuk Est  / Nitrite       RBC  / WBC  / Hyaline  / Gran  / Sq Epi  / Non Sq Epi  / Bacteria     Urine Creatinine 70      [03-25-25 @ 06:43]  Urine Sodium 15      [03-25-25 @ 06:43]  Urine Urea Nitrogen 462      [03-25-25 @ 16:08]  Urine Osmolality 345      [03-25-25 @ 06:43]    TSH 1.01      [03-24-25 @ 19:09]    
Date of Service  : 03-25-25     INTERVAL HPI/OVERNIGHT EVENTS: Seen and examined earlier. Said I feel better.   Vital Signs Last 24 Hrs  T(C): 37.1 (25 Mar 2025 12:45), Max: 37.1 (25 Mar 2025 09:40)  T(F): 98.7 (25 Mar 2025 12:45), Max: 98.7 (25 Mar 2025 09:40)  HR: 70 (25 Mar 2025 12:45) (69 - 78)  BP: 85/47 (25 Mar 2025 12:45) (85/47 - 130/79)  BP(mean): 72 (24 Mar 2025 16:05) (72 - 96)  RR: 18 (25 Mar 2025 12:45) (16 - 20)  SpO2: 94% (25 Mar 2025 12:45) (91% - 95%)    Parameters below as of 25 Mar 2025 12:45  Patient On (Oxygen Delivery Method): nasal cannula  O2 Flow (L/min): 2    I&O's Summary    24 Mar 2025 07:01  -  25 Mar 2025 07:00  --------------------------------------------------------  IN: 0 mL / OUT: 150 mL / NET: -150 mL      MEDICATIONS  (STANDING):  allopurinol 100 milliGRAM(s) Oral daily  ALPRAZolam 0.5 milliGRAM(s) Oral at bedtime  atorvastatin 10 milliGRAM(s) Oral at bedtime  influenza  Vaccine (HIGH DOSE) 0.5 milliLiter(s) IntraMuscular once  midodrine. 10 milliGRAM(s) Oral three times a day  tamsulosin 0.4 milliGRAM(s) Oral at bedtime  traZODone 100 milliGRAM(s) Oral at bedtime    MEDICATIONS  (PRN):  acetaminophen     Tablet .. 650 milliGRAM(s) Oral every 6 hours PRN Temp greater or equal to 38.5C (101.3F), Moderate Pain (4 - 6)    LABS:                        10.9   5.86  )-----------( 107      ( 25 Mar 2025 06:37 )             33.0     03-25    132[L]  |  89[L]  |  81[H]  ----------------------------<  92  4.2   |  29  |  2.60[H]    Ca    8.8      25 Mar 2025 06:37  Mg     2.4     03-24    TPro  6.9  /  Alb  4.0  /  TBili  0.8  /  DBili  x   /  AST  23  /  ALT  21  /  AlkPhos  98  03-24    PT/INR - ( 24 Mar 2025 10:42 )   PT: 16.3 sec;   INR: 1.43 ratio         PTT - ( 24 Mar 2025 10:42 )  PTT:35.2 sec  Urinalysis Basic - ( 25 Mar 2025 06:37 )    Color: x / Appearance: x / SG: x / pH: x  Gluc: 92 mg/dL / Ketone: x  / Bili: x / Urobili: x   Blood: x / Protein: x / Nitrite: x   Leuk Esterase: x / RBC: x / WBC x   Sq Epi: x / Non Sq Epi: x / Bacteria: x      CAPILLARY BLOOD GLUCOSE            Urinalysis Basic - ( 25 Mar 2025 06:37 )    Color: x / Appearance: x / SG: x / pH: x  Gluc: 92 mg/dL / Ketone: x  / Bili: x / Urobili: x   Blood: x / Protein: x / Nitrite: x   Leuk Esterase: x / RBC: x / WBC x   Sq Epi: x / Non Sq Epi: x / Bacteria: x      REVIEW OF SYSTEMS:  CONSTITUTIONAL: No fever, weight loss, or fatigue  EYES: No eye pain, visual disturbances, or discharge  ENMT:  No difficulty hearing, tinnitus, vertigo; No sinus or throat pain  NECK: No pain or stiffness  RESPIRATORY: No cough, wheezing, chills or hemoptysis; No shortness of breath  CARDIOVASCULAR: No chest pain, palpitations, dizziness, or leg swelling  GASTROINTESTINAL: No abdominal or epigastric pain. No nausea, vomiting, or hematemesis; No diarrhea or constipation. No melena or hematochezia.  GENITOURINARY: No dysuria, frequency, hematuria, or incontinence  NEUROLOGICAL: No headaches, memory loss, loss of strength, numbness, or tremors      RADIOLOGY & ADDITIONAL TESTS:    Consultant(s) Notes Reviewed:  [x ] YES  [ ] NO    PHYSICAL EXAM:  GENERAL: NAD, well-groomed, well-developed,not in any distress ,  HEAD:  Atraumatic, Normocephalic  NECK: Supple, No JVD, Normal thyroid  NERVOUS SYSTEM:  Alert & Oriented X3, No focal deficit   CHEST/LUNG: Good air entry bilateral with no  rales, rhonchi, wheezing, or rubs  HEART: Regular rate and rhythm; No murmurs, rubs, or gallops  ABDOMEN: Soft, Nontender, Nondistended; Bowel sounds present  EXTREMITIES:  2+ Peripheral Pulses, No clubbing, cyanosis, or edema    Care Discussed with Consultants/Other Providers [ x] YES  [ ] NO
Date of Service  : 03-27-25     INTERVAL HPI/OVERNIGHT EVENTS: I feel fine.   Vital Signs Last 24 Hrs  T(C): 36.4 (27 Mar 2025 16:00), Max: 36.5 (27 Mar 2025 04:33)  T(F): 97.5 (27 Mar 2025 16:00), Max: 97.7 (27 Mar 2025 04:33)  HR: 69 (27 Mar 2025 16:00) (69 - 72)  BP: 105/57 (27 Mar 2025 16:00) (93/58 - 105/57)  BP(mean): --  RR: 18 (27 Mar 2025 16:00) (18 - 18)  SpO2: 91% (27 Mar 2025 16:00) (90% - 95%)    Parameters below as of 27 Mar 2025 16:00  Patient On (Oxygen Delivery Method): room air      I&O's Summary    26 Mar 2025 07:01  -  27 Mar 2025 07:00  --------------------------------------------------------  IN: 280 mL / OUT: 200 mL / NET: 80 mL      MEDICATIONS  (STANDING):  allopurinol 100 milliGRAM(s) Oral daily  ALPRAZolam 0.5 milliGRAM(s) Oral at bedtime  atorvastatin 10 milliGRAM(s) Oral at bedtime  fluticasone propionate 50 MICROgram(s)/spray Nasal Spray 1 Spray(s) Both Nostrils two times a day  hydrocortisone 1% Cream 1 Application(s) Topical once  influenza  Vaccine (HIGH DOSE) 0.5 milliLiter(s) IntraMuscular once  lactobacillus acidophilus 1 Tablet(s) Oral daily  levothyroxine 112 MICROGram(s) Oral daily  midodrine. 10 milliGRAM(s) Oral three times a day  multivitamin 1 Tablet(s) Oral daily  pantoprazole    Tablet 40 milliGRAM(s) Oral before breakfast  polyethylene glycol 3350 17 Gram(s) Oral daily  predniSONE   Tablet 20 milliGRAM(s) Oral daily  senna 2 Tablet(s) Oral at bedtime  tamsulosin 0.4 milliGRAM(s) Oral at bedtime  traZODone 100 milliGRAM(s) Oral at bedtime    MEDICATIONS  (PRN):  acetaminophen     Tablet .. 650 milliGRAM(s) Oral every 6 hours PRN Temp greater or equal to 38.5C (101.3F), Moderate Pain (4 - 6)    LABS:                        10.5   6.49  )-----------( 106      ( 27 Mar 2025 04:35 )             31.9     03-27    129[L]  |  85[L]  |  104[H]  ----------------------------<  119[H]  4.9   |  26  |  4.37[H]    Ca    8.5      27 Mar 2025 04:40        Urinalysis Basic - ( 27 Mar 2025 04:40 )    Color: x / Appearance: x / SG: x / pH: x  Gluc: 119 mg/dL / Ketone: x  / Bili: x / Urobili: x   Blood: x / Protein: x / Nitrite: x   Leuk Esterase: x / RBC: x / WBC x   Sq Epi: x / Non Sq Epi: x / Bacteria: x      CAPILLARY BLOOD GLUCOSE            Urinalysis Basic - ( 27 Mar 2025 04:40 )    Color: x / Appearance: x / SG: x / pH: x  Gluc: 119 mg/dL / Ketone: x  / Bili: x / Urobili: x   Blood: x / Protein: x / Nitrite: x   Leuk Esterase: x / RBC: x / WBC x   Sq Epi: x / Non Sq Epi: x / Bacteria: x      REVIEW OF SYSTEMS:  CONSTITUTIONAL: No fever, weight loss, or fatigue  EYES: No eye pain, visual disturbances, or discharge  ENMT:  No difficulty hearing, tinnitus, vertigo; No sinus or throat pain  NECK: No pain or stiffness  RESPIRATORY: No cough, wheezing, chills or hemoptysis; No shortness of breath  CARDIOVASCULAR: No chest pain, palpitations, dizziness, or leg swelling  GASTROINTESTINAL: No abdominal or epigastric pain. No nausea, vomiting, or hematemesis; No diarrhea or constipation. No melena or hematochezia.  GENITOURINARY: No dysuria, frequency, hematuria, or incontinence  NEUROLOGICAL: No headaches, memory loss, loss of strength, numbness, or tremors      Consultant(s) Notes Reviewed:  [x ] YES  [ ] NO    PHYSICAL EXAM:  GENERAL: NAD, well-groomed, well-developed,not in any distress ,  HEAD:  Atraumatic, Normocephalic  EYES: EOMI, PERRLA, conjunctiva and sclera clear  ENMT: No tonsillar erythema, exudates, or enlargement; Moist mucous membranes, Good dentition, No lesions  NECK: Supple, No JVD, Normal thyroid  NERVOUS SYSTEM:  Alert & Oriented X3, No focal deficit   CHEST/LUNG: Good air entry bilateral with no  rales, rhonchi, wheezing, or rubs  HEART: Regular rate and rhythm; No murmurs, rubs, or gallops  ABDOMEN: Soft, Nontender, Nondistended; Bowel sounds present  EXTREMITIES:  Mild  edema    Care Discussed with Consultants/Other Providers [ x] YES  [ ] NO
Date of Service  : 04-01-25     INTERVAL HPI/OVERNIGHT EVENTS: Seen and examined earlier . Daughter in room.   Vital Signs Last 24 Hrs  T(C): 36.7 (01 Apr 2025 05:15), Max: 36.7 (31 Mar 2025 18:25)  T(F): 98.1 (01 Apr 2025 05:15), Max: 98.1 (31 Mar 2025 18:25)  HR: 78 (01 Apr 2025 05:15) (70 - 78)  BP: 101/63 (01 Apr 2025 05:15) (101/63 - 109/62)  BP(mean): --  RR: 18 (01 Apr 2025 05:15) (18 - 18)  SpO2: 96% (01 Apr 2025 05:15) (96% - 97%)    Parameters below as of 01 Apr 2025 05:15  Patient On (Oxygen Delivery Method): room air      I&O's Summary    31 Mar 2025 07:01  -  01 Apr 2025 07:00  --------------------------------------------------------  IN: 450 mL / OUT: 1125 mL / NET: -675 mL    01 Apr 2025 07:01  -  01 Apr 2025 13:20  --------------------------------------------------------  IN: 0 mL / OUT: 150 mL / NET: -150 mL      MEDICATIONS  (STANDING):  allopurinol 100 milliGRAM(s) Oral daily  ALPRAZolam 1 milliGRAM(s) Oral at bedtime  apixaban 2.5 milliGRAM(s) Oral <User Schedule>  atorvastatin 10 milliGRAM(s) Oral at bedtime  fluticasone propionate 50 MICROgram(s)/spray Nasal Spray 1 Spray(s) Both Nostrils two times a day  influenza  Vaccine (HIGH DOSE) 0.5 milliLiter(s) IntraMuscular once  lactobacillus acidophilus 1 Tablet(s) Oral daily  levothyroxine 112 MICROGram(s) Oral daily  midodrine. 10 milliGRAM(s) Oral three times a day  multivitamin 1 Tablet(s) Oral daily  pantoprazole    Tablet 40 milliGRAM(s) Oral before breakfast  polyethylene glycol 3350 17 Gram(s) Oral daily  predniSONE   Tablet 10 milliGRAM(s) Oral daily  senna 2 Tablet(s) Oral at bedtime  tamsulosin 0.4 milliGRAM(s) Oral at bedtime  traZODone 50 milliGRAM(s) Oral at bedtime    MEDICATIONS  (PRN):  acetaminophen     Tablet .. 650 milliGRAM(s) Oral every 6 hours PRN Temp greater or equal to 38.5C (101.3F), Moderate Pain (4 - 6)    LABS:                        10.1   10.61 )-----------( 129      ( 01 Apr 2025 06:50 )             30.7     04-01    132[L]  |  91[L]  |  78[H]  ----------------------------<  105[H]  3.8   |  28  |  2.35[H]    Ca    8.9      01 Apr 2025 07:21        Urinalysis Basic - ( 01 Apr 2025 07:21 )    Color: x / Appearance: x / SG: x / pH: x  Gluc: 105 mg/dL / Ketone: x  / Bili: x / Urobili: x   Blood: x / Protein: x / Nitrite: x   Leuk Esterase: x / RBC: x / WBC x   Sq Epi: x / Non Sq Epi: x / Bacteria: x      CAPILLARY BLOOD GLUCOSE            Urinalysis Basic - ( 01 Apr 2025 07:21 )    Color: x / Appearance: x / SG: x / pH: x  Gluc: 105 mg/dL / Ketone: x  / Bili: x / Urobili: x   Blood: x / Protein: x / Nitrite: x   Leuk Esterase: x / RBC: x / WBC x   Sq Epi: x / Non Sq Epi: x / Bacteria: x      REVIEW OF SYSTEMS:  CONSTITUTIONAL: No fever, weight loss, or fatigue  EYES: No eye pain, visual disturbances, or discharge  ENMT:  No difficulty hearing, tinnitus, vertigo; No sinus or throat pain  NECK: No pain or stiffness  RESPIRATORY: No cough, wheezing, chills or hemoptysis; No shortness of breath  CARDIOVASCULAR: No chest pain, palpitations, dizziness, or leg swelling  GASTROINTESTINAL: No abdominal or epigastric pain. No nausea, vomiting, or hematemesis; No diarrhea or constipation. No melena or hematochezia.  GENITOURINARY: No dysuria, frequency, hematuria, or incontinence  NEUROLOGICAL: No headaches, memory loss, loss of strength, numbness, or tremors    RADIOLOGY & ADDITIONAL TESTS:    Consultant(s) Notes Reviewed:  [x ] YES  [ ] NO    PHYSICAL EXAM:  GENERAL: NAD, well-groomed, well-developed,not in any distress ,  HEAD:  Atraumatic, Normocephalic  EYES: EOMI, PERRLA, conjunctiva and sclera clear  ENMT: No tonsillar erythema, exudates, or enlargement; Moist mucous membranes, Good dentition, No lesions  NECK: Supple, No JVD, Normal thyroid  NERVOUS SYSTEM:  Alert & Oriented X3, No focal deficit   CHEST/LUNG: Good air entry bilateral with no  rales, rhonchi, wheezing, or rubs  HEART: Regular rate and rhythm; No murmurs, rubs, or gallops  ABDOMEN: Soft, Nontender, Nondistended; Bowel sounds present  EXTREMITIES:  legs bandaged.     Care Discussed with Consultants/Other Providers [ x] YES  [ ] NO
Neurology      S; patient seen. resting in bed          Medications: MEDICATIONS  (STANDING):  allopurinol 100 milliGRAM(s) Oral daily  ALPRAZolam 1 milliGRAM(s) Oral at bedtime  apixaban 2.5 milliGRAM(s) Oral <User Schedule>  atorvastatin 10 milliGRAM(s) Oral at bedtime  fluticasone propionate 50 MICROgram(s)/spray Nasal Spray 1 Spray(s) Both Nostrils two times a day  influenza  Vaccine (HIGH DOSE) 0.5 milliLiter(s) IntraMuscular once  lactobacillus acidophilus 1 Tablet(s) Oral daily  levothyroxine 112 MICROGram(s) Oral daily  midodrine. 10 milliGRAM(s) Oral three times a day  multivitamin 1 Tablet(s) Oral daily  pantoprazole    Tablet 40 milliGRAM(s) Oral before breakfast  polyethylene glycol 3350 17 Gram(s) Oral daily  predniSONE   Tablet 10 milliGRAM(s) Oral daily  senna 2 Tablet(s) Oral at bedtime  tamsulosin 0.4 milliGRAM(s) Oral at bedtime  traZODone 50 milliGRAM(s) Oral at bedtime    MEDICATIONS  (PRN):  acetaminophen     Tablet .. 650 milliGRAM(s) Oral every 6 hours PRN Temp greater or equal to 38.5C (101.3F), Moderate Pain (4 - 6)       Vitals:  Vital Signs Last 24 Hrs  T(C): 36.8 (31 Mar 2025 08:23), Max: 36.8 (31 Mar 2025 08:23)  T(F): 98.3 (31 Mar 2025 08:23), Max: 98.3 (31 Mar 2025 08:23)  HR: 69 (31 Mar 2025 08:23) (67 - 79)  BP: 101/58 (31 Mar 2025 08:23) (98/61 - 106/73)  BP(mean): --  RR: 18 (31 Mar 2025 08:23) (16 - 18)  SpO2: 93% (31 Mar 2025 08:23) (92% - 95%)    Parameters below as of 31 Mar 2025 08:23  Patient On (Oxygen Delivery Method): room air                  Orthostatic VS    03-26-25 @ 08:50  Lying BP: Orthostatic BP (Lying Systolic): 89/Orthostatic BP (Lying Diastolic (mm Hg)): 56 HR: Orthostatic Pulse (Heart Rate (beats/min)): 70   Sitting BP: Orthostatic BP (Sitting Systolic): 91/Orthostatic BP (Sitting Diastolic (mm Hg)): 62 HR: Orthostatic Pulse (Heart Rate (beats/min)): 70  Standing BP: Orthostatic BP (Standing Systolic): 71/Orthostatic BP (Standing Diastolic (mm Hg)): 49 HR: Orthostatic Pulse (Heart Rate (beats/min)): 73  Site: --   Mode: --      General Exam:   General Appearance: Appropriately dressed and in no acute distress       Head: Normocephalic, atraumatic and no dysmorphic features  Ear, Nose, and Throat: Moist mucous membranes  Orthostatic VS  CVS: S1S2+  Resp: No SOB, no wheeze or rhonchi  GI: soft NT/ND  Extremities: No edema or cyanosis  Skin: No bruises or rashes     Neurological Exam:  Mental Status: Awake, alert and oriented x 2-3.  Able to follow simple and complex verbal commands. Able to name and repeat. fluent speech. No obvious aphasia or dysarthria noted.   Cranial Nerves: PERRL, EOMI, VFFC, sensation V1-V3 intact,  no obvious facial asymmetry, equal elevation of palate, scm/trap 5/5, tongue is midline on protrusion. no obvious papilledema on fundoscopic exam. hearing is grossly intact.   Motor: Normal bulk, tone and strength throughout except LUE pain limited Fine finger movements were intact and symmetric. no tremors or drift noted.    Sensation: Intact to light touch and pinprick throughout. no right/left confusion. no extinction to tactile on DSS.    Reflexes: 1+ throughout at biceps, brachioradialis, triceps, patellars and ankles bilaterally and equal. No clonus. R toe and L toe were both downgoing.  Coordination: No dysmetria on FNF or HKS  Gait: walker     Data/Labs/Imaging which I personally reviewed.      LABS:    LABS:                          10.3   8.41  )-----------( 124      ( 31 Mar 2025 06:44 )             31.2     03-31    131[L]  |  90[L]  |  90[H]  ----------------------------<  100[H]  4.1   |  25  |  2.74[H]    Ca    8.9      31 Mar 2025 06:51          Urinalysis Basic - ( 31 Mar 2025 06:51 )    Color: x / Appearance: x / SG: x / pH: x  Gluc: 100 mg/dL / Ketone: x  / Bili: x / Urobili: x   Blood: x / Protein: x / Nitrite: x   Leuk Esterase: x / RBC: x / WBC x   Sq Epi: x / Non Sq Epi: x / Bacteria: x            < from: CT Head No Cont (03.25.25 @ 15:57) >    ACC: [id number] EXAM:  CT BRAIN   ORDERED BY: NITHYA STILL     PROCEDURE DATE:  03/25/2025          INTERPRETATION:  CLINICAL INFORMATION: s/p fall at home    COMPARISON: CT head 3/24/2025.    TECHNIQUE:  Serial axial images were obtained fromthe skull base to the   vertex using multi-slice helical technique. Sagittal and coronal   reformats were obtained.    FINDINGS:    VENTRICLES AND SULCI: Age appropriate involutional changes.  INTRA-AXIAL: No mass effect, acute hemorrhage, or midlineshift.  There   are periventricular and subcortical white matter hypodensities,   consistent with microvascular type changes.  EXTRA-AXIAL: No mass or fluid collection. Basal cisterns are normal in   appearance.    VISUALIZED SINUSES:  Clear.  TYMPANOMASTOID CAVITIES:  Clear.  VISUALIZED ORBITS: Bilateral lens replacement.  CALVARIUM: Intact.    MISCELLANEOUS: None.      IMPRESSION:  No acute intracranial hemorrhage, mass effect, or midline shift.        --- End of Report ---            CLAIR POWER MD; Attending Radiologist  This document has been electronically signed. Mar 25 2025  4:03PM    < end of copied text >      
Date of Service  : 03-26-25     INTERVAL HPI/OVERNIGHT EVENTS: I have tremor hands and things fall from hand.   Vital Signs Last 24 Hrs  T(C): 37.1 (26 Mar 2025 12:20), Max: 37.1 (25 Mar 2025 17:38)  T(F): 98.7 (26 Mar 2025 12:20), Max: 98.7 (25 Mar 2025 17:38)  HR: 72 (26 Mar 2025 12:20) (71 - 76)  BP: 95/60 (26 Mar 2025 12:20) (94/60 - 103/64)  BP(mean): --  RR: 18 (26 Mar 2025 12:20) (16 - 18)  SpO2: 96% (26 Mar 2025 12:20) (93% - 96%)    Parameters below as of 26 Mar 2025 12:20  Patient On (Oxygen Delivery Method): nasal cannula  O2 Flow (L/min): 2    I&O's Summary    MEDICATIONS  (STANDING):  allopurinol 100 milliGRAM(s) Oral daily  ALPRAZolam 0.5 milliGRAM(s) Oral at bedtime  atorvastatin 10 milliGRAM(s) Oral at bedtime  fluticasone propionate 50 MICROgram(s)/spray Nasal Spray 1 Spray(s) Both Nostrils two times a day  influenza  Vaccine (HIGH DOSE) 0.5 milliLiter(s) IntraMuscular once  midodrine. 10 milliGRAM(s) Oral three times a day  polyethylene glycol 3350 17 Gram(s) Oral daily  senna 2 Tablet(s) Oral at bedtime  tamsulosin 0.4 milliGRAM(s) Oral at bedtime  traZODone 100 milliGRAM(s) Oral at bedtime    MEDICATIONS  (PRN):  acetaminophen     Tablet .. 650 milliGRAM(s) Oral every 6 hours PRN Temp greater or equal to 38.5C (101.3F), Moderate Pain (4 - 6)    LABS:                        10.9   5.86  )-----------( 107      ( 25 Mar 2025 06:37 )             33.0     03-25    132[L]  |  89[L]  |  81[H]  ----------------------------<  92  4.2   |  29  |  2.60[H]    Ca    8.8      25 Mar 2025 06:37        Urinalysis Basic - ( 25 Mar 2025 06:37 )    Color: x / Appearance: x / SG: x / pH: x  Gluc: 92 mg/dL / Ketone: x  / Bili: x / Urobili: x   Blood: x / Protein: x / Nitrite: x   Leuk Esterase: x / RBC: x / WBC x   Sq Epi: x / Non Sq Epi: x / Bacteria: x      CAPILLARY BLOOD GLUCOSE            Urinalysis Basic - ( 25 Mar 2025 06:37 )    Color: x / Appearance: x / SG: x / pH: x  Gluc: 92 mg/dL / Ketone: x  / Bili: x / Urobili: x   Blood: x / Protein: x / Nitrite: x   Leuk Esterase: x / RBC: x / WBC x   Sq Epi: x / Non Sq Epi: x / Bacteria: x      REVIEW OF SYSTEMS:  CONSTITUTIONAL: No fever, weight loss, or fatigue  EYES: No eye pain, visual disturbances, or discharge  ENMT:  No difficulty hearing, tinnitus, vertigo; No sinus or throat pain  NECK: No pain or stiffness  RESPIRATORY: No cough, wheezing, chills or hemoptysis; No shortness of breath  CARDIOVASCULAR: No chest pain, palpitations, dizziness, or leg swelling  GASTROINTESTINAL: No abdominal or epigastric pain. No nausea, vomiting, or hematemesis; No diarrhea or constipation. No melena or hematochezia.  GENITOURINARY: No dysuria, frequency, hematuria, or incontinence  NEUROLOGICAL: No headaches, memory loss, loss of strength, numbness, or tremors  SKIN: No itching, burning, rashes, or lesions   ENDOCRINE: No heat or cold intolerance; No hair loss      RADIOLOGY & ADDITIONAL TESTS:    Consultant(s) Notes Reviewed:  [x ] YES  [ ] NO    PHYSICAL EXAM:  GENERAL: NAD, well-groomed, well-developed,not in any distress ,  HEAD:  Atraumatic, Normocephalic  EYES: EOMI, PERRLA, conjunctiva and sclera clear  ENMT: No tonsillar erythema, exudates, or enlargement; Moist mucous membranes, Good dentition, No lesions  NECK: Supple, No JVD, Normal thyroid  NERVOUS SYSTEM:  Alert & Oriented X3, Tremor +  CHEST/LUNG: Good air entry bilateral with no  rales, rhonchi, wheezing, or rubs  HEART: Regular rate and rhythm; No murmurs, rubs, or gallops  ABDOMEN: Soft, Nontender, Nondistended; Bowel sounds present  EXTREMITIES:  2+ Peripheral Pulses, No clubbing, cyanosis, or edema      Care Discussed with Consultants/Other Providers [ x] YES  [ ] NO
Date of Service  : 03-29-25     INTERVAL HPI/OVERNIGHT EVENTS: Eatting luch and feeling fine. Daughter said he is weak today. Wants Eliquis 2.5 daily and Trazadone 50 only as wants to stop slowly.     Vital Signs Last 24 Hrs  T(C): 36.4 (29 Mar 2025 06:00), Max: 36.4 (29 Mar 2025 06:00)  T(F): 97.5 (29 Mar 2025 06:00), Max: 97.5 (29 Mar 2025 06:00)  HR: 70 (28 Mar 2025 16:46) (70 - 70)  BP: 107/64 (29 Mar 2025 01:58) (107/64 - 128/70)  BP(mean): --  RR: 18 (29 Mar 2025 01:58) (18 - 18)  SpO2: 94% (29 Mar 2025 01:58) (94% - 95%)    Parameters below as of 29 Mar 2025 01:58  Patient On (Oxygen Delivery Method): room air      I&O's Summary    28 Mar 2025 07:01  -  29 Mar 2025 07:00  --------------------------------------------------------  IN: 0 mL / OUT: 950 mL / NET: -950 mL      MEDICATIONS  (STANDING):  allopurinol 100 milliGRAM(s) Oral daily  ALPRAZolam 1 milliGRAM(s) Oral at bedtime  apixaban 2.5 milliGRAM(s) Oral every 12 hours  atorvastatin 10 milliGRAM(s) Oral at bedtime  fluticasone propionate 50 MICROgram(s)/spray Nasal Spray 1 Spray(s) Both Nostrils two times a day  influenza  Vaccine (HIGH DOSE) 0.5 milliLiter(s) IntraMuscular once  lactobacillus acidophilus 1 Tablet(s) Oral daily  levothyroxine 112 MICROGram(s) Oral daily  midodrine. 10 milliGRAM(s) Oral three times a day  multivitamin 1 Tablet(s) Oral daily  pantoprazole    Tablet 40 milliGRAM(s) Oral before breakfast  polyethylene glycol 3350 17 Gram(s) Oral daily  predniSONE   Tablet 20 milliGRAM(s) Oral daily  senna 2 Tablet(s) Oral at bedtime  tamsulosin 0.4 milliGRAM(s) Oral at bedtime  traZODone 100 milliGRAM(s) Oral at bedtime    MEDICATIONS  (PRN):  acetaminophen     Tablet .. 650 milliGRAM(s) Oral every 6 hours PRN Temp greater or equal to 38.5C (101.3F), Moderate Pain (4 - 6)    LABS:                        9.9    9.02  )-----------( 124      ( 29 Mar 2025 07:31 )             29.4     03-29    130[L]  |  86[L]  |  90[H]  ----------------------------<  151[H]  3.8   |  26  |  3.40[H]    Ca    8.8      29 Mar 2025 07:31        Urinalysis Basic - ( 29 Mar 2025 07:31 )    Color: x / Appearance: x / SG: x / pH: x  Gluc: 151 mg/dL / Ketone: x  / Bili: x / Urobili: x   Blood: x / Protein: x / Nitrite: x   Leuk Esterase: x / RBC: x / WBC x   Sq Epi: x / Non Sq Epi: x / Bacteria: x      CAPILLARY BLOOD GLUCOSE            Urinalysis Basic - ( 29 Mar 2025 07:31 )    Color: x / Appearance: x / SG: x / pH: x  Gluc: 151 mg/dL / Ketone: x  / Bili: x / Urobili: x   Blood: x / Protein: x / Nitrite: x   Leuk Esterase: x / RBC: x / WBC x   Sq Epi: x / Non Sq Epi: x / Bacteria: x      REVIEW OF SYSTEMS:  CONSTITUTIONAL: No fever, weight loss, or fatigue  EYES: No eye pain, visual disturbances, or discharge  ENMT:  No difficulty hearing, tinnitus, vertigo; No sinus or throat pain  NECK: No pain or stiffness  RESPIRATORY: No cough, wheezing, chills or hemoptysis; No shortness of breath  CARDIOVASCULAR: No chest pain, palpitations, dizziness, or leg swelling  GASTROINTESTINAL: No abdominal or epigastric pain. No nausea, vomiting, or hematemesis; No diarrhea or constipation. No melena or hematochezia.  GENITOURINARY: No dysuria, frequency, hematuria, or incontinence  NEUROLOGICAL: No headaches, memory loss, loss of strength, numbness, or tremors      RADIOLOGY & ADDITIONAL TESTS:    Consultant(s) Notes Reviewed:  [x ] YES  [ ] NO    PHYSICAL EXAM:  GENERAL: NAD, not in any distress ,  HEAD:  Atraumatic, Normocephalic  EYES: EOMI, PERRLA, conjunctiva and sclera clear  ENMT: No tonsillar erythema, exudates, or enlargement; Moist mucous membranes, Good dentition, No lesions  NECK: Supple, No JVD, Normal thyroid  NERVOUS SYSTEM:  Alert & Oriented X3, No focal deficit   CHEST/LUNG: Good air entry bilateral with no  rales, rhonchi, wheezing, or rubs  HEART: Regular rate and rhythm; No murmurs, rubs, or gallops  ABDOMEN: Soft, Nontender, Nondistended; Bowel sounds present  EXTREMITIES:  + edema    Care Discussed with Consultants/Other Providers [ x] YES  [ ] NO
Neurology      S; patient seen. resting in bed            Medications: MEDICATIONS  (STANDING):  allopurinol 100 milliGRAM(s) Oral daily  ALPRAZolam 1 milliGRAM(s) Oral at bedtime  apixaban 2.5 milliGRAM(s) Oral <User Schedule>  atorvastatin 10 milliGRAM(s) Oral at bedtime  fluticasone propionate 50 MICROgram(s)/spray Nasal Spray 1 Spray(s) Both Nostrils two times a day  influenza  Vaccine (HIGH DOSE) 0.5 milliLiter(s) IntraMuscular once  lactobacillus acidophilus 1 Tablet(s) Oral daily  levothyroxine 112 MICROGram(s) Oral daily  midodrine. 10 milliGRAM(s) Oral three times a day  multivitamin 1 Tablet(s) Oral daily  pantoprazole    Tablet 40 milliGRAM(s) Oral before breakfast  polyethylene glycol 3350 17 Gram(s) Oral daily  predniSONE   Tablet 10 milliGRAM(s) Oral daily  senna 2 Tablet(s) Oral at bedtime  tamsulosin 0.4 milliGRAM(s) Oral at bedtime  traZODone 50 milliGRAM(s) Oral at bedtime    MEDICATIONS  (PRN):  acetaminophen     Tablet .. 650 milliGRAM(s) Oral every 6 hours PRN Temp greater or equal to 38.5C (101.3F), Moderate Pain (4 - 6)       Vitals:  Vital Signs Last 24 Hrs  T(C): 36.7 (02 Apr 2025 08:18), Max: 36.7 (02 Apr 2025 00:00)  T(F): 98.1 (02 Apr 2025 08:18), Max: 98.1 (02 Apr 2025 00:00)  HR: 70 (02 Apr 2025 08:18) (70 - 70)  BP: 104/59 (02 Apr 2025 08:18) (98/63 - 104/64)  BP(mean): --  RR: 18 (02 Apr 2025 08:18) (18 - 18)  SpO2: 93% (02 Apr 2025 08:18) (93% - 93%)    Parameters below as of 02 Apr 2025 08:18  Patient On (Oxygen Delivery Method): room air              Orthostatic VS    03-26-25 @ 08:50  Lying BP: Orthostatic BP (Lying Systolic): 89/Orthostatic BP (Lying Diastolic (mm Hg)): 56 HR: Orthostatic Pulse (Heart Rate (beats/min)): 70   Sitting BP: Orthostatic BP (Sitting Systolic): 91/Orthostatic BP (Sitting Diastolic (mm Hg)): 62 HR: Orthostatic Pulse (Heart Rate (beats/min)): 70  Standing BP: Orthostatic BP (Standing Systolic): 71/Orthostatic BP (Standing Diastolic (mm Hg)): 49 HR: Orthostatic Pulse (Heart Rate (beats/min)): 73  Site: --   Mode: --      General Exam:   General Appearance: Appropriately dressed and in no acute distress       Head: Normocephalic, atraumatic and no dysmorphic features  Ear, Nose, and Throat: Moist mucous membranes  Orthostatic VS  CVS: S1S2+  Resp: No SOB, no wheeze or rhonchi  GI: soft NT/ND  Extremities: No edema or cyanosis  Skin: No bruises or rashes     Neurological Exam:  Mental Status: Awake, alert and oriented x 2-3.  Able to follow simple and complex verbal commands. Able to name and repeat. fluent speech. No obvious aphasia or dysarthria noted.   Cranial Nerves: PERRL, EOMI, VFFC, sensation V1-V3 intact,  no obvious facial asymmetry, equal elevation of palate, scm/trap 5/5, tongue is midline on protrusion. no obvious papilledema on fundoscopic exam. hearing is grossly intact.   Motor: Normal bulk, tone and strength throughout except LUE pain limited Fine finger movements were intact and symmetric. no tremors or drift noted.    Sensation: Intact to light touch and pinprick throughout. no right/left confusion. no extinction to tactile on DSS.    Reflexes: 1+ throughout at biceps, brachioradialis, triceps, patellars and ankles bilaterally and equal. No clonus. R toe and L toe were both downgoing.  Coordination: No dysmetria on FNF or HKS  Gait: walker     Data/Labs/Imaging which I personally reviewed.      LABS:   LABS:                          10.1   10.61 )-----------( 129      ( 01 Apr 2025 06:50 )             30.7     04-01    132[L]  |  91[L]  |  78[H]  ----------------------------<  105[H]  3.8   |  28  |  2.35[H]    Ca    8.9      01 Apr 2025 07:21          Urinalysis Basic - ( 01 Apr 2025 07:21 )    Color: x / Appearance: x / SG: x / pH: x  Gluc: 105 mg/dL / Ketone: x  / Bili: x / Urobili: x   Blood: x / Protein: x / Nitrite: x   Leuk Esterase: x / RBC: x / WBC x   Sq Epi: x / Non Sq Epi: x / Bacteria: x          < from: CT Head No Cont (03.25.25 @ 15:57) >    ACC: [id number] EXAM:  CT BRAIN   ORDERED BY: NITHYA STILL     PROCEDURE DATE:  03/25/2025          INTERPRETATION:  CLINICAL INFORMATION: s/p fall at home    COMPARISON: CT head 3/24/2025.    TECHNIQUE:  Serial axial images were obtained fromthe skull base to the   vertex using multi-slice helical technique. Sagittal and coronal   reformats were obtained.    FINDINGS:    VENTRICLES AND SULCI: Age appropriate involutional changes.  INTRA-AXIAL: No mass effect, acute hemorrhage, or midlineshift.  There   are periventricular and subcortical white matter hypodensities,   consistent with microvascular type changes.  EXTRA-AXIAL: No mass or fluid collection. Basal cisterns are normal in   appearance.    VISUALIZED SINUSES:  Clear.  TYMPANOMASTOID CAVITIES:  Clear.  VISUALIZED ORBITS: Bilateral lens replacement.  CALVARIUM: Intact.    MISCELLANEOUS: None.      IMPRESSION:  No acute intracranial hemorrhage, mass effect, or midline shift.        --- End of Report ---            CLAIR POWER MD; Attending Radiologist  This document has been electronically signed. Mar 25 2025  4:03PM    < end of copied text >      
Neurology      S; patient seen. resting in bed        Medications: MEDICATIONS  (STANDING):  allopurinol 100 milliGRAM(s) Oral daily  ALPRAZolam 1 milliGRAM(s) Oral at bedtime  apixaban 2.5 milliGRAM(s) Oral <User Schedule>  atorvastatin 10 milliGRAM(s) Oral at bedtime  fluticasone propionate 50 MICROgram(s)/spray Nasal Spray 1 Spray(s) Both Nostrils two times a day  influenza  Vaccine (HIGH DOSE) 0.5 milliLiter(s) IntraMuscular once  lactobacillus acidophilus 1 Tablet(s) Oral daily  levothyroxine 112 MICROGram(s) Oral daily  midodrine. 10 milliGRAM(s) Oral three times a day  multivitamin 1 Tablet(s) Oral daily  pantoprazole    Tablet 40 milliGRAM(s) Oral before breakfast  polyethylene glycol 3350 17 Gram(s) Oral daily  predniSONE   Tablet 20 milliGRAM(s) Oral daily  senna 2 Tablet(s) Oral at bedtime  tamsulosin 0.4 milliGRAM(s) Oral at bedtime  traZODone 50 milliGRAM(s) Oral at bedtime    MEDICATIONS  (PRN):  acetaminophen     Tablet .. 650 milliGRAM(s) Oral every 6 hours PRN Temp greater or equal to 38.5C (101.3F), Moderate Pain (4 - 6)       Vitals:  Vital Signs Last 24 Hrs  T(C): 36.7 (30 Mar 2025 06:18), Max: 36.7 (30 Mar 2025 06:18)  T(F): 98 (30 Mar 2025 06:18), Max: 98 (30 Mar 2025 06:18)  HR: 70 (30 Mar 2025 06:18) (69 - 70)  BP: 100/61 (30 Mar 2025 06:18) (93/46 - 104/61)  BP(mean): --  RR: 17 (30 Mar 2025 06:18) (17 - 18)  SpO2: 92% (30 Mar 2025 06:18) (92% - 96%)    Parameters below as of 30 Mar 2025 06:18  Patient On (Oxygen Delivery Method): room air                Orthostatic VS    03-26-25 @ 08:50  Lying BP: Orthostatic BP (Lying Systolic): 89/Orthostatic BP (Lying Diastolic (mm Hg)): 56 HR: Orthostatic Pulse (Heart Rate (beats/min)): 70   Sitting BP: Orthostatic BP (Sitting Systolic): 91/Orthostatic BP (Sitting Diastolic (mm Hg)): 62 HR: Orthostatic Pulse (Heart Rate (beats/min)): 70  Standing BP: Orthostatic BP (Standing Systolic): 71/Orthostatic BP (Standing Diastolic (mm Hg)): 49 HR: Orthostatic Pulse (Heart Rate (beats/min)): 73  Site: --   Mode: --      General Exam:   General Appearance: Appropriately dressed and in no acute distress       Head: Normocephalic, atraumatic and no dysmorphic features  Ear, Nose, and Throat: Moist mucous membranes  Orthostatic VS  CVS: S1S2+  Resp: No SOB, no wheeze or rhonchi  GI: soft NT/ND  Extremities: No edema or cyanosis  Skin: No bruises or rashes     Neurological Exam:  Mental Status: Awake, alert and oriented x 2-3.  Able to follow simple and complex verbal commands. Able to name and repeat. fluent speech. No obvious aphasia or dysarthria noted.   Cranial Nerves: PERRL, EOMI, VFFC, sensation V1-V3 intact,  no obvious facial asymmetry, equal elevation of palate, scm/trap 5/5, tongue is midline on protrusion. no obvious papilledema on fundoscopic exam. hearing is grossly intact.   Motor: Normal bulk, tone and strength throughout except LUE pain limited Fine finger movements were intact and symmetric. no tremors or drift noted.    Sensation: Intact to light touch and pinprick throughout. no right/left confusion. no extinction to tactile on DSS.    Reflexes: 1+ throughout at biceps, brachioradialis, triceps, patellars and ankles bilaterally and equal. No clonus. R toe and L toe were both downgoing.  Coordination: No dysmetria on FNF or HKS  Gait: walker     Data/Labs/Imaging which I personally reviewed.      LABS:                          9.9    9.02  )-----------( 124      ( 29 Mar 2025 07:31 )             29.4     03-30    133[L]  |  92[L]  |  93[H]  ----------------------------<  101[H]  3.5   |  28  |  2.81[H]    Ca    8.7      30 Mar 2025 08:01          Urinalysis Basic - ( 30 Mar 2025 08:01 )    Color: x / Appearance: x / SG: x / pH: x  Gluc: 101 mg/dL / Ketone: x  / Bili: x / Urobili: x   Blood: x / Protein: x / Nitrite: x   Leuk Esterase: x / RBC: x / WBC x   Sq Epi: x / Non Sq Epi: x / Bacteria: x        < from: CT Head No Cont (03.25.25 @ 15:57) >    ACC: [id number] EXAM:  CT BRAIN   ORDERED BY: NITHYA STILL     PROCEDURE DATE:  03/25/2025          INTERPRETATION:  CLINICAL INFORMATION: s/p fall at home    COMPARISON: CT head 3/24/2025.    TECHNIQUE:  Serial axial images were obtained fromthe skull base to the   vertex using multi-slice helical technique. Sagittal and coronal   reformats were obtained.    FINDINGS:    VENTRICLES AND SULCI: Age appropriate involutional changes.  INTRA-AXIAL: No mass effect, acute hemorrhage, or midlineshift.  There   are periventricular and subcortical white matter hypodensities,   consistent with microvascular type changes.  EXTRA-AXIAL: No mass or fluid collection. Basal cisterns are normal in   appearance.    VISUALIZED SINUSES:  Clear.  TYMPANOMASTOID CAVITIES:  Clear.  VISUALIZED ORBITS: Bilateral lens replacement.  CALVARIUM: Intact.    MISCELLANEOUS: None.      IMPRESSION:  No acute intracranial hemorrhage, mass effect, or midline shift.        --- End of Report ---            CLAIR POWER MD; Attending Radiologist  This document has been electronically signed. Mar 25 2025  4:03PM    < end of copied text >      
[facility]   MD OLE ADAMS MD MARIA SANTIAGO, NP    TEL:  OFFICE: [phone]  From 5pm-7am Answering Service [phone]    -- RENAL FOLLOW UP NOTE ---Date of Service 03-31-25 @ 13:19    Patient is a 89y old  Male who presents with a chief complaint of Fall at home      Patient seen and examined at bedside. No chest pain/sob    VITALS:  T(F): 98.3 (03-31-25 @ 08:23), Max: 98.3 (03-31-25 @ 08:23)  HR: 69 (03-31-25 @ 08:23)  BP: 101/58 (03-31-25 @ 08:23)  RR: 18 (03-31-25 @ 08:23)  SpO2: 93% (03-31-25 @ 08:23)  Wt(kg): --    03-30 @ 07:01  -  03-31 @ 07:00  --------------------------------------------------------  IN: 0 mL / OUT: 1100 mL / NET: -1100 mL    03-31 @ 07:01  -  03-31 @ 13:19  --------------------------------------------------------  IN: 450 mL / OUT: 350 mL / NET: 100 mL          PHYSICAL EXAM:  General: NAD  Neck: No JVD  Respiratory: CTAB, no wheezes, rales or rhonchi  Cardiovascular: S1, S2, RRR  Gastrointestinal: BS+, soft, NT/ND  Extremities: b/l LE trace edema    Hospital Medications:   MEDICATIONS  (STANDING):  allopurinol 100 milliGRAM(s) Oral daily  ALPRAZolam 1 milliGRAM(s) Oral at bedtime  apixaban 2.5 milliGRAM(s) Oral <User Schedule>  atorvastatin 10 milliGRAM(s) Oral at bedtime  fluticasone propionate 50 MICROgram(s)/spray Nasal Spray 1 Spray(s) Both Nostrils two times a day  influenza  Vaccine (HIGH DOSE) 0.5 milliLiter(s) IntraMuscular once  lactobacillus acidophilus 1 Tablet(s) Oral daily  levothyroxine 112 MICROGram(s) Oral daily  midodrine. 10 milliGRAM(s) Oral three times a day  multivitamin 1 Tablet(s) Oral daily  pantoprazole    Tablet 40 milliGRAM(s) Oral before breakfast  polyethylene glycol 3350 17 Gram(s) Oral daily  predniSONE   Tablet 10 milliGRAM(s) Oral daily  senna 2 Tablet(s) Oral at bedtime  tamsulosin 0.4 milliGRAM(s) Oral at bedtime  traZODone 50 milliGRAM(s) Oral at bedtime      LABS:  03-31    131[L]  |  90[L]  |  90[H]  ----------------------------<  100[H]  4.1   |  25  |  2.74[H]    Ca    8.9      31 Mar 2025 06:51      Creatinine Trend: 2.74 <--, 2.81 <--, 3.40 <--, 3.65 <--, 4.37 <--, 4.02 <--, 2.60 <--, 1.99 <--                                10.3   8.41  )-----------( 124      ( 31 Mar 2025 06:44 )             31.2     Urine Studies:  Urinalysis - [03-31-25 @ 06:51]      Color  / Appearance  / SG  / pH       Gluc 100 / Ketone   / Bili  / Urobili        Blood  / Protein  / Leuk Est  / Nitrite       RBC  / WBC  / Hyaline  / Gran  / Sq Epi  / Non Sq Epi  / Bacteria     Urine Creatinine 70      [03-25-25 @ 06:43]  Urine Protein 16      [03-28-25 @ 18:47]  Urine Sodium 15      [03-25-25 @ 06:43]  Urine Urea Nitrogen 462      [03-25-25 @ 16:08]  Urine Osmolality 345      [03-25-25 @ 06:43]    TSH 1.01      [03-24-25 @ 19:09]        RADIOLOGY & ADDITIONAL STUDIES:

## 2025-04-02 NOTE — DISCHARGE NOTE NURSING/CASE MANAGEMENT/SOCIAL WORK - FINANCIAL ASSISTANCE
[facility] provides services at a reduced cost to those who are determined to be eligible through [facility]’s financial assistance program. Information regarding [facility]’s financial assistance program can be found by going to [email or web address] or by calling [phone].

## 2025-04-02 NOTE — DISCHARGE NOTE NURSING/CASE MANAGEMENT/SOCIAL WORK - NSDCVIVACCINE_GEN_ALL_CORE_FT
Tdap; 15-Aug-2018 19:28; Victor Manuel Sumner (RN); [employer]; [id number]; IntraMuscular; Deltoid Left.; 0.5 milliLiter(s); VIS (VIS Published: 09-May-2013, VIS Presented: 15-Aug-2018);   Tdap; 24-Mar-2025 12:00; Radha Handley (THAO); [employer]; [id number] (Exp. Date: 01-Apr-2026); IntraMuscular; Deltoid Right.; 0.5 milliLiter(s); VIS (VIS Published: 09-May-2013, VIS Presented: 24-Mar-2025);

## 2025-04-02 NOTE — PROGRESS NOTE ADULT - ASSESSMENT
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  Patient is unsure how he fell, fell backwards and hit his head, had LOC, unknown downtime.  Complains of pain all over his body.  Denies chest pain, shortness of breath. Nephrology consulted for JASON.     A/P  JASON   scr baseline 1.7 per pt.  admitted with scr 2.1   likely prerenal state sec to diuresis vs cardio-renal  s/p 1L NS in the ER  s/p fall - CK normal   FeUrea 21.1% -- suggests pre-renal vs. HF.  CTAP negative for hydronephrosis   s/p contrast study 03/24 - monitor for HELEN   3/27 - S.Cr continues to worsen -- likely sec to contrast.  BUN elevated -- hold off diuretics in view of worsening renal function.  NP d/w pt and daughter, Madhavi at bedside regarding worsening renal function and symptom of fatigue -- possibly d/t uremia in light of elevated BUN.  RRT recommended; pt refused at this time.   3/28 - S.Cr was improving, clinically hypervolemic, s/p Lasix 40mg IVP one dose  3/29- Scr improving, volume status is better-monitor today  3/30- Scr improving, volume status is better-monitor today  3/31 - Scr continues to improve; volume status better  s/p bumex IV 1mg 3/30; UO 1100 over 24 hrs  diuresis as per cardiology  Renal flunction improving pending labs today, can followup with Dr. Goldberg his nephrologist at discharge  Renal diet.  monitor UO and scr  Adjust the dose of meds per GFR  Avoid further nephrotoxic agents; no more studies with contrast.    Hyponatremia  likely sec to fluid overload  hold home salt tabs   urine sodium low with high urine osm  Na fluctuating  Free fluid restriction to 1L/day --daughter and patient again educated.  Optimize glycemic control.  Monitor Na.    Acidosis:  Better  Monitor CO2 w/ diuretics.    Hypokalemia   s/p KCl 40 meq IV x 4 doses 3/24 and 20meq po 3/30  K better  supplement as needed  keep K >4.0.    Hypotension:  On midodrine 10mg TID.  Avoid hypotension.  Monitor BP.    R wrist pain / tremors:  Pt reports pain has been present for over 1 month.  He has been taking prednisone, tapered down to 2.5mg BID.  Pt reports tremors of R hand present before admission but worsened on 3/26; possibly d/t uremia.  Neuro following.    Syncope:  Workup per team.  Neuro following.  Orthostatic +.  On midodrine.  Cardiology following.

## 2025-04-02 NOTE — PROGRESS NOTE ADULT - ASSESSMENT
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  Patient is unsure how he fell, fell backwards and hit his head, had LOC, unknown downtime.  Complains of pain all over his body.  Denies chest pain, shortness of breath.   + LOC, no tongue bite, no incontinence. no conuvlsions   o/e AAOx2-3, PITT but LUE pain limited, walker baseline   orthostatics +   CTH neg   CT C/T/L spine; multiple scattered lucensies suspicioous for lytic neoplastic lesions such as mets or multiple myeloma.    b12      Imprssion:   1) Syncope, unclear; possible multifactorial, dehydration, hypotension, orthostatasis   2) tremor, likely metabolic at this point but component of esesntial tremor  3) dizzy/vertigo when turns head  4) hyponatremia to 128-->129 -->132      - already on DOAC.  low suscpioun for stroke . CTH neg.  unable for MRI   - orthostatics +; comproession stockings, hydration, trend orthostatics.  may need to consider midodrine, started 10 TID.  trend orthostatics   - cardio recs appreicated   - consider checking vessels if not already done so with carotid duplex    - r/o multiple myeloma. check ca, SPEP, UPEP, hematology eval   - outpatient EEG   - slow correction of Na no more than 8-12 meq in 24hrs 128-->129-->132   - device interrogation, neg for events to correlate with syncope   - unable for MRI.    - telemetry  - PT/OT  - check FS, glucose control <180  - GI/DVT ppx   - Thank you for allowing me to participate in the care of this patient. Call with questions.   dc planning   Jose Luis Claire MD  Vascular Neurology  Office: [phone]  Coronary artery disease of native artery of native heart with stable angina pectoris

## 2025-04-02 NOTE — PROGRESS NOTE ADULT - REASON FOR ADMISSION
Fall at home.

## 2025-04-02 NOTE — DISCHARGE NOTE NURSING/CASE MANAGEMENT/SOCIAL WORK - PATIENT PORTAL LINK FT
You can access the FollowMyHealth Patient Portal offered by [facility] by registering at the following website: [email or web address]. By joining [employer]’s FollowMyHealth portal, you will also be able to view your health information using other applications (apps) compatible with our system.

## 2025-04-02 NOTE — PROGRESS NOTE ADULT - PROVIDER SPECIALTY LIST ADULT
Cardiology
Internal Medicine
Nephrology
Nephrology
Neurology
Internal Medicine
Nephrology
Neurology
Neurology
Podiatry
Cardiology
Internal Medicine
Nephrology
Neurology
Neurology
Internal Medicine
Nephrology
Internal Medicine

## 2025-04-02 NOTE — DISCHARGE NOTE NURSING/CASE MANAGEMENT/SOCIAL WORK - NSDCFUADDAPPT_GEN_ALL_CORE_FT
APPTS ARE READY TO BE MADE: [X] YES    Best Family or Patient Contact (if needed):    Additional Information about above appointments (if needed):    1: Patient must have staples removed 4/3/2025 per Dr. López. Teodoro were placed on scalp in Emergency Department.  2: Follow-up with cardiologist outpatient.   3: Follow-up with primary care doctor within one week.   4: Follow-up with nephrologist outpatient.     Other comments or requests:

## 2025-04-08 ENCOUNTER — INPATIENT (INPATIENT)
Facility: HOSPITAL | Age: 89
LOS: 0 days | Discharge: LEFT BEFORE DISCHARGE | DRG: 998 | End: 2025-04-08
Attending: INTERNAL MEDICINE | Admitting: INTERNAL MEDICINE
Payer: MEDICARE

## 2025-04-08 VITALS
HEIGHT: 70 IN | HEART RATE: 73 BPM | DIASTOLIC BLOOD PRESSURE: 72 MMHG | SYSTOLIC BLOOD PRESSURE: 113 MMHG | RESPIRATION RATE: 16 BRPM | TEMPERATURE: 98 F | OXYGEN SATURATION: 95 % | WEIGHT: 145.06 LBS

## 2025-04-08 VITALS
OXYGEN SATURATION: 97 % | RESPIRATION RATE: 18 BRPM | DIASTOLIC BLOOD PRESSURE: 61 MMHG | SYSTOLIC BLOOD PRESSURE: 115 MMHG | HEART RATE: 71 BPM | TEMPERATURE: 97 F

## 2025-04-08 DIAGNOSIS — Z90.49 ACQUIRED ABSENCE OF OTHER SPECIFIED PARTS OF DIGESTIVE TRACT: Chronic | ICD-10-CM

## 2025-04-08 DIAGNOSIS — K66.0 PERITONEAL ADHESIONS (POSTPROCEDURAL) (POSTINFECTION): Chronic | ICD-10-CM

## 2025-04-08 DIAGNOSIS — Z98.890 OTHER SPECIFIED POSTPROCEDURAL STATES: Chronic | ICD-10-CM

## 2025-04-08 DIAGNOSIS — Z85.828 PERSONAL HISTORY OF OTHER MALIGNANT NEOPLASM OF SKIN: Chronic | ICD-10-CM

## 2025-04-08 DIAGNOSIS — Z98.49 CATARACT EXTRACTION STATUS, UNSPECIFIED EYE: Chronic | ICD-10-CM

## 2025-04-08 DIAGNOSIS — Z95.1 PRESENCE OF AORTOCORONARY BYPASS GRAFT: Chronic | ICD-10-CM

## 2025-04-08 DIAGNOSIS — K56.69 OTHER INTESTINAL OBSTRUCTION: Chronic | ICD-10-CM
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
## 2025-04-08 DIAGNOSIS — Z98.89 OTHER SPECIFIED POSTPROCEDURAL STATES: Chronic | ICD-10-CM

## 2025-04-08 DIAGNOSIS — Z95.0 PRESENCE OF CARDIAC PACEMAKER: Chronic | ICD-10-CM

## 2025-04-08 LAB
ACANTHOCYTES BLD QL SMEAR: SLIGHT — SIGNIFICANT CHANGE UP
ALBUMIN SERPL ELPH-MCNC: 3.8 G/DL — SIGNIFICANT CHANGE UP (ref 3.3–5)
ALP SERPL-CCNC: 103 U/L — SIGNIFICANT CHANGE UP (ref 40–120)
ALT FLD-CCNC: 34 U/L — SIGNIFICANT CHANGE UP (ref 10–45)
ANION GAP SERPL CALC-SCNC: 14 MMOL/L — SIGNIFICANT CHANGE UP (ref 5–17)
ANION GAP SERPL CALC-SCNC: 15 MMOL/L — SIGNIFICANT CHANGE UP (ref 5–17)
ANION GAP SERPL CALC-SCNC: 17 MMOL/L — SIGNIFICANT CHANGE UP (ref 5–17)
ANISOCYTOSIS BLD QL: SLIGHT — SIGNIFICANT CHANGE UP
AST SERPL-CCNC: 56 U/L — HIGH (ref 10–40)
BASOPHILS # BLD AUTO: 0 K/UL — SIGNIFICANT CHANGE UP (ref 0–0.2)
BASOPHILS NFR BLD AUTO: 0 % — SIGNIFICANT CHANGE UP (ref 0–2)
BILIRUB SERPL-MCNC: 0.6 MG/DL — SIGNIFICANT CHANGE UP (ref 0.2–1.2)
BUN SERPL-MCNC: 69 MG/DL — HIGH (ref 7–23)
BUN SERPL-MCNC: 69 MG/DL — HIGH (ref 7–23)
BUN SERPL-MCNC: 74 MG/DL — HIGH (ref 7–23)
CALCIUM SERPL-MCNC: 8.6 MG/DL — SIGNIFICANT CHANGE UP (ref 8.4–10.5)
CALCIUM SERPL-MCNC: 8.9 MG/DL — SIGNIFICANT CHANGE UP (ref 8.4–10.5)
CALCIUM SERPL-MCNC: 9.1 MG/DL — SIGNIFICANT CHANGE UP (ref 8.4–10.5)
CHLORIDE SERPL-SCNC: 91 MMOL/L — LOW (ref 96–108)
CHLORIDE SERPL-SCNC: 93 MMOL/L — LOW (ref 96–108)
CHLORIDE SERPL-SCNC: 95 MMOL/L — LOW (ref 96–108)
CO2 SERPL-SCNC: 24 MMOL/L — SIGNIFICANT CHANGE UP (ref 22–31)
CREAT SERPL-MCNC: 2.33 MG/DL — HIGH (ref 0.5–1.3)
CREAT SERPL-MCNC: 2.37 MG/DL — HIGH (ref 0.5–1.3)
CREAT SERPL-MCNC: 2.4 MG/DL — HIGH (ref 0.5–1.3)
EGFR: 25 ML/MIN/1.73M2 — LOW
EGFR: 26 ML/MIN/1.73M2 — LOW
EGFR: 26 ML/MIN/1.73M2 — LOW
EOSINOPHIL # BLD AUTO: 0.09 K/UL — SIGNIFICANT CHANGE UP (ref 0–0.5)
EOSINOPHIL NFR BLD AUTO: 0.9 % — SIGNIFICANT CHANGE UP (ref 0–6)
GLUCOSE SERPL-MCNC: 127 MG/DL — HIGH (ref 70–99)
GLUCOSE SERPL-MCNC: 88 MG/DL — SIGNIFICANT CHANGE UP (ref 70–99)
GLUCOSE SERPL-MCNC: 89 MG/DL — SIGNIFICANT CHANGE UP (ref 70–99)
HCT VFR BLD CALC: 32.9 % — LOW (ref 39–50)
HGB BLD-MCNC: 11 G/DL — LOW (ref 13–17)
HYPOCHROMIA BLD QL: SLIGHT — SIGNIFICANT CHANGE UP
INR BLD: 1.25 RATIO — HIGH (ref 0.85–1.16)
LYMPHOCYTES # BLD AUTO: 0.86 K/UL — LOW (ref 1–3.3)
LYMPHOCYTES # BLD AUTO: 8.3 % — LOW (ref 13–44)
MACROCYTES BLD QL: SLIGHT — SIGNIFICANT CHANGE UP
MAGNESIUM SERPL-MCNC: 2.5 MG/DL — SIGNIFICANT CHANGE UP (ref 1.6–2.6)
MANUAL SMEAR VERIFICATION: SIGNIFICANT CHANGE UP
MCHC RBC-ENTMCNC: 31.3 PG — SIGNIFICANT CHANGE UP (ref 27–34)
MCHC RBC-ENTMCNC: 33.4 G/DL — SIGNIFICANT CHANGE UP (ref 32–36)
MCV RBC AUTO: 93.5 FL — SIGNIFICANT CHANGE UP (ref 80–100)
MONOCYTES # BLD AUTO: 0.47 K/UL — SIGNIFICANT CHANGE UP (ref 0–0.9)
MONOCYTES NFR BLD AUTO: 4.6 % — SIGNIFICANT CHANGE UP (ref 2–14)
MYELOCYTES NFR BLD: 2.8 % — HIGH (ref 0–0)
NEUTROPHILS # BLD AUTO: 8.61 K/UL — HIGH (ref 1.8–7.4)
NEUTROPHILS NFR BLD AUTO: 83.4 % — HIGH (ref 43–77)
PHOSPHATE SERPL-MCNC: 4.6 MG/DL — HIGH (ref 2.5–4.5)
PLAT MORPH BLD: NORMAL — SIGNIFICANT CHANGE UP
PLATELET # BLD AUTO: 169 K/UL — SIGNIFICANT CHANGE UP (ref 150–400)
POIKILOCYTOSIS BLD QL AUTO: SLIGHT — SIGNIFICANT CHANGE UP
POLYCHROMASIA BLD QL SMEAR: SLIGHT — SIGNIFICANT CHANGE UP
POTASSIUM SERPL-MCNC: 3.5 MMOL/L — SIGNIFICANT CHANGE UP (ref 3.5–5.3)
POTASSIUM SERPL-MCNC: 5.6 MMOL/L — HIGH (ref 3.5–5.3)
POTASSIUM SERPL-MCNC: 6.2 MMOL/L — CRITICAL HIGH (ref 3.5–5.3)
POTASSIUM SERPL-SCNC: 3.5 MMOL/L — SIGNIFICANT CHANGE UP (ref 3.5–5.3)
POTASSIUM SERPL-SCNC: 5.6 MMOL/L — HIGH (ref 3.5–5.3)
POTASSIUM SERPL-SCNC: 6.2 MMOL/L — CRITICAL HIGH (ref 3.5–5.3)
PROT SERPL-MCNC: 7.2 G/DL — SIGNIFICANT CHANGE UP (ref 6–8.3)
PROTHROM AB SERPL-ACNC: 14.4 SEC — HIGH (ref 9.9–13.4)
RBC # BLD: 3.52 M/UL — LOW (ref 4.2–5.8)
RBC # FLD: 16.2 % — HIGH (ref 10.3–14.5)
RBC BLD AUTO: ABNORMAL
SCHISTOCYTES BLD QL AUTO: SLIGHT — SIGNIFICANT CHANGE UP
SODIUM SERPL-SCNC: 131 MMOL/L — LOW (ref 135–145)
SODIUM SERPL-SCNC: 132 MMOL/L — LOW (ref 135–145)
SODIUM SERPL-SCNC: 134 MMOL/L — LOW (ref 135–145)
WBC # BLD: 10.32 K/UL — SIGNIFICANT CHANGE UP (ref 3.8–10.5)
WBC # FLD AUTO: 10.32 K/UL — SIGNIFICANT CHANGE UP (ref 3.8–10.5)

## 2025-04-08 PROCEDURE — 85025 COMPLETE CBC W/AUTO DIFF WBC: CPT

## 2025-04-08 PROCEDURE — 72125 CT NECK SPINE W/O DYE: CPT | Mod: 26

## 2025-04-08 PROCEDURE — 99285 EMERGENCY DEPT VISIT HI MDM: CPT

## 2025-04-08 PROCEDURE — 80048 BASIC METABOLIC PNL TOTAL CA: CPT

## 2025-04-08 PROCEDURE — 93005 ELECTROCARDIOGRAM TRACING: CPT

## 2025-04-08 PROCEDURE — 70450 CT HEAD/BRAIN W/O DYE: CPT | Mod: MC

## 2025-04-08 PROCEDURE — 70450 CT HEAD/BRAIN W/O DYE: CPT | Mod: 26

## 2025-04-08 PROCEDURE — 85610 PROTHROMBIN TIME: CPT

## 2025-04-08 PROCEDURE — 72125 CT NECK SPINE W/O DYE: CPT | Mod: MC

## 2025-04-08 PROCEDURE — 99285 EMERGENCY DEPT VISIT HI MDM: CPT | Mod: GC

## 2025-04-08 PROCEDURE — 80053 COMPREHEN METABOLIC PANEL: CPT

## 2025-04-08 PROCEDURE — 96374 THER/PROPH/DIAG INJ IV PUSH: CPT

## 2025-04-08 PROCEDURE — 93010 ELECTROCARDIOGRAM REPORT: CPT

## 2025-04-08 PROCEDURE — 36415 COLL VENOUS BLD VENIPUNCTURE: CPT

## 2025-04-08 PROCEDURE — 83735 ASSAY OF MAGNESIUM: CPT

## 2025-04-08 PROCEDURE — 84100 ASSAY OF PHOSPHORUS: CPT

## 2025-04-08 RX ORDER — MIDODRINE HYDROCHLORIDE 5 MG/1
10 TABLET ORAL ONCE
Refills: 0 | Status: DISCONTINUED | OUTPATIENT
Start: 2025-04-08 | End: 2025-04-08

## 2025-04-08 RX ORDER — FLUTICASONE PROPIONATE 50 UG/1
2 SPRAY, METERED NASAL ONCE
Refills: 0 | Status: DISCONTINUED | OUTPATIENT
Start: 2025-04-08 | End: 2025-04-08

## 2025-04-08 RX ORDER — ACETAMINOPHEN 500 MG/5ML
1000 LIQUID (ML) ORAL ONCE
Refills: 0 | Status: COMPLETED | OUTPATIENT
Start: 2025-04-08 | End: 2025-04-08

## 2025-04-08 RX ADMIN — Medication 1000 MILLIGRAM(S): at 08:20

## 2025-04-08 RX ADMIN — Medication 400 MILLIGRAM(S): at 07:44

## 2025-04-08 NOTE — ED PROVIDER NOTE - PATIENT PORTAL LINK FT
You can access the FollowMyHealth Patient Portal offered by [facility] by registering at the following website: [email or web address]. By joining [employer]’s FollowMyHealth portal, you will also be able to view your health information using other applications (apps) compatible with our system. No indicators present You can access the FollowMyHealth Patient Portal offered by [facility] by registering at the following website: [email or web address]. By joining [employer]’s FollowMyHealth portal, you will also be able to view your health information using other applications (apps) compatible with our system. You can access the FollowMyHealth Patient Portal offered by [facility] by registering at the following website: [email or web address]. By joining [employer]’s FollowMyHealth portal, you will also be able to view your health information using other applications (apps) compatible with our system.

## 2025-04-08 NOTE — ED ADULT NURSE NOTE - NSFALLHARMRISKINTERV_ED_ALL_ED

## 2025-04-08 NOTE — ED ADULT NURSE REASSESSMENT NOTE - NS ED NURSE REASSESS COMMENT FT1
Patient signed out of ED AMA. MD explained risks of signing out AMA to patient. Patient verbalized understanding of risks. Pt A&Ox3. Pt give verbal consent to MD Compa DAVID

## 2025-04-08 NOTE — CONSULT NOTE ADULT - ASSESSMENT
-hard cervical collar at all times for at least 4-6 weeks to allow bony healing  -f/u outpatient with Dr. Jones 1-2 weeks  -patient refuses CTA H/N to r/o vascular injury due to underlying poor renal function, desire to avoid dialysis, understands risks/benefits/alternatives
INCOMPLETE    89M retired GI physician, history of CABG, A-fib on Eliquis, hypertension, hyperlipidemia, CKD, history of multiple inguinal hernia repairs in the past, biventricular pacemaker, presents for witnessed fall in his home. Now with cervical fracture    Plan    # Cervical fracture:  - CT C spine reveals C2 fracture  - CTH w/ No intracranial hemorrhage or calvarial fracture. Small right posterior scalp hematoma.  - CTA to eval for vascular injury  - Pain control  - Fall precautions  - PT eval  - Spine eval pending    # JASON on CKD3:  - Serial Cr  - Avoid nephrotoxins  - Renally dose medications  - Continue to monitor    # H/o CABG/Afib/ HTN/ HLD/ PPM:  -    # GI:  - Bowel regimen    # DVT ppx:  - IPC's    [employer]    MD BRYAN Pinto

## 2025-04-08 NOTE — ED PROVIDER NOTE - NSFOLLOWUPINSTRUCTIONS_ED_ALL_ED_FT
You were evaluated for a fall while on a blood thinner. We were concerned due to possible bleeding due to your blood thinner so we ordered a CT to rule out intracranial bleed or a Cervical fracture.     Please return if you continue to have falls, or hit your head, You were evaluated for a fall while on a blood thinner. We were concerned due to possible bleeding due to your blood thinner so we ordered a CT to rule out intracranial bleed or a Cervical fracture.     Please return if you continue to have falls, or hit your head,    You are seen in the emerged part from today due to a fall with head trauma.  In the emergency department you were found to have a fracture of your second cervical vertebrae.  You are placed in a c-collar and evaluated by neurosurgery who stated that you are safe to be discharged with follow-up outpatient.  Please follow-up with Dr. Jones in 1 week for further evaluation of your symptoms.  Please keep the cervical collar on at all times for the next 4 to 6 weeks until you are cleared by neurosurgery.  You can take Tylenol as needed for pain relief and use lidocaine patches to the affected area as needed as well.  Please return to the emergency department if experience worsening pain, weakness or numbness to your extremities, difficulty breathing, fevers, chills, body aches.

## 2025-04-08 NOTE — ED PROVIDER NOTE - NS ED ROS FT
REVIEW OF SYSTEMS:  CONSTITUTIONAL: No weakness, fevers or chills  EYES/ENT: No visual changes;  No vertigo or throat pain   NECK: Mild neck pain initially, but currently resolved  RESPIRATORY: No cough, wheezing, hemoptysis; No shortness of breath  CARDIOVASCULAR: No chest pain or palpitations  GASTROINTESTINAL: No abdominal or epigastric pain  GENITOURINARY: No dysuria, frequency or hematuria  NEUROLOGICAL: No numbness or weakness  SKIN: No itching, rashes

## 2025-04-08 NOTE — ED PROVIDER NOTE - CARE PROVIDER_API CALL
Faby Jones Brian  Neurosurgery  [address], [address]  [place] [address]  Phone: [phone]  Fax: [phone]  Established Patient  Follow Up Time:

## 2025-04-08 NOTE — CONSULT NOTE ADULT - SUBJECTIVE AND OBJECTIVE BOX
p (1480)     HPI: 89M retired physician, DNR/DNI, many medical comorbidities p/w mechanical fall found to have nondisplaced C2 anterior arch Hangman's fracture.     Exam: Intact    =====================  PAST MEDICAL HISTORY   Coronary artery disease involving native heart without angina pectoris, unspecified vessel or lesion type    Complete heart block    Paroxysmal atrial fibrillation    Hypothyroidism, unspecified type    Chronic gout with tophus, unspecified cause, unspecified site    Benign prostatic hyperplasia, presence of lower urinary tract symptoms unspecified, unspecified morphology    Acute on chronic congestive heart failure, unspecified congestive heart failure type    Abscess    Basal cell carcinoma    Melanoma in situ of face excluding eyelid, nose, lip, and ear    Carpal tunnel syndrome of left wrist    BOOP (bronchiolitis obliterans with organizing pneumonia)      PAST SURGICAL HISTORY   SBO (small bowel obstruction)    Adhesion of intestine    Hx of cataract surgery, unspecified laterality    History of skin cancer    History of carpal tunnel release, unspecified laterality    History of appendectomy    H/O hernia repair    S/P CABG x 3    History of colon resection    Artificial cardiac pacemaker    H/O shoulder surgery      Diprivan (Short breath)      MEDICATIONS:  Antibiotics:    Neuro:    Other:      SOCIAL HISTORY:   Occupation:   Marital Status:     FAMILY HISTORY:  Family history of coronary artery disease        ROS: Negative except per HPI    LABS:  PT/INR - ( 08 Apr 2025 05:42 )   PT: 14.4 sec;   INR: 1.25 ratio                                 11.0   10.32 )-----------( 169      ( 08 Apr 2025 05:42 )             32.9     04-08    132[L]  |  91[L]  |  74[H]  ----------------------------<  127[H]  5.6[H]   |  24  |  2.40[H]    Ca    9.1      08 Apr 2025 05:42  Phos  4.6     04-08  Mg     2.5     04-08    TPro  7.2  /  Alb  3.8  /  TBili  0.6  /  DBili  x   /  AST  56[H]  /  ALT  34  /  AlkPhos  103  04-08      
Patient is a 89y old  Male who presents with a chief complaint of     HPI:    89M retired GI physician, history of CABG, A-fib on Eliquis, hypertension, hyperlipidemia, CKD, history of multiple inguinal hernia repairs in the past, biventricular pacemaker, presents for witnessed fall in his home. Patient reports he was sitting at his bed and was attempting to get up when he felt his knees feel weak. He reports this sensation is exactly how he feels before he fell with his orthostasis for which he takes Midodrine 10 TID. He reports he primarily slid down the bed and the back of his head hit the side of the bed, leaving a small hematoma back left side of his head. He is on eliquis 2.5 BID for AFib, but did not note any external bleeding. Patient denied any palpitations, any loss of consciousness, neurologic exam was intact with no focal deficits noted.          PAST MEDICAL & SURGICAL HISTORY:  Coronary artery disease involving native heart without angina pectoris, unspecified vessel or lesion type      Complete heart block  s/p PPM insertion-2008  Battery change- 2015  Last interrogation-05/2017      Paroxysmal atrial fibrillation  on Coumadin      Hypothyroidism, unspecified type      Chronic gout with tophus, unspecified cause, unspecified site      Benign prostatic hyperplasia, presence of lower urinary tract symptoms unspecified, unspecified morphology      Acute on chronic congestive heart failure, unspecified congestive heart failure type  2016      Abscess  diverticular      Basal cell carcinoma      Melanoma in situ of face excluding eyelid, nose, lip, and ear      Carpal tunnel syndrome of left wrist      BOOP (bronchiolitis obliterans with organizing pneumonia)  09/2016 after colonoscopy      SBO (small bowel obstruction)  03/2017      Adhesion of intestine  Relese of abdominal adhesions- 2002      Hx of cataract surgery, unspecified laterality      History of skin cancer      History of carpal tunnel release, unspecified laterality  right wrist- 2002      History of appendectomy      H/O hernia repair  bilateral IHR x 2      S/P CABG x 3  2002      History of colon resection  1995      Artificial cardiac pacemaker  2008      H/O shoulder surgery  Right shoulder repair- 2001          FAMILY HISTORY:      SOCIAL HISTORY:    Allergies    Diprivan (Short breath)    Intolerances          REVIEW OF SYSTEMS:  Incomplete    SUBJECTIVE / OVERNIGHT EVENTS:    Vital Signs Last 24 Hrs  T(C): 36.8 (08 Apr 2025 05:35), Max: 36.8 (08 Apr 2025 05:35)  T(F): 98.3 (08 Apr 2025 05:35), Max: 98.3 (08 Apr 2025 05:35)  HR: 69 (08 Apr 2025 07:07) (69 - 73)  BP: 120/66 (08 Apr 2025 07:07) (113/72 - 131/69)  BP(mean): --  RR: 18 (08 Apr 2025 07:07) (16 - 20)  SpO2: 96% (08 Apr 2025 07:07) (95% - 96%)    Parameters below as of 08 Apr 2025 07:07  Patient On (Oxygen Delivery Method): room air      I&O's Summary      PHYSICAL EXAM:  Incomplete    LABS:                        11.0   10.32 )-----------( 169      ( 08 Apr 2025 05:42 )             32.9     04-08    132[L]  |  91[L]  |  74[H]  ----------------------------<  127[H]  5.6[H]   |  24  |  2.40[H]    Ca    9.1      08 Apr 2025 05:42  Phos  4.6     04-08  Mg     2.5     04-08    TPro  7.2  /  Alb  3.8  /  TBili  0.6  /  DBili  x   /  AST  56[H]  /  ALT  34  /  AlkPhos  103  04-08    PT/INR - ( 08 Apr 2025 05:42 )   PT: 14.4 sec;   INR: 1.25 ratio           CAPILLARY BLOOD GLUCOSE            Urinalysis Basic - ( 08 Apr 2025 05:42 )    Color: x / Appearance: x / SG: x / pH: x  Gluc: 127 mg/dL / Ketone: x  / Bili: x / Urobili: x   Blood: x / Protein: x / Nitrite: x   Leuk Esterase: x / RBC: x / WBC x   Sq Epi: x / Non Sq Epi: x / Bacteria: x        RADIOLOGY & ADDITIONAL TESTS: < from: CT Cervical Spine No Cont (04.08.25 @ 06:00) >  IMPRESSION:    CT HEAD:  No intracranial hemorrhage or calvarial fracture.  Small right posterior scalp hematoma.    CT CERVICAL SPINE:  Minimally displaced and comminuted fracture through the left lateral mass   of C2 extending into the left transverse foramen. Nondisplaced fracture   lucency crosses the midline through the C2 body into the right lateral   mass. Consider further evaluation with CT to evaluate for left vertebral   artery injury.  No traumatic subluxation.    < end of copied text >      Imaging Personally Reviewed:  [x] YES  [ ] NO    Consultant(s) Notes Reviewed:  [x] YES  [ ] NO      MEDICATIONS  (STANDING):    MEDICATIONS  (PRN):      Care Discussed with Consultants/Other Providers [x] YES  [ ] NO

## 2025-04-08 NOTE — ED ADULT NURSE NOTE - OBJECTIVE STATEMENT
Patient is a 89 year old male brought in by EMS from home ([facility]) status post witnessed fall. Patient reports he lives with his full time home health aid when he woke up to ambulate with his walker and fell hitting his head and right side of his body. Positive head strike, No LOC. On Eliquis. C-collar placed on by EMS. On assessment patient is A&Ox4 (forgetful at times), breathing comfortably on room air, unlabored, no retractions, no nasal flaring, no accessory muscle use, no cough, chest rise and fall equal bilaterally, paced on the cardiac monitor, no JVD, trace bilateral lower leg / ankle edema noted, strong bilateral peripheral pulses, abdomen is soft, symmetric, and non-tender without distention, skin is excepted color for ethnicity without lesions or rashes, skin warm and dry, capillary refill is less than 3 seconds, skin tear noted to right knee and right shoulder - bleeding controlled, old healing skin tear noted to left upper and lower arm, bilateral shins and right lower arm. All wounds cleaned and dried and Xeroform applied. Patient denies headache, dizziness, chest pain, palpitations, cough, SOB, abdominal pain, n/v/d, urinary symptoms, fevers, chills, weakness at this time. PMH CABG, A-fib on Eliquis, hypertension, hyperlipidemia, CKD, history of multiple inguinal hernia repairs in the past, biventricular pacemaker.

## 2025-04-08 NOTE — ED PROVIDER NOTE - PHYSICAL EXAMINATION
T(C): 36.8 (04-08-25 @ 05:35), Max: 36.8 (04-08-25 @ 05:35)  HR: 70 (04-08-25 @ 05:35) (70 - 73)  BP: 131/69 (04-08-25 @ 05:35) (113/72 - 131/69)  RR: 20 (04-08-25 @ 05:35) (16 - 20)  SpO2: 96% (04-08-25 @ 05:35) (95% - 96%)    CONSTITUTIONAL: Well groomed, no apparent distress  HEAD: Small Hematoma noted on R posterior scalp  EYES: PERRLA and symmetric, EOMI, No conjunctival or scleral injection,  ENMT: Oral mucosa with moist membranes. Normal dentition; no pharyngeal injection or exudates  NECK: No point tenderness noted on Cervical neck exam; c-collar taken off;   RESP: No respiratory distress, no use of accessory muscles; CTA b/l, no WRR  CV: RRR, +S1S2, no MRG; no JVD; no peripheral edema  GI: Soft, NT, ND, no rebound, no guarding; no palpable masses; no hepatosplenomegaly; no hernia palpated  MSK: 1+ pitting edema bilaterally  NEURO: CN II-XII intact; normal reflexes in upper and lower extremities, sensation intact in upper and lower extremities b/l to light touch   PSYCH: Appropriate insight/judgment; A+O x 3, mood and affect appropriate, recent/remote memory intact

## 2025-04-08 NOTE — ED ADULT NURSE REASSESSMENT NOTE - NS ED NURSE REASSESS COMMENT FT1
0700 Report received from ANDREI SHANKAR Pt AAOx4, NAD, resp nonlabored, skin warm/dry, resting comfortably in bed with family at bedside. Pt c/o neck pain . Pt denies headache, dizziness, chest pain, palpitations, SOB, abd pain, n/v/d, fevers, chills, weakness at this time. Pt awaiting for results  . Safety maintained with call bell within reach. 0700 Report received from ANDREI SHANKAR Pt AAOx4, NAD, resp nonlabored, skin warm/dry, resting comfortably in bed with family at bedside. Pt c/o neck pain . Pt denies headache, dizziness, chest pain, palpitations, SOB, abd pain, n/v/d, fevers, chills, weakness at this time. Pt awaiting for results  . Safety maintained with call stephens within reach. MD Sepulveda placed C-collar

## 2025-04-08 NOTE — ED PROVIDER NOTE - CARE PLAN
Principal Discharge DX:	Fall at home  Goal:	Rule out bleed/Cervical Fracture  Assessment and plan of treatment:	Will rule out CT   1 Principal Discharge DX:	Fall at home  Goal:	Rule out bleed/Cervical Fracture  Assessment and plan of treatment:	CT head/C-Spine to rule out intracranial bleed/c-spine fracture

## 2025-04-08 NOTE — ED ADULT NURSE NOTE - DRUG PRE-SCREENING (DAST -1)
He is at risk for lack of exercise and has been provided with information to increase physical activity for the benefit of his well-being.  The patient was counseled and encouraged to consider modifying their diet and eating habits. He was provided with information on recommended healthy diet options.   Statement Selected

## 2025-04-08 NOTE — ED PROVIDER NOTE - EKG #1 DATE/TIME
08-Apr-2025 07:32 Ear Wedge Repair Text: A wedge excision was completed by carrying down an excision through the full thickness of the ear and cartilage with an inward facing Burow's triangle. The wound was then closed in a layered fashion.

## 2025-04-08 NOTE — ED PROVIDER NOTE - PRINCIPAL DIAGNOSIS
From: Lakshmi Rebolledo  To: Dr. Mathieu Silvestre  Sent: 4/3/2024 8:08 PM EDT  Subject: toverstation or lipitor     my presition coverage is messed up it will not be valid till 5/1/24 at that time the shot would only cost me 15 dollars i got my metforman and my losartan they were only 4 dollars is there another one for the lipitor thats a generic , cant do much about the shot all name brands will be to much but its only a month to wait for the insurance to cover all my meds    Fall at home

## 2025-04-08 NOTE — ED PROVIDER NOTE - PROGRESS NOTE DETAILS
Received signout of this 89-year-old male who has a C2 fracture after fall awaiting results of neurosurgical consultation. I received this patient in signout.  89-year-old male past medical history of bronchiolitis obliterans, CHF, hypothyroid, complete heart block status post pacemaker insertion, CAD presenting due to fall which occurred last night.  Patient reports he was sitting in his bed and attempted to get up and felt lightheaded and fell backwards hitting his head.  No loss conscious.  Patient states he had some neck pain at the time, but resolved when he came in.  Labs showed no signs of any anemia or electrolyte abnormalities.  CT head showed no signs of ICH.  CT cervical spine showed a C2 fracture.  Neurosurgery has been paged.  Patient is neurologically intact.  Good  strength bilaterally.  In no respiratory distress at this time.    Emir Chatman MD (PGY 2) Neurosurgery stated patient can follow-up outpatient with Dr. Jones in 1 week and to keep the c-collar on for the next 4 to 6 weeks.  I spoke with the patient about this and gave him a [place] J cervical collar and the patient stated he was feeling comfortable with the collar.  Patient understands my instructions and is safer discharge.    Emir Chatman MD (PGY 2) Required multiple metabolic panels to confirm no hyperkalemia due to hemolysis. Patient feels unsteady when standing, is cleared by NSGY but cannot safely go home, will plan for admission. Attempted discharge, patient felt lightheaded when standing and had to sit back down.  Patient was found to be unsteady on feet.  Patient will be admitted for syncopal workup.    Emir Chatman MD (PGY 2) pt unable to walk due to lightheadedness, was here due to fall 2/2 lightheadedness and has c2fx, pt ammenable to admission follows w/ Dr. Steven goldberg, pt w/ no cp, no sob, aware of risks of falling and will stay in the hospital I spoke with Dr. Coronado who agreed admit the patient.    Emir Chatman MD (PGY 2) The patient wishes to be discharged against medical advice. I have assessed the patient's mental status and  the patient has capacity to make this decision. I have explained the risks of leaving without full treatment, including severe disability and death, which the patient understands and is willing to accept. I have answered all of the patient's questions. I reiterated my medical opinion and advised the patient to return at any time. We discussed the further workup outside of the current visit and return precautions.    Emir Chatman MD (PGY 2) Attending MD Prakash: After time of sign out, when decision was made to admit patient for inability to ambulate and patient had been admitted, patient reports that he wants to leave AMA. AAOx4.  Attending MD Prakash: The patient wants to leave the hospital against medical advice.  The patient understands the risks, benefits and alternatives of this decision.  The risks of undiagnosed etiology for syncope, cardiac arrhythmia, permanent disability and death explained to patient and the patient demonstrated understanding of these risks.  The patient was instructed to follow-up immediately with their primary physician. JAKE.

## 2025-04-08 NOTE — ED PROVIDER NOTE - PLAN OF CARE
Rule out bleed/Cervical Fracture Will rule out CT CT head/C-Spine to rule out intracranial bleed/c-spine fracture

## 2025-04-08 NOTE — ED ADULT NURSE REASSESSMENT NOTE - NS ED NURSE REASSESS COMMENT FT1
Pt verbalizes understanding to f/u with PCP and return to ED for any worsening symptoms. Patient d/c home w/ written and verbal instructions. Pt verbalized understanding. IV d/c - No redness or swelling /no bleeding

## 2025-04-08 NOTE — ED PROVIDER NOTE - ATTENDING CONTRIBUTION TO CARE
89 year old M with PMH of CABG, Afib on eliquis, HTN, HLD, CKD, biventricular pacemaker, presents to the ED with witnessed fall today. Patient reports he was sitting at his bed and was attempting to get up when he felt his knees feel weak, sliding down the bed and hitting back of head on side of bed. Has hx of falls in past. Denies fever, chills, chest pain, shortness of breath, abdominal pain, n/v/d, numbness, weakness, tingling, headache, back pain.     Physical Exam:  Gen: NAD, awake and alert, non-toxic appearing  HEENT: Atraumatic, oropharynx clear, moist mucous membranes, normal conjunctiva  Cardio: RRR, no murmurs, rubs or gallops  Lung: CTAB, no respiratory distress, no wheezes/rhonchi/rales B/L  Abd: soft, NT, ND, no guarding, no rigidity, no rebound tenderness, no CVA tenderness   MSK: no visible deformities, ROMx4   Neuro: No focal sensory or motor deficits  Skin: Warm, well perfused, no rash, no leg swelling    Patient presents with head trauma after a mechanical ground level fall. DDX includes MSK trauma, ICH or traumatic SAH, C-spine injury. Doubt other extracranial causes of injury. Considered nonmechanical causes of fall such as syncope, primary cardiopulmonary etiologies such as ACS/PE, but think these are unlikely/  CBC, CMP, CT head, CT cervical. c-collar in place.    CT spine reveals C2 fracture. discussed with pt. he is requesting collar to come off however explained that needs to be kept on at this time.   CT is recommending CTA to eval for vascular injury. pt has JASON of 2.4. will consult spine and await recommendations before further imaging.   pt signed out pending spine reccs

## 2025-04-09 NOTE — CHART NOTE - NSCHARTNOTEFT_GEN_A_CORE
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    Pt well known to me as I met him in the ED last admission 3/24 (admitted s/p fall w hypotension, likely over diuresis), after an extensive discussion with him and family, we admitted him to PCU for co-management given his ongoing functional decline and intense feelings of demoralization leading him to contemplate medical aid in dying (MAID) and comfort care.    After his hospital course, he clinically improved and was dc'd home 4/2 with 24/7 HHA.     Today I received call from his daughter (Sarina, who is an ) as patient is now home after signing himself out of the ED AMA yesterday, with no wheelchair and he remains unable to stand independently. Daughter tells me when pt was notified he would require admission to the hospital yesterday, he requested to go to the PCU as he wanted comfort focused care and was told there was no bed which prompted pt to request signing out AMA (he is a & o x 3). Today, they reached out to his PMD/Cardiologist (Dr. Goldberg) for assistance with managing his BP meds at home given his persistent dizziness and hypotension.  They're awaiting response.      I have involved SW/CM to see what can be done to assist setting up Visiting Docs, guiding how to get a wheelchair and providing them with Hospice info if pt chooses to forgo return to the hospital.       Daughter with continued concern that the pt is increasingly frustrated regarding his increasing debility and has made several statements of considering overdosing himself to end his life as he understands [facility] does not participate with MAID programs. Discussed options of Hospice care and reviewed consideration of deactivating PPM over self-medication as a legal/ethically viable alternative if pts wish is to defer all life sustaining medical interventions.      Daughter is aware to call 911 or bring him to the hospital if increasing concerns of his safety at home. I assured her that if they return to the ED and requires admission, we can expedite getting him directly to PCU (especially if it is to deactivate his PPM) pending bed availability.   All questions answered in detail  Support provided

## 2025-04-14 PROBLEM — S12.101D CLOSED NONDISPLACED FRACTURE OF SECOND CERVICAL VERTEBRA WITH ROUTINE HEALING, UNSPECIFIED FRACTURE MORPHOLOGY, SUBSEQUENT ENCOUNTER: Status: ACTIVE | Noted: 2025-04-14

## 2025-04-16 ENCOUNTER — NON-APPOINTMENT (OUTPATIENT)
Age: 89
End: 2025-04-16

## 2025-04-16 ENCOUNTER — APPOINTMENT (OUTPATIENT)
Dept: NEUROSURGERY | Facility: CLINIC | Age: 89
End: 2025-04-16
Payer: MEDICARE

## 2025-04-16 DIAGNOSIS — S12.101D: ICD-10-CM

## 2025-04-16 PROCEDURE — 99203 OFFICE O/P NEW LOW 30 MIN: CPT

## 2025-05-14 ENCOUNTER — OUTPATIENT (OUTPATIENT)
Dept: OUTPATIENT SERVICES | Facility: HOSPITAL | Age: 89
LOS: 1 days | End: 2025-05-14
Payer: MEDICARE

## 2025-05-14 ENCOUNTER — APPOINTMENT (OUTPATIENT)
Dept: RADIOLOGY | Facility: IMAGING CENTER | Age: 89
End: 2025-05-14
Payer: MEDICARE

## 2025-05-14 ENCOUNTER — APPOINTMENT (OUTPATIENT)
Dept: NEUROSURGERY | Facility: CLINIC | Age: 89
End: 2025-05-14
Payer: MEDICARE

## 2025-05-14 VITALS
WEIGHT: 140 LBS | RESPIRATION RATE: 17 BRPM | HEIGHT: 70 IN | OXYGEN SATURATION: 94 % | HEART RATE: 72 BPM | BODY MASS INDEX: 20.04 KG/M2

## 2025-05-14 VITALS — DIASTOLIC BLOOD PRESSURE: 67 MMHG | SYSTOLIC BLOOD PRESSURE: 105 MMHG | HEIGHT: 70 IN | BODY MASS INDEX: 20.09 KG/M2

## 2025-05-14 DIAGNOSIS — Z90.49 ACQUIRED ABSENCE OF OTHER SPECIFIED PARTS OF DIGESTIVE TRACT: Chronic | ICD-10-CM

## 2025-05-14 DIAGNOSIS — K56.69 OTHER INTESTINAL OBSTRUCTION: Chronic | ICD-10-CM

## 2025-05-14 DIAGNOSIS — Z98.890 OTHER SPECIFIED POSTPROCEDURAL STATES: Chronic | ICD-10-CM

## 2025-05-14 DIAGNOSIS — K66.0 PERITONEAL ADHESIONS (POSTPROCEDURAL) (POSTINFECTION): Chronic | ICD-10-CM

## 2025-05-14 DIAGNOSIS — Z95.0 PRESENCE OF CARDIAC PACEMAKER: Chronic | ICD-10-CM

## 2025-05-14 DIAGNOSIS — Z95.1 PRESENCE OF AORTOCORONARY BYPASS GRAFT: Chronic | ICD-10-CM

## 2025-05-14 DIAGNOSIS — Z98.49 CATARACT EXTRACTION STATUS, UNSPECIFIED EYE: Chronic | ICD-10-CM

## 2025-05-14 DIAGNOSIS — S12.101D: ICD-10-CM

## 2025-05-14 DIAGNOSIS — S12.101D UNSPECIFIED NONDISPLACED FRACTURE OF SECOND CERVICAL VERTEBRA, SUBSEQUENT ENCOUNTER FOR FRACTURE WITH ROUTINE HEALING: ICD-10-CM

## 2025-05-14 DIAGNOSIS — Z85.828 PERSONAL HISTORY OF OTHER MALIGNANT NEOPLASM OF SKIN: Chronic | ICD-10-CM

## 2025-05-14 DIAGNOSIS — Z98.89 OTHER SPECIFIED POSTPROCEDURAL STATES: Chronic | ICD-10-CM

## 2025-05-14 PROCEDURE — 99212 OFFICE O/P EST SF 10 MIN: CPT

## 2025-05-14 PROCEDURE — 72040 X-RAY EXAM NECK SPINE 2-3 VW: CPT

## 2025-05-14 PROCEDURE — 72040 X-RAY EXAM NECK SPINE 2-3 VW: CPT | Mod: 26

## 2025-06-04 ENCOUNTER — APPOINTMENT (OUTPATIENT)
Dept: CT IMAGING | Facility: IMAGING CENTER | Age: 89
End: 2025-06-04
Payer: MEDICARE

## 2025-06-04 ENCOUNTER — OUTPATIENT (OUTPATIENT)
Dept: OUTPATIENT SERVICES | Facility: HOSPITAL | Age: 89
LOS: 1 days | End: 2025-06-04
Payer: MEDICARE

## 2025-06-04 DIAGNOSIS — Z85.828 PERSONAL HISTORY OF OTHER MALIGNANT NEOPLASM OF SKIN: Chronic | ICD-10-CM

## 2025-06-04 DIAGNOSIS — K66.0 PERITONEAL ADHESIONS (POSTPROCEDURAL) (POSTINFECTION): Chronic | ICD-10-CM

## 2025-06-04 DIAGNOSIS — Z90.49 ACQUIRED ABSENCE OF OTHER SPECIFIED PARTS OF DIGESTIVE TRACT: Chronic | ICD-10-CM

## 2025-06-04 DIAGNOSIS — Z98.49 CATARACT EXTRACTION STATUS, UNSPECIFIED EYE: Chronic | ICD-10-CM

## 2025-06-04 DIAGNOSIS — Z98.89 OTHER SPECIFIED POSTPROCEDURAL STATES: Chronic | ICD-10-CM

## 2025-06-04 DIAGNOSIS — Z98.890 OTHER SPECIFIED POSTPROCEDURAL STATES: Chronic | ICD-10-CM

## 2025-06-04 DIAGNOSIS — G31.84 MILD COGNITIVE IMPAIRMENT OF UNCERTAIN OR UNKNOWN ETIOLOGY: ICD-10-CM

## 2025-06-04 DIAGNOSIS — Z95.1 PRESENCE OF AORTOCORONARY BYPASS GRAFT: Chronic | ICD-10-CM

## 2025-06-04 DIAGNOSIS — Z95.0 PRESENCE OF CARDIAC PACEMAKER: Chronic | ICD-10-CM

## 2025-06-04 DIAGNOSIS — K56.69 OTHER INTESTINAL OBSTRUCTION: Chronic | ICD-10-CM

## 2025-06-04 DIAGNOSIS — Z00.8 ENCOUNTER FOR OTHER GENERAL EXAMINATION: ICD-10-CM

## 2025-06-04 PROCEDURE — 70450 CT HEAD/BRAIN W/O DYE: CPT | Mod: MH

## 2025-06-04 PROCEDURE — 70450 CT HEAD/BRAIN W/O DYE: CPT | Mod: 26
